# Patient Record
Sex: FEMALE | Race: WHITE | NOT HISPANIC OR LATINO | Employment: OTHER | URBAN - METROPOLITAN AREA
[De-identification: names, ages, dates, MRNs, and addresses within clinical notes are randomized per-mention and may not be internally consistent; named-entity substitution may affect disease eponyms.]

---

## 2016-10-18 LAB
CHOLESTEROL, TOTAL: 299 (ref 125–200)
CREATININE RANDOM URINE: 59 MG/DL (ref 20–320)
HDLC SERPL-MCNC: 37 MG/DL
LDLC SERPL CALC-MCNC: 224 MG/DL
MICROALBUM.,U,RANDOM: 2.6
MICROALBUMIN/CREATININE RATIO: 44
TRIGL SERPL-MCNC: 192 MG/DL

## 2017-02-09 ENCOUNTER — OFFICE VISIT (OUTPATIENT)
Dept: FAMILY MEDICINE | Facility: CLINIC | Age: 82
End: 2017-02-09
Payer: MEDICARE

## 2017-02-09 VITALS
HEIGHT: 59 IN | OXYGEN SATURATION: 98 % | TEMPERATURE: 98 F | HEART RATE: 79 BPM | DIASTOLIC BLOOD PRESSURE: 68 MMHG | SYSTOLIC BLOOD PRESSURE: 150 MMHG | WEIGHT: 113.88 LBS | BODY MASS INDEX: 22.96 KG/M2 | RESPIRATION RATE: 18 BRPM

## 2017-02-09 DIAGNOSIS — E11.40 CONTROLLED TYPE 2 DIABETES MELLITUS WITH DIABETIC NEUROPATHY, WITHOUT LONG-TERM CURRENT USE OF INSULIN: ICD-10-CM

## 2017-02-09 DIAGNOSIS — M54.9 CHRONIC BACK PAIN, UNSPECIFIED BACK LOCATION, UNSPECIFIED BACK PAIN LATERALITY: ICD-10-CM

## 2017-02-09 DIAGNOSIS — G89.29 CHRONIC BACK PAIN, UNSPECIFIED BACK LOCATION, UNSPECIFIED BACK PAIN LATERALITY: ICD-10-CM

## 2017-02-09 DIAGNOSIS — M15.9 OSTEOARTHRITIS INVOLVING MULTIPLE JOINTS ON BOTH SIDES OF BODY: ICD-10-CM

## 2017-02-09 DIAGNOSIS — I10 ESSENTIAL HYPERTENSION: ICD-10-CM

## 2017-02-09 PROBLEM — E11.9 DIABETES TYPE 2, CONTROLLED: Status: ACTIVE | Noted: 2017-02-09

## 2017-02-09 PROCEDURE — 99204 OFFICE O/P NEW MOD 45 MIN: CPT | Mod: S$GLB,,, | Performed by: FAMILY MEDICINE

## 2017-02-09 PROCEDURE — 99999 PR PBB SHADOW E&M-NEW PATIENT-LVL I: CPT | Mod: PBBFAC,,, | Performed by: FAMILY MEDICINE

## 2017-02-09 PROCEDURE — 1159F MED LIST DOCD IN RCRD: CPT | Mod: S$GLB,,, | Performed by: FAMILY MEDICINE

## 2017-02-09 PROCEDURE — 1160F RVW MEDS BY RX/DR IN RCRD: CPT | Mod: S$GLB,,, | Performed by: FAMILY MEDICINE

## 2017-02-09 PROCEDURE — 1157F ADVNC CARE PLAN IN RCRD: CPT | Mod: S$GLB,,, | Performed by: FAMILY MEDICINE

## 2017-02-09 RX ORDER — ATORVASTATIN CALCIUM 20 MG/1
40 TABLET, FILM COATED ORAL DAILY
COMMUNITY
End: 2018-01-04

## 2017-02-09 RX ORDER — GABAPENTIN 300 MG/1
300 CAPSULE ORAL 3 TIMES DAILY
COMMUNITY
End: 2018-06-26 | Stop reason: SDUPTHER

## 2017-02-09 RX ORDER — ASPIRIN 81 MG/1
81 TABLET ORAL DAILY
COMMUNITY

## 2017-02-09 RX ORDER — TRAMADOL HYDROCHLORIDE 50 MG/1
50 TABLET ORAL EVERY 12 HOURS PRN
Qty: 60 TABLET | Refills: 0 | Status: SHIPPED | OUTPATIENT
Start: 2017-02-09 | End: 2017-03-09 | Stop reason: SDUPTHER

## 2017-02-09 RX ORDER — SERTRALINE HYDROCHLORIDE 100 MG/1
100 TABLET, FILM COATED ORAL DAILY
COMMUNITY
End: 2017-05-24 | Stop reason: SDUPTHER

## 2017-02-09 NOTE — PROGRESS NOTES
Chief Complaint   Patient presents with    Establish Care       HPI  Kaycee Almanza is a 82 y.o. female with multiple medical diagnoses as listed in the medical history and problem list that presents for establishment of care . She has diabetes type 2 and hypertension along with chronic pain from arthritis. She was previously seeing Dr. Cordero but changed her insurance. Her blood sugars range form 130-140s. She has been taking tramadol for pain once daily with tylenol but this does not last long enough. She would like to take it twice a day but is concerned she will run out of pills.   The pain in her back is worsening and radiates down her legs. Her daughter describes her as being very active but is now reduced to sitting in her chair most days due to pain. She is not having numbness or paresthesia.     PAST MEDICAL HISTORY:  Past Medical History   Diagnosis Date    Arthritis     Cataracts, both eyes     Depression     Diabetes type 2, controlled      sees Dr. Elena    Hyperlipidemia     Hypertension        PAST SURGICAL HISTORY:  Past Surgical History   Procedure Laterality Date    Cholecystectomy      Cataract surgery  2006    Shoulder surgery      Knee surgery      Hand surgery      Hysterectomy         SOCIAL HISTORY:  Social History     Social History    Marital status:      Spouse name: N/A    Number of children: N/A    Years of education: N/A     Occupational History    Not on file.     Social History Main Topics    Smoking status: Not on file    Smokeless tobacco: Not on file    Alcohol use Not on file    Drug use: Not on file    Sexual activity: Not on file     Other Topics Concern    Not on file     Social History Narrative    No narrative on file       FAMILY HISTORY:  No family history on file.    ALLERGIES AND MEDICATIONS: updated and reviewed.  Review of patient's allergies indicates:  No Known Allergies  Current Outpatient Prescriptions   Medication Sig Dispense  Refill    tramadol (ULTRAM) 50 mg tablet Take 1 tablet (50 mg total) by mouth every 12 (twelve) hours as needed for Pain. 60 tablet 0     No current facility-administered medications for this visit.        ROS  Review of Systems   Constitutional: Negative for chills, diaphoresis, fatigue, fever and unexpected weight change.   HENT: Negative for rhinorrhea, sinus pressure, sore throat and tinnitus.    Eyes: Negative for photophobia and visual disturbance.   Respiratory: Negative for cough, shortness of breath and wheezing.    Cardiovascular: Negative for chest pain and palpitations.   Gastrointestinal: Negative for abdominal pain, blood in stool, constipation, diarrhea, nausea and vomiting.   Genitourinary: Negative for dysuria, flank pain, frequency and vaginal discharge.   Musculoskeletal: Positive for arthralgias and back pain. Negative for joint swelling.   Skin: Negative for rash.   Neurological: Negative for speech difficulty, weakness, light-headedness and headaches.   Psychiatric/Behavioral: Negative for behavioral problems and dysphoric mood.       Physical Exam  There were no vitals filed for this visit. There is no height or weight on file to calculate BMI.            Physical Exam   Constitutional: She is oriented to person, place, and time. She appears well-developed and well-nourished. No distress.   HENT:   Head: Normocephalic and atraumatic.   Eyes: EOM are normal.   Neck: Neck supple.   Cardiovascular: Normal rate and regular rhythm.  Exam reveals no gallop and no friction rub.    No murmur heard.  Pulmonary/Chest: Effort normal and breath sounds normal. No respiratory distress. She has no wheezes. She has no rales.   Neurological: She is alert and oriented to person, place, and time.   Skin: Skin is warm and dry. No rash noted.   Psychiatric: She has a normal mood and affect. Her behavior is normal.   Nursing note and vitals reviewed.      Health Maintenance     Patient has no pending health  maintenance at this time            ASSESSMENT     1. Osteoarthritis involving multiple joints on both sides of body    2. Essential hypertension    3. Controlled type 2 diabetes mellitus with diabetic neuropathy, without long-term current use of insulin    4. Chronic back pain, unspecified back location, unspecified back pain laterality        PLAN:     Osteoarthritis involving multiple joints on both sides of body  -increase to BID dosing, discussed side effects of narcotic medications such as tolerance, drowsiness, or constipation  -     tramadol (ULTRAM) 50 mg tablet; Take 1 tablet (50 mg total) by mouth every 12 (twelve) hours as needed for Pain.  Dispense: 60 tablet; Refill: 0    Essential hypertension  -follow up in one month if recheck is elevated    Controlled type 2 diabetes mellitus with diabetic neuropathy, without long-term current use of insulin  -get records, may need to update A1C at next visit    Chronic back pain, unspecified back location, unspecified back pain laterality  -This is a chronic medical condition that is stable under the current regimen. Continue to monitor and we will make medication adjustments as needed.           Iona Jose MD  02/09/2017 12:55 PM        Return in about 1 month (around 3/9/2017) for Follow up.

## 2017-03-09 ENCOUNTER — OFFICE VISIT (OUTPATIENT)
Dept: FAMILY MEDICINE | Facility: CLINIC | Age: 82
End: 2017-03-09
Payer: MEDICARE

## 2017-03-09 VITALS
DIASTOLIC BLOOD PRESSURE: 86 MMHG | SYSTOLIC BLOOD PRESSURE: 170 MMHG | OXYGEN SATURATION: 97 % | RESPIRATION RATE: 18 BRPM | HEIGHT: 59 IN | TEMPERATURE: 98 F | WEIGHT: 112.88 LBS | HEART RATE: 71 BPM | BODY MASS INDEX: 22.76 KG/M2

## 2017-03-09 DIAGNOSIS — M15.9 OSTEOARTHRITIS INVOLVING MULTIPLE JOINTS ON BOTH SIDES OF BODY: ICD-10-CM

## 2017-03-09 DIAGNOSIS — G89.29 CHRONIC BACK PAIN, UNSPECIFIED BACK LOCATION, UNSPECIFIED BACK PAIN LATERALITY: ICD-10-CM

## 2017-03-09 DIAGNOSIS — E11.9 TYPE II OR UNSPECIFIED TYPE DIABETES MELLITUS WITHOUT MENTION OF COMPLICATION, NOT STATED AS UNCONTROLLED: Primary | ICD-10-CM

## 2017-03-09 DIAGNOSIS — I10 ESSENTIAL HYPERTENSION: ICD-10-CM

## 2017-03-09 DIAGNOSIS — M54.9 CHRONIC BACK PAIN, UNSPECIFIED BACK LOCATION, UNSPECIFIED BACK PAIN LATERALITY: ICD-10-CM

## 2017-03-09 PROCEDURE — 1157F ADVNC CARE PLAN IN RCRD: CPT | Mod: S$GLB,,, | Performed by: FAMILY MEDICINE

## 2017-03-09 PROCEDURE — 3079F DIAST BP 80-89 MM HG: CPT | Mod: S$GLB,,, | Performed by: FAMILY MEDICINE

## 2017-03-09 PROCEDURE — 1160F RVW MEDS BY RX/DR IN RCRD: CPT | Mod: S$GLB,,, | Performed by: FAMILY MEDICINE

## 2017-03-09 PROCEDURE — 99214 OFFICE O/P EST MOD 30 MIN: CPT | Mod: S$GLB,,, | Performed by: FAMILY MEDICINE

## 2017-03-09 PROCEDURE — 99999 PR PBB SHADOW E&M-EST. PATIENT-LVL IV: CPT | Mod: PBBFAC,,, | Performed by: FAMILY MEDICINE

## 2017-03-09 PROCEDURE — 3077F SYST BP >= 140 MM HG: CPT | Mod: S$GLB,,, | Performed by: FAMILY MEDICINE

## 2017-03-09 PROCEDURE — 1125F AMNT PAIN NOTED PAIN PRSNT: CPT | Mod: S$GLB,,, | Performed by: FAMILY MEDICINE

## 2017-03-09 PROCEDURE — 1159F MED LIST DOCD IN RCRD: CPT | Mod: S$GLB,,, | Performed by: FAMILY MEDICINE

## 2017-03-09 RX ORDER — AMLODIPINE BESYLATE 5 MG/1
5 TABLET ORAL DAILY
COMMUNITY
End: 2017-05-24 | Stop reason: SDUPTHER

## 2017-03-09 RX ORDER — LISINOPRIL 40 MG/1
40 TABLET ORAL DAILY
Qty: 90 TABLET | Refills: 3 | Status: SHIPPED | OUTPATIENT
Start: 2017-03-09 | End: 2018-02-01 | Stop reason: SDUPTHER

## 2017-03-09 RX ORDER — TRAMADOL HYDROCHLORIDE 50 MG/1
50 TABLET ORAL EVERY 12 HOURS PRN
Qty: 60 TABLET | Refills: 3 | Status: SHIPPED | OUTPATIENT
Start: 2017-03-09 | End: 2017-08-24

## 2017-03-09 RX ORDER — LISINOPRIL 20 MG/1
20 TABLET ORAL DAILY
COMMUNITY
End: 2017-03-09

## 2017-03-09 NOTE — MR AVS SNAPSHOT
[facility]  [address]  [place] [address]  Phone: [phone]  Fax: [phone]                  Kaycee Almanza   3/9/2017 7:20 AM   Office Visit    Description:  Female : 10/13/1934   Provider:  Iona Jose MD   Department:  [facility] - Family Medicine           Reason for Visit     Hypertension     Follow-up           Diagnoses this Visit        Comments    Type II or unspecified type diabetes mellitus without mention of complication, not stated as uncontrolled    -  Primary     Osteoarthritis involving multiple joints on both sides of body         Essential hypertension         Chronic back pain, unspecified back location, unspecified back pain laterality                To Do List           Goals (5 Years of Data)     None      Follow-Up and Disposition     Return in about 1 month (around 2017) for Follow up.       These Medications        Disp Refills Start End    lisinopril (PRINIVIL,ZESTRIL) 40 MG tablet 90 tablet 3 3/9/2017 3/9/2018    Take 1 tablet (40 mg total) by mouth once daily. - Oral    Pharmacy: [facility] - [address] #: [phone]         [facility] On Call     [facility] On Call Nurse Care Line -  Assistance  Registered nurses in the [facility] On Call Center provide clinical advisement, health education, appointment booking, and other advisory services.  Call for this free service at [phone].             Medications           Message regarding Medications     Verify the changes and/or additions to your medication regime listed below are the same as discussed with your clinician today.  If any of these changes or additions are incorrect, please notify your healthcare provider.        START taking these NEW medications        Refills    lisinopril (PRINIVIL,ZESTRIL) 40 MG tablet 3    Sig: Take 1 tablet (40 mg total) by mouth once daily.    Class: Normal    Route: Oral           Verify that the below  "list of medications is an accurate representation of the medications you are currently taking.  If none reported, the list may be blank. If incorrect, please contact your healthcare provider. Carry this list with you in case of emergency.           Current Medications     amlodipine (NORVASC) 5 MG tablet Take 5 mg by mouth once daily.    aspirin (ECOTRIN) 81 MG EC tablet Take 81 mg by mouth once daily.    atorvastatin (LIPITOR) 20 MG tablet Take 20 mg by mouth once daily.    gabapentin (NEURONTIN) 300 MG capsule Take 300 mg by mouth 3 (three) times daily.    linagliptin (TRADJENTA) 5 mg Tab tablet Take 5 mg by mouth once daily.    sertraline (ZOLOFT) 100 MG tablet Take 100 mg by mouth once daily.    tramadol (ULTRAM) 50 mg tablet Take 1 tablet (50 mg total) by mouth every 12 (twelve) hours as needed for Pain.    lisinopril (PRINIVIL,ZESTRIL) 40 MG tablet Take 1 tablet (40 mg total) by mouth once daily.           Clinical Reference Information           Your Vitals Were     BP Pulse Temp Resp Height Weight    170/86 (BP Location: Left arm, Patient Position: Sitting, BP Method: Manual) 71 97.9 °F (36.6 °C) (Oral) 18 4' 11" (1.499 m) 51.2 kg (112 lb 14 oz)    SpO2 BMI             97% 22.8 kg/m2         Blood Pressure          Most Recent Value    BP  (!)  170/86      Allergies as of 3/9/2017     No Known Allergies      Immunizations Administered on Date of Encounter - 3/9/2017     None      Orders Placed During Today's Visit      Normal Orders This Visit    Ambulatory consult to Endocrinology     Ambulatory consult to Orthopedics       [facility] Sign-Up     Activating your [facility] account is as easy as 1-2-3!     1) Visit [email or web address], select Sign Up Now, enter this activation code and your date of birth, then select Next.  [email or web address]  Expires: 4/23/2017  7:49 AM      2) Create a username and password to use when you visit [facility] in the future and select a security question in case you lose your password " and select Next.    3) Enter your e-mail address and click Sign Up!    Additional Information  If you have questions, please e-mail [email or web address] or call [phone] to talk to our [facility] staff. Remember, [facility] is NOT to be used for urgent needs. For medical emergencies, dial 911.         Language Assistance Services     ATTENTION: Language assistance services are available, free of charge. Please call [phone].      ATENCIÓN: Si habla farrukhibeth, tiene a baker disposición servicios gratuitos de asistencia lingüística. Llame al [phone].     [facility] Ý: N?u b?n nói Ti?ng Vi?t, có các d?ch v? h? tr? ngôn ng? mi?n phí dành cho b?n. G?i s? [phone].         [facility] complies with applicable Federal civil rights laws and does not discriminate on the basis of race, color, national origin, age, disability, or sex.

## 2017-03-09 NOTE — PROGRESS NOTES
Chief Complaint   Patient presents with    Hypertension    Follow-up     Referral       HPI  Kaycee Almanza is a 82 y.o. female with multiple medical diagnoses as listed in the medical history and problem list that presents for follow-up for hypertension, diabetes and arthritis.    She needs a refill of her medication for pain. She needs a referral for Dr. Cesar, her orthopedic to make an appt.    She also needs a referral for Dr. Morales as she sees him tomorrow for diabetes care. Her sugars run around 117-120.    Her blood pressure has not been controlled. She takes norvasc 5mg and lisinopril 20mg. She has been systolic 140-150 and diastolic, 70-88.    PAST MEDICAL HISTORY:  Past Medical History:   Diagnosis Date    Anxiety     Arthritis     Cataracts, both eyes     Dementia     Depression     Diabetes type 2, controlled     sees Dr. Elena    GERD (gastroesophageal reflux disease)     Hyperlipidemia     Hypertension     Osteoporosis     Thyroid disease     Ulcer     Ulcer        PAST SURGICAL HISTORY:  Past Surgical History:   Procedure Laterality Date    cataract surgery  2006    CHOLECYSTECTOMY      EYE SURGERY      HAND SURGERY      HYSTERECTOMY      KNEE SURGERY      SHOULDER SURGERY         SOCIAL HISTORY:  Social History     Social History    Marital status:      Spouse name: N/A    Number of children: N/A    Years of education: N/A     Occupational History    Not on file.     Social History Main Topics    Smoking status: Never Smoker    Smokeless tobacco: Never Used    Alcohol use No    Drug use: No    Sexual activity: No     Other Topics Concern    Not on file     Social History Narrative       FAMILY HISTORY:  Family History   Problem Relation Age of Onset    Stroke Mother     Arthritis Father     Early death Sister     Birth defects Brother     No Known Problems Sister     Birth defects Brother     Birth defects Brother     Birth defects Brother      Birth defects Brother        ALLERGIES AND MEDICATIONS: updated and reviewed.  Review of patient's allergies indicates:  No Known Allergies  Current Outpatient Prescriptions   Medication Sig Dispense Refill    amlodipine (NORVASC) 5 MG tablet Take 5 mg by mouth once daily.      aspirin (ECOTRIN) 81 MG EC tablet Take 81 mg by mouth once daily.      atorvastatin (LIPITOR) 20 MG tablet Take 20 mg by mouth once daily.      gabapentin (NEURONTIN) 300 MG capsule Take 300 mg by mouth 3 (three) times daily.      linagliptin (TRADJENTA) 5 mg Tab tablet Take 5 mg by mouth once daily.      sertraline (ZOLOFT) 100 MG tablet Take 100 mg by mouth once daily.      tramadol (ULTRAM) 50 mg tablet Take 1 tablet (50 mg total) by mouth every 12 (twelve) hours as needed for Pain. 60 tablet 3    lisinopril (PRINIVIL,ZESTRIL) 40 MG tablet Take 1 tablet (40 mg total) by mouth once daily. 90 tablet 3     No current facility-administered medications for this visit.        ROS  Review of Systems   Constitutional: Negative for chills, diaphoresis, fatigue, fever and unexpected weight change.   HENT: Negative for rhinorrhea, sinus pressure, sore throat and tinnitus.    Eyes: Negative for photophobia and visual disturbance.   Respiratory: Negative for cough, shortness of breath and wheezing.    Cardiovascular: Negative for chest pain and palpitations.   Gastrointestinal: Negative for abdominal pain, blood in stool, constipation, diarrhea, nausea and vomiting.   Genitourinary: Negative for dysuria, flank pain, frequency and vaginal discharge.   Musculoskeletal: Positive for arthralgias. Negative for joint swelling.   Skin: Negative for rash.   Neurological: Negative for speech difficulty, weakness, light-headedness and headaches.   Psychiatric/Behavioral: Negative for behavioral problems and dysphoric mood.       Physical Exam  Vitals:    03/09/17 0722   BP: (!) 170/86   BP Location: Left arm   Patient Position: Sitting   BP Method:  "Manual   Pulse: 71   Resp: 18   Temp: 97.9 °F (36.6 °C)   TempSrc: Oral   SpO2: 97%   Weight: 51.2 kg (112 lb 14 oz)   Height: 4' 11" (1.499 m)    Body mass index is 22.8 kg/(m^2).  Weight: 51.2 kg (112 lb 14 oz)   Height: 4' 11" (149.9 cm)     Physical Exam   Constitutional: She is oriented to person, place, and time. She appears well-developed and well-nourished.   HENT:   Head: Normocephalic and atraumatic.   Eyes: EOM are normal.   Neurological: She is alert and oriented to person, place, and time.   Skin: Skin is warm and dry. No rash noted. No erythema.   Psychiatric: She has a normal mood and affect. Her behavior is normal.   Nursing note and vitals reviewed.      Health Maintenance       Date Due Completion Date    Lipid Panel 10/13/1934 ---    Hemoglobin A1c 10/13/1934 ---    Foot Exam 10/13/1944 ---    Eye Exam 10/13/1944 ---    DEXA SCAN 10/13/1974 ---    Zoster Vaccine 10/13/1994 ---    Pneumococcal (65+) (1 of 2 - PCV13) 10/13/1999 ---    Influenza Vaccine 8/1/2016 ---    TETANUS VACCINE 3/9/2027 3/9/2017 (Declined)    Override on 3/9/2017: Declined            ASSESSMENT     1. Type II or unspecified type diabetes mellitus without mention of complication, not stated as uncontrolled    2. Osteoarthritis involving multiple joints on both sides of body    3. Essential hypertension    4. Chronic back pain, unspecified back location, unspecified back pain laterality        PLAN:     Type II or unspecified type diabetes mellitus without mention of complication, not stated as uncontrolled  -     Ambulatory consult to Endocrinology    Osteoarthritis involving multiple joints on both sides of body  -     Ambulatory consult to Orthopedics  -     tramadol (ULTRAM) 50 mg tablet; Take 1 tablet (50 mg total) by mouth every 12 (twelve) hours as needed for Pain.  Dispense: 60 tablet; Refill: 3    Essential hypertension  -increase medication to 40 mg  -     lisinopril (PRINIVIL,ZESTRIL) 40 MG tablet; Take 1 tablet (40 " mg total) by mouth once daily.  Dispense: 90 tablet; Refill: 3    Chronic back pain, unspecified back location, unspecified back pain laterality  -ultram refilled, has follow up with orthopedics        Continue home monitoring      Iona Jose MD  03/09/2017 7:54 AM        Return in about 1 month (around 4/9/2017) for Follow up.

## 2017-03-10 DIAGNOSIS — E11.9 TYPE 2 DIABETES MELLITUS WITHOUT COMPLICATION: ICD-10-CM

## 2017-04-10 ENCOUNTER — OFFICE VISIT (OUTPATIENT)
Dept: FAMILY MEDICINE | Facility: CLINIC | Age: 82
End: 2017-04-10
Payer: MEDICARE

## 2017-04-10 ENCOUNTER — TELEPHONE (OUTPATIENT)
Dept: ADMINISTRATIVE | Facility: HOSPITAL | Age: 82
End: 2017-04-10

## 2017-04-10 VITALS
BODY MASS INDEX: 22.6 KG/M2 | SYSTOLIC BLOOD PRESSURE: 138 MMHG | OXYGEN SATURATION: 99 % | HEIGHT: 59 IN | RESPIRATION RATE: 18 BRPM | WEIGHT: 112.13 LBS | DIASTOLIC BLOOD PRESSURE: 60 MMHG | TEMPERATURE: 98 F | HEART RATE: 71 BPM

## 2017-04-10 DIAGNOSIS — E11.9 TYPE 2 DIABETES MELLITUS WITHOUT COMPLICATION, WITHOUT LONG-TERM CURRENT USE OF INSULIN: Primary | ICD-10-CM

## 2017-04-10 DIAGNOSIS — I10 ESSENTIAL HYPERTENSION: ICD-10-CM

## 2017-04-10 PROCEDURE — 1160F RVW MEDS BY RX/DR IN RCRD: CPT | Mod: S$GLB,,, | Performed by: FAMILY MEDICINE

## 2017-04-10 PROCEDURE — 99214 OFFICE O/P EST MOD 30 MIN: CPT | Mod: S$GLB,,, | Performed by: FAMILY MEDICINE

## 2017-04-10 PROCEDURE — 3078F DIAST BP <80 MM HG: CPT | Mod: S$GLB,,, | Performed by: FAMILY MEDICINE

## 2017-04-10 PROCEDURE — 99999 PR PBB SHADOW E&M-EST. PATIENT-LVL III: CPT | Mod: PBBFAC,,, | Performed by: FAMILY MEDICINE

## 2017-04-10 PROCEDURE — 1159F MED LIST DOCD IN RCRD: CPT | Mod: S$GLB,,, | Performed by: FAMILY MEDICINE

## 2017-04-10 PROCEDURE — 1157F ADVNC CARE PLAN IN RCRD: CPT | Mod: S$GLB,,, | Performed by: FAMILY MEDICINE

## 2017-04-10 PROCEDURE — 3075F SYST BP GE 130 - 139MM HG: CPT | Mod: S$GLB,,, | Performed by: FAMILY MEDICINE

## 2017-04-10 PROCEDURE — 1125F AMNT PAIN NOTED PAIN PRSNT: CPT | Mod: S$GLB,,, | Performed by: FAMILY MEDICINE

## 2017-04-10 NOTE — TELEPHONE ENCOUNTER
A request for was sent on today to Dr. Luther's office for a copy of the patient's most recent office notes and blood work. I spoke w/ Sariah, she will fax over the requested information to our office on today.    A request was sent on today to [facility] for a copy of the patient's bone scan.

## 2017-04-10 NOTE — MR AVS SNAPSHOT
[facility]  [address]  [place] [address]  Phone: [phone]  Fax: [phone]                  Kaycee Almanza   4/10/2017 7:20 AM   Office Visit    Description:  Female : 10/13/1934   Provider:  Iona Jose MD   Department:  [facility] Medicine           Reason for Visit     Hypertension     Follow-up           Diagnoses this Visit        Comments    Type 2 diabetes mellitus without complication, without long-term current use of insulin    -  Primary     Essential hypertension                To Do List           Goals (5 Years of Data)     None      Follow-Up and Disposition     Return in about 6 weeks (around 2017) for Follow up.      [facility] On Call     [facility] On [facility] Nurse Care Line -  Assistance  Unless otherwise directed by your provider, please contact [facility], our nurse care line that is available for  assistance.     Registered nurses in the [facility] On Call Center provide: appointment scheduling, clinical advisement, health education, and other advisory services.  Call: [phone] (toll free)               Medications           Message regarding Medications     Verify the changes and/or additions to your medication regime listed below are the same as discussed with your clinician today.  If any of these changes or additions are incorrect, please notify your healthcare provider.             Verify that the below list of medications is an accurate representation of the medications you are currently taking.  If none reported, the list may be blank. If incorrect, please contact your healthcare provider. Carry this list with you in case of emergency.           Current Medications     amlodipine (NORVASC) 5 MG tablet Take 5 mg by mouth once daily.    aspirin (ECOTRIN) 81 MG EC tablet Take 81 mg by mouth once daily.    atorvastatin (LIPITOR) 20 MG tablet Take 20 mg by mouth once daily.    gabapentin (NEURONTIN) 300 MG capsule Take 300 mg by  "mouth 3 (three) times daily.    linagliptin (TRADJENTA) 5 mg Tab tablet Take 5 mg by mouth once daily.    lisinopril (PRINIVIL,ZESTRIL) 40 MG tablet Take 1 tablet (40 mg total) by mouth once daily.    sertraline (ZOLOFT) 100 MG tablet Take 100 mg by mouth once daily.    tramadol (ULTRAM) 50 mg tablet Take 1 tablet (50 mg total) by mouth every 12 (twelve) hours as needed for Pain.           Clinical Reference Information           Your Vitals Were     BP Pulse Temp Resp Height Weight    138/60 71 97.9 °F (36.6 °C) (Oral) 18 4' 11" (1.499 m) 50.8 kg (112 lb 1.7 oz)    SpO2 BMI             99% 22.64 kg/m2         Blood Pressure          Most Recent Value    BP  138/60      Allergies as of 4/10/2017     No Known Allergies      Immunizations Administered on Date of Encounter - 4/10/2017     None      Orders Placed During Today's Visit      Normal Orders This Visit    Ambulatory referral to Optometry       [facility] Sign-Up     Activating your [facility] account is as easy as 1-2-3!     1) Visit [email or web address], select Sign Up Now, enter this activation code and your date of birth, then select Next.  [email or web address]  Expires: 4/23/2017  8:49 AM      2) Create a username and password to use when you visit [facility] in the future and select a security question in case you lose your password and select Next.    3) Enter your e-mail address and click Sign Up!    Additional Information  If you have questions, please e-mail [email or web address] or call [phone] to talk to our [facility] staff. Remember, [facility] is NOT to be used for urgent needs. For medical emergencies, dial 911.         Language Assistance Services     ATTENTION: Language assistance services are available, free of charge. Please call [phone].      ATENCIÓN: Si habla español, tiene a baker disposición servicios gratuitos de asistencia lingüística. Llame al [phone].     CHÚ Ý: N?u b?n nói Ti?ng Vi?t, có các d?ch v? h? tr? ngôn ng? mi?n phí bryce " ynes garcia?nZack TOSCANO?i s? [phone].         [facility] complies with applicable Federal civil rights laws and does not discriminate on the basis of race, color, national origin, age, disability, or sex.

## 2017-04-10 NOTE — PROGRESS NOTES
Chief Complaint   Patient presents with    Hypertension    Follow-up       HPI  Kaycee Almanza is a 82 y.o. female with multiple medical diagnoses as listed in the medical history and problem list that presents for follow-up for hypertension and diabetes. She has been doing well, she is checking her sugars and they run in the 100s. Her pressures have been improving with systolic average in 140s but some are in the 120s and one was 96/66. Diastolic running in the 60s-80s. She has been checking her pressure before she takes her medicine when she checks her sugar.     She has seen Dr. Morales and was told she has prediabetes. She feels fine.    PAST MEDICAL HISTORY:  Past Medical History:   Diagnosis Date    Anxiety     Arthritis     Cataracts, both eyes     Dementia     Depression     Diabetes type 2, controlled     sees Dr. Elena    GERD (gastroesophageal reflux disease)     Hyperlipidemia     Hypertension     Osteoporosis     Thyroid disease     Ulcer     Ulcer        PAST SURGICAL HISTORY:  Past Surgical History:   Procedure Laterality Date    cataract surgery  2006    CHOLECYSTECTOMY      EYE SURGERY      HAND SURGERY      HYSTERECTOMY      KNEE SURGERY      SHOULDER SURGERY         SOCIAL HISTORY:  Social History     Social History    Marital status:      Spouse name: N/A    Number of children: N/A    Years of education: N/A     Occupational History    Not on file.     Social History Main Topics    Smoking status: Never Smoker    Smokeless tobacco: Never Used    Alcohol use No    Drug use: No    Sexual activity: No     Other Topics Concern    Not on file     Social History Narrative       FAMILY HISTORY:  Family History   Problem Relation Age of Onset    Stroke Mother     Arthritis Father     Early death Sister     Birth defects Brother     No Known Problems Sister     Birth defects Brother     Birth defects Brother     Birth defects Brother     Birth defects  Brother        ALLERGIES AND MEDICATIONS: updated and reviewed.  Review of patient's allergies indicates:  No Known Allergies  Current Outpatient Prescriptions   Medication Sig Dispense Refill    amlodipine (NORVASC) 5 MG tablet Take 5 mg by mouth once daily.      aspirin (ECOTRIN) 81 MG EC tablet Take 81 mg by mouth once daily.      atorvastatin (LIPITOR) 20 MG tablet Take 20 mg by mouth once daily.      gabapentin (NEURONTIN) 300 MG capsule Take 300 mg by mouth 3 (three) times daily.      linagliptin (TRADJENTA) 5 mg Tab tablet Take 5 mg by mouth once daily.      lisinopril (PRINIVIL,ZESTRIL) 40 MG tablet Take 1 tablet (40 mg total) by mouth once daily. 90 tablet 3    sertraline (ZOLOFT) 100 MG tablet Take 100 mg by mouth once daily.      tramadol (ULTRAM) 50 mg tablet Take 1 tablet (50 mg total) by mouth every 12 (twelve) hours as needed for Pain. 60 tablet 3     No current facility-administered medications for this visit.        ROS  Review of Systems   Constitutional: Negative for chills, diaphoresis, fatigue, fever and unexpected weight change.   HENT: Negative for rhinorrhea, sinus pressure, sore throat and tinnitus.    Eyes: Negative for photophobia and visual disturbance.   Respiratory: Negative for cough, shortness of breath and wheezing.    Cardiovascular: Negative for chest pain and palpitations.   Gastrointestinal: Negative for abdominal pain, blood in stool, constipation, diarrhea, nausea and vomiting.   Genitourinary: Negative for dysuria, flank pain, frequency and vaginal discharge.   Musculoskeletal: Negative for arthralgias and joint swelling.   Skin: Negative for rash.   Neurological: Negative for speech difficulty, weakness, light-headedness and headaches.   Psychiatric/Behavioral: Negative for behavioral problems and dysphoric mood.       Physical Exam  Vitals:    04/10/17 0713   BP: 138/60   Pulse: 71   Resp: 18   Temp: 97.9 °F (36.6 °C)   TempSrc: Oral   SpO2: 99%   Weight: 50.8 kg  "(112 lb 1.7 oz)   Height: 4' 11" (1.499 m)    Body mass index is 22.64 kg/(m^2).  Weight: 50.8 kg (112 lb 1.7 oz)   Height: 4' 11" (149.9 cm)     Physical Exam   Constitutional: She is oriented to person, place, and time. She appears well-developed and well-nourished.   Eyes: EOM are normal.   Neurological: She is alert and oriented to person, place, and time.   Skin: Skin is warm and dry. No rash noted. No erythema.   Psychiatric: She has a normal mood and affect. Her behavior is normal.   Nursing note and vitals reviewed.      Health Maintenance       Date Due Completion Date    Lipid Panel 10/13/1934 ---    DEXA SCAN 10/13/1974 ---    Pneumococcal (65+) (1 of 2 - PCV13) 10/13/1999 ---    Influenza Vaccine 9/29/2017 (Originally 8/1/2016) ---    TETANUS VACCINE 3/9/2027 3/9/2017 (Declined)    Override on 3/9/2017: Declined            ASSESSMENT     1. Type 2 diabetes mellitus without complication, without long-term current use of insulin    2. Essential hypertension        PLAN:     Type 2 diabetes mellitus without complication, without long-term current use of insulin  -     Ambulatory referral to Optometry    Essential hypertension  -continue current management, BP improved, check Bp one to two hours after taking medication    Will get records from Dr. Andrew Jose MD  04/10/2017 7:35 AM        Return in about 6 weeks (around 5/22/2017) for Follow up.              "

## 2017-04-10 NOTE — TELEPHONE ENCOUNTER
----- Message from Iona Jose MD sent at 4/10/2017  7:41 AM CDT -----  Regarding: records  Record release for records from Dr. Morales along with bone scan done at [facility]    Thanks    Iona Jose MD

## 2017-04-11 ENCOUNTER — TELEPHONE (OUTPATIENT)
Dept: FAMILY MEDICINE | Facility: CLINIC | Age: 82
End: 2017-04-11

## 2017-04-11 NOTE — TELEPHONE ENCOUNTER
Patient has an appointment 4/19/17 8:30am with Dr. Crow at the [facility], patient was notified.

## 2017-04-19 ENCOUNTER — OFFICE VISIT (OUTPATIENT)
Dept: OPTOMETRY | Facility: CLINIC | Age: 82
End: 2017-04-19
Payer: MEDICARE

## 2017-04-19 DIAGNOSIS — H52.03 HYPEROPIA WITH ASTIGMATISM AND PRESBYOPIA, BILATERAL: ICD-10-CM

## 2017-04-19 DIAGNOSIS — H26.492 PCO (POSTERIOR CAPSULAR OPACIFICATION), LEFT: ICD-10-CM

## 2017-04-19 DIAGNOSIS — H52.203 HYPEROPIA WITH ASTIGMATISM AND PRESBYOPIA, BILATERAL: ICD-10-CM

## 2017-04-19 DIAGNOSIS — H52.4 HYPEROPIA WITH ASTIGMATISM AND PRESBYOPIA, BILATERAL: ICD-10-CM

## 2017-04-19 DIAGNOSIS — E11.9 DIABETES MELLITUS TYPE 2 WITHOUT RETINOPATHY: Primary | ICD-10-CM

## 2017-04-19 DIAGNOSIS — Z13.5 GLAUCOMA SCREENING: ICD-10-CM

## 2017-04-19 PROCEDURE — 99999 PR PBB SHADOW E&M-EST. PATIENT-LVL II: CPT | Mod: PBBFAC,,, | Performed by: OPTOMETRIST

## 2017-04-19 PROCEDURE — 92015 DETERMINE REFRACTIVE STATE: CPT | Mod: S$GLB,,, | Performed by: OPTOMETRIST

## 2017-04-19 PROCEDURE — 92004 COMPRE OPH EXAM NEW PT 1/>: CPT | Mod: S$GLB,,, | Performed by: OPTOMETRIST

## 2017-04-19 NOTE — PROGRESS NOTES
"HPI     Pt here for diabetic eye exam and states her last eye exam was about 2   years ago. Pt c/o occasional foggy "veil" over vision OS. Pt states that   if she blinks or rubs eye a little that the veil will go away.  [place] OU 2006, YAG OS only  No results found for: HGBA1C ~5's         Last edited by Koby Crow, OD on 4/19/2017  9:04 AM.         Assessment /Plan     For exam results, see Encounter Report.    Diabetes mellitus type 2 without retinopathy  -No retinopathy noted today.  Continued control with primary care physician and annual comprehensive eye exam.    PCO (posterior capsular opacification), left  -     Ambulatory Referral to Ophthalmology  -YAG consult Dr. Sawyer    Glaucoma screening  -Monitor with annual eye exam and IOP check    Hyperopia with astigmatism and presbyopia, bilateral  Eyeglass Final Rx     Eyeglass Final Rx      Sphere Cylinder Axis Add   Right +0.75 +1.25 005 +2.50   Left +0.50 +1.00 155 +2.50       Type:  Bifocal    Expiration Date:  4/20/2018                  RTC 1 yr                 "

## 2017-04-19 NOTE — MR AVS SNAPSHOT
[facility]  [address]  [place] [address]  Phone: [phone]  Fax: [phone]                  Kaycee Almanza   2017 8:30 AM   Office Visit    Description:  Female : 10/13/1934   Provider:  Koby Crow OD   Department:  [facility]           Reason for Visit     Diabetic Eye Exam           Diagnoses this Visit        Comments    Diabetes mellitus type 2 without retinopathy    -  Primary     PCO (posterior capsular opacification), left         Glaucoma screening         Hyperopia with astigmatism and presbyopia, bilateral                To Do List           Future Appointments        Provider Department Dept Phone    2017 7:00 AM Iona Jose MD [facility] [phone]      Goals (5 Years of Data)     None      Follow-Up and Disposition     Return in about 1 year (around 2018).      [facility] On [facility]     [facility] On Call Nurse Care Line -  Assistance  Unless otherwise directed by your provider, please contact [facility], our nurse care line that is available for  assistance.     Registered nurses in the [facility] provide: appointment scheduling, clinical advisement, health education, and other advisory services.  Call: [phone] (toll free)               Medications           Message regarding Medications     Verify the changes and/or additions to your medication regime listed below are the same as discussed with your clinician today.  If any of these changes or additions are incorrect, please notify your healthcare provider.             Verify that the below list of medications is an accurate representation of the medications you are currently taking.  If none reported, the list may be blank. If incorrect, please contact your healthcare provider. Carry this list with you in case of emergency.           Current Medications     amlodipine (NORVASC) 5 MG tablet Take 5 mg by mouth once daily.    aspirin (ECOTRIN) 81  MG EC tablet Take 81 mg by mouth once daily.    atorvastatin (LIPITOR) 20 MG tablet Take 20 mg by mouth once daily.    gabapentin (NEURONTIN) 300 MG capsule Take 300 mg by mouth 3 (three) times daily.    linagliptin (TRADJENTA) 5 mg Tab tablet Take 5 mg by mouth once daily.    lisinopril (PRINIVIL,ZESTRIL) 40 MG tablet Take 1 tablet (40 mg total) by mouth once daily.    sertraline (ZOLOFT) 100 MG tablet Take 100 mg by mouth once daily.    tramadol (ULTRAM) 50 mg tablet Take 1 tablet (50 mg total) by mouth every 12 (twelve) hours as needed for Pain.           Clinical Reference Information           Allergies as of 4/19/2017     No Known Allergies      Immunizations Administered on Date of Encounter - 4/19/2017     None      Orders Placed During Today's Visit      Normal Orders This Visit    Ambulatory Referral to Ophthalmology       [facility] Sign-Up     Activating your [facility] account is as easy as 1-2-3!     1) Visit [email or web address], select Sign Up Now, enter this activation code and your date of birth, then select Next.  [email or web address]  Expires: 4/23/2017  8:49 AM      2) Create a username and password to use when you visit [facility] in the future and select a security question in case you lose your password and select Next.    3) Enter your e-mail address and click Sign Up!    Additional Information  If you have questions, please e-mail [email or web address] or call [phone] to talk to our [facility] staff. Remember, [facility] is NOT to be used for urgent needs. For medical emergencies, dial 911.         Language Assistance Services     ATTENTION: Language assistance services are available, free of charge. Please call [phone].      ATENCIÓN: Si habla sis, tiene a baker disposición servicios gratuitos de asistencia lingüística. Llame al [phone].     CHÚ Ý: N?u b?n nói Ti?ng Vi?t, có các d?ch v? h? tr? ngôn ng? mi?n phí dành cho b?n. G?i s? [phone].         [facility] complies  with applicable Federal civil rights laws and does not discriminate on the basis of race, color, national origin, age, disability, or sex.

## 2017-04-19 NOTE — LETTER
April 19, 2017      Iona Jose MD  [address] [facility] [address]  [place] [address]           [facility]  [address] [facility]  [place] [address]  Phone: [phone]  Fax: [phone]          Patient: Kaycee Almanza   MR Number: [id number]   YOB: 1934   Date of Visit: 4/19/2017       Dear Dr. Iona Jose:    Thank you for referring Kaycee Almanza to me for evaluation. Attached you will find relevant portions of my assessment and plan of care.    If you have questions, please do not hesitate to call me. I look forward to following Kaycee Almanza along with you.    Sincerely,    Koby Crow, OD    Enclosure  CC:  No Recipients    If you would like to receive this communication electronically, please contact [email or web address] or [phone] to request more information on [facility] access.    For providers and/or their staff who would like to refer a patient to [facility], please contact us through our one-stop-shop provider referral line, [facility], at [phone].    If you feel you have received this communication in error or would no longer like to receive these types of communications, please e-mail [email or web address]

## 2017-04-20 NOTE — TELEPHONE ENCOUNTER
Late entry from 04/10/17. The patient's records from Dr. Luther's office was received. Health maintenance was updated on that day.

## 2017-04-25 NOTE — TELEPHONE ENCOUNTER
The patient's bone density was received in our office on yesterday. Health Maintenance was updated on today.

## 2017-05-11 ENCOUNTER — PROCEDURE VISIT (OUTPATIENT)
Dept: OPHTHALMOLOGY | Facility: CLINIC | Age: 82
End: 2017-05-11
Payer: MEDICARE

## 2017-05-11 VITALS — SYSTOLIC BLOOD PRESSURE: 153 MMHG | DIASTOLIC BLOOD PRESSURE: 74 MMHG | HEART RATE: 82 BPM

## 2017-05-11 DIAGNOSIS — H26.492 PCO (POSTERIOR CAPSULAR OPACIFICATION), LEFT: Primary | ICD-10-CM

## 2017-05-11 DIAGNOSIS — Z96.1 PSEUDOPHAKIA: ICD-10-CM

## 2017-05-11 DIAGNOSIS — I10 ESSENTIAL HYPERTENSION: ICD-10-CM

## 2017-05-11 DIAGNOSIS — E11.9 DM TYPE 2 WITHOUT RETINOPATHY: ICD-10-CM

## 2017-05-11 PROCEDURE — 66821 AFTER CATARACT LASER SURGERY: CPT | Mod: LT,S$GLB,, | Performed by: OPHTHALMOLOGY

## 2017-05-11 PROCEDURE — 92014 COMPRE OPH EXAM EST PT 1/>: CPT | Mod: 57,S$GLB,, | Performed by: OPHTHALMOLOGY

## 2017-05-11 NOTE — MR AVS SNAPSHOT
[facility]  [address]  [place] [address]  Phone: [phone]  Fax: [phone]                  Kaycee Almanza   2017 3:30 PM   Procedure visit    Description:  Female : 10/13/1934   Provider:  Bonifacio Sawyer MD   Department:  [facility]           Reason for Visit     Laser Treatment           Diagnoses this Visit        Comments    PCO (posterior capsular opacification), left    -  Primary     DM type 2 without retinopathy         Essential hypertension         Pseudophakia                To Do List           Future Appointments        Provider Department Dept Phone    2017 7:00 AM Iona Jose MD [facility] [phone]    2017 10:30 AM Bonifacio Sawyer MD [facility] [phone]      Goals (5 Years of Data)     None      Follow-Up and Disposition     Return in about 2 weeks (around 2017) for Post-op Left eye.      [facility]     [facility] On Call Nurse Care Line -  Assistance  Unless otherwise directed by your provider, please contact [facility], our nurse care line that is available for  assistance.     Registered nurses in the [facility] provide: appointment scheduling, clinical advisement, health education, and other advisory services.  Call: [phone] (toll free)               Medications           Message regarding Medications     Verify the changes and/or additions to your medication regime listed below are the same as discussed with your clinician today.  If any of these changes or additions are incorrect, please notify your healthcare provider.             Verify that the below list of medications is an accurate representation of the medications you are currently taking.  If none reported, the list may be blank. If incorrect, please contact your healthcare provider. Carry this list with you in case of emergency.           Current Medications     amlodipine  (NORVASC) 5 MG tablet Take 5 mg by mouth once daily.    aspirin (ECOTRIN) 81 MG EC tablet Take 81 mg by mouth once daily.    atorvastatin (LIPITOR) 20 MG tablet Take 20 mg by mouth once daily.    gabapentin (NEURONTIN) 300 MG capsule Take 300 mg by mouth 3 (three) times daily.    linagliptin (TRADJENTA) 5 mg Tab tablet Take 5 mg by mouth once daily.    lisinopril (PRINIVIL,ZESTRIL) 40 MG tablet Take 1 tablet (40 mg total) by mouth once daily.    sertraline (ZOLOFT) 100 MG tablet Take 100 mg by mouth once daily.    tramadol (ULTRAM) 50 mg tablet Take 1 tablet (50 mg total) by mouth every 12 (twelve) hours as needed for Pain.           Clinical Reference Information           Your Vitals Were     BP Pulse                153/74 (BP Location: Right arm, Patient Position: Sitting, BP Method: Automatic) 82          Blood Pressure          Most Recent Value    BP  (!)  153/74      Allergies as of 5/11/2017     No Known Allergies      Immunizations Administered on Date of Encounter - 5/11/2017     None      [facility] Sign-Up     Activating your [facility] account is as easy as 1-2-3!     1) Visit [email or web address], select Sign Up Now, enter this activation code and your date of birth, then select Next.  [email or web address]  Expires: 6/25/2017  7:12 PM      2) Create a username and password to use when you visit [facility] in the future and select a security question in case you lose your password and select Next.    3) Enter your e-mail address and click Sign Up!    Additional Information  If you have questions, please e-mail [email or web address] or call [phone] to talk to our [facility] staff. Remember, [facility] is NOT to be used for urgent needs. For medical emergencies, dial 911.         Language Assistance Services     ATTENTION: Language assistance services are available, free of charge. Please call [phone].      ATENCIÓN: Si habla español, tiene a baker disposición servicios gratuitos de asistencia lingüística.  Kimberly green [phone].     MARLENY Ý: N?u b?n nói Ti?ng Vi?t, có các d?ch v? h? tr? ngôn ng? mi?n phí dành cho b?n. G?i s? [phone].         [facility] complies with applicable Federal civil rights laws and does not discriminate on the basis of race, color, national origin, age, disability, or sex.

## 2017-05-12 NOTE — PROGRESS NOTES
Subjective:       Patient ID: Kaycee Almanza is a 82 y.o. female.    Chief Complaint: Laser Treatment (Yag Laser OS )    HPI  Review of Systems    Objective:      Physical Exam    Assessment:       1. PCO (posterior capsular opacification), left    2. DM type 2 without retinopathy    3. Essential hypertension    4. Pseudophakia        Plan:       Visually significant  PCO OS- Pt. Wants Laser.  DM-No NPDR.  HTN-No retinopathy.          YAG CAP OS today. TE=  35.0 mj, Avg. 1.0 mj, 35 pulses.  PF taper OS.  Control DM & HTN.  RTC 2 wks.

## 2017-05-24 ENCOUNTER — OFFICE VISIT (OUTPATIENT)
Dept: FAMILY MEDICINE | Facility: CLINIC | Age: 82
End: 2017-05-24
Payer: MEDICARE

## 2017-05-24 VITALS
HEIGHT: 59 IN | OXYGEN SATURATION: 97 % | SYSTOLIC BLOOD PRESSURE: 132 MMHG | RESPIRATION RATE: 18 BRPM | TEMPERATURE: 98 F | BODY MASS INDEX: 22.53 KG/M2 | WEIGHT: 111.75 LBS | HEART RATE: 77 BPM | DIASTOLIC BLOOD PRESSURE: 56 MMHG

## 2017-05-24 DIAGNOSIS — M15.9 OSTEOARTHRITIS INVOLVING MULTIPLE JOINTS ON BOTH SIDES OF BODY: ICD-10-CM

## 2017-05-24 DIAGNOSIS — I10 ESSENTIAL HYPERTENSION: Primary | ICD-10-CM

## 2017-05-24 DIAGNOSIS — E11.9 TYPE 2 DIABETES MELLITUS WITHOUT COMPLICATION, WITHOUT LONG-TERM CURRENT USE OF INSULIN: ICD-10-CM

## 2017-05-24 PROCEDURE — 99999 PR PBB SHADOW E&M-EST. PATIENT-LVL III: CPT | Mod: PBBFAC,,, | Performed by: FAMILY MEDICINE

## 2017-05-24 PROCEDURE — 1159F MED LIST DOCD IN RCRD: CPT | Mod: S$GLB,,, | Performed by: FAMILY MEDICINE

## 2017-05-24 PROCEDURE — 1157F ADVNC CARE PLAN IN RCRD: CPT | Mod: S$GLB,,, | Performed by: FAMILY MEDICINE

## 2017-05-24 PROCEDURE — 3075F SYST BP GE 130 - 139MM HG: CPT | Mod: S$GLB,,, | Performed by: FAMILY MEDICINE

## 2017-05-24 PROCEDURE — 3078F DIAST BP <80 MM HG: CPT | Mod: S$GLB,,, | Performed by: FAMILY MEDICINE

## 2017-05-24 PROCEDURE — 99214 OFFICE O/P EST MOD 30 MIN: CPT | Mod: S$GLB,,, | Performed by: FAMILY MEDICINE

## 2017-05-24 PROCEDURE — 1160F RVW MEDS BY RX/DR IN RCRD: CPT | Mod: S$GLB,,, | Performed by: FAMILY MEDICINE

## 2017-05-24 PROCEDURE — 1125F AMNT PAIN NOTED PAIN PRSNT: CPT | Mod: S$GLB,,, | Performed by: FAMILY MEDICINE

## 2017-05-24 RX ORDER — SERTRALINE HYDROCHLORIDE 100 MG/1
100 TABLET, FILM COATED ORAL DAILY
Qty: 90 TABLET | Refills: 1 | Status: SHIPPED | OUTPATIENT
Start: 2017-05-24 | End: 2017-10-04 | Stop reason: SDUPTHER

## 2017-05-24 RX ORDER — AMLODIPINE BESYLATE 5 MG/1
5 TABLET ORAL DAILY
Qty: 90 TABLET | Refills: 1 | Status: SHIPPED | OUTPATIENT
Start: 2017-05-24 | End: 2017-12-04 | Stop reason: SDUPTHER

## 2017-05-24 NOTE — PROGRESS NOTES
Chief Complaint   Patient presents with    Hypertension    Follow-up    Medication Refill     HTN/PAIN/ANXIETY       HPI  Kaycee Almanza is a 82 y.o. female with multiple medical diagnoses as listed in the medical history and problem list that presents for follow-up for hypertension and diabetes.    Her blood pressure has been improved per her log, with blood pressure systolic 109-136 and diastolic 70-80. She has some that are elevated but these are when she takes it before her medication. She has arthritis in her hands and a ring finger that gets stuck in the mornings and improves with use.    PAST MEDICAL HISTORY:  Past Medical History:   Diagnosis Date    Anxiety     Arthritis     Dementia     Depression     Diabetes type 2, controlled     sees Dr. Elena    GERD (gastroesophageal reflux disease)     Hyperlipidemia     Hypertension     Left posterior capsular opacification 5/11/2017    Osteoporosis     Thyroid disease     Ulcer     Ulcer        PAST SURGICAL HISTORY:  Past Surgical History:   Procedure Laterality Date    CATARACT EXTRACTION W/  INTRAOCULAR LENS IMPLANT Bilateral 2006    done at [facility]    cataract surgery  2006    CHOLECYSTECTOMY      EYE SURGERY      HAND SURGERY      HYSTERECTOMY      KNEE SURGERY      SHOULDER SURGERY         SOCIAL HISTORY:  Social History     Social History    Marital status:      Spouse name: N/A    Number of children: N/A    Years of education: N/A     Occupational History    Not on file.     Social History Main Topics    Smoking status: Never Smoker    Smokeless tobacco: Never Used    Alcohol use No    Drug use: No    Sexual activity: No     Other Topics Concern    Not on file     Social History Narrative    No narrative on file       FAMILY HISTORY:  Family History   Problem Relation Age of Onset    Stroke Mother     Diabetes Mother     Arthritis Father     Early death Sister     Birth defects Brother     No Known  Problems Sister     Birth defects Brother     Birth defects Brother     Birth defects Brother     Birth defects Brother     Amblyopia Neg Hx     Blindness Neg Hx     Cataracts Neg Hx     Glaucoma Neg Hx     Macular degeneration Neg Hx     Retinal detachment Neg Hx     Strabismus Neg Hx        ALLERGIES AND MEDICATIONS: updated and reviewed.  Review of patient's allergies indicates:  No Known Allergies  Current Outpatient Prescriptions   Medication Sig Dispense Refill    amlodipine (NORVASC) 5 MG tablet Take 1 tablet (5 mg total) by mouth once daily. 90 tablet 1    aspirin (ECOTRIN) 81 MG EC tablet Take 81 mg by mouth once daily.      atorvastatin (LIPITOR) 20 MG tablet Take 20 mg by mouth once daily.      gabapentin (NEURONTIN) 300 MG capsule Take 300 mg by mouth 3 (three) times daily.      linagliptin (TRADJENTA) 5 mg Tab tablet Take 1 tablet (5 mg total) by mouth once daily. 90 tablet 1    lisinopril (PRINIVIL,ZESTRIL) 40 MG tablet Take 1 tablet (40 mg total) by mouth once daily. 90 tablet 3    sertraline (ZOLOFT) 100 MG tablet Take 1 tablet (100 mg total) by mouth once daily. 90 tablet 1    tramadol (ULTRAM) 50 mg tablet Take 1 tablet (50 mg total) by mouth every 12 (twelve) hours as needed for Pain. 60 tablet 3    ZOSTAVAX, PF, 19,400 unit/0.65 mL injection        No current facility-administered medications for this visit.        ROS  Review of Systems   Constitutional: Negative for chills, diaphoresis, fatigue, fever and unexpected weight change.   HENT: Negative for rhinorrhea, sinus pressure, sore throat and tinnitus.    Eyes: Negative for photophobia and visual disturbance.   Respiratory: Negative for cough, shortness of breath and wheezing.    Cardiovascular: Negative for chest pain and palpitations.   Gastrointestinal: Negative for abdominal pain, blood in stool, constipation, diarrhea, nausea and vomiting.   Genitourinary: Negative for dysuria, flank pain, frequency and vaginal  "discharge.   Musculoskeletal: Positive for arthralgias. Negative for joint swelling.   Skin: Negative for rash.   Neurological: Negative for speech difficulty, weakness, light-headedness and headaches.   Psychiatric/Behavioral: Negative for behavioral problems and dysphoric mood.       Physical Exam  Vitals:    05/24/17 0708   BP: (!) 132/56   Pulse: 77   Resp: 18   Temp: 98 °F (36.7 °C)   TempSrc: Oral   SpO2: 97%   Weight: 50.7 kg (111 lb 12.4 oz)   Height: 4' 11" (1.499 m)    Body mass index is 22.58 kg/m².  Weight: 50.7 kg (111 lb 12.4 oz)   Height: 4' 11" (149.9 cm)     Physical Exam   Constitutional: She is oriented to person, place, and time. She appears well-developed and well-nourished.   Eyes: EOM are normal.   Musculoskeletal:   Swelling at MTP joints, with trigger present on right ring finger   Neurological: She is alert and oriented to person, place, and time.   Skin: Skin is warm and dry. No rash noted. No erythema.   Psychiatric: She has a normal mood and affect. Her behavior is normal.   Nursing note and vitals reviewed.      Health Maintenance       Date Due Completion Date    Pneumococcal (65+) (2 of 2 - PPSV23) 10/08/2016 10/8/2015    Hemoglobin A1c 04/18/2017 10/18/2016 (Done)    Override on 10/18/2016: Done (Dr. Hollis Luther/Endocrinology- hemoglobin a1c 6.2)    Influenza Vaccine 09/29/2017 (Originally 8/1/2017) 10/8/2015    Lipid Panel 10/18/2017 10/18/2016    Urine Microalbumin 10/18/2017 10/18/2016    Foot Exam 11/09/2017 11/9/2016 (Done)    Override on 11/9/2016: Done (Dr. Hollis Luther/Endocrinology-bilateral feet normal by visual exam, normal pulses,sensations intact,by monofilament,right DP's2/4,PT's 2/4,monofilament 10/10,cap refill <3 secs,left  DP's 2/4,PT's 2/4,monofilament 10/10,cap refill <3 secs)    Eye Exam 05/11/2018 5/11/2017    Override on 4/19/2017: Done    DEXA SCAN 06/22/2019 6/22/2016 (Done)    Override on 6/22/2016: Done (LAURY Barnett/Dr. Hollis NUNEZ " [facility]/Endocrinology- normal range in the lumbar spine & hips,left wrist Tscore -2.7 improved from -3.3. Patient has had to stop Prolia, but patient had 4 doses. Patient is also on calcium + vitamin d supplements)    Override on 7/22/2014: Done (Dr. Hollis Luther/Endocrinology- AP Spine-1.247 g/cm 2 w/ T score =0.3,dual femur=0.979 g/cm 2 w/ T score =0.2,left forearm= 0.586 g/cm 2 w/ T score=3.3,patient started on prolia)    TETANUS VACCINE 03/09/2027 3/9/2017 (Declined)    Override on 3/9/2017: Declined            ASSESSMENT     1. Essential hypertension    2. Type 2 diabetes mellitus without complication, without long-term current use of insulin    3. Osteoarthritis involving multiple joints on both sides of body        PLAN:     Essential hypertension  -BP stable, continue current management    Type 2 diabetes mellitus without complication, without long-term current use of insulin  -sugars in the 100s, has follow up with Dr. Luther    Osteoarthritis involving multiple joints on both sides of body  -continue tylenol for pain control, they were not able to see orthopedics as the insurance did not cover it  -she does not want to see anyone now              Iona Jose MD  05/24/2017 7:51 AM        Return in about 3 months (around 8/24/2017) for Follow up.

## 2017-05-25 ENCOUNTER — OFFICE VISIT (OUTPATIENT)
Dept: OPHTHALMOLOGY | Facility: CLINIC | Age: 82
End: 2017-05-25
Payer: MEDICARE

## 2017-05-25 DIAGNOSIS — Z98.890 POST-OPERATIVE STATE: Primary | ICD-10-CM

## 2017-05-25 DIAGNOSIS — H52.7 REFRACTIVE ERROR: ICD-10-CM

## 2017-05-25 PROCEDURE — 99999 PR PBB SHADOW E&M-EST. PATIENT-LVL II: CPT | Mod: PBBFAC,,, | Performed by: OPHTHALMOLOGY

## 2017-05-25 PROCEDURE — 99024 POSTOP FOLLOW-UP VISIT: CPT | Mod: S$GLB,,, | Performed by: OPHTHALMOLOGY

## 2017-05-25 NOTE — PROGRESS NOTES
Subjective:       Patient ID: Kaycee Almanza is a 82 y.o. female.    Chief Complaint: Post-op Evaluation (2 weeks yag po os)    HPI  Review of Systems    Objective:      Physical Exam    Assessment:       1. Post-operative state    2. Refractive error        Plan:       S/p YAG CAP OS- Doing well.  RE-Pt does not want MRx.        RTC Dr Crow in 6 mos.

## 2017-06-20 DIAGNOSIS — M15.9 OSTEOARTHRITIS INVOLVING MULTIPLE JOINTS ON BOTH SIDES OF BODY: ICD-10-CM

## 2017-06-20 RX ORDER — TRAMADOL HYDROCHLORIDE 50 MG/1
TABLET ORAL
Qty: 60 TABLET | OUTPATIENT
Start: 2017-06-20

## 2017-06-20 NOTE — TELEPHONE ENCOUNTER
Patient stated she already had a refill on the Tramadol and she picked it up today. Patient was also informed for her next refill for Tramadol an office visit is needed. Patient verbalized understanding.

## 2017-08-24 ENCOUNTER — OFFICE VISIT (OUTPATIENT)
Dept: FAMILY MEDICINE | Facility: CLINIC | Age: 82
End: 2017-08-24
Payer: MEDICARE

## 2017-08-24 VITALS
HEIGHT: 57 IN | SYSTOLIC BLOOD PRESSURE: 144 MMHG | BODY MASS INDEX: 23.9 KG/M2 | OXYGEN SATURATION: 98 % | HEART RATE: 77 BPM | WEIGHT: 110.81 LBS | DIASTOLIC BLOOD PRESSURE: 60 MMHG | TEMPERATURE: 98 F | RESPIRATION RATE: 18 BRPM

## 2017-08-24 DIAGNOSIS — M15.9 OSTEOARTHRITIS INVOLVING MULTIPLE JOINTS ON BOTH SIDES OF BODY: ICD-10-CM

## 2017-08-24 DIAGNOSIS — E11.9 TYPE 2 DIABETES MELLITUS WITHOUT COMPLICATION, WITHOUT LONG-TERM CURRENT USE OF INSULIN: Primary | ICD-10-CM

## 2017-08-24 DIAGNOSIS — I10 ESSENTIAL HYPERTENSION: ICD-10-CM

## 2017-08-24 PROCEDURE — 1159F MED LIST DOCD IN RCRD: CPT | Mod: S$GLB,,, | Performed by: FAMILY MEDICINE

## 2017-08-24 PROCEDURE — 1125F AMNT PAIN NOTED PAIN PRSNT: CPT | Mod: S$GLB,,, | Performed by: FAMILY MEDICINE

## 2017-08-24 PROCEDURE — 3077F SYST BP >= 140 MM HG: CPT | Mod: S$GLB,,, | Performed by: FAMILY MEDICINE

## 2017-08-24 PROCEDURE — 3008F BODY MASS INDEX DOCD: CPT | Mod: S$GLB,,, | Performed by: FAMILY MEDICINE

## 2017-08-24 PROCEDURE — 99214 OFFICE O/P EST MOD 30 MIN: CPT | Mod: S$GLB,,, | Performed by: FAMILY MEDICINE

## 2017-08-24 PROCEDURE — 99999 PR PBB SHADOW E&M-EST. PATIENT-LVL IV: CPT | Mod: PBBFAC,,, | Performed by: FAMILY MEDICINE

## 2017-08-24 PROCEDURE — 3078F DIAST BP <80 MM HG: CPT | Mod: S$GLB,,, | Performed by: FAMILY MEDICINE

## 2017-08-24 RX ORDER — CALCIUM CITRATE/VITAMIN D3 200MG-6.25
TABLET ORAL
COMMUNITY
Start: 2017-06-26 | End: 2018-04-16 | Stop reason: SDUPTHER

## 2017-08-24 RX ORDER — HYDROCODONE BITARTRATE AND ACETAMINOPHEN 5; 325 MG/1; MG/1
1 TABLET ORAL EVERY 6 HOURS PRN
Qty: 45 TABLET | Refills: 0 | Status: SHIPPED | OUTPATIENT
Start: 2017-08-24 | End: 2017-10-04 | Stop reason: SDUPTHER

## 2017-08-24 NOTE — MEDICAL/APP STUDENT
Subjective:       Patient ID: Kaycee Almanza is a 82 y.o. female.    Chief Complaint: Diabetes; Hypertension; and Follow-up    Pt is an 81 yo female with PMH HTN, DMII, arthritis here today for an annual visit.  Pt left BP log at home but describes home BP measurements between approx 120s - 140s.  Pt checks fasting blood glucose am between 100 and 110.  Chief complaint today is worsening arthritis pain in her hands which has been insufficiently managed with tramadol.  Pt has no other complaints at this time.        Review of Systems   Constitutional: Negative.    HENT: Negative.    Respiratory: Negative.    Cardiovascular: Negative.    Gastrointestinal: Negative.    Genitourinary: Negative.    Musculoskeletal: Negative.    Skin: Negative.    Allergic/Immunologic: Negative.    Neurological: Negative.    Hematological: Negative.    Psychiatric/Behavioral: Negative.        Objective:      Physical Exam   Constitutional: She is oriented to person, place, and time. She appears well-developed and well-nourished.   Neck: Normal range of motion.   Cardiovascular: Normal rate, regular rhythm, normal heart sounds and intact distal pulses.    No murmur heard.  Pulmonary/Chest: Effort normal and breath sounds normal. No respiratory distress. She has no wheezes.   Abdominal: Soft. Bowel sounds are normal. She exhibits no distension. There is no tenderness. There is no guarding.   Neurological: She is alert and oriented to person, place, and time.   Skin: Skin is warm and dry.   Psychiatric: She has a normal mood and affect. Her behavior is normal. Judgment and thought content normal.       Assessment:   1.  Arthritis joint pain in the hands  2.  Controlled DMII  3.  HTN  4.  Annual visit    Plan:   1.  Arthritis  - uncontrolled with tramadol  - consider methotrexate    2.  Controlled DMII  - Pt sees endocrine next month (Dr. Luther)    3.  HTN  - continue current regimen  - ask pt to bring HTN log next visit    4.  Annual  visit  - labs outdated, consider CBC, CMP, lipid panel

## 2017-08-24 NOTE — PROGRESS NOTES
Chief Complaint   Patient presents with    Diabetes    Hypertension    Follow-up       HPI  Kaycee Almanza is a 82 y.o. female with multiple medical diagnoses as listed in the medical history and problem list that presents for follow-up for diabetes and hypertension follow up.    Her sugars run in the 100s, she checks her pressure but it runs anywhere from 120s-150s diastolic but she does not check it often. She did not take her medication today. She has been having increased arthritis pain and the tramadol lasts about 7 hours. She takes it twice a day,but it only decreases her pain from a 7 to a 5. Her pain is worse when she does housework.    PAST MEDICAL HISTORY:  Past Medical History:   Diagnosis Date    Anxiety     Arthritis     Dementia     Depression     Diabetes type 2, controlled     sees Dr. Elena    GERD (gastroesophageal reflux disease)     Hyperlipidemia     Hypertension     Left posterior capsular opacification 5/11/2017    Osteoporosis     Thyroid disease     Ulcer     Ulcer        PAST SURGICAL HISTORY:  Past Surgical History:   Procedure Laterality Date    CATARACT EXTRACTION W/  INTRAOCULAR LENS IMPLANT Bilateral 2006    done at [facility]    cataract surgery  2006    CHOLECYSTECTOMY      EYE SURGERY      HAND SURGERY      HYSTERECTOMY      KNEE SURGERY      SHOULDER SURGERY         SOCIAL HISTORY:  Social History     Social History    Marital status:      Spouse name: N/A    Number of children: N/A    Years of education: N/A     Occupational History    Not on file.     Social History Main Topics    Smoking status: Never Smoker    Smokeless tobacco: Never Used    Alcohol use No    Drug use: No    Sexual activity: No     Other Topics Concern    Not on file     Social History Narrative    No narrative on file       FAMILY HISTORY:  Family History   Problem Relation Age of Onset    Stroke Mother     Diabetes Mother     Arthritis Father     Early death  Sister     Birth defects Brother     No Known Problems Sister     Birth defects Brother     Birth defects Brother     Birth defects Brother     Birth defects Brother     Amblyopia Neg Hx     Blindness Neg Hx     Cataracts Neg Hx     Glaucoma Neg Hx     Macular degeneration Neg Hx     Retinal detachment Neg Hx     Strabismus Neg Hx        ALLERGIES AND MEDICATIONS: updated and reviewed.  Review of patient's allergies indicates:  No Known Allergies  Current Outpatient Prescriptions   Medication Sig Dispense Refill    amlodipine (NORVASC) 5 MG tablet Take 1 tablet (5 mg total) by mouth once daily. 90 tablet 1    aspirin (ECOTRIN) 81 MG EC tablet Take 81 mg by mouth once daily.      atorvastatin (LIPITOR) 20 MG tablet Take 20 mg by mouth once daily.      linagliptin (TRADJENTA) 5 mg Tab tablet Take 1 tablet (5 mg total) by mouth once daily. 90 tablet 1    lisinopril (PRINIVIL,ZESTRIL) 40 MG tablet Take 1 tablet (40 mg total) by mouth once daily. 90 tablet 3    sertraline (ZOLOFT) 100 MG tablet Take 1 tablet (100 mg total) by mouth once daily. 90 tablet 1    TRUE METRIX GLUCOSE TEST STRIP Strp       gabapentin (NEURONTIN) 300 MG capsule Take 300 mg by mouth 3 (three) times daily.      hydrocodone-acetaminophen 5-325mg (NORCO) 5-325 mg per tablet Take 1 tablet by mouth every 6 (six) hours as needed. 45 tablet 0    ZOSTAVAX, PF, 19,400 unit/0.65 mL injection        No current facility-administered medications for this visit.        ROS  Review of Systems   Constitutional: Negative for chills, diaphoresis, fatigue, fever and unexpected weight change.   HENT: Negative for rhinorrhea, sinus pressure, sore throat and tinnitus.    Eyes: Negative for photophobia and visual disturbance.   Respiratory: Negative for cough, shortness of breath and wheezing.    Cardiovascular: Negative for chest pain and palpitations.   Gastrointestinal: Negative for abdominal pain, blood in stool, constipation, diarrhea,  "nausea and vomiting.   Genitourinary: Negative for dysuria, flank pain, frequency and vaginal discharge.   Musculoskeletal: Positive for arthralgias and back pain. Negative for joint swelling.   Skin: Negative for rash.   Neurological: Negative for speech difficulty, weakness, light-headedness and headaches.   Psychiatric/Behavioral: Negative for behavioral problems and dysphoric mood.       Physical Exam  Vitals:    08/24/17 0705 08/24/17 0754   BP: (!) 150/60 (!) 144/60   Pulse: 77    Resp: 18    Temp: 98.4 °F (36.9 °C)    TempSrc: Oral    SpO2: 98%    Weight: 50.3 kg (110 lb 12.5 oz)    Height: 4' 9" (1.448 m)     Body mass index is 23.97 kg/m².  Weight: 50.3 kg (110 lb 12.5 oz)   Height: 4' 9" (144.8 cm)     Physical Exam   Constitutional: She is oriented to person, place, and time. She appears well-developed and well-nourished. No distress.   HENT:   Head: Normocephalic and atraumatic.   Right Ear: Tympanic membrane normal.   Left Ear: Tympanic membrane normal.   Nose: Nose normal.   Mouth/Throat: No oropharyngeal exudate.   Eyes: EOM are normal.   Neck: Neck supple.   Cardiovascular: Normal rate and regular rhythm.  Exam reveals no gallop and no friction rub.    No murmur heard.  Pulmonary/Chest: Effort normal and breath sounds normal. No respiratory distress. She has no wheezes. She has no rales.   Abdominal: Soft. Bowel sounds are normal. She exhibits no distension and no mass. There is no tenderness. There is no rebound and no guarding.   Musculoskeletal:   Both hands show some deformity from arthritis   Lymphadenopathy:     She has no cervical adenopathy.   Neurological: She is alert and oriented to person, place, and time.   Skin: Skin is warm and dry. No rash noted.   Psychiatric: She has a normal mood and affect. Her behavior is normal.   Nursing note and vitals reviewed.      Health Maintenance       Date Due Completion Date    Pneumococcal (65+) (2 of 2 - PPSV23) 10/08/2016 10/8/2015    Hemoglobin A1c " 04/18/2017 10/18/2016 (Done)    Override on 10/18/2016: Done (Dr. Hollis Luther/Endocrinology- hemoglobin a1c 6.2)    Urine Microalbumin 10/18/2017 10/18/2016    Influenza Vaccine 09/29/2017 (Originally 8/1/2017) 10/8/2015    Lipid Panel 10/18/2017 10/18/2016    Foot Exam 11/09/2017 11/9/2016 (Done)    Override on 11/9/2016: Done (Dr. Hollis Luther/Endocrinology-bilateral feet normal by visual exam, normal pulses,sensations intact,by monofilament,right DP's2/4,PT's 2/4,monofilament 10/10,cap refill <3 secs,left  DP's 2/4,PT's 2/4,monofilament 10/10,cap refill <3 secs)    Eye Exam 05/11/2018 5/11/2017    Override on 4/19/2017: Done    DEXA SCAN 06/22/2019 6/22/2016 (Done)    Override on 6/22/2016: Done (LAURY Barnett/Dr. Hollis Luther/Endocrinology- normal range in the lumbar spine & hips,left wrist Tscore -2.7 improved from -3.3. Patient has had to stop Prolia, but patient had 4 doses. Patient is also on calcium + vitamin d supplements)    Override on 7/22/2014: Done (Dr. Hollis Luther/Endocrinology- AP Spine-1.247 g/cm 2 w/ T score =0.3,dual femur=0.979 g/cm 2 w/ T score =0.2,left forearm= 0.586 g/cm 2 w/ T score=3.3,patient started on prolia)    TETANUS VACCINE 03/09/2027 3/9/2017 (Declined)    Override on 3/9/2017: Declined            ASSESSMENT     1. Type 2 diabetes mellitus without complication, without long-term current use of insulin    2. Essential hypertension    3. Osteoarthritis involving multiple joints on both sides of body        PLAN:     Type 2 diabetes mellitus without complication, without long-term current use of insulin  -she has follow up with endocrine for lab work, will get records    Essential hypertension  -BP recheck elevated, will follow up in one month  Recommend she take her medication before her visitis    Osteoarthritis involving multiple joints on both sides of body  -discussed side effects of this medication, recommend she take her medication at night as it may cause  drowsiness  -     hydrocodone-acetaminophen 5-325mg (NORCO) 5-325 mg per tablet; Take 1 tablet by mouth every 6 (six) hours as needed.  Dispense: 45 tablet; Refill: 0          Iona Jose MD  08/24/2017 8:01 AM        Return in about 2 months (around 10/24/2017) for Follow up.

## 2017-09-06 LAB
ALBUMIN/GLOBULIN RATIO: 1.4 (ref 1–2.5)
ALBUMIN: 3.9 (ref 3.6–5.1)
ALP ISOS SERPL LEV INH-CCNC: 61 U/L (ref 33–130)
ALT SERPL-CCNC: 12 U/L (ref 6–29)
AST SERPL-CCNC: 19 U/L (ref 10–35)
BILIRUBIN, TOTAL: 0.4 (ref 0.2–1.2)
BUN/CREAT SERPL: 23 (ref 6–22)
CALCIUM SERPL-MCNC: 9.5 MG/DL (ref 8.6–10.4)
CARBON DIOXIDE, CO2: 31 (ref 20–31)
CHLORIDE: 104 (ref 98–110)
CHOL/HDLC RATIO: 8.4
CHOLESTEROL, TOTAL: 336 (ref 125–200)
CREAT SERPL-MCNC: 0.9 MG/DL (ref 0.6–0.9)
EGFR IF AFRICAN AMERICAN: 65
EST. GFR  (NON AFRICAN AMERICAN): 56 MG/DL
GLOBULIN: 2.8 (ref 1.9–3.7)
GLUCOSE: 110 (ref 65–99)
HBA1C MFR BLD: 5.6 % (ref 4–5.7)
HDLC SERPL-MCNC: 40 MG/DL
LDLC SERPL CALC-MCNC: 260 MG/DL
MAGNESIUM SERPL-MCNC: 1.9 MG/DL (ref 1.5–2.5)
POTASSIUM: 4.2 (ref 3.5–5.3)
PROT SNV-MCNC: 6.7 G/L (ref 6.1–8.1)
SODIUM: 143 (ref 135–146)
T4 TOTAL: 6.4 (ref 4.5–12)
TRIGL SERPL-MCNC: 186 MG/DL
TSH SERPL DL<=0.005 MIU/L-ACNC: 1.22 UIU/ML (ref 0.4–4.5)
UREA NITROGEN (BUN): 22 (ref 7–25)

## 2017-09-08 ENCOUNTER — HOSPITAL ENCOUNTER (OUTPATIENT)
Facility: HOSPITAL | Age: 82
Discharge: HOME | End: 2017-09-09
Attending: EMERGENCY MEDICINE | Admitting: INTERNAL MEDICINE
Payer: MEDICARE

## 2017-09-08 DIAGNOSIS — I10 ESSENTIAL HYPERTENSION: ICD-10-CM

## 2017-09-08 DIAGNOSIS — K62.5 RECTAL BLEEDING: Primary | ICD-10-CM

## 2017-09-08 DIAGNOSIS — K64.4 EXTERNAL HEMORRHOID, BLEEDING: ICD-10-CM

## 2017-09-08 DIAGNOSIS — E11.9 TYPE 2 DIABETES MELLITUS WITHOUT COMPLICATION, WITHOUT LONG-TERM CURRENT USE OF INSULIN: ICD-10-CM

## 2017-09-08 PROBLEM — D64.9 ANEMIA: Status: ACTIVE | Noted: 2017-09-08

## 2017-09-08 LAB
ALBUMIN SERPL BCP-MCNC: 3.5 G/DL
ALP SERPL-CCNC: 71 U/L
ALT SERPL W/O P-5'-P-CCNC: 15 U/L
ANION GAP SERPL CALC-SCNC: 9 MMOL/L
AST SERPL-CCNC: 18 U/L
BASOPHILS # BLD AUTO: 0.03 K/UL
BASOPHILS NFR BLD: 0.6 %
BILIRUB SERPL-MCNC: 0.4 MG/DL
BUN SERPL-MCNC: 16 MG/DL
CALCIUM SERPL-MCNC: 9.6 MG/DL
CHLORIDE SERPL-SCNC: 104 MMOL/L
CO2 SERPL-SCNC: 26 MMOL/L
CREAT SERPL-MCNC: 0.9 MG/DL
DIFFERENTIAL METHOD: ABNORMAL
EOSINOPHIL # BLD AUTO: 0.2 K/UL
EOSINOPHIL NFR BLD: 4.5 %
ERYTHROCYTE [DISTWIDTH] IN BLOOD BY AUTOMATED COUNT: 13.3 %
EST. GFR  (AFRICAN AMERICAN): >60 ML/MIN/1.73 M^2
EST. GFR  (NON AFRICAN AMERICAN): 60 ML/MIN/1.73 M^2
GLUCOSE SERPL-MCNC: 124 MG/DL
HCT VFR BLD AUTO: 35.5 %
HCT VFR BLD AUTO: 35.9 %
HCT VFR BLD AUTO: 36.9 %
HGB BLD-MCNC: 12.3 G/DL
HGB BLD-MCNC: 12.4 G/DL
HGB BLD-MCNC: 12.4 G/DL
INR PPP: 1
LYMPHOCYTES # BLD AUTO: 1.6 K/UL
LYMPHOCYTES NFR BLD: 30.8 %
MCH RBC QN AUTO: 31.6 PG
MCHC RBC AUTO-ENTMCNC: 34.6 G/DL
MCV RBC AUTO: 91 FL
MONOCYTES # BLD AUTO: 0.7 K/UL
MONOCYTES NFR BLD: 12.3 %
NEUTROPHILS # BLD AUTO: 2.8 K/UL
NEUTROPHILS NFR BLD: 51.8 %
PLATELET # BLD AUTO: 196 K/UL
PMV BLD AUTO: 10.2 FL
POCT GLUCOSE: 127 MG/DL (ref 70–110)
POCT GLUCOSE: 161 MG/DL (ref 70–110)
POTASSIUM SERPL-SCNC: 4.1 MMOL/L
PROT SERPL-MCNC: 7.2 G/DL
PROTHROMBIN TIME: 10.4 SEC
RBC # BLD AUTO: 3.89 M/UL
SODIUM SERPL-SCNC: 139 MMOL/L
WBC # BLD AUTO: 5.3 K/UL

## 2017-09-08 PROCEDURE — 85610 PROTHROMBIN TIME: CPT

## 2017-09-08 PROCEDURE — G0378 HOSPITAL OBSERVATION PER HR: HCPCS

## 2017-09-08 PROCEDURE — 85025 COMPLETE CBC W/AUTO DIFF WBC: CPT

## 2017-09-08 PROCEDURE — 36415 COLL VENOUS BLD VENIPUNCTURE: CPT

## 2017-09-08 PROCEDURE — 80053 COMPREHEN METABOLIC PANEL: CPT

## 2017-09-08 PROCEDURE — 85018 HEMOGLOBIN: CPT

## 2017-09-08 PROCEDURE — 25000003 PHARM REV CODE 250: Performed by: EMERGENCY MEDICINE

## 2017-09-08 PROCEDURE — 85014 HEMATOCRIT: CPT | Mod: 91

## 2017-09-08 PROCEDURE — 99284 EMERGENCY DEPT VISIT MOD MDM: CPT

## 2017-09-08 RX ORDER — AMOXICILLIN 250 MG
1 CAPSULE ORAL 2 TIMES DAILY
Status: DISCONTINUED | OUTPATIENT
Start: 2017-09-08 | End: 2017-09-09 | Stop reason: HOSPADM

## 2017-09-08 RX ORDER — ATORVASTATIN CALCIUM 10 MG/1
20 TABLET, FILM COATED ORAL DAILY
Status: DISCONTINUED | OUTPATIENT
Start: 2017-09-08 | End: 2017-09-09 | Stop reason: HOSPADM

## 2017-09-08 RX ORDER — GABAPENTIN 300 MG/1
300 CAPSULE ORAL 3 TIMES DAILY
Status: DISCONTINUED | OUTPATIENT
Start: 2017-09-08 | End: 2017-09-09 | Stop reason: HOSPADM

## 2017-09-08 RX ORDER — INSULIN ASPART 100 [IU]/ML
0-5 INJECTION, SOLUTION INTRAVENOUS; SUBCUTANEOUS
Status: DISCONTINUED | OUTPATIENT
Start: 2017-09-08 | End: 2017-09-09 | Stop reason: HOSPADM

## 2017-09-08 RX ORDER — SERTRALINE HYDROCHLORIDE 50 MG/1
100 TABLET, FILM COATED ORAL DAILY
Status: DISCONTINUED | OUTPATIENT
Start: 2017-09-08 | End: 2017-09-09 | Stop reason: HOSPADM

## 2017-09-08 RX ORDER — LISINOPRIL 20 MG/1
40 TABLET ORAL DAILY
Status: DISCONTINUED | OUTPATIENT
Start: 2017-09-08 | End: 2017-09-09 | Stop reason: HOSPADM

## 2017-09-08 RX ORDER — ACETAMINOPHEN 325 MG/1
650 TABLET ORAL EVERY 6 HOURS PRN
Status: DISCONTINUED | OUTPATIENT
Start: 2017-09-08 | End: 2017-09-09 | Stop reason: HOSPADM

## 2017-09-08 RX ORDER — IBUPROFEN 200 MG
24 TABLET ORAL
Status: DISCONTINUED | OUTPATIENT
Start: 2017-09-08 | End: 2017-09-09 | Stop reason: HOSPADM

## 2017-09-08 RX ORDER — SODIUM CHLORIDE, SODIUM LACTATE, POTASSIUM CHLORIDE, CALCIUM CHLORIDE 600; 310; 30; 20 MG/100ML; MG/100ML; MG/100ML; MG/100ML
1000 INJECTION, SOLUTION INTRAVENOUS CONTINUOUS
Status: DISCONTINUED | OUTPATIENT
Start: 2017-09-08 | End: 2017-09-08

## 2017-09-08 RX ORDER — IBUPROFEN 200 MG
16 TABLET ORAL
Status: DISCONTINUED | OUTPATIENT
Start: 2017-09-08 | End: 2017-09-09 | Stop reason: HOSPADM

## 2017-09-08 RX ORDER — GLUCAGON 1 MG
1 KIT INJECTION
Status: DISCONTINUED | OUTPATIENT
Start: 2017-09-08 | End: 2017-09-09 | Stop reason: HOSPADM

## 2017-09-08 RX ORDER — AMLODIPINE BESYLATE 5 MG/1
5 TABLET ORAL DAILY
Status: DISCONTINUED | OUTPATIENT
Start: 2017-09-08 | End: 2017-09-09 | Stop reason: HOSPADM

## 2017-09-08 RX ORDER — ASPIRIN 81 MG/1
81 TABLET ORAL DAILY
Status: DISCONTINUED | OUTPATIENT
Start: 2017-09-08 | End: 2017-09-08

## 2017-09-08 RX ADMIN — ATORVASTATIN CALCIUM 20 MG: 10 TABLET, FILM COATED ORAL at 01:09

## 2017-09-08 RX ADMIN — GABAPENTIN 300 MG: 300 CAPSULE ORAL at 01:09

## 2017-09-08 RX ADMIN — SERTRALINE HYDROCHLORIDE 100 MG: 50 TABLET ORAL at 01:09

## 2017-09-08 RX ADMIN — AMLODIPINE BESYLATE 5 MG: 5 TABLET ORAL at 01:09

## 2017-09-08 RX ADMIN — GABAPENTIN 300 MG: 300 CAPSULE ORAL at 10:09

## 2017-09-08 RX ADMIN — LISINOPRIL 40 MG: 20 TABLET ORAL at 01:09

## 2017-09-08 NOTE — HPI
"82 year old female with Hx of peptic ulcer with bleed (~7 years ago), essential hypertension and controlled diabetes mellitus type 2 (not on insulin) who presented with 4 days of "dark stools" and one day of bright red blood per rectum. Associated symptoms include mild lower abdominal pain. Denied dysuria, lightheadedness, LOC, syncope, SOB, chest pain, n/v or diarrhea. Is on daily aspirin. Not on anticoagulants. Denied constipation or straining with BMs. In the ED, Hgb 12.3 (no other Hgb on record to compare), non toxic appearing, benign abdominal exam, AAOx3 and euvolemic, although hypertensive without associated distress. Admitted to observation unit with GI consult for further monitoring and evaluation.   "

## 2017-09-08 NOTE — NURSING
Pt arrive to the unit.  Assisted pt to bed. Tele monitoring initiated.  Admit assessment initiated.  Pt is AAOx4.  No distress noted.

## 2017-09-08 NOTE — SUBJECTIVE & OBJECTIVE
Past Medical History:   Diagnosis Date    Anxiety     Arthritis     Dementia     Depression     Diabetes type 2, controlled     sees Dr. Elena    GERD (gastroesophageal reflux disease)     Hyperlipidemia     Hypertension     Left posterior capsular opacification 5/11/2017    Osteoporosis     Thyroid disease     Ulcer     Ulcer        Past Surgical History:   Procedure Laterality Date    CATARACT EXTRACTION W/  INTRAOCULAR LENS IMPLANT Bilateral 2006    done at [facility]    cataract surgery  2006    CHOLECYSTECTOMY      EYE SURGERY      HAND SURGERY      HYSTERECTOMY      KNEE SURGERY      SHOULDER SURGERY         Review of patient's allergies indicates:  No Known Allergies    No current facility-administered medications on file prior to encounter.      Current Outpatient Prescriptions on File Prior to Encounter   Medication Sig    amlodipine (NORVASC) 5 MG tablet Take 1 tablet (5 mg total) by mouth once daily.    aspirin (ECOTRIN) 81 MG EC tablet Take 81 mg by mouth once daily.    atorvastatin (LIPITOR) 20 MG tablet Take 20 mg by mouth once daily.    gabapentin (NEURONTIN) 300 MG capsule Take 300 mg by mouth 3 (three) times daily.    hydrocodone-acetaminophen 5-325mg (NORCO) 5-325 mg per tablet Take 1 tablet by mouth every 6 (six) hours as needed.    linagliptin (TRADJENTA) 5 mg Tab tablet Take 1 tablet (5 mg total) by mouth once daily.    lisinopril (PRINIVIL,ZESTRIL) 40 MG tablet Take 1 tablet (40 mg total) by mouth once daily.    sertraline (ZOLOFT) 100 MG tablet Take 1 tablet (100 mg total) by mouth once daily.    TRUE METRIX GLUCOSE TEST STRIP Strp     ZOSTAVAX, PF, 19,400 unit/0.65 mL injection      Family History     Problem Relation (Age of Onset)    Arthritis Father    Birth defects Brother, Brother, Brother, Brother, Brother    Diabetes Mother    Early death Sister    No Known Problems Sister    Stroke Mother        Social History Main Topics    Smoking status: Never  Smoker    Smokeless tobacco: Never Used    Alcohol use No    Drug use: No    Sexual activity: No     Review of Systems   Constitutional: Negative.    HENT: Negative.    Eyes: Negative.    Respiratory: Negative.    Cardiovascular: Negative.    Gastrointestinal: Positive for abdominal pain, anal bleeding and blood in stool. Negative for constipation.   Genitourinary: Negative.    Musculoskeletal: Negative.    Skin: Negative.    Neurological: Negative.    Hematological: Negative.    Psychiatric/Behavioral: Negative.      Objective:     Vital Signs (Most Recent):  Temp: 98.6 °F (37 °C) (09/08/17 1324)  Pulse: 68 (09/08/17 1324)  Resp: 16 (09/08/17 1324)  BP: (!) 171/72 (09/08/17 1324)  SpO2: 97 % (09/08/17 1324) Vital Signs (24h Range):  Temp:  [98.6 °F (37 °C)] 98.6 °F (37 °C)  Pulse:  [68-82] 68  Resp:  [16-18] 16  SpO2:  [97 %-98 %] 97 %  BP: (171-221)/(72-85) 171/72     Weight: 48.5 kg (106 lb 14.8 oz)  Body mass index is 23.14 kg/m².    Physical Exam   Constitutional: She is oriented to person, place, and time. She appears well-developed. No distress.   HENT:   Head: Normocephalic and atraumatic.   Mouth/Throat: Oropharynx is clear and moist.   Eyes: Conjunctivae and EOM are normal.   Neck: Normal range of motion.   Cardiovascular: Normal rate, regular rhythm, normal heart sounds and intact distal pulses.    Pulmonary/Chest: Effort normal and breath sounds normal.   Abdominal: Soft. Bowel sounds are normal. She exhibits no distension. There is no tenderness. There is no guarding.   One external small hemorrhoid not actively bleeding. None else palpated on rectal exam. No pain   Musculoskeletal: Normal range of motion.   Neurological: She is alert and oriented to person, place, and time.   Skin: Skin is warm and dry. Capillary refill takes less than 2 seconds. She is not diaphoretic.   Psychiatric: She has a normal mood and affect. Her behavior is normal. Judgment and thought content normal.   Nursing note and  vitals reviewed.       Significant Labs: All pertinent labs within the past 24 hours have been reviewed.    Significant Imaging: I have reviewed all pertinent imaging results/findings within the past 24 hours.  I have reviewed and interpreted all pertinent imaging results/findings within the past 24 hours.

## 2017-09-08 NOTE — CONSULTS
Gastroenterology    CC: Rectal bleeding    HPI 82 y.o. female  With persistent dark loose stool this week admitted with one episode of bright red, mild-moderate severity, painless, recurrent x 1 (though second episode with only small amount blood) rectal bleeding this AM.  No heavy NSAID use.  No abd pain.  No N/V.  No recent sick contacts or travel.  No hematemesis.  Bleeding this AM was without stool.      Past Medical History:   Diagnosis Date    Anxiety     Arthritis     Dementia     Depression     Diabetes type 2, controlled     sees Dr. Elena    GERD (gastroesophageal reflux disease)     Hyperlipidemia     Hypertension     Left posterior capsular opacification 5/11/2017    Osteoporosis     Thyroid disease     Ulcer     Ulcer          Past Surgical History:   Procedure Laterality Date    CATARACT EXTRACTION W/  INTRAOCULAR LENS IMPLANT Bilateral 2006    [facility]    cataract surgery  2006    CHOLECYSTECTOMY      EYE SURGERY      HAND SURGERY      HYSTERECTOMY      KNEE SURGERY      SHOULDER SURGERY         Social History  Social History   Substance Use Topics    Smoking status: Never Smoker    Smokeless tobacco: Never Used    Alcohol use No   No illicits      Family History   Problem Relation Age of Onset    Stroke Mother     Diabetes Mother     Arthritis Father     Early death Sister     Birth defects Brother     No Known Problems Sister     Birth defects Brother     Birth defects Brother     Birth defects Brother     Birth defects Brother     Amblyopia Neg Hx     Blindness Neg Hx     Cataracts Neg Hx     Glaucoma Neg Hx     Macular degeneration Neg Hx     Retinal detachment Neg Hx     Strabismus Neg Hx        Review of Systems  General ROS: negative for chills, fever or weight loss  Psychological ROS: negative for hallucination, depression or suicidal ideation  Ophthalmic ROS: negative for blurry vision, photophobia or eye pain  ENT ROS: negative for  "epistaxis, sore throat or rhinorrhea  Respiratory ROS: no cough, shortness of breath, or wheezing  Cardiovascular ROS: no chest pain or dyspnea on exertion  Gastrointestinal ROS: no abdominal pain, change in bowel habits  Genito-Urinary ROS: no dysuria, trouble voiding, or hematuria  Musculoskeletal ROS: negative for gait disturbance or muscular weakness  Neurological ROS: no syncope or seizures; no ataxia  Dermatological ROS: negative for pruritis, rash and jaundice    Physical Examination  BP (!) 171/72 (BP Location: Left arm, Patient Position: Lying)   Pulse 68   Temp 98.6 °F (37 °C) (Oral)   Resp 16   Ht 4' 9" (1.448 m)   Wt 48.5 kg (106 lb 14.8 oz)   SpO2 97%   Breastfeeding? No   BMI 23.14 kg/m²   General appearance: alert, cooperative, no distress  HENT: Normocephalic, atraumatic, neck symmetrical, no nasal discharge   Eyes: conjunctivae/corneas clear, PERRL, EOM's intact  Lungs: clear to auscultation bilaterally, no dullness to percussion bilaterally  Heart: regular rate and rhythm without rub; no displacement of the PMI   Abdomen: soft, non-tender; ND, no rebound or guarding  Extremities: extremities symmetric; no clubbing, cyanosis, or edema  Integument: Skin color, texture, turgor normal; no rashes; hair distrubution normal  Neurologic: Alert and oriented X 3, MAEW  Psychiatric: no pressured speech; normal affect; no evidence of impaired cognition     Labs:  H/H normal  BUN normal    Assessment:     Suspect limited diverticular bleeding, expected to resolve spontaneously.  VSS and H/H normal.     Plan:   - Observe until tomorrow AM with serial H/H and monitoring for recurrent bleeding.  If no further bleeding and H/H stable in AM, can D/C with clinic follow up.  If bleeding recurs significantly, will perform further testing.       "

## 2017-09-08 NOTE — ED PROVIDER NOTES
"Encounter Date: 9/8/2017    SCRIBE #1 NOTE: I, Regina Velasco, am scribing for, and in the presence of,  Mateo Snell Jr., MD. I have scribed the following portions of the note - Other sections scribed: HPI and ROS.       History     Chief Complaint   Patient presents with    Rectal Bleeding     pt states rectal bleeding since monday     CC: Hematochezia and Rectal Bleeding    HPI: This 82 y.o. Female with PMHx of DM type II, arthritis, depression, HTN, HLD, anxiety, GERD, osteoporosis, thyroid disease, ulcer, and dementia presents to the ED c/o 4 day hx of hematochezia and acute onset episode of rectal bleeding that occurred shortly PTA. Pt states she had 2 to 3 episodes of hematochezia in the past 4 days, and she describes the blood in stool as "dark" colored stool. Pt describes her rectal bleeding episode as "bright" colored blood with no clots, and it was blood only, not mixed with stool. Pt adds she had one episode of lower abdominal pain that lasted for several minutes. Pt describes abdominal pain as "sharp". Pt notes she was diagnosed with bleeding ulcers approximately 7 years ago at [facility]. Pt adds she takes 81 mg Aspirin daily. Pt denies a hx of hemorrhoids . Pt also denies fever, SOB, chest pain, vomiting, and any other associated symptoms.      The history is provided by the patient and a relative. No  was used.     Review of patient's allergies indicates:  No Known Allergies  Past Medical History:   Diagnosis Date    Anxiety     Arthritis     Dementia     Depression     Diabetes type 2, controlled     sees Dr. Elena    GERD (gastroesophageal reflux disease)     Hyperlipidemia     Hypertension     Left posterior capsular opacification 5/11/2017    Osteoporosis     Thyroid disease     Ulcer     Ulcer      Past Surgical History:   Procedure Laterality Date    CATARACT EXTRACTION W/  INTRAOCULAR LENS IMPLANT Bilateral 2006    done at [facility]    cataract surgery  2006 " "   CHOLECYSTECTOMY      EYE SURGERY      HAND SURGERY      HYSTERECTOMY      KNEE SURGERY      SHOULDER SURGERY       Family History   Problem Relation Age of Onset    Stroke Mother     Diabetes Mother     Arthritis Father     Early death Sister     Birth defects Brother     No Known Problems Sister     Birth defects Brother     Birth defects Brother     Birth defects Brother     Birth defects Brother     Amblyopia Neg Hx     Blindness Neg Hx     Cataracts Neg Hx     Glaucoma Neg Hx     Macular degeneration Neg Hx     Retinal detachment Neg Hx     Strabismus Neg Hx      Social History   Substance Use Topics    Smoking status: Never Smoker    Smokeless tobacco: Never Used    Alcohol use No     Review of Systems   Constitutional: Negative for chills, diaphoresis and fever.   HENT: Negative for ear pain and sore throat.    Eyes: Negative for pain.   Respiratory: Negative for cough and shortness of breath.    Cardiovascular: Negative for chest pain.   Gastrointestinal: Positive for abdominal pain (Lower, "sharp"), anal bleeding (1x in the past 24 hours, "bright" colored blood) and blood in stool (2 to 3 episodes, "dark" colored blood). Negative for diarrhea, nausea and vomiting.   Genitourinary: Negative for dysuria.   Musculoskeletal: Negative for back pain.   Skin: Negative for rash.   Neurological: Negative for headaches.       Physical Exam     Initial Vitals [09/08/17 0838]   BP Pulse Resp Temp SpO2   (!) 221/85 82 18 98.6 °F (37 °C) 97 %      MAP       130.33         Physical Exam    Nursing note and vitals reviewed.  Constitutional: She appears well-developed and well-nourished. She is not diaphoretic. No distress.   Well-appearing female in no acute distress.   HENT:   Head: Normocephalic and atraumatic.   Right Ear: External ear normal.   Left Ear: External ear normal.   Nose: Nose normal.   Mouth/Throat: Oropharynx is clear and moist.   Eyes: Conjunctivae and EOM are normal. Pupils " are equal, round, and reactive to light. Right eye exhibits no discharge. Left eye exhibits no discharge. No scleral icterus.   Neck: Normal range of motion. Neck supple.   Cardiovascular: Normal rate, regular rhythm, normal heart sounds and intact distal pulses.   No murmur heard.  Pulmonary/Chest: Breath sounds normal. No respiratory distress. She has no wheezes. She has no rhonchi. She has no rales.   Abdominal: Soft. Bowel sounds are normal. She exhibits no distension and no mass. There is no tenderness. There is no rebound and no guarding.   Abdomen is soft, nontender in all 4 quadrants.  Normal bowel sounds.  No distention.  No hernias or masses.   Genitourinary: Rectal exam shows guaiac negative stool. Guaiac negative stool. : Acceptable.  Genitourinary Comments: Rectal exam reveals nonthrombosed external hemorrhoids.  No internal hemorrhoids updated.  No gross blood noted.  There is scant brown stool in the rectal vault which is guaiac positive.   Musculoskeletal: Normal range of motion. She exhibits no edema or tenderness.   Neurological: She is alert and oriented to person, place, and time. She has normal strength. No cranial nerve deficit or sensory deficit.   Skin: Skin is warm and dry. No rash noted. No erythema. No pallor.   Psychiatric: She has a normal mood and affect. Her behavior is normal. Judgment and thought content normal.         ED Course   Procedures  Labs Reviewed   CBC W/ AUTO DIFFERENTIAL - Abnormal; Notable for the following:        Result Value    RBC 3.89 (*)     Hematocrit 35.5 (*)     MCH 31.6 (*)     All other components within normal limits   COMPREHENSIVE METABOLIC PANEL - Abnormal; Notable for the following:     Glucose 124 (*)     All other components within normal limits   PROTIME-INR             Medical Decision Making:   ED Management:  This is the emergent evaluation of a 82-year-old female who presents to the emergency department for evaluation of rectal  bleeding.  Differential diagnosis at the time of initial evaluation included, but was not limited to: Diverticulosis, angiodysplasia, arteriovenous formation, internal hemorrhoids.  I considered but doubt brisk upper GI bleed.   Patient states that initially started as maroon and dark blood on Monday and is occurred 2 or 3 times since then.  This morning, the patient had large volume of bright red blood from the rectum without any stool associated with it.  She had one sharp pain that lasted 3 seconds this morning but is otherwise had no abdominal pain.  No vomiting.  No fever.  No chest pain or shortness of breath or dyspnea on exertion.    Patient's rectal exam reveals scant brown stool which is guaiac negative.  Her hemoglobin and hematocrit are reassuring.  She is not tachycardic, weak, dyspneic, or hypotensive.  She is not having any chest pain.  I discussed this case with Dr. Marques from gastroenterology.  Due to the patient's age and history of continuous episodes of bleeding over the past 4 days, we agreed to admit this patient to observation for serial hemoglobin and hematocrit.  Patient and family member also agree with this plan.  No further episodes of hematochezia in this emergency department.            Scribe Attestation:   Scribe #1: I performed the above scribed service and the documentation accurately describes the services I performed. I attest to the accuracy of the note.    Attending Attestation:           Physician Attestation for Scribe:  Physician Attestation Statement for Scribe #1: I, Mateo Snell Jr., MD, reviewed documentation, as scribed by Regina Velasco in my presence, and it is both accurate and complete.                 ED Course      Clinical Impression:   The encounter diagnosis was Rectal bleeding.                           Mateo Snell Jr., MD  09/08/17 1203

## 2017-09-08 NOTE — ED TRIAGE NOTES
81 yo female comes in with the cc of rectal bleeding for the past 5 days. Pt describes blood as bright red, but back on Monday it was black and green. Pt denies any abdominal pain. Pt has cenuine concern because this has happened B-4.

## 2017-09-08 NOTE — ASSESSMENT & PLAN NOTE
Controlled. No on insulin at home. Home oral hypoglycemics and start SSI with diabetic diet. Goal < 180

## 2017-09-08 NOTE — HOSPITAL COURSE
Ms Almanza presented with blood per rectum without associated acute abdomen, severe anemia or hemodynamic instability. Also without upper GI symptoms. Is on ASA at home, but otherwise no anticoagulation and no coagulopathy. Hgb remained at 12 g/dL for 24 hours and with no further bloody BMs nor blood per rectum. Patient denied chronic constipation nor straining during BMs. ASA held and senna doc initiated to ensure stool softening effect. On exam, patient was noted to have a small external hemorrhoid that was not actively bleeding nor tender. Rest of rectal exam unremarkable. GI consulted. They had also suggested the possibility of self limited, non complicated diverticular bleed. Patient was observed overnight. Remained clinically stable and with no significant events. Discharged in stable condition. Is to follow up with PCP. Is to continue stool softeners and resume ASA. Ambulatory and independent. Low sodium/diabetic diet.

## 2017-09-08 NOTE — ASSESSMENT & PLAN NOTE
Likely due to external hemorrhoid. Will hold ASA temporarily. GI consulted for co-management. Senna-doc to avoid straining and constipation. H/H every 8 hours and vitals q 4 hours.

## 2017-09-08 NOTE — H&P
"[facility] Hospital Medicine  History & Physical    Patient Name: Kaycee Almanza  MRN: [id number]  Admission Date: 9/8/2017  Attending Physician: No att. providers found   Primary Care Provider: Iona Jose MD         Patient information was obtained from patient and ER records.     Subjective:     Principal Problem:Rectal bleeding    Chief Complaint:   Chief Complaint   Patient presents with    Rectal Bleeding     pt states rectal bleeding since monday        HPI: 82 year old female with Hx of peptic ulcer with bleed (~7 years ago), essential hypertension and controlled diabetes mellitus type 2 (not on insulin) who presented with 4 days of "dark stools" and one day of bright red blood per rectum. Associated symptoms include mild lower abdominal pain. Denied dysuria, lightheadedness, LOC, syncope, SOB, chest pain, n/v or diarrhea. Is on daily aspirin. Not on anticoagulants. Denied constipation or straining with BMs. In the ED, Hgb 12.3 (no other Hgb on record to compare), non toxic appearing, benign abdominal exam, AAOx3 and euvolemic, although hypertensive without associated distress. Admitted to observation unit with GI consult for further monitoring and evaluation.     Past Medical History:   Diagnosis Date    Anxiety     Arthritis     Dementia     Depression     Diabetes type 2, controlled     sees Dr. Elena    GERD (gastroesophageal reflux disease)     Hyperlipidemia     Hypertension     Left posterior capsular opacification 5/11/2017    Osteoporosis     Thyroid disease     Ulcer     Ulcer        Past Surgical History:   Procedure Laterality Date    CATARACT EXTRACTION W/  INTRAOCULAR LENS IMPLANT Bilateral 2006    [facility] at [facility]    cataract surgery  2006    CHOLECYSTECTOMY      EYE SURGERY      HAND SURGERY      HYSTERECTOMY      KNEE SURGERY      SHOULDER SURGERY         Review of patient's allergies indicates:  No Known Allergies    No current " facility-administered medications on file prior to encounter.      Current Outpatient Prescriptions on File Prior to Encounter   Medication Sig    amlodipine (NORVASC) 5 MG tablet Take 1 tablet (5 mg total) by mouth once daily.    aspirin (ECOTRIN) 81 MG EC tablet Take 81 mg by mouth once daily.    atorvastatin (LIPITOR) 20 MG tablet Take 20 mg by mouth once daily.    gabapentin (NEURONTIN) 300 MG capsule Take 300 mg by mouth 3 (three) times daily.    hydrocodone-acetaminophen 5-325mg (NORCO) 5-325 mg per tablet Take 1 tablet by mouth every 6 (six) hours as needed.    linagliptin (TRADJENTA) 5 mg Tab tablet Take 1 tablet (5 mg total) by mouth once daily.    lisinopril (PRINIVIL,ZESTRIL) 40 MG tablet Take 1 tablet (40 mg total) by mouth once daily.    sertraline (ZOLOFT) 100 MG tablet Take 1 tablet (100 mg total) by mouth once daily.    TRUE METRIX GLUCOSE TEST STRIP Strp     ZOSTAVAX, PF, 19,400 unit/0.65 mL injection      Family History     Problem Relation (Age of Onset)    Arthritis Father    Birth defects Brother, Brother, Brother, Brother, Brother    Diabetes Mother    Early death Sister    No Known Problems Sister    Stroke Mother        Social History Main Topics    Smoking status: Never Smoker    Smokeless tobacco: Never Used    Alcohol use No    Drug use: No    Sexual activity: No     Review of Systems   Constitutional: Negative.    HENT: Negative.    Eyes: Negative.    Respiratory: Negative.    Cardiovascular: Negative.    Gastrointestinal: Positive for abdominal pain, anal bleeding and blood in stool. Negative for constipation.   Genitourinary: Negative.    Musculoskeletal: Negative.    Skin: Negative.    Neurological: Negative.    Hematological: Negative.    Psychiatric/Behavioral: Negative.      Objective:     Vital Signs (Most Recent):  Temp: 98.6 °F (37 °C) (09/08/17 1324)  Pulse: 68 (09/08/17 1324)  Resp: 16 (09/08/17 1324)  BP: (!) 171/72 (09/08/17 1324)  SpO2: 97 % (09/08/17 1324)  Vital Signs (24h Range):  Temp:  [98.6 °F (37 °C)] 98.6 °F (37 °C)  Pulse:  [68-82] 68  Resp:  [16-18] 16  SpO2:  [97 %-98 %] 97 %  BP: (171-221)/(72-85) 171/72     Weight: 48.5 kg (106 lb 14.8 oz)  Body mass index is 23.14 kg/m².    Physical Exam   Constitutional: She is oriented to person, place, and time. She appears well-developed. No distress.   HENT:   Head: Normocephalic and atraumatic.   Mouth/Throat: Oropharynx is clear and moist.   Eyes: Conjunctivae and EOM are normal.   Neck: Normal range of motion.   Cardiovascular: Normal rate, regular rhythm, normal heart sounds and intact distal pulses.    Pulmonary/Chest: Effort normal and breath sounds normal.   Abdominal: Soft. Bowel sounds are normal. She exhibits no distension. There is no tenderness. There is no guarding.   One external small hemorrhoid not actively bleeding. None else palpated on rectal exam. No pain   Musculoskeletal: Normal range of motion.   Neurological: She is alert and oriented to person, place, and time.   Skin: Skin is warm and dry. Capillary refill takes less than 2 seconds. She is not diaphoretic.   Psychiatric: She has a normal mood and affect. Her behavior is normal. Judgment and thought content normal.   Nursing note and vitals reviewed.       Significant Labs: All pertinent labs within the past 24 hours have been reviewed.    Significant Imaging: I have reviewed all pertinent imaging results/findings within the past 24 hours.  I have reviewed and interpreted all pertinent imaging results/findings within the past 24 hours.    Assessment/Plan:     * Rectal bleeding    Likely due to external hemorrhoid. Will hold ASA temporarily. GI consulted for co-management. Senna-doc to avoid straining and constipation. H/H every 8 hours and vitals q 4 hours.           External hemorrhoid, bleeding    As above          Anemia    unknown baseline. Is to currently severe. H/H every 8 hours given bleed          Type 2 diabetes mellitus without  complication, without long-term current use of insulin    Controlled. No on insulin at home. Home oral hypoglycemics and start SSI with diabetic diet. Goal < 180          Essential hypertension    Not at goal. Give home antihypertensives now and monitor. Goal < 150/90 mmHg           Osteoarthritis involving multiple joints on both sides of body    Pain management. Currently without acute issues. Ambulates indepenently            VTE Risk Mitigation         Ordered     Medium Risk of VTE  Once      09/08/17 1324     Place VEENA hose  Until discontinued      09/08/17 1324        Monitor overnight in OBS unit. Appreciate GI recs. H/H q 8 hours       Lisseth Cazares MD  Department of Hospital Medicine   [facility]

## 2017-09-09 VITALS
OXYGEN SATURATION: 97 % | RESPIRATION RATE: 18 BRPM | HEART RATE: 70 BPM | WEIGHT: 106.94 LBS | TEMPERATURE: 99 F | SYSTOLIC BLOOD PRESSURE: 140 MMHG | BODY MASS INDEX: 23.07 KG/M2 | DIASTOLIC BLOOD PRESSURE: 65 MMHG | HEIGHT: 57 IN

## 2017-09-09 LAB
BASOPHILS # BLD AUTO: 0.02 K/UL
BASOPHILS NFR BLD: 0.3 %
DIFFERENTIAL METHOD: NORMAL
EOSINOPHIL # BLD AUTO: 0.4 K/UL
EOSINOPHIL NFR BLD: 5.1 %
ERYTHROCYTE [DISTWIDTH] IN BLOOD BY AUTOMATED COUNT: 13.1 %
HCT VFR BLD AUTO: 37.6 %
HGB BLD-MCNC: 12.4 G/DL
LYMPHOCYTES # BLD AUTO: 2.7 K/UL
LYMPHOCYTES NFR BLD: 36.6 %
MCH RBC QN AUTO: 30.6 PG
MCHC RBC AUTO-ENTMCNC: 33 G/DL
MCV RBC AUTO: 93 FL
MONOCYTES # BLD AUTO: 0.9 K/UL
MONOCYTES NFR BLD: 12.6 %
NEUTROPHILS # BLD AUTO: 3.3 K/UL
NEUTROPHILS NFR BLD: 45.3 %
PLATELET # BLD AUTO: 220 K/UL
PMV BLD AUTO: 10.1 FL
POCT GLUCOSE: 148 MG/DL (ref 70–110)
RBC # BLD AUTO: 4.05 M/UL
WBC # BLD AUTO: 7.24 K/UL

## 2017-09-09 PROCEDURE — 36415 COLL VENOUS BLD VENIPUNCTURE: CPT

## 2017-09-09 PROCEDURE — 85025 COMPLETE CBC W/AUTO DIFF WBC: CPT

## 2017-09-09 PROCEDURE — G0378 HOSPITAL OBSERVATION PER HR: HCPCS

## 2017-09-09 PROCEDURE — 25000003 PHARM REV CODE 250: Performed by: EMERGENCY MEDICINE

## 2017-09-09 RX ADMIN — ATORVASTATIN CALCIUM 20 MG: 10 TABLET, FILM COATED ORAL at 09:09

## 2017-09-09 RX ADMIN — LISINOPRIL 40 MG: 20 TABLET ORAL at 09:09

## 2017-09-09 RX ADMIN — SERTRALINE HYDROCHLORIDE 100 MG: 50 TABLET ORAL at 09:09

## 2017-09-09 RX ADMIN — GABAPENTIN 300 MG: 300 CAPSULE ORAL at 06:09

## 2017-09-09 RX ADMIN — AMLODIPINE BESYLATE 5 MG: 5 TABLET ORAL at 09:09

## 2017-09-09 NOTE — PLAN OF CARE
09/09/17 1122   Discharge Assessment   Assessment Type Discharge Planning Assessment   Confirmed/corrected address and phone number on facesheet? Yes   Assessment information obtained from? Patient   Communicated expected length of stay with patient/caregiver no   Prior to hospitilization cognitive status: Alert/Oriented   Prior to hospitalization functional status: Independent   Current cognitive status: Alert/Oriented   Current Functional Status: Independent   Facility Arrived From: Home   Lives With grandchild(sally)  (Adult grandchildren; son across street; other near; family very close around)   Able to Return to Prior Arrangements yes   Is patient able to care for self after discharge? Yes   Who are your caregiver(s) and their phone number(s)? Son-Tru; spouse-Santo: [phone]; relative-Jacklyn: [phone]   Patient's perception of discharge disposition home or selfcare   Readmission Within The Last 30 Days no previous admission in last 30 days   Patient currently being followed by outpatient case management? No   Patient currently receives any other outside agency services? No   Equipment Currently Used at Home none   Do you have any problems affording any of your prescribed medications? No   Is the patient taking medications as prescribed? yes   Does the patient have transportation home? Yes   Transportation Available family or friend will provide   Does the patient receive services at the [facility]madin Clinic? No   Discharge Plan A Home with family   Discharge Plan B Other  (TBD)   Patient/Family In Agreement With Plan yes     Pharmacy: [facility] and mail order-[facility]    Independent  home with adult grandchildren; son is across street and other son is around the block; family very near and very involved    Warning signs/symptoms information reviewed with and provided to patient-blue folder

## 2017-09-09 NOTE — PROGRESS NOTES
AAOX3  Denies any needs and no distress noted.  No bloody stools noted this am.  VSS.  IV dc'd.  Waiting for son to arrive and bring pt home.

## 2017-09-09 NOTE — PLAN OF CARE
Problem: Fall Risk (Adult)  Goal: Identify Related Risk Factors and Signs and Symptoms  Related risk factors and signs and symptoms are identified upon initiation of Human Response Clinical Practice Guideline (CPG)   Outcome: Ongoing (interventions implemented as appropriate)   09/08/17 1912   Fall Risk   Related Risk Factors (Fall Risk) age-related changes;environment unfamiliar   Signs and Symptoms (Fall Risk) presence of risk factors     Goal: Absence of Falls  Patient will demonstrate the desired outcomes by discharge/transition of care.   Outcome: Ongoing (interventions implemented as appropriate)   09/08/17 1912   Fall Risk (Adult)   Absence of Falls making progress toward outcome       Problem: Patient Care Overview  Goal: Plan of Care Review  Outcome: Ongoing (interventions implemented as appropriate)   09/08/17 1912   Coping/Psychosocial   Plan Of Care Reviewed With patient;family

## 2017-09-09 NOTE — PLAN OF CARE
09/09/17 1027   Final Note   Assessment Type Final Discharge Note   Discharge Disposition Home   What phone number can be called within the next 1-3 days to see how you are doing after discharge? ([phone])   Hospital Follow Up  Appt(s) scheduled? Yes  (Patient has pcp follow-up scheduled for next week)   Discharge plans and expectations educations in teach back method with documentation complete? Yes   Right Care Referral Info   Post Acute Recommendation No Care

## 2017-09-10 NOTE — DISCHARGE SUMMARY
"[facility] Medicine  Discharge Summary      Patient Name: Kaycee Almanza  MRN: [id number]  Admission Date: 9/8/2017  Hospital Length of Stay: 0 days  Discharge Date and Time:  09/09/2017 6:31 AM  Attending Physician: No att. providers found   Discharging Provider: Lisseth Cazares MD  Primary Care Provider: Iona Jose MD      HPI:   82 year old female with Hx of peptic ulcer with bleed (~7 years ago), essential hypertension and controlled diabetes mellitus type 2 (not on insulin) who presented with 4 days of "dark stools" and one day of bright red blood per rectum. Associated symptoms include mild lower abdominal pain. Denied dysuria, lightheadedness, LOC, syncope, SOB, chest pain, n/v or diarrhea. Is on daily aspirin. Not on anticoagulants. Denied constipation or straining with BMs. In the ED, Hgb 12.3 (no other Hgb on record to compare), non toxic appearing, benign abdominal exam, AAOx3 and euvolemic, although hypertensive without associated distress. Admitted to observation unit with GI consult for further monitoring and evaluation.     * No surgery found *      Indwelling Lines/Drains at time of discharge:   Lines/Drains/Airways          No matching active lines, drains, or airways        Hospital Course:   Ms Almanza presented with blood per rectum without associated acute abdomen, severe anemia or hemodynamic instability. Also without upper GI symptoms. Is on ASA at home, but otherwise no anticoagulation and no coagulopathy. Hgb remained at 12 g/dL for 24 hours and with no further bloody BMs nor blood per rectum. BUN normal. Patient denied chronic constipation nor straining during BMs. ASA held and senna doc initiated to ensure stool softening effect. On exam, patient was noted to have a small external hemorrhoid that was not actively bleeding nor tender. Rest of rectal exam unremarkable. GI consulted. They had also suggested the possibility of self limited, non complicated " diverticular bleed. Patient was observed overnight. Remained clinically stable and with no significant events. Discharged in stable condition. Is to follow up with PCP. Is to continue stool softeners and resume ASA. Ambulatory and independent. Low sodium/diabetic diet.      Consults:   Consults         Status Ordering Provider     Inpatient consult to Gastroenterology  Once     Provider:  Gilberto Marques MD    Completed MARIO WILSON JR        Pending Diagnostic Studies:     None        Final Active Diagnoses:    Diagnosis Date Noted POA    PRINCIPAL PROBLEM:  Rectal bleeding [K62.5] 09/08/2017 Yes    External hemorrhoid, bleeding [K64.4] 09/08/2017 Yes    Anemia [D64.9] 09/08/2017 Yes    Type 2 diabetes mellitus without complication, without long-term current use of insulin [E11.9] 04/10/2017 Yes    Osteoarthritis involving multiple joints on both sides of body [M15.9] 02/09/2017 Yes    Essential hypertension [I10] 02/09/2017 Yes      Problems Resolved During this Admission:    Diagnosis Date Noted Date Resolved POA      Discharged Condition: good    Disposition: Home or Self Care    Follow Up:  Follow-up Information     Iona Jose MD.    Specialty:  Internal Medicine  Why:  Call on Monday to scheduled post hospitalization follow-up   Contact information:  [address] [facility] [address]  [phone]             Cooper Canales MD.    Specialty:  Gastroenterology  Why:  Call on Monday to scheduled post hospitalization follow-up   Contact information:  [address] [place] [address]  [id number]  [facility]  [facility] [address] [phone]                   Diet general   Order Specific Question Answer Comments   Na restriction, if any: 2gNa    Additional restrictions: Diabetic 1800      Activity as tolerated     Call MD for:  temperature >100.4     Call MD for:  persistent nausea and vomiting or diarrhea     Call MD for:  severe uncontrolled pain     Call MD for:   persistent dizziness, light-headedness, or visual disturbances     Call MD for:  increased confusion or weakness     Call MD for:  severe persistent headache     Call MD for:  difficulty breathing or increased cough       Medications:  Reconciled Home Medications:   Discharge Medication List as of 9/9/2017  9:56 AM      CONTINUE these medications which have NOT CHANGED    Details   amlodipine (NORVASC) 5 MG tablet Take 1 tablet (5 mg total) by mouth once daily., Starting Wed 5/24/2017, Normal      aspirin (ECOTRIN) 81 MG EC tablet Take 81 mg by mouth once daily., Until Discontinued, Historical Med      atorvastatin (LIPITOR) 20 MG tablet Take 20 mg by mouth once daily., Until Discontinued, Historical Med      gabapentin (NEURONTIN) 300 MG capsule Take 300 mg by mouth 3 (three) times daily., Until Discontinued, Historical Med      hydrocodone-acetaminophen 5-325mg (NORCO) 5-325 mg per tablet Take 1 tablet by mouth every 6 (six) hours as needed., Starting Thu 8/24/2017, Print      linagliptin (TRADJENTA) 5 mg Tab tablet Take 1 tablet (5 mg total) by mouth once daily., Starting Wed 5/24/2017, Normal      lisinopril (PRINIVIL,ZESTRIL) 40 MG tablet Take 1 tablet (40 mg total) by mouth once daily., Starting 3/9/2017, Until Fri 3/9/18, Normal      sertraline (ZOLOFT) 100 MG tablet Take 1 tablet (100 mg total) by mouth once daily., Starting Wed 5/24/2017, Normal      TRUE METRIX GLUCOSE TEST STRIP Strp Starting Mon 6/26/2017, Historical Med      ZOSTAVAX, PF, 19,400 unit/0.65 mL injection Starting Wed 4/12/2017, Historical Med           Time spent on the discharge of patient: > 45 minutes    HOS POC IP DISCHARGE SUMMARY    Lisseth Cazares MD  Department of Hospital Medicine  [facility]

## 2017-10-04 ENCOUNTER — OFFICE VISIT (OUTPATIENT)
Dept: FAMILY MEDICINE | Facility: CLINIC | Age: 82
End: 2017-10-04
Payer: MEDICARE

## 2017-10-04 ENCOUNTER — HOSPITAL ENCOUNTER (EMERGENCY)
Facility: OTHER | Age: 82
Discharge: HOME | End: 2017-10-04
Attending: EMERGENCY MEDICINE
Payer: MEDICARE

## 2017-10-04 ENCOUNTER — NURSE TRIAGE (OUTPATIENT)
Dept: ADMINISTRATIVE | Facility: CLINIC | Age: 82
End: 2017-10-04

## 2017-10-04 VITALS
WEIGHT: 107 LBS | HEART RATE: 78 BPM | TEMPERATURE: 99 F | HEIGHT: 57 IN | RESPIRATION RATE: 18 BRPM | WEIGHT: 107.69 LBS | OXYGEN SATURATION: 98 % | HEART RATE: 68 BPM | OXYGEN SATURATION: 97 % | HEIGHT: 57 IN | TEMPERATURE: 98 F | SYSTOLIC BLOOD PRESSURE: 187 MMHG | DIASTOLIC BLOOD PRESSURE: 66 MMHG | SYSTOLIC BLOOD PRESSURE: 180 MMHG | BODY MASS INDEX: 23.08 KG/M2 | BODY MASS INDEX: 23.23 KG/M2 | DIASTOLIC BLOOD PRESSURE: 62 MMHG

## 2017-10-04 DIAGNOSIS — M15.9 OSTEOARTHRITIS INVOLVING MULTIPLE JOINTS ON BOTH SIDES OF BODY: ICD-10-CM

## 2017-10-04 DIAGNOSIS — F41.9 ANXIETY: ICD-10-CM

## 2017-10-04 DIAGNOSIS — T78.40XA ALLERGIC REACTION TO DRUG, INITIAL ENCOUNTER: Primary | ICD-10-CM

## 2017-10-04 DIAGNOSIS — K62.5 RECTAL BLEEDING: ICD-10-CM

## 2017-10-04 DIAGNOSIS — Z23 NEED FOR PROPHYLACTIC VACCINATION WITH STREPTOCOCCUS PNEUMONIAE (PNEUMOCOCCUS) AND INFLUENZA VACCINES: ICD-10-CM

## 2017-10-04 DIAGNOSIS — Z23 NEED FOR IMMUNIZATION AGAINST INFLUENZA: ICD-10-CM

## 2017-10-04 DIAGNOSIS — E11.9 TYPE 2 DIABETES MELLITUS WITHOUT COMPLICATION, WITHOUT LONG-TERM CURRENT USE OF INSULIN: Primary | ICD-10-CM

## 2017-10-04 PROCEDURE — 63600175 PHARM REV CODE 636 W HCPCS: Performed by: EMERGENCY MEDICINE

## 2017-10-04 PROCEDURE — 25000003 PHARM REV CODE 250: Performed by: EMERGENCY MEDICINE

## 2017-10-04 PROCEDURE — 90732 PPSV23 VACC 2 YRS+ SUBQ/IM: CPT | Mod: S$GLB,,, | Performed by: FAMILY MEDICINE

## 2017-10-04 PROCEDURE — 99214 OFFICE O/P EST MOD 30 MIN: CPT | Mod: S$GLB,,, | Performed by: FAMILY MEDICINE

## 2017-10-04 PROCEDURE — G0009 ADMIN PNEUMOCOCCAL VACCINE: HCPCS | Mod: S$GLB,,, | Performed by: FAMILY MEDICINE

## 2017-10-04 PROCEDURE — 99999 PR PBB SHADOW E&M-EST. PATIENT-LVL III: CPT | Mod: PBBFAC,,, | Performed by: FAMILY MEDICINE

## 2017-10-04 PROCEDURE — 99283 EMERGENCY DEPT VISIT LOW MDM: CPT

## 2017-10-04 RX ORDER — DIPHENHYDRAMINE HCL 25 MG
25 CAPSULE ORAL
Status: COMPLETED | OUTPATIENT
Start: 2017-10-04 | End: 2017-10-04

## 2017-10-04 RX ORDER — ATORVASTATIN CALCIUM 40 MG/1
TABLET, FILM COATED ORAL
COMMUNITY
Start: 2017-09-15 | End: 2018-01-04

## 2017-10-04 RX ORDER — PREDNISONE 20 MG/1
60 TABLET ORAL
Status: COMPLETED | OUTPATIENT
Start: 2017-10-04 | End: 2017-10-04

## 2017-10-04 RX ORDER — PREDNISONE 10 MG/1
10 TABLET ORAL DAILY
Qty: 5 TABLET | Refills: 0 | Status: SHIPPED | OUTPATIENT
Start: 2017-10-04 | End: 2017-10-09

## 2017-10-04 RX ORDER — HYDROCODONE BITARTRATE AND ACETAMINOPHEN 5; 325 MG/1; MG/1
1 TABLET ORAL EVERY 6 HOURS PRN
Qty: 45 TABLET | Refills: 0 | Status: SHIPPED | OUTPATIENT
Start: 2017-10-04 | End: 2017-11-16 | Stop reason: SDUPTHER

## 2017-10-04 RX ORDER — SERTRALINE HYDROCHLORIDE 100 MG/1
100 TABLET, FILM COATED ORAL DAILY
Qty: 90 TABLET | Refills: 1 | Status: SHIPPED | OUTPATIENT
Start: 2017-10-04 | End: 2018-06-15 | Stop reason: SDUPTHER

## 2017-10-04 RX ADMIN — PREDNISONE 60 MG: 20 TABLET ORAL at 07:10

## 2017-10-04 RX ADMIN — DIPHENHYDRAMINE HYDROCHLORIDE 25 MG: 25 CAPSULE ORAL at 07:10

## 2017-10-04 NOTE — PROGRESS NOTES
Chief Complaint   Patient presents with    Hypertension     follow up        HPI  Kaycee Almanza is a 82 y.o. female with multiple medical diagnoses as listed in the medical history and problem list that presents for follow-up for hypertension, diabetes, and as follow up to her recent hospital visit for rectal bleeding. She was kept in observation and discharged after her blood counts remained stable. This occurred on 9/8. She has not followed up with GI yet as it has not happened again. She has seen endocrine recently and her blood sugar is controlled. Her statin was increased as her cholesterol is not controlled.    PAST MEDICAL HISTORY:  Past Medical History:   Diagnosis Date    Anxiety     Arthritis     Dementia     Depression     Diabetes type 2, controlled     sees Dr. Elena    GERD (gastroesophageal reflux disease)     Hyperlipidemia     Hypertension     Left posterior capsular opacification 5/11/2017    Osteoporosis     Thyroid disease     Ulcer     Ulcer        PAST SURGICAL HISTORY:  Past Surgical History:   Procedure Laterality Date    CATARACT EXTRACTION W/  INTRAOCULAR LENS IMPLANT Bilateral 2006    [facility] at [facility]    cataract surgery  2006    CHOLECYSTECTOMY      EYE SURGERY      HAND SURGERY      HYSTERECTOMY      KNEE SURGERY      SHOULDER SURGERY         SOCIAL HISTORY:  Social History     Social History    Marital status:      Spouse name: N/A    Number of children: N/A    Years of education: N/A     Occupational History    Not on file.     Social History Main Topics    Smoking status: Never Smoker    Smokeless tobacco: Never Used    Alcohol use No    Drug use: No    Sexual activity: No     Other Topics Concern    Not on file     Social History Narrative    No narrative on file       FAMILY HISTORY:  Family History   Problem Relation Age of Onset    Stroke Mother     Diabetes Mother     Arthritis Father     Early death Sister     Birth defects  Brother     No Known Problems Sister     Birth defects Brother     Birth defects Brother     Birth defects Brother     Birth defects Brother     Amblyopia Neg Hx     Blindness Neg Hx     Cataracts Neg Hx     Glaucoma Neg Hx     Macular degeneration Neg Hx     Retinal detachment Neg Hx     Strabismus Neg Hx        ALLERGIES AND MEDICATIONS: updated and reviewed.  Review of patient's allergies indicates:  No Known Allergies  Current Outpatient Prescriptions   Medication Sig Dispense Refill    amlodipine (NORVASC) 5 MG tablet Take 1 tablet (5 mg total) by mouth once daily. 90 tablet 1    aspirin (ECOTRIN) 81 MG EC tablet Take 81 mg by mouth once daily.      atorvastatin (LIPITOR) 40 MG tablet       gabapentin (NEURONTIN) 300 MG capsule Take 300 mg by mouth 3 (three) times daily.      hydrocodone-acetaminophen 5-325mg (NORCO) 5-325 mg per tablet Take 1 tablet by mouth every 6 (six) hours as needed. 45 tablet 0    linagliptin (TRADJENTA) 5 mg Tab tablet Take 1 tablet (5 mg total) by mouth once daily. 90 tablet 1    lisinopril (PRINIVIL,ZESTRIL) 40 MG tablet Take 1 tablet (40 mg total) by mouth once daily. 90 tablet 3    sertraline (ZOLOFT) 100 MG tablet Take 1 tablet (100 mg total) by mouth once daily. 90 tablet 1    TRUE METRIX GLUCOSE TEST STRIP Strp       atorvastatin (LIPITOR) 20 MG tablet Take 40 mg by mouth once daily.       ZOSTAVAX, PF, 19,400 unit/0.65 mL injection        No current facility-administered medications for this visit.        ROS  Review of Systems   Constitutional: Negative for chills, diaphoresis, fatigue, fever and unexpected weight change.   HENT: Negative for rhinorrhea, sinus pressure, sore throat and tinnitus.    Eyes: Negative for photophobia and visual disturbance.   Respiratory: Negative for cough, shortness of breath and wheezing.    Cardiovascular: Negative for chest pain and palpitations.   Gastrointestinal: Negative for abdominal pain, blood in stool,  "constipation, diarrhea, nausea and vomiting.   Genitourinary: Negative for dysuria, flank pain, frequency and vaginal discharge.   Musculoskeletal: Negative for arthralgias and joint swelling.   Skin: Negative for rash.   Neurological: Negative for speech difficulty, weakness, light-headedness and headaches.   Psychiatric/Behavioral: Negative for behavioral problems and dysphoric mood.       Physical Exam  Vitals:    10/04/17 0705 10/04/17 0757   BP: (!) 144/60 (!) 180/66   BP Location: Left arm Left arm   Patient Position: Sitting Sitting   BP Method: Medium (Manual) Medium (Manual)   Pulse: 78    Temp: 98.2 °F (36.8 °C)    TempSrc: Oral    SpO2: 97%    Weight: 48.9 kg (107 lb 11.1 oz)    Height: 4' 9" (1.448 m)     Body mass index is 23.3 kg/m².  Weight: 48.9 kg (107 lb 11.1 oz)   Height: 4' 9" (144.8 cm)     Physical Exam   Constitutional: She is oriented to person, place, and time. She appears well-developed and well-nourished.   Eyes: EOM are normal.   Neurological: She is alert and oriented to person, place, and time.   Skin: Skin is warm and dry. No rash noted. No erythema.   Psychiatric: She has a normal mood and affect. Her behavior is normal.   Nursing note and vitals reviewed.      Health Maintenance       Date Due Completion Date    Pneumococcal (65+) (2 of 2 - PPSV23) 10/08/2016 10/8/2015    Hemoglobin A1c 04/18/2017 10/18/2016 (Done)    Override on 10/18/2016: Done (Dr. Hollis Luther/Endocrinology- hemoglobin a1c 6.2)    Influenza Vaccine 08/01/2017 10/8/2015    Urine Microalbumin 10/18/2017 10/18/2016    Foot Exam 11/09/2017 11/9/2016 (Done)    Override on 11/9/2016: Done (Dr. Hollis Luther/Endocrinology-bilateral feet normal by visual exam, normal pulses,sensations intact,by monofilament,right DP's2/4,PT's 2/4,monofilament 10/10,cap refill <3 secs,left  DP's 2/4,PT's 2/4,monofilament 10/10,cap refill <3 secs)    Lipid Panel 10/18/2017 10/18/2016    Eye Exam 05/11/2018 5/11/2017    Override on " 4/19/2017: Done    DEXA SCAN 06/22/2019 6/22/2016 (Done)    Override on 6/22/2016: Done (LAURY Barnett/Dr. Hollis Luther/Endocrinology- normal range in the lumbar spine & hips,left wrist Tscore -2.7 improved from -3.3. Patient has had to stop Prolia, but patient had 4 doses. Patient is also on calcium + vitamin d supplements)    Override on 7/22/2014: Done (Dr. Hollis Luther/Endocrinology- AP Spine-1.247 g/cm 2 w/ T score =0.3,dual femur=0.979 g/cm 2 w/ T score =0.2,left forearm= 0.586 g/cm 2 w/ T score=3.3,patient started on prolia)    TETANUS VACCINE 03/09/2027 3/9/2017 (Declined)    Override on 3/9/2017: Declined            ASSESSMENT     1. Type 2 diabetes mellitus without complication, without long-term current use of insulin    2. Need for immunization against influenza    3. Need for prophylactic vaccination with Streptococcus pneumoniae (Pneumococcus) and Influenza vaccines    4. Anxiety    5. Osteoarthritis involving multiple joints on both sides of body    6. Rectal bleeding        PLAN:     Problem List Items Addressed This Visit        Psychiatric    Anxiety  -This is a chronic medical condition that is stable under the current regimen. Continue to monitor and we will make medication adjustments as needed.       Relevant Medications    sertraline (ZOLOFT) 100 MG tablet       Endocrine    Type 2 diabetes mellitus without complication, without long-term current use of insulin - Primary  -continue follow up with endocrine       GI    Rectal bleeding  -I recommend she follow up with GI just in case this occurs again       Orthopedic    Osteoarthritis involving multiple joints on both sides of body  -This is a chronic medical condition that is stable under the current regimen. Continue to monitor and we will make medication adjustments as needed.       Relevant Medications    hydrocodone-acetaminophen 5-325mg (NORCO) 5-325 mg per tablet      Other Visit Diagnoses     Need for immunization against  influenza      -as ordered       Relevant Orders    Influenza - High Dose (65+) (PF) (IM)    Need for prophylactic vaccination with Streptococcus pneumoniae (Pneumococcus) and Influenza vaccines      -as ordered       Relevant Orders    (In Office Administered) Pneumococcal Polysaccharide Vaccine (23 Valent) (SQ/IM) (Completed)            Iona Jose MD  10/04/2017 7:48 AM        Return in about 3 months (around 1/4/2018) for Follow up.

## 2017-10-04 NOTE — PROGRESS NOTES
Pneumonia-23 vaccine administered, yuliana well. Instructed to wait 15mins for observation, no reaction noted @ time of discharge.

## 2017-10-04 NOTE — TELEPHONE ENCOUNTER
"    Reason for Disposition   Sounds like a severe, unusual reaction to the triager     Recommended ED now for Kaycee.  Her daughter in law, Jacklyn, called to say her left arm from shoulder to elbow is very painful, and it is noticeably swollen, both on the top of the arm and the back of the arm, also from the shoulder to the elbow. This began following her pneumococcal vaccination this morning in clinic.  Jacklyn will bring her to the [facility] ED now for evaluation.   Message to Iona Jose pcp.  Please contact caller directly with any additional care advice.    Answer Assessment - Initial Assessment Questions  1. SYMPTOMS: "What is the main symptom?" (e.g., redness, swelling, pain)       Swelling in her left arm at the site of pneumonia shot, and the swelling goes from her shoulder to the elbow.    2. ONSET: "When was the vaccine (shot) given?" "How much later did the __________ begin?" (e.g., hours, days ago)       Today at 0800.    3. SEVERITY: "How bad is it?"       The swelling is all on the front and the back side of the arm to the elbow.    4. FEVER: "Is there a fever?" If so, ask: "What is it, how was it measured, and when did it start?"       No fever.    5. IMMUNIZATIONS GIVEN: "What shots have you recently received?"      Pneumoccocal .    6. PAST REACTIONS: "Have you reacted to immunizations before?" If so, ask: "What happened?"      No.    7. OTHER SYMPTOMS: "Do you have any other symptoms?"      Pain and swelling.    Protocols used: ST IMMUNIZATION [id number]      "

## 2017-10-05 NOTE — ED PROVIDER NOTES
Encounter Date: 10/4/2017       History     Chief Complaint   Patient presents with    Allergic Reaction     pt presents to ED with c/o localized swelling and itching to injection site of pneumonia vaccine. Denies any sob, throat irritation or tongue swelling at this time.      Patient had pneumococcal vaccine today developed ALLERGIC reaction shot site.        Allergic Reaction   The primary symptoms are  rash.     Review of patient's allergies indicates:  No Known Allergies  Past Medical History:   Diagnosis Date    Anxiety     Arthritis     Dementia     Depression     Diabetes type 2, controlled     sees Dr. Elena    GERD (gastroesophageal reflux disease)     Hyperlipidemia     Hypertension     Left posterior capsular opacification 5/11/2017    Osteoporosis     Thyroid disease     Ulcer     Ulcer      Past Surgical History:   Procedure Laterality Date    CATARACT EXTRACTION W/  INTRAOCULAR LENS IMPLANT Bilateral 2006    [facility] at [facility]    cataract surgery  2006    CHOLECYSTECTOMY      EYE SURGERY      HAND SURGERY      HYSTERECTOMY      KNEE SURGERY      SHOULDER SURGERY       Family History   Problem Relation Age of Onset    Stroke Mother     Diabetes Mother     Arthritis Father     Early death Sister     Birth defects Brother     No Known Problems Sister     Birth defects Brother     Birth defects Brother     Birth defects Brother     Birth defects Brother     Amblyopia Neg Hx     Blindness Neg Hx     Cataracts Neg Hx     Glaucoma Neg Hx     Macular degeneration Neg Hx     Retinal detachment Neg Hx     Strabismus Neg Hx      Social History   Substance Use Topics    Smoking status: Never Smoker    Smokeless tobacco: Never Used    Alcohol use No     Review of Systems   Constitutional: Negative.    HENT: Negative.    Eyes: Negative.    Respiratory: Negative.    Cardiovascular: Negative.    Gastrointestinal: Negative.    Endocrine: Negative.    Genitourinary: Negative.     Musculoskeletal: Negative.    Skin: Positive for rash.   Allergic/Immunologic: Negative.    Neurological: Negative.    Hematological: Negative.    Psychiatric/Behavioral: Negative.        Physical Exam     Initial Vitals [10/04/17 1900]   BP Pulse Resp Temp SpO2   (!) 196/60 70 16 98.9 °F (37.2 °C) 97 %      MAP       105.33         Physical Exam    Nursing note and vitals reviewed.  Constitutional: Vital signs are normal. She appears well-developed. She is active and cooperative.   HENT:   Head: Normocephalic and atraumatic.   Eyes: Conjunctivae, EOM and lids are normal. Pupils are equal, round, and reactive to light.   Neck: Trachea normal and full passive range of motion without pain. Neck supple. No thyroid mass present.   Cardiovascular: Normal rate, regular rhythm, S1 normal, S2 normal, normal heart sounds, intact distal pulses and normal pulses.   Abdominal: Soft. Normal appearance, normal aorta and bowel sounds are normal.   Musculoskeletal: Normal range of motion.   Lymphadenopathy:     She has no axillary adenopathy.   Neurological: She is alert and oriented to person, place, and time.   Skin: Skin is warm, dry and intact.        Psychiatric: She has a normal mood and affect. Her speech is normal and behavior is normal. Judgment and thought content normal. Cognition and memory are normal.         ED Course   Procedures  Labs Reviewed - No data to display                            ED Course      Clinical Impression:   The encounter diagnosis was Allergic reaction to drug, initial encounter.                           Marvel Gauthier MD  10/04/17 1918

## 2017-10-05 NOTE — TELEPHONE ENCOUNTER
Can we contact the pt to see how she is doing today, I recommend placing ice on the area for 15 min three times daily. Let us know if it is getting worse

## 2017-10-11 ENCOUNTER — CLINICAL SUPPORT (OUTPATIENT)
Dept: FAMILY MEDICINE | Facility: CLINIC | Age: 82
End: 2017-10-11
Payer: MEDICARE

## 2017-10-11 VITALS — HEART RATE: 72 BPM | DIASTOLIC BLOOD PRESSURE: 66 MMHG | SYSTOLIC BLOOD PRESSURE: 148 MMHG

## 2017-10-11 DIAGNOSIS — I10 BENIGN ESSENTIAL HTN: Primary | ICD-10-CM

## 2017-10-11 PROCEDURE — 99499 UNLISTED E&M SERVICE: CPT | Mod: S$GLB,,, | Performed by: FAMILY MEDICINE

## 2017-10-11 PROCEDURE — 99999 PR PBB SHADOW E&M-EST. PATIENT-LVL II: CPT | Mod: PBBFAC,,,

## 2017-10-11 NOTE — PROGRESS NOTES
Kaycee Almanza 82 y.o. female is here today for Blood Pressure check.   History of HTN yes.    Review of patient's allergies indicates:  No Known Allergies  Creatinine   Date Value Ref Range Status   09/08/2017 0.9 0.5 - 1.4 mg/dL Final     Sodium   Date Value Ref Range Status   09/08/2017 139 136 - 145 mmol/L Final     Potassium   Date Value Ref Range Status   09/08/2017 4.1 3.5 - 5.1 mmol/L Final   ]  Patient verifies taking blood pressure medications on a regular basis at the same time of the day.     Current Outpatient Prescriptions:     amlodipine (NORVASC) 5 MG tablet, Take 1 tablet (5 mg total) by mouth once daily., Disp: 90 tablet, Rfl: 1    aspirin (ECOTRIN) 81 MG EC tablet, Take 81 mg by mouth once daily., Disp: , Rfl:     atorvastatin (LIPITOR) 20 MG tablet, Take 40 mg by mouth once daily. , Disp: , Rfl:     atorvastatin (LIPITOR) 40 MG tablet, , Disp: , Rfl:     gabapentin (NEURONTIN) 300 MG capsule, Take 300 mg by mouth 3 (three) times daily., Disp: , Rfl:     hydrocodone-acetaminophen 5-325mg (NORCO) 5-325 mg per tablet, Take 1 tablet by mouth every 6 (six) hours as needed., Disp: 45 tablet, Rfl: 0    linagliptin (TRADJENTA) 5 mg Tab tablet, Take 1 tablet (5 mg total) by mouth once daily., Disp: 90 tablet, Rfl: 1    lisinopril (PRINIVIL,ZESTRIL) 40 MG tablet, Take 1 tablet (40 mg total) by mouth once daily., Disp: 90 tablet, Rfl: 3    sertraline (ZOLOFT) 100 MG tablet, Take 1 tablet (100 mg total) by mouth once daily., Disp: 90 tablet, Rfl: 1    TRUE METRIX GLUCOSE TEST STRIP Strp, , Disp: , Rfl:     ZOSTAVAX, PF, 19,400 unit/0.65 mL injection, , Disp: , Rfl:   Does patient have record of home blood pressure readings no. .   Last dose of blood pressure medication was taken at 6:00 this morning.  Patient is asymptomatic.   Complains of none.    Vitals:    10/11/17 0734 10/11/17 0756   BP: (!) 150/70 (!) 148/66   BP Location: Right arm Right arm   Patient Position: Sitting Sitting   BP  Method: Small (Manual) Small (Manual)   Pulse: 78 72         Dr. Jose notified.

## 2017-10-21 ENCOUNTER — TELEPHONE (OUTPATIENT)
Dept: FAMILY MEDICINE | Facility: CLINIC | Age: 82
End: 2017-10-21

## 2017-10-21 NOTE — TELEPHONE ENCOUNTER
Attempted to contact patient regarding pharmacy not receiving meds. Prescription received by the Pharmacy on 10/4/17 per note in computer. Unable to contact pharmacy today because they are closed. Left VM for patient to call the clinic at her earliest convenience.

## 2017-10-21 NOTE — TELEPHONE ENCOUNTER
----- Message from Chayo Laird sent at 10/20/2017  3:38 PM CDT -----  Contact: self  Pt states [facility] stated they never received prescription for--sertraline (ZOLOFT) 100 MG tablet--- neither [place] [address].     [facility] Mail Order.     [phone].

## 2017-10-23 ENCOUNTER — CLINICAL SUPPORT (OUTPATIENT)
Dept: FAMILY MEDICINE | Facility: CLINIC | Age: 82
End: 2017-10-23
Payer: MEDICARE

## 2017-10-23 ENCOUNTER — TELEPHONE (OUTPATIENT)
Dept: ADMINISTRATIVE | Facility: HOSPITAL | Age: 82
End: 2017-10-23

## 2017-10-23 DIAGNOSIS — Z23 NEED FOR INFLUENZA VACCINATION: Primary | ICD-10-CM

## 2017-10-23 PROCEDURE — 99499 UNLISTED E&M SERVICE: CPT | Mod: S$GLB,,, | Performed by: FAMILY MEDICINE

## 2017-10-23 PROCEDURE — G0008 ADMIN INFLUENZA VIRUS VAC: HCPCS | Mod: S$GLB,,, | Performed by: FAMILY MEDICINE

## 2017-10-23 PROCEDURE — 90662 IIV NO PRSV INCREASED AG IM: CPT | Mod: S$GLB,,, | Performed by: FAMILY MEDICINE

## 2017-11-16 DIAGNOSIS — M15.9 OSTEOARTHRITIS INVOLVING MULTIPLE JOINTS ON BOTH SIDES OF BODY: ICD-10-CM

## 2017-11-16 RX ORDER — HYDROCODONE BITARTRATE AND ACETAMINOPHEN 5; 325 MG/1; MG/1
1 TABLET ORAL EVERY 6 HOURS PRN
Qty: 45 TABLET | Refills: 0 | Status: SHIPPED | OUTPATIENT
Start: 2017-11-16 | End: 2018-01-04 | Stop reason: SDUPTHER

## 2017-11-16 RX ORDER — HYDROCODONE BITARTRATE AND ACETAMINOPHEN 5; 325 MG/1; MG/1
1 TABLET ORAL EVERY 6 HOURS PRN
Qty: 45 TABLET | Refills: 0 | Status: SHIPPED | OUTPATIENT
Start: 2017-11-16 | End: 2017-11-16 | Stop reason: SDUPTHER

## 2017-12-04 RX ORDER — AMLODIPINE BESYLATE 5 MG/1
TABLET ORAL
Qty: 90 TABLET | Refills: 1 | Status: SHIPPED | OUTPATIENT
Start: 2017-12-04 | End: 2018-02-15

## 2017-12-29 DIAGNOSIS — M15.9 OSTEOARTHRITIS INVOLVING MULTIPLE JOINTS ON BOTH SIDES OF BODY: ICD-10-CM

## 2017-12-29 RX ORDER — HYDROCODONE BITARTRATE AND ACETAMINOPHEN 5; 325 MG/1; MG/1
1 TABLET ORAL EVERY 6 HOURS PRN
Qty: 45 TABLET | Refills: 0 | OUTPATIENT
Start: 2017-12-29

## 2017-12-29 NOTE — TELEPHONE ENCOUNTER
Called and spoken to patient. Patient would like hydrocodone 5-325mg refilled. She going out of town soon.

## 2018-01-04 ENCOUNTER — LAB VISIT (OUTPATIENT)
Dept: LAB | Facility: HOSPITAL | Age: 83
End: 2018-01-04
Attending: FAMILY MEDICINE
Payer: MEDICARE

## 2018-01-04 ENCOUNTER — OFFICE VISIT (OUTPATIENT)
Dept: FAMILY MEDICINE | Facility: CLINIC | Age: 83
End: 2018-01-04
Payer: MEDICARE

## 2018-01-04 VITALS
TEMPERATURE: 98 F | RESPIRATION RATE: 18 BRPM | WEIGHT: 110.69 LBS | SYSTOLIC BLOOD PRESSURE: 182 MMHG | OXYGEN SATURATION: 98 % | HEART RATE: 68 BPM | HEIGHT: 59 IN | DIASTOLIC BLOOD PRESSURE: 62 MMHG | BODY MASS INDEX: 22.32 KG/M2

## 2018-01-04 DIAGNOSIS — I10 ESSENTIAL HYPERTENSION: ICD-10-CM

## 2018-01-04 DIAGNOSIS — E11.9 TYPE 2 DIABETES MELLITUS WITHOUT COMPLICATION, WITHOUT LONG-TERM CURRENT USE OF INSULIN: ICD-10-CM

## 2018-01-04 DIAGNOSIS — G89.29 CHRONIC BACK PAIN, UNSPECIFIED BACK LOCATION, UNSPECIFIED BACK PAIN LATERALITY: Primary | ICD-10-CM

## 2018-01-04 DIAGNOSIS — M15.9 OSTEOARTHRITIS INVOLVING MULTIPLE JOINTS ON BOTH SIDES OF BODY: ICD-10-CM

## 2018-01-04 DIAGNOSIS — M54.9 CHRONIC BACK PAIN, UNSPECIFIED BACK LOCATION, UNSPECIFIED BACK PAIN LATERALITY: Primary | ICD-10-CM

## 2018-01-04 LAB
CREAT UR-MCNC: 87 MG/DL
MICROALBUMIN UR DL<=1MG/L-MCNC: 2097 UG/ML
MICROALBUMIN/CREATININE RATIO: 2410.3 UG/MG

## 2018-01-04 PROCEDURE — 99214 OFFICE O/P EST MOD 30 MIN: CPT | Mod: S$GLB,,, | Performed by: FAMILY MEDICINE

## 2018-01-04 PROCEDURE — 99999 PR PBB SHADOW E&M-EST. PATIENT-LVL IV: CPT | Mod: PBBFAC,,, | Performed by: FAMILY MEDICINE

## 2018-01-04 PROCEDURE — 82043 UR ALBUMIN QUANTITATIVE: CPT

## 2018-01-04 RX ORDER — HYDROCODONE BITARTRATE AND ACETAMINOPHEN 5; 325 MG/1; MG/1
1 TABLET ORAL EVERY 6 HOURS PRN
Qty: 45 TABLET | Refills: 0 | Status: SHIPPED | OUTPATIENT
Start: 2018-01-04 | End: 2018-02-05 | Stop reason: SDUPTHER

## 2018-01-04 NOTE — PROGRESS NOTES
Chief Complaint   Patient presents with    Hypertension    Follow-up       HPI  Kaycee Almanza is a 83 y.o. female with multiple medical diagnoses as listed in the medical history and problem list that presents for follow-up for HTN and arthritis. She has not taken her medication today as she normally takes it at this time. She has been having some pain from arthritis but her script lasted her a month and a half as she takes it when needed. She feels the current dose is effective.    PAST MEDICAL HISTORY:  Past Medical History:   Diagnosis Date    Anxiety     Arthritis     Dementia     Depression     Diabetes type 2, controlled     sees Dr. Elena    GERD (gastroesophageal reflux disease)     Hyperlipidemia     Hypertension     Left posterior capsular opacification 5/11/2017    Osteoporosis     Thyroid disease     Ulcer     Ulcer        PAST SURGICAL HISTORY:  Past Surgical History:   Procedure Laterality Date    CATARACT EXTRACTION W/  INTRAOCULAR LENS IMPLANT Bilateral 2006    [facility] at [facility]    cataract surgery  2006    CHOLECYSTECTOMY      EYE SURGERY      HAND SURGERY      HYSTERECTOMY      KNEE SURGERY      SHOULDER SURGERY         SOCIAL HISTORY:  Social History     Social History    Marital status:      Spouse name: N/A    Number of children: N/A    Years of education: N/A     Occupational History    Not on file.     Social History Main Topics    Smoking status: Never Smoker    Smokeless tobacco: Never Used    Alcohol use No    Drug use: No    Sexual activity: No     Other Topics Concern    Not on file     Social History Narrative    No narrative on file       FAMILY HISTORY:  Family History   Problem Relation Age of Onset    Stroke Mother     Diabetes Mother     Arthritis Father     Early death Sister     Birth defects Brother     No Known Problems Sister     Birth defects Brother     Birth defects Brother     Birth defects Brother     Birth defects  Brother     Amblyopia Neg Hx     Blindness Neg Hx     Cataracts Neg Hx     Glaucoma Neg Hx     Macular degeneration Neg Hx     Retinal detachment Neg Hx     Strabismus Neg Hx        ALLERGIES AND MEDICATIONS: updated and reviewed.  Review of patient's allergies indicates:  No Known Allergies  Current Outpatient Prescriptions   Medication Sig Dispense Refill    amLODIPine (NORVASC) 5 MG tablet TAKE 1 TABLET EVERY DAY 90 tablet 1    aspirin (ECOTRIN) 81 MG EC tablet Take 81 mg by mouth once daily.      gabapentin (NEURONTIN) 300 MG capsule Take 300 mg by mouth 3 (three) times daily.      hydrocodone-acetaminophen 5-325mg (NORCO) 5-325 mg per tablet Take 1 tablet by mouth every 6 (six) hours as needed. 45 tablet 0    linagliptin (TRADJENTA) 5 mg Tab tablet Take 1 tablet (5 mg total) by mouth once daily. 90 tablet 1    lisinopril (PRINIVIL,ZESTRIL) 40 MG tablet Take 1 tablet (40 mg total) by mouth once daily. 90 tablet 3    sertraline (ZOLOFT) 100 MG tablet Take 1 tablet (100 mg total) by mouth once daily. 90 tablet 1    TRUE METRIX GLUCOSE TEST STRIP Strp       ZOSTAVAX, PF, 19,400 unit/0.65 mL injection        No current facility-administered medications for this visit.        ROS  Review of Systems   Constitutional: Negative for chills, diaphoresis, fatigue, fever and unexpected weight change.   HENT: Negative for rhinorrhea, sinus pressure, sore throat and tinnitus.    Eyes: Negative for photophobia and visual disturbance.   Respiratory: Negative for cough, shortness of breath and wheezing.    Cardiovascular: Negative for chest pain and palpitations.   Gastrointestinal: Negative for abdominal pain, blood in stool, constipation, diarrhea, nausea and vomiting.   Genitourinary: Negative for dysuria, flank pain, frequency and vaginal discharge.   Musculoskeletal: Positive for arthralgias and back pain. Negative for joint swelling.   Skin: Negative for rash.   Neurological: Negative for speech difficulty,  "weakness, light-headedness and headaches.   Psychiatric/Behavioral: Negative for behavioral problems and dysphoric mood.       Physical Exam  Vitals:    01/04/18 0705   BP: (!) 192/64   Pulse: 68   Resp: 18   Temp: 98.3 °F (36.8 °C)   TempSrc: Oral   SpO2: 98%   Weight: 50.2 kg (110 lb 10.7 oz)   Height: 4' 11" (1.499 m)    Body mass index is 22.35 kg/m².  Weight: 50.2 kg (110 lb 10.7 oz)   Height: 4' 11" (149.9 cm)     Physical Exam   Constitutional: She is oriented to person, place, and time. She appears well-developed and well-nourished.   HENT:   Head: Normocephalic and atraumatic.   Eyes: EOM are normal.   Neurological: She is alert and oriented to person, place, and time.   Skin: Skin is warm and dry. No rash noted. No erythema.   Psychiatric: She has a normal mood and affect. Her behavior is normal.   Nursing note and vitals reviewed.      Health Maintenance       Date Due Completion Date    Sign Pain Contract 10/13/1952 ---    Urine Microalbumin 10/18/2017 10/18/2016    Foot Exam 11/09/2017 11/9/2016 (Done)    Override on 11/9/2016: Done (Dr. Hollis Luther/Endocrinology-bilateral feet normal by visual exam, normal pulses,sensations intact,by monofilament,right DP's2/4,PT's 2/4,monofilament 10/10,cap refill <3 secs,left  DP's 2/4,PT's 2/4,monofilament 10/10,cap refill <3 secs)    Hemoglobin A1c 03/06/2018 9/6/2017    Override on 10/18/2016: Done (Dr. Hollis Luther/Endocrinology- hemoglobin a1c 6.2)    Eye Exam 05/11/2018 5/11/2017    Override on 4/19/2017: Done    Lipid Panel 09/06/2018 9/6/2017    DEXA SCAN 06/22/2019 6/22/2016 (Done)    Override on 6/22/2016: Done (LAURY Barnett/Dr. Hollis Luther/Endocrinology- normal range in the lumbar spine & hips,left wrist Tscore -2.7 improved from -3.3. Patient has had to stop Prolia, but patient had 4 doses. Patient is also on calcium + vitamin d supplements)    Override on 7/22/2014: Done (Dr. Hollis Luther/Endocrinology- AP Spine-1.247 g/cm 2 w/ T score " =0.3,dual femur=0.979 g/cm 2 w/ T score =0.2,left forearm= 0.586 g/cm 2 w/ T score=3.3,patient started on prolia)    TETANUS VACCINE 03/09/2027 3/9/2017 (Declined)    Override on 3/9/2017: Declined            ASSESSMENT     1. Chronic back pain, unspecified back location, unspecified back pain laterality    2. Osteoarthritis involving multiple joints on both sides of body    3. Essential hypertension    4. Type 2 diabetes mellitus without complication, without long-term current use of insulin        PLAN:     Problem List Items Addressed This Visit        Cardiac/Vascular    Essential hypertension  -will follow up in 6 wks for recheck  -suggest scheduling her appts so that she can take her medication       Endocrine    Type 2 diabetes mellitus without complication, without long-term current use of insulin  -check urine protein    Relevant Orders    Microalbumin/creatinine urine ratio       Orthopedic    Osteoarthritis involving multiple joints on both sides of body  -continue current dose    Relevant Medications    hydrocodone-acetaminophen 5-325mg (NORCO) 5-325 mg per tablet    Chronic back pain - Primary  -continue current medication            Iona Jose MD  01/04/2018 7:34 AM        Return in about 6 weeks (around 2/15/2018) for Follow up.

## 2018-01-12 ENCOUNTER — TELEPHONE (OUTPATIENT)
Dept: FAMILY MEDICINE | Facility: CLINIC | Age: 83
End: 2018-01-12

## 2018-01-12 DIAGNOSIS — R80.9 DIABETES MELLITUS DUE TO UNDERLYING CONDITION WITH MICROALBUMINURIA, WITHOUT LONG-TERM CURRENT USE OF INSULIN: Primary | ICD-10-CM

## 2018-01-12 DIAGNOSIS — E08.29 DIABETES MELLITUS DUE TO UNDERLYING CONDITION WITH MICROALBUMINURIA, WITHOUT LONG-TERM CURRENT USE OF INSULIN: Primary | ICD-10-CM

## 2018-01-12 NOTE — TELEPHONE ENCOUNTER
Her urine test shows she has a lot of protein in her urine. I have referred her to a kidney doctor for evaluation of this problem    Iona Jose MD

## 2018-02-01 DIAGNOSIS — I10 ESSENTIAL HYPERTENSION: ICD-10-CM

## 2018-02-01 RX ORDER — LISINOPRIL 40 MG/1
TABLET ORAL
Qty: 90 TABLET | Refills: 1 | Status: SHIPPED | OUTPATIENT
Start: 2018-02-01 | End: 2018-06-15 | Stop reason: SDUPTHER

## 2018-02-05 DIAGNOSIS — M15.9 OSTEOARTHRITIS INVOLVING MULTIPLE JOINTS ON BOTH SIDES OF BODY: ICD-10-CM

## 2018-02-05 NOTE — TELEPHONE ENCOUNTER
----- Message from Shaylee Coto sent at 2/5/2018  1:23 PM CST -----  Contact: Self  [phone]  Pt is requesting a refill on Hydrocodone , Pls call Lexy [phone]. Thanks......Sima

## 2018-02-06 RX ORDER — HYDROCODONE BITARTRATE AND ACETAMINOPHEN 5; 325 MG/1; MG/1
1 TABLET ORAL EVERY 6 HOURS PRN
Qty: 45 TABLET | Refills: 0 | Status: SHIPPED | OUTPATIENT
Start: 2018-02-06 | End: 2018-03-08 | Stop reason: SDUPTHER

## 2018-02-10 ENCOUNTER — HOSPITAL ENCOUNTER (EMERGENCY)
Facility: OTHER | Age: 83
Discharge: HOME | End: 2018-02-10
Attending: EMERGENCY MEDICINE
Payer: MEDICARE

## 2018-02-10 VITALS
DIASTOLIC BLOOD PRESSURE: 85 MMHG | OXYGEN SATURATION: 96 % | BODY MASS INDEX: 20.96 KG/M2 | SYSTOLIC BLOOD PRESSURE: 180 MMHG | WEIGHT: 104 LBS | HEIGHT: 59 IN | RESPIRATION RATE: 16 BRPM | HEART RATE: 82 BPM | TEMPERATURE: 98 F

## 2018-02-10 DIAGNOSIS — S60.512A ABRASION, HAND WITH INFECTION, LEFT, INITIAL ENCOUNTER: Primary | ICD-10-CM

## 2018-02-10 DIAGNOSIS — S40.022A CONTUSION OF LEFT UPPER EXTREMITY, INITIAL ENCOUNTER: ICD-10-CM
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
## 2018-02-10 DIAGNOSIS — L08.9 ABRASION, HAND WITH INFECTION, LEFT, INITIAL ENCOUNTER: Primary | ICD-10-CM

## 2018-02-10 PROBLEM — T81.30XA WOUND DISRUPTION: Status: ACTIVE | Noted: 2018-02-10

## 2018-02-10 PROCEDURE — 99283 EMERGENCY DEPT VISIT LOW MDM: CPT

## 2018-02-10 PROCEDURE — 25000003 PHARM REV CODE 250: Performed by: EMERGENCY MEDICINE

## 2018-02-10 RX ORDER — CLINDAMYCIN HYDROCHLORIDE 150 MG/1
300 CAPSULE ORAL
Status: COMPLETED | OUTPATIENT
Start: 2018-02-10 | End: 2018-02-10

## 2018-02-10 RX ORDER — NAPROXEN 375 MG/1
375 TABLET ORAL 2 TIMES DAILY WITH MEALS
Qty: 20 TABLET | Refills: 0 | Status: SHIPPED | OUTPATIENT
Start: 2018-02-10 | End: 2018-03-06

## 2018-02-10 RX ORDER — MUPIROCIN 20 MG/G
OINTMENT TOPICAL 3 TIMES DAILY
Qty: 1 TUBE | Refills: 0 | Status: SHIPPED | OUTPATIENT
Start: 2018-02-10 | End: 2018-02-20

## 2018-02-10 RX ORDER — CLINDAMYCIN HYDROCHLORIDE 150 MG/1
300 CAPSULE ORAL 4 TIMES DAILY
Qty: 80 CAPSULE | Refills: 0 | Status: SHIPPED | OUTPATIENT
Start: 2018-02-10 | End: 2018-02-15

## 2018-02-10 RX ORDER — ACETAMINOPHEN 325 MG/1
650 TABLET ORAL
Status: COMPLETED | OUTPATIENT
Start: 2018-02-10 | End: 2018-02-10

## 2018-02-10 RX ADMIN — ACETAMINOPHEN 650 MG: 325 TABLET ORAL at 10:02

## 2018-02-10 RX ADMIN — CLINDAMYCIN HYDROCHLORIDE 300 MG: 150 CAPSULE ORAL at 10:02

## 2018-02-10 RX ADMIN — BACITRACIN ZINC, NEOMYCIN SULFATE, POLYMYXIN B SULFATE 1 EACH: 3.5; 5000; 4 OINTMENT TOPICAL at 10:02

## 2018-02-10 NOTE — ED PROVIDER NOTES
Encounter Date: 2/10/2018       History     Chief Complaint   Patient presents with    Hand Injury     pt presents to ED with c/o left hand injury and left elbow pain that started thursday after tripping in [place]     Elbow Pain     Kaycee Almanza is a 83 y.o. female who presents to the Emergency Department with left wrist arm and elbow pain.  injury occurred after accidental  trip and fall [place].  Patient states she fell on Thursday cut the base of her left hand, has wrist pain and arm pain.  patient states she is right-handed.  Last tetanus 5 years ago.      The history is provided by the patient and a relative.     Review of patient's allergies indicates:  No Known Allergies  Past Medical History:   Diagnosis Date    Anxiety     Arthritis     Dementia     Depression     Diabetes type 2, controlled     sees Dr. Elena    GERD (gastroesophageal reflux disease)     Hyperlipidemia     Hypertension     Left posterior capsular opacification 5/11/2017    Osteoporosis     Thyroid disease     Ulcer     Ulcer      Past Surgical History:   Procedure Laterality Date    CATARACT EXTRACTION W/  INTRAOCULAR LENS IMPLANT Bilateral 2006    [facility] at [facility]    cataract surgery  2006    CHOLECYSTECTOMY      EYE SURGERY      HAND SURGERY      HYSTERECTOMY      KNEE SURGERY      SHOULDER SURGERY       Family History   Problem Relation Age of Onset    Stroke Mother     Diabetes Mother     Arthritis Father     Early death Sister     Birth defects Brother     No Known Problems Sister     Birth defects Brother     Birth defects Brother     Birth defects Brother     Birth defects Brother     Amblyopia Neg Hx     Blindness Neg Hx     Cataracts Neg Hx     Glaucoma Neg Hx     Macular degeneration Neg Hx     Retinal detachment Neg Hx     Strabismus Neg Hx      Social History   Substance Use Topics    Smoking status: Never Smoker    Smokeless tobacco: Never Used    Alcohol use  No     Review of Systems   Constitutional: Negative for fever.   HENT: Negative for sore throat.    Respiratory: Negative for shortness of breath.    Cardiovascular: Negative for chest pain.   Gastrointestinal: Negative for nausea.   Genitourinary: Negative for dysuria.   Musculoskeletal: Positive for arthralgias. Negative for back pain.   Skin: Positive for wound. Negative for rash.   Neurological: Negative for weakness.   Hematological: Does not bruise/bleed easily.   All other systems reviewed and are negative.      Physical Exam     Initial Vitals [02/10/18 0844]   BP Pulse Resp Temp SpO2   (!) 180/85 82 16 98.4 °F (36.9 °C) 96 %      MAP       116.67         Physical Exam    Nursing note and vitals reviewed.  Constitutional: She appears well-developed and well-nourished.   HENT:   Head: Normocephalic and atraumatic.   Right Ear: External ear normal.   Left Ear: External ear normal.   Nose: Nose normal.   Eyes: Conjunctivae and EOM are normal. Pupils are equal, round, and reactive to light. Right eye exhibits no discharge. Left eye exhibits no discharge.   Neck: Normal range of motion. Neck supple.   Cardiovascular: Normal rate, regular rhythm, normal heart sounds and intact distal pulses. Exam reveals no gallop and no friction rub.    No murmur heard.  Pulmonary/Chest: Breath sounds normal. No respiratory distress. She has no wheezes. She has no rhonchi. She has no rales. She exhibits no tenderness.   Abdominal: Soft. Bowel sounds are normal. She exhibits no distension and no mass. There is no tenderness. There is no rebound and no guarding.   Musculoskeletal: Normal range of motion. She exhibits tenderness. She exhibits no edema.        Right shoulder: Normal.        Left shoulder: Normal. She exhibits normal range of motion, no tenderness and no bony tenderness.        Right elbow: Normal.       Left elbow: Normal. She exhibits normal range of motion, no swelling, no effusion and no deformity.        Right  wrist: Normal.        Left wrist: She exhibits tenderness and bony tenderness. She exhibits no swelling.        Cervical back: Normal. She exhibits no bony tenderness and no edema.        Thoracic back: Normal. She exhibits no bony tenderness and no edema.        Lumbar back: Normal. She exhibits no bony tenderness and no edema.        Left hand: Normal. She exhibits no bony tenderness, normal two-point discrimination and normal capillary refill. Normal sensation noted. Normal strength noted.        Hands:  Avulsion injury as dipicted   Neurological: She is alert and oriented to person, place, and time. She has normal strength. No cranial nerve deficit or sensory deficit.   Skin: Skin is warm and dry. Capillary refill takes less than 2 seconds. No rash noted. No pallor.   Psychiatric: She has a normal mood and affect.         ED Course   Procedures  Labs Reviewed - No data to display     Imaging Results          X-Ray Humerus 2 View Left (Final result)  Result time 02/10/18 10:25:54    Final result by Kentrell Blake III, MD (02/10/18 10:25:54)                 Narrative:    2 views: There is chronic rotator cuff tear and there is chronic a.c. joint separation. No fracture or dislocation or bone destruction seen.      Electronically signed by: KENTRELL BLAKE MD  Date:     02/10/18  Time:    10:25                              X-Ray Forearm Left (Final result)  Result time 02/10/18 10:26:06    Final result by Kentrell Blake III, MD (02/10/18 10:26:06)                 Narrative:    The 2 views: No fracture dislocation bone destruction seen. There is mild DJD.      Electronically signed by: KENTRELL BLAKE MD  Date:     02/10/18  Time:    10:26                              X-Ray Wrist Complete Left (Final result)  Result time 02/10/18 10:00:01    Final result by Kentrell Blake III, MD (02/10/18 10:00:01)                 Narrative:    Findings: There is baseline DJD. 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 No fracture dislocation bone destruction seen.      Electronically signed by: KENTRELL BLAKE MD  Date:     02/10/18  Time:    10:00                              X-Ray Elbow Complete Left (Final result)  Result time 02/10/18 09:59:42    Final result by Kentrell Blake III, MD (02/10/18 09:59:42)                 Narrative:    3 views: No fracture dislocation bone destruction seen. There is DJD, joint effusion, and mild unilateral chronic calcific epicondylitis.      Electronically signed by: KENTRELL BLAKE MD  Date:     02/10/18  Time:    09:59                                                    ED Course        Medical decision making   Chief complaint: atient states she fell on Thursday cut the base of her left hand, has wrist pain and arm pain.   Differential diagnosis: Strain, sprain, contusion, laceration, abrasion, and fracture  Treatment in the ED Physical Exam, clindamycin, Bactrim for abrasion, and Tylenol  Patient reports feeling much better after medication.    Discussed  imaging results.    Fill and take prescriptions as directed.  Return to the ED if symptoms worsen or do not resolve.   Answered questions and discussed discharge plan.    Patient feels much better and is ready for discharge.  Follow up with PCP in 1 days.    Clinical Impression:   The primary encounter diagnosis was Abrasion, hand with infection, left, initial encounter. Diagnoses of Fall and Contusion of left upper extremity, initial encounter were also pertinent to this visit.                           Elisa Emery DO  02/10/18 1536

## 2018-02-15 ENCOUNTER — OFFICE VISIT (OUTPATIENT)
Dept: FAMILY MEDICINE | Facility: CLINIC | Age: 83
End: 2018-02-15
Payer: MEDICARE

## 2018-02-15 VITALS
SYSTOLIC BLOOD PRESSURE: 166 MMHG | HEART RATE: 80 BPM | BODY MASS INDEX: 23.54 KG/M2 | WEIGHT: 109.13 LBS | RESPIRATION RATE: 18 BRPM | TEMPERATURE: 98 F | OXYGEN SATURATION: 98 % | DIASTOLIC BLOOD PRESSURE: 60 MMHG | HEIGHT: 57 IN
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
## 2018-02-15 DIAGNOSIS — T14.8XXA ABRASION: ICD-10-CM

## 2018-02-15 DIAGNOSIS — B35.3 TINEA PEDIS OF BOTH FEET: ICD-10-CM

## 2018-02-15 DIAGNOSIS — S61.411D LACERATION OF RIGHT HAND WITHOUT FOREIGN BODY, SUBSEQUENT ENCOUNTER: ICD-10-CM

## 2018-02-15 DIAGNOSIS — E11.9 TYPE 2 DIABETES MELLITUS WITHOUT COMPLICATION, WITHOUT LONG-TERM CURRENT USE OF INSULIN: ICD-10-CM

## 2018-02-15 DIAGNOSIS — I10 ESSENTIAL HYPERTENSION: ICD-10-CM

## 2018-02-15 PROCEDURE — 99214 OFFICE O/P EST MOD 30 MIN: CPT | Mod: S$GLB,,, | Performed by: FAMILY MEDICINE

## 2018-02-15 PROCEDURE — 1159F MED LIST DOCD IN RCRD: CPT | Mod: S$GLB,,, | Performed by: FAMILY MEDICINE

## 2018-02-15 PROCEDURE — 3008F BODY MASS INDEX DOCD: CPT | Mod: S$GLB,,, | Performed by: FAMILY MEDICINE

## 2018-02-15 PROCEDURE — 99999 PR PBB SHADOW E&M-EST. PATIENT-LVL IV: CPT | Mod: PBBFAC,,, | Performed by: FAMILY MEDICINE

## 2018-02-15 PROCEDURE — 1126F AMNT PAIN NOTED NONE PRSNT: CPT | Mod: S$GLB,,, | Performed by: FAMILY MEDICINE

## 2018-02-15 RX ORDER — DOXYCYCLINE 100 MG/1
100 CAPSULE ORAL EVERY 12 HOURS
Qty: 20 CAPSULE | Refills: 0 | Status: SHIPPED | OUTPATIENT
Start: 2018-02-15 | End: 2018-02-25

## 2018-02-15 RX ORDER — AMLODIPINE BESYLATE 10 MG/1
10 TABLET ORAL DAILY
Qty: 90 TABLET | Refills: 0 | Status: SHIPPED | OUTPATIENT
Start: 2018-02-15 | End: 2018-07-09 | Stop reason: SDUPTHER

## 2018-02-15 RX ORDER — PRAVASTATIN SODIUM 10 MG/1
10 TABLET ORAL DAILY
Qty: 90 TABLET | Refills: 1 | Status: SHIPPED | OUTPATIENT
Start: 2018-02-15 | End: 2019-01-15 | Stop reason: SDUPTHER

## 2018-02-15 RX ORDER — DOXYCYCLINE 100 MG/1
100 CAPSULE ORAL EVERY 12 HOURS
Qty: 20 CAPSULE | Refills: 0 | Status: SHIPPED | OUTPATIENT
Start: 2018-02-15 | End: 2018-02-15 | Stop reason: SDUPTHER

## 2018-02-15 NOTE — PROGRESS NOTES
Chief Complaint   Patient presents with    Hypertension    Follow-up       HPI  Kaycee Almanza is a 83 y.o. female with multiple medical diagnoses as listed in the medical history and problem list that presents for follow-up for hypertension, she also had a fall 2[place] where she caught herself on outstretched hands. She went to the ER two days later and was given abx and ointment. She has not been able to take the abx because it is too many pills. She has had some drainage from the wound. She initially had bruising of her left arm and hand but this has resolved along with her pain. She is taking naproxen prn.     PAST MEDICAL HISTORY:  Past Medical History:   Diagnosis Date    Anxiety     Arthritis     Dementia     Depression     Diabetes type 2, controlled     sees Dr. Elena    GERD (gastroesophageal reflux disease)     Hyperlipidemia     Hypertension     Left posterior capsular opacification 5/11/2017    Osteoporosis     Thyroid disease     Ulcer     Ulcer        PAST SURGICAL HISTORY:  Past Surgical History:   Procedure Laterality Date    CATARACT EXTRACTION W/  INTRAOCULAR LENS IMPLANT Bilateral 2006    done [facility]    cataract surgery  2006    CHOLECYSTECTOMY      EYE SURGERY      HAND SURGERY      HYSTERECTOMY      KNEE SURGERY      SHOULDER SURGERY         SOCIAL HISTORY:  Social History     Social History    Marital status:      Spouse name: N/A    Number of children: N/A    Years of education: N/A     Occupational History    Not on file.     Social History Main Topics    Smoking status: Never Smoker    Smokeless tobacco: Never Used    Alcohol use No    Drug use: No    Sexual activity: No     Other Topics Concern    Not on file     Social History Narrative    No narrative on file       FAMILY HISTORY:  Family History   Problem Relation Age of Onset    Stroke Mother     Diabetes Mother     Arthritis Father     Early death Sister     Birth  defects Brother     No Known Problems Sister     Birth defects Brother     Birth defects Brother     Birth defects Brother     Birth defects Brother     Amblyopia Neg Hx     Blindness Neg Hx     Cataracts Neg Hx     Glaucoma Neg Hx     Macular degeneration Neg Hx     Retinal detachment Neg Hx     Strabismus Neg Hx        ALLERGIES AND MEDICATIONS: updated and reviewed.  Review of patient's allergies indicates:  No Known Allergies  Current Outpatient Prescriptions   Medication Sig Dispense Refill    aspirin (ECOTRIN) 81 MG EC tablet Take 81 mg by mouth once daily.      gabapentin (NEURONTIN) 300 MG capsule Take 300 mg by mouth 3 (three) times daily.      hydrocodone-acetaminophen 5-325mg (NORCO) 5-325 mg per tablet Take 1 tablet by mouth every 6 (six) hours as needed. 45 tablet 0    linagliptin (TRADJENTA) 5 mg Tab tablet Take 1 tablet (5 mg total) by mouth once daily. 90 tablet 1    lisinopril (PRINIVIL,ZESTRIL) 40 MG tablet TAKE 1 TABLET EVERY DAY 90 tablet 1    mupirocin (BACTROBAN) 2 % ointment Apply topically 3 (three) times daily. 1 Tube 0    naproxen (NAPROSYN) 375 MG tablet Take 1 tablet (375 mg total) by mouth 2 (two) times daily with meals. 20 tablet 0    sertraline (ZOLOFT) 100 MG tablet Take 1 tablet (100 mg total) by mouth once daily. 90 tablet 1    TRUE METRIX GLUCOSE TEST STRIP Strp       amLODIPine (NORVASC) 10 MG tablet Take 1 tablet (10 mg total) by mouth once daily. 90 tablet 0    doxycycline (VIBRAMYCIN) 100 MG Cap Take 1 capsule (100 mg total) by mouth every 12 (twelve) hours. 20 capsule 0    pravastatin (PRAVACHOL) 10 MG tablet Take 1 tablet (10 mg total) by mouth once daily. 90 tablet 1    ZOSTAVAX, PF, 19,400 unit/0.65 mL injection        No current facility-administered medications for this visit.        ROS  Review of Systems   Constitutional: Negative for chills, diaphoresis, fatigue, fever and unexpected weight change.   HENT: Negative for rhinorrhea, sinus  "pressure, sore throat and tinnitus.    Eyes: Negative for photophobia and visual disturbance.   Respiratory: Negative for cough, shortness of breath and wheezing.    Cardiovascular: Negative for chest pain and palpitations.   Gastrointestinal: Negative for abdominal pain, blood in stool, constipation, diarrhea, nausea and vomiting.   Genitourinary: Negative for dysuria, flank pain, frequency and vaginal discharge.   Musculoskeletal: Positive for arthralgias. Negative for joint swelling.   Skin: Negative for rash.   Neurological: Negative for speech difficulty, weakness, light-headedness and headaches.   Psychiatric/Behavioral: Negative for behavioral problems and dysphoric mood.       Physical Exam  Vitals:    02/15/18 0750 02/15/18 0832   BP: (!) 176/58 (!) 166/60   Pulse: 80    Resp: 18    Temp: 98.4 °F (36.9 °C)    TempSrc: Oral    SpO2: 98%    Weight: 49.5 kg (109 lb 2 oz)    Height: 4' 9" (1.448 m)     Body mass index is 23.61 kg/m².  Weight: 49.5 kg (109 lb 2 oz)   Height: 4' 9" (144.8 cm)     Physical Exam   Constitutional: She is oriented to person, place, and time. She appears well-developed and well-nourished.   HENT:   Head: Normocephalic and atraumatic.   Eyes: EOM are normal.   Musculoskeletal:   Left arm with minimal bruising, return to baseline ROM    Left hand wound healing with some serous drainage   Neurological: She is alert and oriented to person, place, and time.   Protective Sensation (w/ 10 gram monofilament):  Right: Intact  Left: Intact    Visual Inspection:  Normal -  Bilateral; with tinea pedis present along sock distribution    Pedal Pulses:   Right: Present  Left: Present    Posterior tibialis:   Right:Present  Left: Present     Skin: Skin is warm and dry. No rash noted. No erythema.   Psychiatric: She has a normal mood and affect. Her behavior is normal.   Nursing note and vitals reviewed.      Health Maintenance       Date Due Completion Date    Sign Pain Contract 10/13/1952 ---    " Foot Exam 11/09/2017 11/9/2016 (Done)    Override on 11/9/2016: Done (Dr. Hollis Luther/Endocrinology-bilateral feet normal by visual exam, normal pulses,sensations intact,by monofilament,right DP's2/4,PT's 2/4,monofilament 10/10,cap refill <3 secs,left  DP's 2/4,PT's 2/4,monofilament 10/10,cap refill <3 secs)    Hemoglobin A1c 03/06/2018 9/6/2017    Override on 10/18/2016: Done (Dr. Hollis Luther/Endocrinology- hemoglobin a1c 6.2)    Eye Exam 05/11/2018 5/11/2017    Override on 4/19/2017: Done    Lipid Panel 09/06/2018 9/6/2017    Urine Microalbumin 01/04/2019 1/4/2018    DEXA SCAN 06/22/2019 6/22/2016 (Done)    Override on 6/22/2016: Done (LAURY Barnett/Dr. Hollis Luther/Endocrinology- normal range in the lumbar spine & hips,left wrist Tscore -2.7 improved from -3.3. Patient has had to stop Prolia, but patient had 4 doses. Patient is also on calcium + vitamin d supplements)    Override on 7/22/2014: Done (Dr. Hollis Luther/Endocrinology- AP Spine-1.247 g/cm 2 w/ T score =0.3,dual femur=0.979 g/cm 2 w/ T score =0.2,left forearm= 0.586 g/cm 2 w/ T score=3.3,patient started on prolia)    TETANUS VACCINE 03/09/2027 3/9/2017 (Declined)    Override on 3/9/2017: Declined            ASSESSMENT     1. Fall, subsequent encounter    2. Abrasion    3. Laceration of right hand without foreign body, subsequent encounter    4. Type 2 diabetes mellitus without complication, without long-term current use of insulin    5. Essential hypertension    6. Tinea pedis of both feet        PLAN:     Problem List Items Addressed This Visit        Derm    Tinea pedis of both feet  -begin athletes foot cream over the counter to both feet       Cardiac/Vascular    Essential hypertension  -increase norvasc to 10mg, may add fluid pill if renal function can tolerate it    Relevant Medications    amLODIPine (NORVASC) 10 MG tablet       Endocrine    Type 2 diabetes mellitus without complication, without long-term current use of  insulin  -begin taking statin every other day  Lab Results   Component Value Date    LDLCALC 260 (H) 09/06/2017         Relevant Medications    pravastatin (PRAVACHOL) 10 MG tablet      Other Visit Diagnoses     Fall, subsequent encounter    -  Primary  -healing wound, pain improving  -reviewed ER x rays, positive for DJD, ligamentous injury of wrist, and old ligament tear of shoulder    Abrasion      -will change to doxy as she has not been able to be compliant with clinda    Relevant Medications    doxycycline (VIBRAMYCIN) 100 MG Cap    Laceration of right hand without foreign body, subsequent encounter      -will change to doxy as she has not been able to be compliant with clinda    Relevant Medications    doxycycline (VIBRAMYCIN) 100 MG Cap            Iona Jose MD  02/15/2018 8:28 AM        Follow-up in about 6 weeks (around 3/29/2018) for Follow up.

## 2018-03-06 ENCOUNTER — LAB VISIT (OUTPATIENT)
Dept: LAB | Facility: HOSPITAL | Age: 83
End: 2018-03-06
Attending: INTERNAL MEDICINE
Payer: MEDICARE

## 2018-03-06 ENCOUNTER — OFFICE VISIT (OUTPATIENT)
Dept: NEPHROLOGY | Facility: CLINIC | Age: 83
End: 2018-03-06
Payer: MEDICARE

## 2018-03-06 VITALS
BODY MASS INDEX: 22.79 KG/M2 | OXYGEN SATURATION: 95 % | SYSTOLIC BLOOD PRESSURE: 160 MMHG | WEIGHT: 105.63 LBS | DIASTOLIC BLOOD PRESSURE: 60 MMHG | HEART RATE: 88 BPM | HEIGHT: 57 IN

## 2018-03-06 DIAGNOSIS — R80.1 PERSISTENT PROTEINURIA: ICD-10-CM

## 2018-03-06 DIAGNOSIS — R80.1 PERSISTENT PROTEINURIA: Primary | ICD-10-CM

## 2018-03-06 DIAGNOSIS — R80.9 CONTROLLED TYPE 2 DIABETES MELLITUS WITH MICROALBUMINURIA, WITHOUT LONG-TERM CURRENT USE OF INSULIN: ICD-10-CM

## 2018-03-06 DIAGNOSIS — E11.29 CONTROLLED TYPE 2 DIABETES MELLITUS WITH MICROALBUMINURIA, WITHOUT LONG-TERM CURRENT USE OF INSULIN: ICD-10-CM

## 2018-03-06 LAB
BACTERIA #/AREA URNS AUTO: ABNORMAL /HPF
BILIRUB UR QL STRIP: NEGATIVE
CHOL/HDLC RATIO: 6
CHOLESTEROL, TOTAL: 301
CLARITY UR REFRACT.AUTO: ABNORMAL
COLOR UR AUTO: YELLOW
CREAT UR-MCNC: 77 MG/DL
GLUCOSE UR QL STRIP: NEGATIVE
HBA1C MFR BLD: 5.6 % (ref 4–5.6)
HDLC SERPL-MCNC: 50 MG/DL
HGB UR QL STRIP: NEGATIVE
HYALINE CASTS UR QL AUTO: 3 /LPF
KETONES UR QL STRIP: NEGATIVE
LDLC SERPL CALC-MCNC: 216 MG/DL
LEUKOCYTE ESTERASE UR QL STRIP: NEGATIVE
MICROSCOPIC COMMENT: ABNORMAL
NITRITE UR QL STRIP: NEGATIVE
NONHDLC SERPL-MCNC: 251 MG/DL
PH UR STRIP: 6 [PH] (ref 5–8)
PROT UR QL STRIP: ABNORMAL
PROT UR-MCNC: 312 MG/DL
PROT/CREAT RATIO, UR: 4.05
RBC #/AREA URNS AUTO: 0 /HPF (ref 0–4)
SP GR UR STRIP: 1.01 (ref 1–1.03)
SQUAMOUS #/AREA URNS AUTO: 1 /HPF
TRIGL SERPL-MCNC: 177 MG/DL
URN SPEC COLLECT METH UR: ABNORMAL
UROBILINOGEN UR STRIP-ACNC: NEGATIVE EU/DL
WBC #/AREA URNS AUTO: 1 /HPF (ref 0–5)

## 2018-03-06 PROCEDURE — 99204 OFFICE O/P NEW MOD 45 MIN: CPT | Mod: S$GLB,,, | Performed by: INTERNAL MEDICINE

## 2018-03-06 PROCEDURE — 3077F SYST BP >= 140 MM HG: CPT | Mod: CPTII,S$GLB,, | Performed by: INTERNAL MEDICINE

## 2018-03-06 PROCEDURE — 82570 ASSAY OF URINE CREATININE: CPT

## 2018-03-06 PROCEDURE — 3078F DIAST BP <80 MM HG: CPT | Mod: CPTII,S$GLB,, | Performed by: INTERNAL MEDICINE

## 2018-03-06 PROCEDURE — 99999 PR PBB SHADOW E&M-EST. PATIENT-LVL II: CPT | Mod: PBBFAC,,, | Performed by: INTERNAL MEDICINE

## 2018-03-06 PROCEDURE — 81001 URINALYSIS AUTO W/SCOPE: CPT

## 2018-03-06 RX ORDER — ALPRAZOLAM 0.25 MG/1
TABLET ORAL
COMMUNITY
Start: 2018-01-24 | End: 2019-12-18

## 2018-03-06 NOTE — LETTER
March 9, 2018      Iona Jose MD  [address] [facility]  [place] [address]           [facility]  [address] [facility] [place]  [place] [address]  Phone: [phone]          Patient: Kaycee Almanza   MR Number: [id number]   YOB: 1934   Date of Visit: 3/6/2018       Dear Dr. Iona Jose:    Thank you for referring Kaycee Almanza to me for evaluation. Attached you will find relevant portions of my assessment and plan of care.    If you have questions, please do not hesitate to call me. I look forward to following Kaycee Almanza along with you.    Sincerely,    Miranda Rosenbaum  CC:  No Recipients    If you would like to receive this communication electronically, please contact [email or web address] or [phone] to request more information on [employer] access.    For providers and/or their staff who would like to refer a patient to [facility], please contact us through our one-stop-shop provider referral line, Aleksandr Fink, at [phone].    If you feel you have received this communication in error or would no longer like to receive these types of communications, please e-mail [email or web address]

## 2018-03-08 DIAGNOSIS — M15.9 OSTEOARTHRITIS INVOLVING MULTIPLE JOINTS ON BOTH SIDES OF BODY: ICD-10-CM

## 2018-03-08 RX ORDER — HYDROCODONE BITARTRATE AND ACETAMINOPHEN 5; 325 MG/1; MG/1
1 TABLET ORAL EVERY 6 HOURS PRN
Qty: 45 TABLET | Refills: 0 | Status: SHIPPED | OUTPATIENT
Start: 2018-03-08 | End: 2018-04-06 | Stop reason: SDUPTHER

## 2018-03-08 NOTE — TELEPHONE ENCOUNTER
Next OV 4/16/18. Was seen in February.      ----- Message from Bradley Nix sent at 3/8/2018  3:17 PM CST -----  Contact: Kaycee [phone]  REFILL: hydrocodone-acetaminophen 5-325mg (NORCO) 5-325 mg per tablet    PHARMACY: [facility] [address]

## 2018-03-15 ENCOUNTER — CLINICAL SUPPORT (OUTPATIENT)
Dept: FAMILY MEDICINE | Facility: CLINIC | Age: 83
End: 2018-03-15
Payer: MEDICARE

## 2018-03-15 VITALS — DIASTOLIC BLOOD PRESSURE: 60 MMHG | SYSTOLIC BLOOD PRESSURE: 130 MMHG | HEART RATE: 78 BPM

## 2018-03-15 DIAGNOSIS — I10 BENIGN ESSENTIAL HTN: Primary | ICD-10-CM

## 2018-03-15 PROCEDURE — 99999 PR PBB SHADOW E&M-EST. PATIENT-LVL I: CPT | Mod: PBBFAC,,,

## 2018-03-15 PROCEDURE — 99499 UNLISTED E&M SERVICE: CPT | Mod: S$GLB,,, | Performed by: FAMILY MEDICINE

## 2018-03-15 NOTE — PROGRESS NOTES
Kaycee Almanza 83 y.o. female is here today for Blood Pressure check.   History of HTN yes.    Review of patient's allergies indicates:  No Known Allergies  Creatinine   Date Value Ref Range Status   09/08/2017 0.9 0.5 - 1.4 mg/dL Final   09/06/2017 0.9 (H) 0.6 - 0.9 mg/dL Final     Sodium   Date Value Ref Range Status   09/08/2017 139 136 - 145 mmol/L Final   09/06/2017 143 135 - 146 Final     Potassium   Date Value Ref Range Status   09/08/2017 4.1 3.5 - 5.1 mmol/L Final   09/06/2017 4.2 3.5 - 5.3 Final   ]  Patient verifies taking blood pressure medications on a regular basis at the same time of the day.     Current Outpatient Prescriptions:     ALPRAZolam (XANAX) 0.25 MG tablet, , Disp: , Rfl:     amLODIPine (NORVASC) 10 MG tablet, Take 1 tablet (10 mg total) by mouth once daily., Disp: 90 tablet, Rfl: 0    aspirin (ECOTRIN) 81 MG EC tablet, Take 81 mg by mouth once daily., Disp: , Rfl:     gabapentin (NEURONTIN) 300 MG capsule, Take 300 mg by mouth 3 (three) times daily., Disp: , Rfl:     hydrocodone-acetaminophen 5-325mg (NORCO) 5-325 mg per tablet, Take 1 tablet by mouth every 6 (six) hours as needed., Disp: 45 tablet, Rfl: 0    linagliptin (TRADJENTA) 5 mg Tab tablet, Take 1 tablet (5 mg total) by mouth once daily., Disp: 90 tablet, Rfl: 1    lisinopril (PRINIVIL,ZESTRIL) 40 MG tablet, TAKE 1 TABLET EVERY DAY, Disp: 90 tablet, Rfl: 1    pravastatin (PRAVACHOL) 10 MG tablet, Take 1 tablet (10 mg total) by mouth once daily., Disp: 90 tablet, Rfl: 1    sertraline (ZOLOFT) 100 MG tablet, Take 1 tablet (100 mg total) by mouth once daily., Disp: 90 tablet, Rfl: 1    TRUE METRIX GLUCOSE TEST STRIP Strp, , Disp: , Rfl:     ZOSTAVAX, PF, 19,400 unit/0.65 mL injection, , Disp: , Rfl:   Does patient have record of home blood pressure readings yes. Readings have been averaging 199/90-94/58.   Last dose of blood pressure medication was taken at 6:00 this morning.  Patient is asymptomatic.   Complains of  none.    Vitals:    03/15/18 0803   BP: 130/60   BP Location: Right arm   Patient Position: Sitting   BP Method: Small (Manual)   Pulse: 78         Dr. Jose notified.

## 2018-04-06 DIAGNOSIS — M15.9 OSTEOARTHRITIS INVOLVING MULTIPLE JOINTS ON BOTH SIDES OF BODY: ICD-10-CM

## 2018-04-06 RX ORDER — HYDROCODONE BITARTRATE AND ACETAMINOPHEN 5; 325 MG/1; MG/1
1 TABLET ORAL EVERY 6 HOURS PRN
Qty: 45 TABLET | Refills: 0 | Status: SHIPPED | OUTPATIENT
Start: 2018-04-06 | End: 2018-05-03 | Stop reason: SDUPTHER

## 2018-04-06 NOTE — TELEPHONE ENCOUNTER
----- Message from Narciso Christopher sent at 4/6/2018  1:58 PM CDT -----  Contact: [phone]/PT  Calling TO get pain med refill at [facility]

## 2018-04-16 ENCOUNTER — OFFICE VISIT (OUTPATIENT)
Dept: FAMILY MEDICINE | Facility: CLINIC | Age: 83
End: 2018-04-16
Payer: MEDICARE

## 2018-04-16 ENCOUNTER — TELEPHONE (OUTPATIENT)
Dept: FAMILY MEDICINE | Facility: CLINIC | Age: 83
End: 2018-04-16

## 2018-04-16 VITALS
DIASTOLIC BLOOD PRESSURE: 60 MMHG | HEIGHT: 57 IN | HEART RATE: 86 BPM | WEIGHT: 108 LBS | SYSTOLIC BLOOD PRESSURE: 164 MMHG | OXYGEN SATURATION: 97 % | TEMPERATURE: 98 F | BODY MASS INDEX: 23.3 KG/M2

## 2018-04-16 DIAGNOSIS — I10 ESSENTIAL HYPERTENSION: Primary | ICD-10-CM

## 2018-04-16 DIAGNOSIS — E11.9 TYPE 2 DIABETES MELLITUS WITHOUT COMPLICATION, WITHOUT LONG-TERM CURRENT USE OF INSULIN: ICD-10-CM

## 2018-04-16 PROCEDURE — 99214 OFFICE O/P EST MOD 30 MIN: CPT | Mod: S$GLB,,, | Performed by: NURSE PRACTITIONER

## 2018-04-16 PROCEDURE — 3077F SYST BP >= 140 MM HG: CPT | Mod: CPTII,S$GLB,, | Performed by: NURSE PRACTITIONER

## 2018-04-16 PROCEDURE — 99999 PR PBB SHADOW E&M-EST. PATIENT-LVL IV: CPT | Mod: PBBFAC,,, | Performed by: NURSE PRACTITIONER

## 2018-04-16 PROCEDURE — 3078F DIAST BP <80 MM HG: CPT | Mod: CPTII,S$GLB,, | Performed by: NURSE PRACTITIONER

## 2018-04-16 RX ORDER — HYDROCHLOROTHIAZIDE 12.5 MG/1
12.5 TABLET ORAL DAILY
Qty: 90 TABLET | Refills: 0 | Status: SHIPPED | OUTPATIENT
Start: 2018-04-16 | End: 2019-05-08 | Stop reason: SDUPTHER

## 2018-04-16 RX ORDER — CALCIUM CITRATE/VITAMIN D3 200MG-6.25
TABLET ORAL
Qty: 200 EACH | Refills: 0 | Status: SHIPPED | OUTPATIENT
Start: 2018-04-16 | End: 2019-09-18 | Stop reason: SDUPTHER

## 2018-04-16 RX ORDER — ATORVASTATIN CALCIUM 20 MG/1
10 TABLET, FILM COATED ORAL DAILY
COMMUNITY
Start: 2018-03-19 | End: 2019-01-15

## 2018-04-16 NOTE — TELEPHONE ENCOUNTER
Called patient to notify Dr. Jose is out today need to rescheule appointment. Left message to call our office.

## 2018-04-16 NOTE — PROGRESS NOTES
Subjective:       Patient ID: Kaycee Almanza is a 83 y.o. female.    Chief Complaint: Hypertension (follow up )    84 yo female presents for a follow-up on Diabetes, HTN, and Hyperlipidemia. Her HgbA1c was 5.6. She denies numbness and tingling in her feet. She denies wounds on her feet. Her last LDL was 260. Her blood pressure in clinic today is 164/60. She reports blood pressure of 180's/90's this morning before taking her medication. She denies shortness of breath, chest pain, headache, and dizziness.        Past Medical History:   Diagnosis Date    Anxiety     Arthritis     Dementia     Depression     Diabetes type 2, controlled     sees Dr. Elena    GERD (gastroesophageal reflux disease)     Hyperlipidemia     Hypertension     Left posterior capsular opacification 5/11/2017    Osteoporosis     Thyroid disease     Ulcer     Ulcer        Social History     Social History    Marital status:      Spouse name: N/A    Number of children: N/A    Years of education: N/A     Occupational History    Not on file.     Social History Main Topics    Smoking status: Never Smoker    Smokeless tobacco: Never Used    Alcohol use No    Drug use: No    Sexual activity: No     Other Topics Concern    Not on file     Social History Narrative    No narrative on file       Past Surgical History:   Procedure Laterality Date    CATARACT EXTRACTION W/  INTRAOCULAR LENS IMPLANT Bilateral 2006    [facility] at [facility]    cataract surgery  2006    CHOLECYSTECTOMY      EYE SURGERY      HAND SURGERY      HYSTERECTOMY      KNEE SURGERY      SHOULDER SURGERY         Review of Systems   Constitutional: Negative for fatigue and unexpected weight change.   Eyes: Negative for photophobia, redness and visual disturbance.   Respiratory: Negative for chest tightness.    Cardiovascular: Negative for leg swelling.   Gastrointestinal: Negative for abdominal pain, nausea and vomiting.   Skin: Negative for pallor.  "  Neurological: Negative for dizziness, tremors, seizures, syncope, weakness and numbness.   Hematological: Does not bruise/bleed easily.   All other systems reviewed and are negative.      Objective:   BP (!) 164/60 (BP Location: Left arm, Patient Position: Sitting, BP Method: Medium (Manual))   Pulse 86   Temp 98.3 °F (36.8 °C) (Oral)   Ht 4' 9" (1.448 m)   Wt 49 kg (108 lb 0.4 oz)   SpO2 97%   BMI 23.38 kg/m²      Physical Exam   Constitutional: She is oriented to person, place, and time. She appears well-developed and well-nourished.   HENT:   Head: Normocephalic and atraumatic.   Right Ear: Hearing, tympanic membrane, external ear and ear canal normal.   Left Ear: Hearing, tympanic membrane, external ear and ear canal normal.   Nose: No epistaxis.   Eyes: Conjunctivae, EOM and lids are normal. Pupils are equal, round, and reactive to light.   Neck: Normal carotid pulses and no JVD present. Carotid bruit is not present.   Cardiovascular: Normal rate, regular rhythm and normal heart sounds.    Pulmonary/Chest: Effort normal and breath sounds normal. No respiratory distress. She has no decreased breath sounds.   Neurological: She is alert and oriented to person, place, and time.   Skin: Skin is warm, dry and intact. She is not diaphoretic. No pallor.   Psychiatric: She has a normal mood and affect. Her speech is normal and behavior is normal.       Assessment:       1. Essential hypertension    2. Type 2 diabetes mellitus without complication, without long-term current use of insulin        Plan:       Kaycee was seen today for hypertension.    Diagnoses and all orders for this visit:    Essential hypertension    Type 2 diabetes mellitus without complication, without long-term current use of insulin    Other orders  -     TRUE METRIX GLUCOSE TEST STRIP Strp; Check blood glucose 2 times daily before meals  -     hydroCHLOROthiazide (HYDRODIURIL) 12.5 MG Tab; Take 1 tablet (12.5 mg total) by mouth once " daily.      Problem List Items Addressed This Visit     Type 2 diabetes mellitus without complication, without long-term current use of insulin    Current Assessment & Plan     Stable and controlled. Continue current treatment plan as previously prescribed with your PCP.   The patient is asked to make an attempt to improve diet and exercise patterns to aid in medical management of this problem.  Followed by PCP           Essential hypertension - Primary    Current Assessment & Plan     This problem is currently not controlled. Please follow up with your PCP as planned to discuss adjustments to your treatment plan.  The patient is asked to make an attempt to improve diet and exercise patterns to aid in medical management of this problem.  She will switch from salt to Mrs. Dash. Will add HCTZ 12.5 mg to her regimen. She is to follow up in 2 weeks for a nurse visit and bring in BP machine.             Follow-up in about 2 weeks (around 4/30/2018).

## 2018-04-30 ENCOUNTER — CLINICAL SUPPORT (OUTPATIENT)
Dept: FAMILY MEDICINE | Facility: CLINIC | Age: 83
End: 2018-04-30
Payer: MEDICARE

## 2018-04-30 VITALS — SYSTOLIC BLOOD PRESSURE: 130 MMHG | HEART RATE: 66 BPM | DIASTOLIC BLOOD PRESSURE: 68 MMHG

## 2018-04-30 DIAGNOSIS — I10 BENIGN ESSENTIAL HTN: Primary | ICD-10-CM

## 2018-04-30 PROCEDURE — 99999 PR PBB SHADOW E&M-EST. PATIENT-LVL II: CPT | Mod: PBBFAC,,,

## 2018-04-30 PROCEDURE — 99499 UNLISTED E&M SERVICE: CPT | Mod: S$GLB,,, | Performed by: FAMILY MEDICINE

## 2018-04-30 NOTE — PROGRESS NOTES
Kaycee Almanza 83 y.o. female is here today for Blood Pressure check.   History of HTN yes.    Review of patient's allergies indicates:  No Known Allergies  Creatinine   Date Value Ref Range Status   09/08/2017 0.9 0.5 - 1.4 mg/dL Final   09/06/2017 0.9 (H) 0.6 - 0.9 mg/dL Final     Sodium   Date Value Ref Range Status   09/08/2017 139 136 - 145 mmol/L Final   09/06/2017 143 135 - 146 Final     Potassium   Date Value Ref Range Status   09/08/2017 4.1 3.5 - 5.1 mmol/L Final   09/06/2017 4.2 3.5 - 5.3 Final   ]  Patient verifies taking blood pressure medications on a regular basis at the same time of the day.     Current Outpatient Prescriptions:     ALPRAZolam (XANAX) 0.25 MG tablet, , Disp: , Rfl:     amLODIPine (NORVASC) 10 MG tablet, Take 1 tablet (10 mg total) by mouth once daily., Disp: 90 tablet, Rfl: 0    aspirin (ECOTRIN) 81 MG EC tablet, Take 81 mg by mouth once daily., Disp: , Rfl:     atorvastatin (LIPITOR) 20 MG tablet, , Disp: , Rfl:     gabapentin (NEURONTIN) 300 MG capsule, Take 300 mg by mouth 3 (three) times daily., Disp: , Rfl:     hydroCHLOROthiazide (HYDRODIURIL) 12.5 MG Tab, Take 1 tablet (12.5 mg total) by mouth once daily., Disp: 90 tablet, Rfl: 0    hydrocodone-acetaminophen 5-325mg (NORCO) 5-325 mg per tablet, Take 1 tablet by mouth every 6 (six) hours as needed., Disp: 45 tablet, Rfl: 0    linagliptin (TRADJENTA) 5 mg Tab tablet, Take 1 tablet (5 mg total) by mouth once daily., Disp: 90 tablet, Rfl: 1    lisinopril (PRINIVIL,ZESTRIL) 40 MG tablet, TAKE 1 TABLET EVERY DAY, Disp: 90 tablet, Rfl: 1    pravastatin (PRAVACHOL) 10 MG tablet, Take 1 tablet (10 mg total) by mouth once daily., Disp: 90 tablet, Rfl: 1    sertraline (ZOLOFT) 100 MG tablet, Take 1 tablet (100 mg total) by mouth once daily., Disp: 90 tablet, Rfl: 1    TRUE METRIX GLUCOSE TEST STRIP Strp, Check blood glucose 2 times daily before meals, Disp: 200 each, Rfl: 0    ZOSTAVAX, PF, 19,400 unit/0.65 mL injection,  , Disp: , Rfl:   Does patient have record of home blood pressure readings yes. Readings have been averaging  189//89.   Last dose of blood pressure medication was taken at 6:00 this morning..  Patient is asymptomatic.   Complains of pain lower back to back of bilateral legs 10 on pain scale.blood pressure with patient's machine 156/67.    Vitals:    04/30/18 0753 04/30/18 0812   BP: (!) 140/68 130/68   BP Location: Right arm Right arm   Patient Position: Sitting Sitting   BP Method: Small (Manual) Small (Manual)   Pulse: 88 66         Dr. Jose notified.

## 2018-05-03 DIAGNOSIS — M15.9 OSTEOARTHRITIS INVOLVING MULTIPLE JOINTS ON BOTH SIDES OF BODY: ICD-10-CM

## 2018-05-03 RX ORDER — HYDROCODONE BITARTRATE AND ACETAMINOPHEN 5; 325 MG/1; MG/1
1 TABLET ORAL EVERY 6 HOURS PRN
Qty: 45 TABLET | Refills: 0 | Status: SHIPPED | OUTPATIENT
Start: 2018-05-03 | End: 2018-05-15

## 2018-05-03 NOTE — TELEPHONE ENCOUNTER
----- Message from Maria Victoria Ronquillo sent at 5/3/2018  2:01 PM CDT -----  Contact: Daughter-Jacklyn  Called to f/u on request for hydrocodone-acetaminophen 5-325mg (NORCO) 5-325 mg per [phone]

## 2018-05-15 ENCOUNTER — OFFICE VISIT (OUTPATIENT)
Dept: FAMILY MEDICINE | Facility: CLINIC | Age: 83
End: 2018-05-15
Payer: MEDICARE

## 2018-05-15 VITALS
DIASTOLIC BLOOD PRESSURE: 60 MMHG | HEIGHT: 56 IN | HEART RATE: 77 BPM | OXYGEN SATURATION: 97 % | BODY MASS INDEX: 24.52 KG/M2 | SYSTOLIC BLOOD PRESSURE: 146 MMHG | RESPIRATION RATE: 18 BRPM | WEIGHT: 109 LBS | TEMPERATURE: 98 F

## 2018-05-15 DIAGNOSIS — E11.9 TYPE 2 DIABETES MELLITUS WITHOUT COMPLICATION, WITHOUT LONG-TERM CURRENT USE OF INSULIN: ICD-10-CM

## 2018-05-15 DIAGNOSIS — I10 ESSENTIAL HYPERTENSION: ICD-10-CM

## 2018-05-15 DIAGNOSIS — M54.9 CHRONIC BACK PAIN, UNSPECIFIED BACK LOCATION, UNSPECIFIED BACK PAIN LATERALITY: Primary | ICD-10-CM

## 2018-05-15 DIAGNOSIS — M15.9 OSTEOARTHRITIS INVOLVING MULTIPLE JOINTS ON BOTH SIDES OF BODY: ICD-10-CM

## 2018-05-15 DIAGNOSIS — G89.29 CHRONIC BACK PAIN, UNSPECIFIED BACK LOCATION, UNSPECIFIED BACK PAIN LATERALITY: Primary | ICD-10-CM

## 2018-05-15 PROCEDURE — 3077F SYST BP >= 140 MM HG: CPT | Mod: CPTII,S$GLB,, | Performed by: FAMILY MEDICINE

## 2018-05-15 PROCEDURE — 99999 PR PBB SHADOW E&M-EST. PATIENT-LVL V: CPT | Mod: PBBFAC,,, | Performed by: FAMILY MEDICINE

## 2018-05-15 PROCEDURE — 3078F DIAST BP <80 MM HG: CPT | Mod: CPTII,S$GLB,, | Performed by: FAMILY MEDICINE

## 2018-05-15 PROCEDURE — 99214 OFFICE O/P EST MOD 30 MIN: CPT | Mod: S$GLB,,, | Performed by: FAMILY MEDICINE

## 2018-05-15 RX ORDER — HYDROCODONE BITARTRATE AND ACETAMINOPHEN 7.5; 325 MG/1; MG/1
1 TABLET ORAL EVERY 6 HOURS PRN
Qty: 60 TABLET | Refills: 0 | Status: SHIPPED | OUTPATIENT
Start: 2018-05-15 | End: 2018-06-21 | Stop reason: SDUPTHER

## 2018-05-15 NOTE — PROGRESS NOTES
Chief Complaint   Patient presents with    Sciatica       HPI  Kaycee Almanza is a 83 y.o. female with multiple medical diagnoses as listed in the medical history and problem list that presents for follow-up for chronic low back pain in her right side that radiates to her lower leg. It is difficult for her to walk and has been so for the last four weeks. She has been taking norco for this but it takes her pain from a 9 to a four. Pain returns after 12 hours. She has pain with getting up from a seated position. She is no longer getting out of the house due to her pain. She has taken epidural injections in the past and is considering doing this again.     She has been taking hctz without any problems and is not having any urinary frequency. Per her BP log her systolic pressures are running in the 140s-160s, with diastolic in the 60s-80s.    PAST MEDICAL HISTORY:  Past Medical History:   Diagnosis Date    Anxiety     Arthritis     Dementia     Depression     Diabetes type 2, controlled     sees Dr. Elena    GERD (gastroesophageal reflux disease)     Hyperlipidemia     Hypertension     Left posterior capsular opacification 5/11/2017    Osteoporosis     Thyroid disease     Ulcer     Ulcer        PAST SURGICAL HISTORY:  Past Surgical History:   Procedure Laterality Date    CATARACT EXTRACTION W/  INTRAOCULAR LENS IMPLANT Bilateral 2006    done at [facility]    cataract surgery  2006    CHOLECYSTECTOMY      EYE SURGERY      HAND SURGERY      HYSTERECTOMY      KNEE SURGERY      SHOULDER SURGERY         SOCIAL HISTORY:  Social History     Social History    Marital status:      Spouse name: N/A    Number of children: N/A    Years of education: N/A     Occupational History    Not on file.     Social History Main Topics    Smoking status: Never Smoker    Smokeless tobacco: Never Used    Alcohol use No    Drug use: No    Sexual activity: No     Other Topics Concern    Not on file      Social History Narrative    No narrative on file       FAMILY HISTORY:  Family History   Problem Relation Age of Onset    Stroke Mother     Diabetes Mother     Arthritis Father     Early death Sister     Birth defects Brother     No Known Problems Sister     Birth defects Brother     Birth defects Brother     Birth defects Brother     Birth defects Brother     Amblyopia Neg Hx     Blindness Neg Hx     Cataracts Neg Hx     Glaucoma Neg Hx     Macular degeneration Neg Hx     Retinal detachment Neg Hx     Strabismus Neg Hx        ALLERGIES AND MEDICATIONS: updated and reviewed.  Review of patient's allergies indicates:  No Known Allergies  Current Outpatient Prescriptions   Medication Sig Dispense Refill    ALPRAZolam (XANAX) 0.25 MG tablet       amLODIPine (NORVASC) 10 MG tablet Take 1 tablet (10 mg total) by mouth once daily. 90 tablet 0    aspirin (ECOTRIN) 81 MG EC tablet Take 81 mg by mouth once daily.      atorvastatin (LIPITOR) 20 MG tablet Take 10 mg by mouth once daily.       gabapentin (NEURONTIN) 300 MG capsule Take 300 mg by mouth 3 (three) times daily.      hydroCHLOROthiazide (HYDRODIURIL) 12.5 MG Tab Take 1 tablet (12.5 mg total) by mouth once daily. 90 tablet 0    linagliptin (TRADJENTA) 5 mg Tab tablet Take 1 tablet (5 mg total) by mouth once daily. 90 tablet 1    lisinopril (PRINIVIL,ZESTRIL) 40 MG tablet TAKE 1 TABLET EVERY DAY 90 tablet 1    pravastatin (PRAVACHOL) 10 MG tablet Take 1 tablet (10 mg total) by mouth once daily. 90 tablet 1    sertraline (ZOLOFT) 100 MG tablet Take 1 tablet (100 mg total) by mouth once daily. 90 tablet 1    TRUE METRIX GLUCOSE TEST STRIP Strp Check blood glucose 2 times daily before meals 200 each 0    hydrocodone-acetaminophen 7.5-325mg (NORCO) 7.5-325 mg per tablet Take 1 tablet by mouth every 6 (six) hours as needed for Pain. 60 tablet 0    ZOSTAVAX, PF, 19,400 unit/0.65 mL injection        No current facility-administered  "medications for this visit.        ROS  Review of Systems   Constitutional: Negative for chills, diaphoresis, fatigue, fever and unexpected weight change.   HENT: Negative for rhinorrhea, sinus pressure, sore throat and tinnitus.    Eyes: Negative for photophobia and visual disturbance.   Respiratory: Negative for cough, shortness of breath and wheezing.    Cardiovascular: Negative for chest pain and palpitations.   Gastrointestinal: Negative for abdominal pain, blood in stool, constipation, diarrhea, nausea and vomiting.   Genitourinary: Negative for dysuria, flank pain, frequency and vaginal discharge.   Musculoskeletal: Positive for arthralgias and back pain. Negative for joint swelling.   Skin: Negative for rash.   Neurological: Negative for speech difficulty, weakness, light-headedness and headaches.   Psychiatric/Behavioral: Negative for behavioral problems and dysphoric mood.       Physical Exam  Vitals:    05/15/18 1147 05/15/18 1204   BP: (!) 166/60 (!) 146/60   Pulse: 77    Resp: 18    Temp: 97.9 °F (36.6 °C)    TempSrc: Oral    SpO2: 97%    Weight: 49.4 kg (109 lb)    Height: 4' 8" (1.422 m)     Body mass index is 24.44 kg/m².  Weight: 49.4 kg (109 lb)   Height: 4' 8" (142.2 cm)     Physical Exam   Constitutional: She is oriented to person, place, and time. She appears well-developed and well-nourished.   Eyes: EOM are normal.   Neurological: She is alert and oriented to person, place, and time.   Skin: Skin is warm and dry. No rash noted. No erythema.   Psychiatric: She has a normal mood and affect. Her behavior is normal.   Nursing note and vitals reviewed.      Health Maintenance       Date Due Completion Date    Sign Pain Contract 10/13/1952 ---    Hemoglobin A1c 03/06/2018 9/6/2017    Override on 10/18/2016: Done (Dr. Hollis Luther/Endocrinology- hemoglobin a1c 6.2)    Eye Exam 05/11/2018 5/11/2017    Override on 4/19/2017: Done    Influenza Vaccine 08/01/2018 10/23/2017    Lipid Panel 09/06/2018 " 9/6/2017    Foot Exam 02/15/2019 2/15/2018    Override on 2/15/2018: Done    Override on 11/9/2016: Done (Dr. Hollis Luther/Endocrinology-bilateral feet normal by visual exam, normal pulses,sensations intact,by monofilament,right DP's2/4,PT's 2/4,monofilament 10/10,cap refill <3 secs,left  DP's 2/4,PT's 2/4,monofilament 10/10,cap refill <3 secs)    Urine Microalbumin 03/06/2019 3/6/2018    DEXA SCAN 06/22/2019 6/22/2016 (Done)    Override on 6/22/2016: Done (NP Akilah Barnett/Dr. Hollis Luther/Endocrinology- normal range in the lumbar spine & hips,left wrist Tscore -2.7 improved from -3.3. Patient has had to stop Prolia, but patient had 4 doses. Patient is also on calcium + vitamin d supplements)    Override on 7/22/2014: Done (Dr. Hollis Luther/Endocrinology- AP Spine-1.247 g/cm 2 w/ T score =0.3,dual femur=0.979 g/cm 2 w/ T score =0.2,left forearm= 0.586 g/cm 2 w/ T score=3.3,patient started on prolia)    TETANUS VACCINE 03/09/2027 3/9/2017 (Declined)    Override on 3/9/2017: Declined            ASSESSMENT     1. Chronic back pain, unspecified back location, unspecified back pain laterality    2. Osteoarthritis involving multiple joints on both sides of body    3. Essential hypertension    4. Type 2 diabetes mellitus without complication, without long-term current use of insulin        PLAN:     Problem List Items Addressed This Visit        Cardiac/Vascular    Essential hypertension  -continue hctz, pain improvement should improve blood pressure also, will continue to monitor renal function       Endocrine    Type 2 diabetes mellitus without complication, without long-term current use of insulin  -get records for last A1c from Dr. Luther       Orthopedic    Osteoarthritis involving multiple joints on both sides of body  -will refer for pain management    Relevant Orders    Ambulatory consult to Pain Clinic    Chronic back pain - Primary  -continue to supplement with tylenol arthritis, and use for prn use     Relevant Medications    hydrocodone-acetaminophen 7.5-325mg (NORCO) 7.5-325 mg per tablet    Other Relevant Orders    Ambulatory consult to Pain Clinic    Ambulatory referral to Optometry            Iona Jose MD  05/15/2018 12:24 PM        Follow-up in about 1 month (around 6/15/2018) for Follow up.

## 2018-05-16 ENCOUNTER — TELEPHONE (OUTPATIENT)
Dept: OPTOMETRY | Facility: CLINIC | Age: 83
End: 2018-05-16

## 2018-05-29 ENCOUNTER — HOSPITAL ENCOUNTER (OUTPATIENT)
Dept: RADIOLOGY | Facility: HOSPITAL | Age: 83
Discharge: HOME | End: 2018-05-29
Attending: PAIN MEDICINE
Payer: MEDICARE

## 2018-05-29 ENCOUNTER — OFFICE VISIT (OUTPATIENT)
Dept: PAIN MEDICINE | Facility: CLINIC | Age: 83
End: 2018-05-29
Payer: MEDICARE

## 2018-05-29 VITALS
RESPIRATION RATE: 18 BRPM | DIASTOLIC BLOOD PRESSURE: 80 MMHG | HEART RATE: 85 BPM | HEIGHT: 56 IN | WEIGHT: 109.38 LBS | SYSTOLIC BLOOD PRESSURE: 133 MMHG | BODY MASS INDEX: 24.6 KG/M2 | OXYGEN SATURATION: 96 %

## 2018-05-29 DIAGNOSIS — M51.36 DDD (DEGENERATIVE DISC DISEASE), LUMBAR: ICD-10-CM

## 2018-05-29 DIAGNOSIS — M47.816 LUMBAR SPONDYLOSIS: ICD-10-CM

## 2018-05-29 DIAGNOSIS — M51.36 DDD (DEGENERATIVE DISC DISEASE), LUMBAR: Primary | ICD-10-CM

## 2018-05-29 DIAGNOSIS — M48.062 SPINAL STENOSIS OF LUMBAR REGION WITH NEUROGENIC CLAUDICATION: ICD-10-CM

## 2018-05-29 PROCEDURE — 99204 OFFICE O/P NEW MOD 45 MIN: CPT | Mod: S$GLB,,, | Performed by: PAIN MEDICINE

## 2018-05-29 PROCEDURE — 99999 PR PBB SHADOW E&M-EST. PATIENT-LVL III: CPT | Mod: PBBFAC,,, | Performed by: PAIN MEDICINE

## 2018-05-29 PROCEDURE — 3079F DIAST BP 80-89 MM HG: CPT | Mod: CPTII,S$GLB,, | Performed by: PAIN MEDICINE

## 2018-05-29 PROCEDURE — 72148 MRI LUMBAR SPINE W/O DYE: CPT | Mod: TC

## 2018-05-29 PROCEDURE — 3075F SYST BP GE 130 - 139MM HG: CPT | Mod: CPTII,S$GLB,, | Performed by: PAIN MEDICINE

## 2018-05-29 PROCEDURE — 72148 MRI LUMBAR SPINE W/O DYE: CPT | Mod: 26,,, | Performed by: RADIOLOGY

## 2018-05-29 NOTE — PROGRESS NOTES
Subjective:     Patient ID: Kaycee Almanza is a 83 y.o. female    Chief Complaint: Back Pain (patient experiences chronic back pain- osteoarthritis involving multiple joints- patient experiences numbnes in bilateral legs & hand w/ tingling- treatment w/ medication)      Referred by: Iona Jose MD      HPI:    Initial Encounter (5/29/18):  Kaycee Almanza is a 83 y.o. female who presents today with chronic low back pain. This pain has been present for many years without inciting event or injury. In the past 6 months she has noticed a significant increase in her pain and it began radiating to the bilateral posterior thigh. It does not cross the knees. She does report some associated numbness. She denies any focal weakness or b/b dysfunction. The pain is constant but significantly worsens when she walks for 15 minutes or more. The pain will significantly and immediately improve upon sitting. .   This pain is described in detail below.    Physical Therapy: No    Non-pharmacologic Treatment: rest helps         · TENS? No    Pain Medications:         · Currently taking: nothing    · Has tried in the past:  Norco, Gabapentin,     · Has not tried: NSAIDs, Tylenol, Muscle relaxants, TCAs, SNRIs, topical creams    Blood thinners: ASA 81mg daily    Interventional Therapies:   Previous lumbar ESIs over 20 years ago    Relevant Surgeries: None    Affecting sleep? Yes    Affecting daily activities? yes    Depressive symptoms? yes          · SI/HI? No    Work status: Unemployed    Pain Scores:    Best:       10/10  Worst:     10/10  Usually:   10/10  Today:    10/10    Review of Systems   Constitutional: Negative for activity change, appetite change, chills, fatigue, fever and unexpected weight change.   HENT: Negative for hearing loss.    Eyes: Negative for visual disturbance.   Respiratory: Negative for chest tightness and shortness of breath.    Cardiovascular: Negative for chest pain.   Gastrointestinal: Negative for  abdominal pain, constipation, diarrhea, nausea and vomiting.   Genitourinary: Negative for difficulty urinating.   Musculoskeletal: Positive for arthralgias, back pain, gait problem and myalgias. Negative for neck pain.   Skin: Negative for rash.   Neurological: Negative for dizziness, weakness, light-headedness, numbness and headaches.   Psychiatric/Behavioral: Positive for sleep disturbance. Negative for hallucinations and suicidal ideas. The patient is not nervous/anxious.        Past Medical History:   Diagnosis Date    Anxiety     Arthritis     Dementia     Depression     Diabetes type 2, controlled     sees Dr. Elena    GERD (gastroesophageal reflux disease)     Hyperlipidemia     Hypertension     Left posterior capsular opacification 5/11/2017    Osteoporosis     Thyroid disease     Ulcer     Ulcer        Past Surgical History:   Procedure Laterality Date    CATARACT EXTRACTION W/  INTRAOCULAR LENS IMPLANT Bilateral 2006    [facility]    cataract surgery  2006    CHOLECYSTECTOMY      EYE SURGERY      HAND SURGERY      HYSTERECTOMY      KNEE SURGERY      SHOULDER SURGERY         Social History     Social History    Marital status:      Spouse name: N/A    Number of children: N/A    Years of education: N/A     Occupational History    Not on file.     Social History Main Topics    Smoking status: Never Smoker    Smokeless tobacco: Never Used    Alcohol use No    Drug use: No    Sexual activity: No     Other Topics Concern    Not on file     Social History Narrative    No narrative on file       Review of patient's allergies indicates:  No Known Allergies    Current Outpatient Prescriptions on File Prior to Visit   Medication Sig Dispense Refill    ALPRAZolam (XANAX) 0.25 MG tablet       aspirin (ECOTRIN) 81 MG EC tablet Take 81 mg by mouth once daily.      atorvastatin (LIPITOR) 20 MG tablet Take 10 mg by mouth once daily.       gabapentin (NEURONTIN) 300 MG  "capsule Take 300 mg by mouth 3 (three) times daily.      hydroCHLOROthiazide (HYDRODIURIL) 12.5 MG Tab Take 1 tablet (12.5 mg total) by mouth once daily. 90 tablet 0    hydrocodone-acetaminophen 7.5-325mg (NORCO) 7.5-325 mg per tablet Take 1 tablet by mouth every 6 (six) hours as needed for Pain. 60 tablet 0    linagliptin (TRADJENTA) 5 mg Tab tablet Take 1 tablet (5 mg total) by mouth once daily. 90 tablet 1    lisinopril (PRINIVIL,ZESTRIL) 40 MG tablet TAKE 1 TABLET EVERY DAY 90 tablet 1    pravastatin (PRAVACHOL) 10 MG tablet Take 1 tablet (10 mg total) by mouth once daily. 90 tablet 1    sertraline (ZOLOFT) 100 MG tablet Take 1 tablet (100 mg total) by mouth once daily. 90 tablet 1    TRUE METRIX GLUCOSE TEST STRIP Strp Check blood glucose 2 times daily before meals 200 each 0    ZOSTAVAX, PF, 19,400 unit/0.65 mL injection       amLODIPine (NORVASC) 10 MG tablet Take 1 tablet (10 mg total) by mouth once daily. 90 tablet 0     No current facility-administered medications on file prior to visit.        Objective:      /80   Pulse 85   Resp 18   Ht 4' 8" (1.422 m)   Wt 49.6 kg (109 lb 6.4 oz)   SpO2 96%   BMI 24.53 kg/m²     Exam:  GEN:  Well developed, well nourished.  No acute distress. Normal pain behavior.  HEENT:  No trauma.  Mucous membranes moist.  Nares patent bilaterally.  PSYCH: Normal affect. Thought content appropriate.  CHEST:  Breathing symmetric.  No audible wheezing.  ABD: Soft, non-distended.  SKIN:  Warm, pink, dry.  No rash on exposed areas.    EXT:  No cyanosis, clubbing, or edema.  No color change or changes in nail or hair growth.  NEURO/MUSCULOSKELETAL:  Fully alert, oriented, and appropriate. Speech normal zaire. No cranial nerve deficits.   Gait: Antalgic.  No trendelenburg sign bilaterally.   Motor Strength: 5/5 motor strength throughout lower extremities.   Sensory: No sensory deficit in the lower extremities.   Reflexes:  1+ and symmetric throughout.  Downgoing " Babinski's bilaterally.  No clonus or spasticity.  L-Spine:  Limited ROM with pain on flexion = extension. Negative facet loading bilaterally.  Negative SLR bilaterally.    SI Joint/Hip: unable to perform BENOIT bilaterally.  Unable to perform FADIR bilaterally.  No TTP over lumbar paraspinals, bilateral SI joints, hips, piriformis muscles, or GTB.          Imaging:  No pertinent imaging available for review at this time.    Assessment:       Encounter Diagnoses   Name Primary?    DDD (degenerative disc disease), lumbar Yes    Lumbar spondylosis     Spinal stenosis of lumbar region with neurogenic claudication          Plan:       Kaycee was seen today for back pain.    Diagnoses and all orders for this visit:    DDD (degenerative disc disease), lumbar  -     MRI Lumbar Spine Without Contrast; Future    Lumbar spondylosis  -     MRI Lumbar Spine Without Contrast; Future    Spinal stenosis of lumbar region with neurogenic claudication  -     MRI Lumbar Spine Without Contrast; Future        Kaycee Almanza is a 83 y.o. female with chronic low back pain that has been worsening over the past 6 months. Now with symptoms radiating to the bilateral lower extremities. Her pain sounds most consistent with neurogenic claudication, though likely has some component of facet mediated pain as well.    1. MRI L-spine to evaluate for spinal stenosis and facet arthropathy  2. RTC after MRI. At that time we will discuss MRI results and consider HERBERTH and PT.

## 2018-05-29 NOTE — LETTER
May 29, 2018      Iona Jose MD  [address]  [place] [address]           [facility]  [address]  [place] [address]  Phone: [phone]  Fax: [phone]          Patient: Kaycee Almanza   MR Number: [id number]   YOB: 1934   Date of Visit: 5/29/2018       Dear Dr. Iona Jose:    Thank you for referring Kaycee Almanza to me for evaluation. Attached you will find relevant portions of my assessment and plan of care.    If you have questions, please do not hesitate to call me. I look forward to following Kaycee Almanza along with you.    Sincerely,    Marvel Castro Jr., MD    Enclosure  CC:  No Recipients    If you would like to receive this communication electronically, please contact [email or web address] or [phone] to request more information on [facility] access.    For providers and/or their staff who would like to refer a patient to [facility], please contact us through our one-stop-shop provider referral line, [facility], at [phone].    If you feel you have received this communication in error or would no longer like to receive these types of communications, please e-mail [email or web address]

## 2018-06-01 ENCOUNTER — TELEPHONE (OUTPATIENT)
Dept: PAIN MEDICINE | Facility: CLINIC | Age: 83
End: 2018-06-01

## 2018-06-01 NOTE — TELEPHONE ENCOUNTER
Reminded patient of Pain Management appointment scheduled for Monday at 8.15a with Dr. Castro- verbal confirmation received.  Location information also provided.

## 2018-06-04 ENCOUNTER — OFFICE VISIT (OUTPATIENT)
Dept: PAIN MEDICINE | Facility: CLINIC | Age: 83
End: 2018-06-04
Payer: MEDICARE

## 2018-06-04 VITALS
DIASTOLIC BLOOD PRESSURE: 64 MMHG | HEART RATE: 79 BPM | HEIGHT: 56 IN | SYSTOLIC BLOOD PRESSURE: 137 MMHG | WEIGHT: 109.5 LBS | OXYGEN SATURATION: 96 % | RESPIRATION RATE: 18 BRPM | BODY MASS INDEX: 24.63 KG/M2

## 2018-06-04 DIAGNOSIS — M54.16 LUMBAR RADICULOPATHY: ICD-10-CM

## 2018-06-04 DIAGNOSIS — M48.062 SPINAL STENOSIS OF LUMBAR REGION WITH NEUROGENIC CLAUDICATION: ICD-10-CM

## 2018-06-04 DIAGNOSIS — M47.816 LUMBAR SPONDYLOSIS: ICD-10-CM

## 2018-06-04 DIAGNOSIS — M51.36 DDD (DEGENERATIVE DISC DISEASE), LUMBAR: Primary | ICD-10-CM

## 2018-06-04 PROCEDURE — 3075F SYST BP GE 130 - 139MM HG: CPT | Mod: CPTII,S$GLB,, | Performed by: PAIN MEDICINE

## 2018-06-04 PROCEDURE — 99999 PR PBB SHADOW E&M-EST. PATIENT-LVL III: CPT | Mod: PBBFAC,,, | Performed by: PAIN MEDICINE

## 2018-06-04 PROCEDURE — 3078F DIAST BP <80 MM HG: CPT | Mod: CPTII,S$GLB,, | Performed by: PAIN MEDICINE

## 2018-06-04 PROCEDURE — 99213 OFFICE O/P EST LOW 20 MIN: CPT | Mod: S$GLB,,, | Performed by: PAIN MEDICINE

## 2018-06-04 NOTE — PROGRESS NOTES
Subjective:     Patient ID: Kaycee Alamnza is a 83 y.o. female    Chief Complaint: Follow-up (MRI f/u)      Referred by: No ref. provider found      HPI:    Interval History (6/4/18):  She returns today for follow up and MRI review.  She reports that her pain is unchanged since last visit. She denies weakness and b/b dysfunction.       Initial Encounter (5/29/18):  Kaycee Almanza is a 83 y.o. female who presents today with chronic low back pain. This pain has been present for many years without inciting event or injury. In the past 6 months she has noticed a significant increase in her pain and it began radiating to the bilateral posterior thigh. It does not cross the knees. She does report some associated numbness. She denies any focal weakness or b/b dysfunction. The pain is constant but significantly worsens when she walks for 15 minutes or more. The pain will significantly and immediately improve upon sitting. .   This pain is described in detail below.    Physical Therapy: No    Non-pharmacologic Treatment: rest helps         · TENS? No    Pain Medications:         · Currently taking: nothing    · Has tried in the past:  Norco, Gabapentin,     · Has not tried: NSAIDs, Tylenol, Muscle relaxants, TCAs, SNRIs, topical creams    Blood thinners: ASA 81mg daily    Interventional Therapies:   Previous lumbar ESIs over 20 years ago    Relevant Surgeries: None    Affecting sleep? Yes    Affecting daily activities? yes    Depressive symptoms? yes          · SI/HI? No    Work status: Unemployed    Pain Scores:    Best:       10/10  Worst:     10/10  Usually:   10/10  Today:    9/10    Review of Systems   Constitutional: Negative for activity change, appetite change, chills, fatigue, fever and unexpected weight change.   HENT: Negative for hearing loss.    Eyes: Negative for visual disturbance.   Respiratory: Negative for chest tightness and shortness of breath.    Cardiovascular: Negative for chest pain.    Gastrointestinal: Negative for abdominal pain, constipation, diarrhea, nausea and vomiting.   Genitourinary: Negative for difficulty urinating.   Musculoskeletal: Positive for arthralgias, back pain, gait problem and myalgias. Negative for neck pain.   Skin: Negative for rash.   Neurological: Negative for dizziness, weakness, light-headedness, numbness and headaches.   Psychiatric/Behavioral: Positive for sleep disturbance. Negative for hallucinations and suicidal ideas. The patient is not nervous/anxious.        Past Medical History:   Diagnosis Date    Anxiety     Arthritis     Dementia     Depression     Diabetes type 2, controlled     sees Dr. Elena    GERD (gastroesophageal reflux disease)     Hyperlipidemia     Hypertension     Left posterior capsular opacification 5/11/2017    Osteoporosis     Thyroid disease     Ulcer     Ulcer        Past Surgical History:   Procedure Laterality Date    CATARACT EXTRACTION W/  INTRAOCULAR LENS IMPLANT Bilateral 2006    [facility]    cataract surgery  2006    CHOLECYSTECTOMY      EYE SURGERY      HAND SURGERY      HYSTERECTOMY      KNEE SURGERY      SHOULDER SURGERY         Social History     Social History    Marital status:      Spouse name: N/A    Number of children: N/A    Years of education: N/A     Occupational History    Not on file.     Social History Main Topics    Smoking status: Never Smoker    Smokeless tobacco: Never Used    Alcohol use No    Drug use: No    Sexual activity: No     Other Topics Concern    Not on file     Social History Narrative    No narrative on file       Review of patient's allergies indicates:  No Known Allergies    Current Outpatient Prescriptions on File Prior to Visit   Medication Sig Dispense Refill    ALPRAZolam (XANAX) 0.25 MG tablet       aspirin (ECOTRIN) 81 MG EC tablet Take 81 mg by mouth once daily.      atorvastatin (LIPITOR) 20 MG tablet Take 10 mg by mouth once daily.        "gabapentin (NEURONTIN) 300 MG capsule Take 300 mg by mouth 3 (three) times daily.      hydroCHLOROthiazide (HYDRODIURIL) 12.5 MG Tab Take 1 tablet (12.5 mg total) by mouth once daily. 90 tablet 0    hydrocodone-acetaminophen 7.5-325mg (NORCO) 7.5-325 mg per tablet Take 1 tablet by mouth every 6 (six) hours as needed for Pain. 60 tablet 0    linagliptin (TRADJENTA) 5 mg Tab tablet Take 1 tablet (5 mg total) by mouth once daily. 90 tablet 1    lisinopril (PRINIVIL,ZESTRIL) 40 MG tablet TAKE 1 TABLET EVERY DAY 90 tablet 1    pravastatin (PRAVACHOL) 10 MG tablet Take 1 tablet (10 mg total) by mouth once daily. 90 tablet 1    sertraline (ZOLOFT) 100 MG tablet Take 1 tablet (100 mg total) by mouth once daily. 90 tablet 1    TRUE METRIX GLUCOSE TEST STRIP Strp Check blood glucose 2 times daily before meals 200 each 0    ZOSTAVAX, PF, 19,400 unit/0.65 mL injection       amLODIPine (NORVASC) 10 MG tablet Take 1 tablet (10 mg total) by mouth once daily. 90 tablet 0     No current facility-administered medications on file prior to visit.        Objective:      /64   Pulse 79   Resp 18   Ht 4' 8" (1.422 m)   Wt 49.7 kg (109 lb 8 oz)   SpO2 96%   BMI 24.55 kg/m²     Exam:  GEN:  Well developed, well nourished.  No acute distress.   HEENT:  No trauma.  Mucous membranes moist.  Nares patent bilaterally.  PSYCH: Normal affect. Thought content appropriate.  CHEST:  Breathing symmetric.  No audible wheezing.  ABD: Soft, non-distended.  SKIN:  Warm, pink, dry.  No rash on exposed areas.    EXT:  No cyanosis, clubbing, or edema.  No color change or changes in nail or hair growth.  NEURO/MUSCULOSKELETAL:  Fully alert, oriented, and appropriate. Speech normal zaire. No cranial nerve deficits.   Gait: normal.  No focal motor deficits.             Imaging:  Narrative     EXAMINATION:  MRI LUMBAR SPINE WITHOUT CONTRAST    CLINICAL HISTORY:  neurogenic claudication; Other intervertebral disc degeneration, lumbar " region    TECHNIQUE:  Multiplanar, multisequence MR images were acquired from the thoracolumbar junction to the sacrum without the administration of contrast.    COMPARISON:  None.    FINDINGS:  Normal marrow signal is seen throughout with the exception of endplate changes adjacent to L3-4.    L1/2: There is a circumferential disc protrusion resulting in mild narrowing of the anterior aspect of the canal and mild narrowing of bilateral neural foramina.    L2/3: There is grade 1 retrolisthesis of L2 with respect L3 as well as a circumferential disc protrusion resulting in moderate narrowing of bilateral neural foramina and moderate narrowing of the central canal.    L3/4: There is grade 1 retrolisthesis of L3 with respect L4 with a circumferential disc protrusion resulting in moderate severe narrowing of bilateral neural foramina and moderate narrowing of the central canal.    L4/5: There is a circumferential disc protrusion resulting in severe narrowing of bilateral neural foramina and severe central canal narrowing.    L5/S1: There is grade 1 anterolisthesis of L5 with respect S1 with uncovering of the disc and severe bilateral neural foraminal narrowing.    The adjacent soft tissues are unremarkable.   Impression       There are multilevel degenerative changes of the spine.      Electronically signed by: Nathaly Salter MD  Date: 05/30/2018  Time: 08:53         Assessment:       Encounter Diagnoses   Name Primary?    DDD (degenerative disc disease), lumbar Yes    Lumbar spondylosis     Spinal stenosis of lumbar region with neurogenic claudication          Plan:       Kaycee was seen today for follow-up.    Diagnoses and all orders for this visit:    DDD (degenerative disc disease), lumbar    Lumbar spondylosis    Spinal stenosis of lumbar region with neurogenic claudication        Kaycee Almanza is a 83 y.o. female with chronic low back pain that has been worsening over the past 6 months. Now with symptoms  radiating to the bilateral lower extremities. Her pain sounds most consistent with neurogenic claudication, though likely has some component of facet mediated pain as well.    1. Pertinent imaging studies reviewed by me. Imaging results were discussed with patient.  2. Schedule for L3-4 ILESI.  3. RTC 2 weeks after procedure.

## 2018-06-07 ENCOUNTER — TELEPHONE (OUTPATIENT)
Dept: PAIN MEDICINE | Facility: CLINIC | Age: 83
End: 2018-06-07

## 2018-06-07 NOTE — TELEPHONE ENCOUNTER
Patient's daughter states she is going to drop the Anticoagulant Clearance Form to us later today from Dr. Saldana.     ----- Message from Zoë Lopez sent at 6/7/2018 12:24 PM CDT -----  Contact: Jacklyn/Daughter/[phone]  Jacklyn returned the staff's call. Thank you.

## 2018-06-13 ENCOUNTER — HOSPITAL ENCOUNTER (OUTPATIENT)
Facility: HOSPITAL | Age: 83
Discharge: HOME | End: 2018-06-13
Attending: PAIN MEDICINE | Admitting: PAIN MEDICINE
Payer: MEDICARE

## 2018-06-13 ENCOUNTER — HOSPITAL ENCOUNTER (OUTPATIENT)
Dept: RADIOLOGY | Facility: HOSPITAL | Age: 83
Discharge: HOME | End: 2018-06-13
Attending: PAIN MEDICINE | Admitting: PAIN MEDICINE
Payer: MEDICARE

## 2018-06-13 ENCOUNTER — SURGERY (OUTPATIENT)
Age: 83
End: 2018-06-13

## 2018-06-13 VITALS
RESPIRATION RATE: 16 BRPM | TEMPERATURE: 98 F | OXYGEN SATURATION: 97 % | HEART RATE: 70 BPM | DIASTOLIC BLOOD PRESSURE: 66 MMHG | SYSTOLIC BLOOD PRESSURE: 150 MMHG

## 2018-06-13 DIAGNOSIS — M48.061 LUMBAR SPINAL STENOSIS: ICD-10-CM

## 2018-06-13 DIAGNOSIS — M48.062 SPINAL STENOSIS OF LUMBAR REGION WITH NEUROGENIC CLAUDICATION: Primary | ICD-10-CM

## 2018-06-13 DIAGNOSIS — M54.16 LUMBAR RADICULOPATHY: ICD-10-CM

## 2018-06-13 PROCEDURE — 76000 FLUOROSCOPY <1 HR PHYS/QHP: CPT | Mod: TC

## 2018-06-13 PROCEDURE — 63600175 PHARM REV CODE 636 W HCPCS: Performed by: PAIN MEDICINE

## 2018-06-13 PROCEDURE — 62322 NJX INTERLAMINAR LMBR/SAC: CPT | Performed by: PAIN MEDICINE

## 2018-06-13 PROCEDURE — 62323 NJX INTERLAMINAR LMBR/SAC: CPT | Mod: ,,, | Performed by: PAIN MEDICINE

## 2018-06-13 PROCEDURE — 25500020 PHARM REV CODE 255: Performed by: PAIN MEDICINE

## 2018-06-13 PROCEDURE — 62323 NJX INTERLAMINAR LMBR/SAC: CPT | Performed by: PAIN MEDICINE

## 2018-06-13 PROCEDURE — 25000003 PHARM REV CODE 250: Performed by: PAIN MEDICINE

## 2018-06-13 RX ORDER — LIDOCAINE HYDROCHLORIDE 20 MG/ML
10 INJECTION, SOLUTION INFILTRATION; PERINEURAL ONCE
Status: COMPLETED | OUTPATIENT
Start: 2018-06-13 | End: 2018-06-13

## 2018-06-13 RX ORDER — LIDOCAINE HYDROCHLORIDE 10 MG/ML
INJECTION, SOLUTION EPIDURAL; INFILTRATION; INTRACAUDAL; PERINEURAL
Status: DISCONTINUED
Start: 2018-06-13 | End: 2018-06-13 | Stop reason: HOSPADM

## 2018-06-13 RX ORDER — LIDOCAINE HYDROCHLORIDE 10 MG/ML
1 INJECTION, SOLUTION EPIDURAL; INFILTRATION; INTRACAUDAL; PERINEURAL ONCE
Status: COMPLETED | OUTPATIENT
Start: 2018-06-13 | End: 2018-06-13

## 2018-06-13 RX ORDER — DEXAMETHASONE SODIUM PHOSPHATE 10 MG/ML
10 INJECTION INTRAMUSCULAR; INTRAVENOUS ONCE
Status: COMPLETED | OUTPATIENT
Start: 2018-06-13 | End: 2018-06-13

## 2018-06-13 RX ORDER — LIDOCAINE HYDROCHLORIDE 20 MG/ML
INJECTION, SOLUTION INFILTRATION; PERINEURAL
Status: DISCONTINUED
Start: 2018-06-13 | End: 2018-06-13 | Stop reason: HOSPADM

## 2018-06-13 RX ORDER — ALPRAZOLAM 0.5 MG/1
1 TABLET, ORALLY DISINTEGRATING ORAL ONCE AS NEEDED
Status: COMPLETED | OUTPATIENT
Start: 2018-06-13 | End: 2018-06-13

## 2018-06-13 RX ORDER — DEXAMETHASONE SODIUM PHOSPHATE 10 MG/ML
INJECTION INTRAMUSCULAR; INTRAVENOUS
Status: DISCONTINUED
Start: 2018-06-13 | End: 2018-06-13 | Stop reason: HOSPADM

## 2018-06-13 RX ADMIN — SODIUM BICARBONATE 1 ML: 0.2 INJECTION, SOLUTION INTRAVENOUS at 11:06

## 2018-06-13 RX ADMIN — LIDOCAINE HYDROCHLORIDE 9 ML: 20 INJECTION, SOLUTION INFILTRATION; PERINEURAL at 11:06

## 2018-06-13 RX ADMIN — ALPRAZOLAM 1 MG: 0.5 TABLET, ORALLY DISINTEGRATING ORAL at 11:06

## 2018-06-13 RX ADMIN — DEXAMETHASONE SODIUM PHOSPHATE 10 MG: 10 INJECTION, SOLUTION INTRAMUSCULAR; INTRAVENOUS at 11:06

## 2018-06-13 RX ADMIN — LIDOCAINE HYDROCHLORIDE 4 ML: 10 INJECTION, SOLUTION EPIDURAL; INFILTRATION; INTRACAUDAL; PERINEURAL at 11:06

## 2018-06-13 RX ADMIN — IOHEXOL 5 ML: 300 INJECTION, SOLUTION INTRAVENOUS at 11:06

## 2018-06-13 NOTE — DISCHARGE INSTRUCTIONS
Home Care Instructions Pain Management:    1. DIET:   You may resume your normal diet today.   2. BATHING:   You may shower with luke warm water.  3. DRESSING:   You may remove your bandage today.   4. ACTIVITY LEVEL:   You may resume your normal activities 24 hrs after your procedure.  5. MEDICATIONS:   You may resume your normal medications today.   6. SPECIAL INSTRUCTIONS:   No heat to the injection site for 24 hrs including, bath or shower, heating pad, moist heat, or hot tubs.    Use ice pack to injection site for any pain or discomfort.  Apply ice packs for 20 minute intervals as needed.   If you have received any sedatives by mouth today you may not drive for 12 hours.    If you have received any sedation through your IV, you may not drive for 24 hrs.     PLEASE CALL YOUR DOCTOR IF:  1. Redness or swelling around the injection site.  2. Fever of 101 degrees  3. Drainage (pus) from the injection site.  4. For any continuous bleeding (some dried blood over the incision is normal.)    FOR EMERGENCIES:   If any unusual problems or difficulties occur during clinic hours, call [phone] or 494.   Fall Prevention  Millions of people fall every year and injure themselves. You may have had anesthesia or sedation which may increase your risk of falling. You may have health issues that put you at an increased risk of falling.     Here are ways to reduce your risk of falling.  ·   · Make your home safe by keeping walkways clear of objects you may trip over.  · Use non-slip pads under rugs. Do not use area rugs or small throw rugs.  · Use non-slip mats in bathtubs and showers.  · Install handrails and lights on staircases.  · Do not walk in poorly lit areas.  · Do not stand on chairs or wobbly ladders.  · Use caution when reaching overhead or looking upward. This position can cause a loss of balance.  · Be sure your shoes fit properly, have non-slip bottoms and are in good condition.   · Wear shoes both inside and  out. Avoid going barefoot or wearing slippers.  · Be cautious when going up and down stairs, curbs, and when walking on uneven sidewalks.  · If your balance is poor, consider using a cane or walker.  · If your fall was related to alcohol use, stop or limit alcohol intake.   · If your fall was related to use of sleeping medicines, talk to your doctor about this. You may need to reduce your dosage at bedtime if you awaken during the night to go to the bathroom.    · To reduce the need for nighttime bathroom trips:  ¨ Avoid drinking fluids for several hours before going to bed  ¨ Empty your bladder before going to bed  ¨ Men can keep a urinal at the bedside  · Stay as active as you can. Balance, flexibility, strength, and endurance all come from exercise. They all play a role in preventing falls. Ask your healthcare provider which types of activity are right for you.  · Get your vision checked on a regular basis.  · If you have pets, know where they are before you stand up or walk so you don't trip over them.  · Use night lights.

## 2018-06-13 NOTE — OP NOTE
Lumbar Interlaminar Epidural Steroid Injection under Fluoroscopic Guidance.  Time-out taken to identify patient and procedure side prior to starting the procedure.   I attest that I have reviewed the patient's home medications prior to the procedure and no contraindication have been identified. I  re-evaluated the patient after the patient was positioned for the procedure in the procedure room immediately before the procedural time-out. The vital signs are current and represent the current state of the patient which has not significantly changed since the preprocedure assessment.                                                               Date of Service: 06/13/2018    PCP: Iona Jose MD    Referring Physician:    Procedure:  L3-4 Interlaminar epidural steroid injection under fluoroscopic guidance.    Reasons for procedure: Lumbar radiculopathy [M54.16]  DDD (degenerative disc disease), lumbar [M51.36]     Physician: Marvel Castro MD  ASSISTANTS: none    Medications injected: Preservative-free dexamethasone 10mg with 4mL of sterile Xylocaine-MPF 1% and 1ml of sterile preservative-free normal saline.    Local anesthetic injected:    Xylocaine 1% 9ml with Sodium Bicarbonate 1ml.   Sedation Medications: None    Estimated blood loss:  none.    Complications:  none.    Technique:  With the patient laying in a prone position, the area was prepped and draped in the usual sterile fashion using ChloraPrep and a fenestrated drape.  Local anesthetic was given using a 27-gauge needle by raising a wheal and going down to the hub of the needle.  A 3.5 inch 20-gauge Touhy needle was introduced under fluoroscopic guidance.  It met the lamina of the posterior element. The needle was then hinged above the lamina.  Loss of resistance technique was employed while advancing the needle.  Once in the desired position, contrast dye Omnipaque was injected to confirm placement and there was no vascular runoff.  Digital subtraction  was employed to confirm that there was no vascular runoff.  The medication was then injected slowly.  The patient tolerated the procedure well.      Pain before the procedure: 6/10    Pain after the procedure: 0/10    The patient was monitored after the procedure.   They were given post-procedure and discharge instructions to follow at home.  The patient was discharged in a stable condition.

## 2018-06-13 NOTE — H&P (VIEW-ONLY)
Subjective:     Patient ID: Kaycee Almanza is a 83 y.o. female    Chief Complaint: Follow-up (MRI f/u)      Referred by: No ref. provider found      HPI:    Interval History (6/4/18):  She returns today for follow up and MRI review.  She reports that her pain is unchanged since last visit. She denies weakness and b/b dysfunction.       Initial Encounter (5/29/18):  Kaycee Almanza is a 83 y.o. female who presents today with chronic low back pain. This pain has been present for many years without inciting event or injury. In the past 6 months she has noticed a significant increase in her pain and it began radiating to the bilateral posterior thigh. It does not cross the knees. She does report some associated numbness. She denies any focal weakness or b/b dysfunction. The pain is constant but significantly worsens when she walks for 15 minutes or more. The pain will significantly and immediately improve upon sitting. .   This pain is described in detail below.    Physical Therapy: No    Non-pharmacologic Treatment: rest helps         · TENS? No    Pain Medications:         · Currently taking: nothing    · Has tried in the past:  Norco, Gabapentin,     · Has not tried: NSAIDs, Tylenol, Muscle relaxants, TCAs, SNRIs, topical creams    Blood thinners: ASA 81mg daily    Interventional Therapies:   Previous lumbar ESIs over 20 years ago    Relevant Surgeries: None    Affecting sleep? Yes    Affecting daily activities? yes    Depressive symptoms? yes          · SI/HI? No    Work status: Unemployed    Pain Scores:    Best:       10/10  Worst:     10/10  Usually:   10/10  Today:    9/10    Review of Systems   Constitutional: Negative for activity change, appetite change, chills, fatigue, fever and unexpected weight change.   HENT: Negative for hearing loss.    Eyes: Negative for visual disturbance.   Respiratory: Negative for chest tightness and shortness of breath.    Cardiovascular: Negative for chest pain.    Gastrointestinal: Negative for abdominal pain, constipation, diarrhea, nausea and vomiting.   Genitourinary: Negative for difficulty urinating.   Musculoskeletal: Positive for arthralgias, back pain, gait problem and myalgias. Negative for neck pain.   Skin: Negative for rash.   Neurological: Negative for dizziness, weakness, light-headedness, numbness and headaches.   Psychiatric/Behavioral: Positive for sleep disturbance. Negative for hallucinations and suicidal ideas. The patient is not nervous/anxious.        Past Medical History:   Diagnosis Date    Anxiety     Arthritis     Dementia     Depression     Diabetes type 2, controlled     sees Dr. Elena    GERD (gastroesophageal reflux disease)     Hyperlipidemia     Hypertension     Left posterior capsular opacification 5/11/2017    Osteoporosis     Thyroid disease     Ulcer     Ulcer        Past Surgical History:   Procedure Laterality Date    CATARACT EXTRACTION W/  INTRAOCULAR LENS IMPLANT Bilateral 2006    [facility]    cataract surgery  2006    CHOLECYSTECTOMY      EYE SURGERY      HAND SURGERY      HYSTERECTOMY      KNEE SURGERY      SHOULDER SURGERY         Social History     Social History    Marital status:      Spouse name: N/A    Number of children: N/A    Years of education: N/A     Occupational History    Not on file.     Social History Main Topics    Smoking status: Never Smoker    Smokeless tobacco: Never Used    Alcohol use No    Drug use: No    Sexual activity: No     Other Topics Concern    Not on file     Social History Narrative    No narrative on file       Review of patient's allergies indicates:  No Known Allergies    Current Outpatient Prescriptions on File Prior to Visit   Medication Sig Dispense Refill    ALPRAZolam (XANAX) 0.25 MG tablet       aspirin (ECOTRIN) 81 MG EC tablet Take 81 mg by mouth once daily.      atorvastatin (LIPITOR) 20 MG tablet Take 10 mg by mouth once daily.        "gabapentin (NEURONTIN) 300 MG capsule Take 300 mg by mouth 3 (three) times daily.      hydroCHLOROthiazide (HYDRODIURIL) 12.5 MG Tab Take 1 tablet (12.5 mg total) by mouth once daily. 90 tablet 0    hydrocodone-acetaminophen 7.5-325mg (NORCO) 7.5-325 mg per tablet Take 1 tablet by mouth every 6 (six) hours as needed for Pain. 60 tablet 0    linagliptin (TRADJENTA) 5 mg Tab tablet Take 1 tablet (5 mg total) by mouth once daily. 90 tablet 1    lisinopril (PRINIVIL,ZESTRIL) 40 MG tablet TAKE 1 TABLET EVERY DAY 90 tablet 1    pravastatin (PRAVACHOL) 10 MG tablet Take 1 tablet (10 mg total) by mouth once daily. 90 tablet 1    sertraline (ZOLOFT) 100 MG tablet Take 1 tablet (100 mg total) by mouth once daily. 90 tablet 1    TRUE METRIX GLUCOSE TEST STRIP Strp Check blood glucose 2 times daily before meals 200 each 0    ZOSTAVAX, PF, 19,400 unit/0.65 mL injection       amLODIPine (NORVASC) 10 MG tablet Take 1 tablet (10 mg total) by mouth once daily. 90 tablet 0     No current facility-administered medications on file prior to visit.        Objective:      /64   Pulse 79   Resp 18   Ht 4' 8" (1.422 m)   Wt 49.7 kg (109 lb 8 oz)   SpO2 96%   BMI 24.55 kg/m²     Exam:  GEN:  Well developed, well nourished.  No acute distress.   HEENT:  No trauma.  Mucous membranes moist.  Nares patent bilaterally.  PSYCH: Normal affect. Thought content appropriate.  CHEST:  Breathing symmetric.  No audible wheezing.  ABD: Soft, non-distended.  SKIN:  Warm, pink, dry.  No rash on exposed areas.    EXT:  No cyanosis, clubbing, or edema.  No color change or changes in nail or hair growth.  NEURO/MUSCULOSKELETAL:  Fully alert, oriented, and appropriate. Speech normal zaire. No cranial nerve deficits.   Gait: normal.  No focal motor deficits.             Imaging:  Narrative     EXAMINATION:  MRI LUMBAR SPINE WITHOUT CONTRAST    CLINICAL HISTORY:  neurogenic claudication; Other intervertebral disc degeneration, lumbar " region    TECHNIQUE:  Multiplanar, multisequence MR images were acquired from the thoracolumbar junction to the sacrum without the administration of contrast.    COMPARISON:  None.    FINDINGS:  Normal marrow signal is seen throughout with the exception of endplate changes adjacent to L3-4.    L1/2: There is a circumferential disc protrusion resulting in mild narrowing of the anterior aspect of the canal and mild narrowing of bilateral neural foramina.    L2/3: There is grade 1 retrolisthesis of L2 with respect L3 as well as a circumferential disc protrusion resulting in moderate narrowing of bilateral neural foramina and moderate narrowing of the central canal.    L3/4: There is grade 1 retrolisthesis of L3 with respect L4 with a circumferential disc protrusion resulting in moderate severe narrowing of bilateral neural foramina and moderate narrowing of the central canal.    L4/5: There is a circumferential disc protrusion resulting in severe narrowing of bilateral neural foramina and severe central canal narrowing.    L5/S1: There is grade 1 anterolisthesis of L5 with respect S1 with uncovering of the disc and severe bilateral neural foraminal narrowing.    The adjacent soft tissues are unremarkable.   Impression       There are multilevel degenerative changes of the spine.      Electronically signed by: Nathaly Salter MD  Date: 05/30/2018  Time: 08:53         Assessment:       Encounter Diagnoses   Name Primary?    DDD (degenerative disc disease), lumbar Yes    Lumbar spondylosis     Spinal stenosis of lumbar region with neurogenic claudication          Plan:       Kaycee was seen today for follow-up.    Diagnoses and all orders for this visit:    DDD (degenerative disc disease), lumbar    Lumbar spondylosis    Spinal stenosis of lumbar region with neurogenic claudication        Kaycee Almanza is a 83 y.o. female with chronic low back pain that has been worsening over the past 6 months. Now with symptoms  radiating to the bilateral lower extremities. Her pain sounds most consistent with neurogenic claudication, though likely has some component of facet mediated pain as well.    1. Pertinent imaging studies reviewed by me. Imaging results were discussed with patient.  2. Schedule for L3-4 ILESI.  3. RTC 2 weeks after procedure.

## 2018-06-13 NOTE — INTERVAL H&P NOTE
The patient has been examined and the H&P has been reviewed:    I concur with the findings and no changes have occurred since H&P was written.    Anesthesia/Surgery risks, benefits and alternative options discussed and understood by patient/family.          Active Hospital Problems    Diagnosis  POA    Lumbar spinal stenosis [M48.061]  Yes      Resolved Hospital Problems    Diagnosis Date Resolved POA   No resolved problems to display.

## 2018-06-15 ENCOUNTER — OFFICE VISIT (OUTPATIENT)
Dept: FAMILY MEDICINE | Facility: CLINIC | Age: 83
End: 2018-06-15
Payer: MEDICARE

## 2018-06-15 VITALS
HEIGHT: 56 IN | HEART RATE: 72 BPM | OXYGEN SATURATION: 98 % | BODY MASS INDEX: 24.43 KG/M2 | SYSTOLIC BLOOD PRESSURE: 128 MMHG | WEIGHT: 108.63 LBS | DIASTOLIC BLOOD PRESSURE: 64 MMHG | TEMPERATURE: 98 F

## 2018-06-15 DIAGNOSIS — E11.9 TYPE 2 DIABETES MELLITUS WITHOUT COMPLICATION, WITHOUT LONG-TERM CURRENT USE OF INSULIN: ICD-10-CM

## 2018-06-15 DIAGNOSIS — F41.9 ANXIETY: ICD-10-CM

## 2018-06-15 DIAGNOSIS — I10 ESSENTIAL HYPERTENSION: ICD-10-CM

## 2018-06-15 DIAGNOSIS — M15.9 OSTEOARTHRITIS INVOLVING MULTIPLE JOINTS ON BOTH SIDES OF BODY: Primary | ICD-10-CM

## 2018-06-15 PROCEDURE — 99999 PR PBB SHADOW E&M-EST. PATIENT-LVL III: CPT | Mod: PBBFAC,,, | Performed by: FAMILY MEDICINE

## 2018-06-15 PROCEDURE — 3074F SYST BP LT 130 MM HG: CPT | Mod: CPTII,S$GLB,, | Performed by: FAMILY MEDICINE

## 2018-06-15 PROCEDURE — 3078F DIAST BP <80 MM HG: CPT | Mod: CPTII,S$GLB,, | Performed by: FAMILY MEDICINE

## 2018-06-15 PROCEDURE — 99214 OFFICE O/P EST MOD 30 MIN: CPT | Mod: S$GLB,,, | Performed by: FAMILY MEDICINE

## 2018-06-15 RX ORDER — SERTRALINE HYDROCHLORIDE 100 MG/1
100 TABLET, FILM COATED ORAL DAILY
Qty: 90 TABLET | Refills: 3 | Status: SHIPPED | OUTPATIENT
Start: 2018-06-15 | End: 2019-03-18 | Stop reason: SDUPTHER

## 2018-06-15 RX ORDER — LISINOPRIL 40 MG/1
40 TABLET ORAL DAILY
Qty: 90 TABLET | Refills: 3 | Status: SHIPPED | OUTPATIENT
Start: 2018-06-15 | End: 2019-03-18

## 2018-06-15 NOTE — PROGRESS NOTES
Chief Complaint   Patient presents with    Back Pain    Medication Refill       HPI  Kaycee Almanza is a 83 y.o. female with multiple medical diagnoses as listed in the medical history and problem list that presents for follow-up for chronic back pain and refill of medications. She has had an epidural shot in her spine and her soreness has improved. She is not taking the norco often as it did not help for her back pain but she does take it for arthritis in her knees.     PAST MEDICAL HISTORY:  Past Medical History:   Diagnosis Date    Anxiety     Arthritis     Dementia     Depression     Diabetes type 2, controlled     sees Dr. Elena    GERD (gastroesophageal reflux disease)     Hyperlipidemia     Hypertension     Left posterior capsular opacification 5/11/2017    Osteoporosis     Thyroid disease     Ulcer     Ulcer        PAST SURGICAL HISTORY:  Past Surgical History:   Procedure Laterality Date    CATARACT EXTRACTION W/  INTRAOCULAR LENS IMPLANT Bilateral 2006    [facility]    cataract surgery  2006    CHOLECYSTECTOMY      EYE SURGERY      HAND SURGERY      HYSTERECTOMY      KNEE SURGERY      SHOULDER SURGERY         SOCIAL HISTORY:  Social History     Social History    Marital status:      Spouse name: N/A    Number of children: N/A    Years of education: N/A     Occupational History    Not on file.     Social History Main Topics    Smoking status: Never Smoker    Smokeless tobacco: Never Used    Alcohol use No    Drug use: No    Sexual activity: No     Other Topics Concern    Not on file     Social History Narrative    No narrative on file       FAMILY HISTORY:  Family History   Problem Relation Age of Onset    Stroke Mother     Diabetes Mother     Arthritis Father     Early death Sister     Birth defects Brother     No Known Problems Sister     Birth defects Brother     Birth defects Brother     Birth defects Brother     Birth defects Brother      Amblyopia Neg Hx     Blindness Neg Hx     Cataracts Neg Hx     Glaucoma Neg Hx     Macular degeneration Neg Hx     Retinal detachment Neg Hx     Strabismus Neg Hx        ALLERGIES AND MEDICATIONS: updated and reviewed.  Review of patient's allergies indicates:  No Known Allergies  Current Outpatient Prescriptions   Medication Sig Dispense Refill    ALPRAZolam (XANAX) 0.25 MG tablet       amLODIPine (NORVASC) 10 MG tablet Take 1 tablet (10 mg total) by mouth once daily. 90 tablet 0    aspirin (ECOTRIN) 81 MG EC tablet Take 81 mg by mouth once daily.      atorvastatin (LIPITOR) 20 MG tablet Take 10 mg by mouth once daily.       hydroCHLOROthiazide (HYDRODIURIL) 12.5 MG Tab Take 1 tablet (12.5 mg total) by mouth once daily. 90 tablet 0    hydrocodone-acetaminophen 7.5-325mg (NORCO) 7.5-325 mg per tablet Take 1 tablet by mouth every 6 (six) hours as needed for Pain. 60 tablet 0    linagliptin (TRADJENTA) 5 mg Tab tablet Take 1 tablet (5 mg total) by mouth once daily. 90 tablet 3    lisinopril (PRINIVIL,ZESTRIL) 40 MG tablet Take 1 tablet (40 mg total) by mouth once daily. 90 tablet 3    pravastatin (PRAVACHOL) 10 MG tablet Take 1 tablet (10 mg total) by mouth once daily. 90 tablet 1    sertraline (ZOLOFT) 100 MG tablet Take 1 tablet (100 mg total) by mouth once daily. 90 tablet 3    gabapentin (NEURONTIN) 300 MG capsule Take 300 mg by mouth 3 (three) times daily.      TRUE METRIX GLUCOSE TEST STRIP Strp Check blood glucose 2 times daily before meals 200 each 0     No current facility-administered medications for this visit.        ROS  Review of Systems   Constitutional: Negative for chills, diaphoresis, fatigue, fever and unexpected weight change.   HENT: Negative for rhinorrhea, sinus pressure, sore throat and tinnitus.    Eyes: Negative for photophobia and visual disturbance.   Respiratory: Negative for cough, shortness of breath and wheezing.    Cardiovascular: Negative for chest pain and  "palpitations.   Gastrointestinal: Negative for abdominal pain, blood in stool, constipation, diarrhea, nausea and vomiting.   Genitourinary: Negative for dysuria, flank pain, frequency and vaginal discharge.   Musculoskeletal: Positive for arthralgias and back pain. Negative for joint swelling.   Skin: Negative for rash.   Neurological: Negative for speech difficulty, weakness, light-headedness and headaches.   Psychiatric/Behavioral: Negative for behavioral problems and dysphoric mood.       Physical Exam  Vitals:    06/15/18 0745   BP: 128/64   Pulse: 72   Temp: 98 °F (36.7 °C)   SpO2: 98%   Weight: 49.3 kg (108 lb 9.6 oz)   Height: 4' 8" (1.422 m)    Body mass index is 24.35 kg/m².  Weight: 49.3 kg (108 lb 9.6 oz)   Height: 4' 8" (142.2 cm)     Physical Exam   Constitutional: She is oriented to person, place, and time. She appears well-developed and well-nourished.   HENT:   Head: Normocephalic and atraumatic.   Eyes: EOM are normal.   Neurological: She is alert and oriented to person, place, and time.   Skin: Skin is warm and dry. No rash noted. No erythema.   Psychiatric: She has a normal mood and affect. Her behavior is normal.   Nursing note and vitals reviewed.      Health Maintenance       Date Due Completion Date    Hemoglobin A1c 03/06/2018 9/6/2017    Override on 10/18/2016: Done (Dr. Hollis Luther/Endocrinology- hemoglobin a1c 6.2)    Influenza Vaccine 08/01/2018 10/23/2017    Lipid Panel 09/06/2018 9/6/2017    Foot Exam 02/15/2019 2/15/2018    Override on 2/15/2018: Done    Override on 11/9/2016: Done (Dr. Hollis Luther/Endocrinology-bilateral feet normal by visual exam, normal pulses,sensations intact,by monofilament,right DP's2/4,PT's 2/4,monofilament 10/10,cap refill <3 secs,left  DP's 2/4,PT's 2/4,monofilament 10/10,cap refill <3 secs)    Urine Microalbumin 03/06/2019 3/6/2018    Eye Exam 05/16/2019 5/16/2018 (Declined)    Override on 5/16/2018: Declined    Override on 4/19/2017: Done    DEXA " SCAN 06/22/2019 6/22/2016 (Done)    Override on 6/22/2016: Done (NP Akilah Barnett/Dr. Hollis Luther/Endocrinology- normal range in the lumbar spine & hips,left wrist Tscore -2.7 improved from -3.3. Patient has had to stop Prolia, but patient had 4 doses. Patient is also on calcium + vitamin d supplements)    Override on 7/22/2014: Done (Dr. Hollis Luther/Endocrinology- AP Spine-1.247 g/cm 2 w/ T score =0.3,dual femur=0.979 g/cm 2 w/ T score =0.2,left forearm= 0.586 g/cm 2 w/ T score=3.3,patient started on prolia)    TETANUS VACCINE 03/09/2027 3/9/2017 (Declined)    Override on 3/9/2017: Declined            ASSESSMENT     1. Osteoarthritis involving multiple joints on both sides of body    2. Anxiety    3. Essential hypertension    4. Type 2 diabetes mellitus without complication, without long-term current use of insulin        PLAN:     Problem List Items Addressed This Visit        Psychiatric    Anxiety  This is a chronic medical condition that is stable under the current regimen. Continue to monitor and we will make medication adjustments as needed.       Relevant Medications    sertraline (ZOLOFT) 100 MG tablet       Cardiac/Vascular    Essential hypertension  -stable on ACE    Relevant Medications    lisinopril (PRINIVIL,ZESTRIL) 40 MG tablet       Endocrine    Type 2 diabetes mellitus without complication, without long-term current use of insulin  -she would like eye exam at this time, record request for A1C    Relevant Medications    linagliptin (TRADJENTA) 5 mg Tab tablet    Other Relevant Orders    Ambulatory referral to Optometry       Orthopedic    Osteoarthritis involving multiple joints on both sides of body - Primary  -continue follow up with pain management, may continue norco for pain as needed, does not need refills at this time            Iona Jose MD  06/15/2018 8:14 AM        Follow-up in about 3 months (around 9/15/2018) for Follow up.

## 2018-06-19 NOTE — PLAN OF CARE
Not compliant with CPAP 7 cm.   Percent of Days with Usage >= 4 Hours 33.3%  [place] [facility]  Average AHI 30.9    3/17/2015 PSG AHI 12.2   Complained of high pressures  6/19/2016 CPAP re-titration CPAP 7 cm optimal treatment     Changed to auto CPAP 4-10  Cm to determine if improved AHI   Follow up in 6 weeks for dwld.    Problem: Gastrointestinal Bleeding (Adult)  Intervention: Monitor/Manage Gastrointestinal Bleed Characteristics/Effects   09/08/17 2200 09/09/17 0419   Safety Interventions   Medication Review/Management --  medications reviewed   Monitor/Manage Gastrointestinal Bleed Characteristics/Effects   GI Signs/Symptoms diarrhea --      Intervention: Support/Optimize Psychosocial Response to Illness   09/09/17 0419   Coping/Psychosocial Interventions   Supportive Measures active listening utilized;positive reinforcement provided;verbalization of feelings encouraged;self-care encouraged     Intervention: Provide Comfort Measures Related to Nausea/Vomiting   09/09/17 0419   Monitor/Manage Hypovolemia   Nausea/Vomiting Interventions slow deep breathing encouraged     Intervention: Provide Frequent Perineal Care   09/08/17 2200   Skin Interventions   Skin Protection tubing/devices free from skin contact     Intervention: Monitor/Manage Fluid Electrolyte Balance   09/09/17 0419   Nutrition Interventions   Fluid/Electrolyte Management fluids provided       Goal: Signs and Symptoms of Listed Potential Problems Will be Absent, Minimized or Managed (Gastrointestinal Bleeding)  Signs and symptoms of listed potential problems will be absent, minimized or managed by discharge/transition of care (reference Gastrointestinal Bleeding (Adult) CPG).   Outcome: Ongoing (interventions implemented as appropriate)   09/09/17 0419   Gastrointestinal Bleeding   Problems Assessed (GI Bleeding) all   Problems Present (GI Bleeding) none   Results for BRANDT MEHTA (MRN [id number]) as of 9/9/2017 04:20   Ref. Range 9/8/2017 23:07   Hemoglobin Latest Ref Range: 12.0 - 16.0 g/dL 12.4   Hematocrit Latest Ref Range: 37.0 - 48.5 % 35.9 (L)

## 2018-06-21 DIAGNOSIS — G89.29 CHRONIC BACK PAIN, UNSPECIFIED BACK LOCATION, UNSPECIFIED BACK PAIN LATERALITY: ICD-10-CM

## 2018-06-21 DIAGNOSIS — M54.9 CHRONIC BACK PAIN, UNSPECIFIED BACK LOCATION, UNSPECIFIED BACK PAIN LATERALITY: ICD-10-CM

## 2018-06-21 NOTE — TELEPHONE ENCOUNTER
----- Message from Zoë Lopez sent at 6/21/2018  3:18 PM CDT -----  Contact: Self/[phone]  Refill:  hydrocodone-acetaminophen 7.5-325mg (NORCO) 7.5-325 mg per tablet    .  [facility] - [place] - [address]  [address]  [place] [address]  Phone: [phone] Fax: [phone]    Thank you.

## 2018-06-22 ENCOUNTER — TELEPHONE (OUTPATIENT)
Dept: ADMINISTRATIVE | Facility: HOSPITAL | Age: 83
End: 2018-06-22

## 2018-06-22 RX ORDER — HYDROCODONE BITARTRATE AND ACETAMINOPHEN 7.5; 325 MG/1; MG/1
1 TABLET ORAL EVERY 6 HOURS PRN
Qty: 60 TABLET | Refills: 0 | Status: SHIPPED | OUTPATIENT
Start: 2018-06-22 | End: 2018-07-23 | Stop reason: SDUPTHER

## 2018-06-23 ENCOUNTER — HOSPITAL ENCOUNTER (EMERGENCY)
Facility: HOSPITAL | Age: 83
Discharge: HOME | End: 2018-06-23
Attending: EMERGENCY MEDICINE
Payer: MEDICARE

## 2018-06-23 VITALS
DIASTOLIC BLOOD PRESSURE: 77 MMHG | HEIGHT: 56 IN | RESPIRATION RATE: 15 BRPM | TEMPERATURE: 98 F | SYSTOLIC BLOOD PRESSURE: 185 MMHG | BODY MASS INDEX: 24.3 KG/M2 | OXYGEN SATURATION: 98 % | HEART RATE: 80 BPM | WEIGHT: 108 LBS

## 2018-06-23 DIAGNOSIS — M54.16 LUMBAR BACK PAIN WITH RADICULOPATHY AFFECTING LEFT LOWER EXTREMITY: Primary | ICD-10-CM

## 2018-06-23 LAB
GLUCOSE SERPL-MCNC: 110 MG/DL (ref 70–110)
POCT GLUCOSE: 110 MG/DL (ref 70–110)

## 2018-06-23 PROCEDURE — 96372 THER/PROPH/DIAG INJ SC/IM: CPT

## 2018-06-23 PROCEDURE — 25000003 PHARM REV CODE 250: Performed by: EMERGENCY MEDICINE

## 2018-06-23 PROCEDURE — 99283 EMERGENCY DEPT VISIT LOW MDM: CPT | Mod: 25

## 2018-06-23 PROCEDURE — 63600175 PHARM REV CODE 636 W HCPCS: Performed by: EMERGENCY MEDICINE

## 2018-06-23 PROCEDURE — 82962 GLUCOSE BLOOD TEST: CPT

## 2018-06-23 RX ORDER — ONDANSETRON 4 MG/1
4 TABLET, ORALLY DISINTEGRATING ORAL
Status: COMPLETED | OUTPATIENT
Start: 2018-06-23 | End: 2018-06-23

## 2018-06-23 RX ORDER — BETAMETHASONE SODIUM PHOSPHATE AND BETAMETHASONE ACETATE 3; 3 MG/ML; MG/ML
8 INJECTION, SUSPENSION INTRA-ARTICULAR; INTRALESIONAL; INTRAMUSCULAR; SOFT TISSUE ONCE
Status: COMPLETED | OUTPATIENT
Start: 2018-06-23 | End: 2018-06-23

## 2018-06-23 RX ORDER — MORPHINE SULFATE 4 MG/ML
4 INJECTION, SOLUTION INTRAMUSCULAR; INTRAVENOUS
Status: COMPLETED | OUTPATIENT
Start: 2018-06-23 | End: 2018-06-23

## 2018-06-23 RX ADMIN — MORPHINE SULFATE 4 MG: 4 INJECTION INTRAVENOUS at 08:06

## 2018-06-23 RX ADMIN — ONDANSETRON 4 MG: 4 TABLET, ORALLY DISINTEGRATING ORAL at 08:06

## 2018-06-23 RX ADMIN — BETAMETHASONE SODIUM PHOSPHATE AND BETAMETHASONE ACETATE 7.98 MG: 3; 3 INJECTION, SUSPENSION INTRA-ARTICULAR; INTRALESIONAL; INTRAMUSCULAR at 07:06

## 2018-06-24 NOTE — ED PROVIDER NOTES
Encounter Date: 6/23/2018    SCRIBE #1 NOTE: I, Asa Jhonson, am scribing for, and in the presence of,  Abraham Preston MD. I have scribed the following portions of the note - Other sections scribed: HPI, ROS.       History     Chief Complaint   Patient presents with    Back Pain     Left lower back pain that radiates down left leg. Pt reports she recently had an epidural w/ no relief of pain.      CC: Back Pain    HPI: 84 y/o female with medical hx of anxiety, arthritis, dementia, depression, DM type 2, GERD, hyperlipidemia, HTN, L posterior capsular opacification, thyroid disease, osteoporosis, ulcer, and stent placement presents to the ED c/o x2 month hx of constant, severe (9/10) lumbar back pain. Pt reports that the pain radiates down her L leg. Pt had a MRI on 5/29/18 that showed DDD of her lumbar back, receiving an epidural steroid injection on 6/13/18 with little relief. Pt reports she takes hydrocodone for her pain but is out and it does not effectivly relieve her symptoms. Pt denies trauma. Pt denies fever, chills, N/V/D, abdominal pain, ear pain, chest pain, cough, dysuria, or any other associated symptoms. No modifying factors or tx PTA.       The history is provided by the patient and a relative. No  was used.     Review of patient's allergies indicates:  No Known Allergies  Past Medical History:   Diagnosis Date    Anxiety     Arthritis     Coronary artery disease     Dementia     Depression     Diabetes type 2, controlled     sees Dr. Elena    GERD (gastroesophageal reflux disease)     Hyperlipidemia     Hypertension     Left posterior capsular opacification 5/11/2017    Osteoporosis     Thyroid disease     Ulcer     Ulcer      Past Surgical History:   Procedure Laterality Date    CATARACT EXTRACTION W/  INTRAOCULAR LENS IMPLANT Bilateral 2006    [facility]    cataract surgery  2006    CHOLECYSTECTOMY      EYE SURGERY      HAND SURGERY      HYSTERECTOMY       KNEE SURGERY      SHOULDER SURGERY       Family History   Problem Relation Age of Onset    Stroke Mother     Diabetes Mother     Arthritis Father     Early death Sister     Birth defects Brother     No Known Problems Sister     Birth defects Brother     Birth defects Brother     Birth defects Brother     Birth defects Brother     Amblyopia Neg Hx     Blindness Neg Hx     Cataracts Neg Hx     Glaucoma Neg Hx     Macular degeneration Neg Hx     Retinal detachment Neg Hx     Strabismus Neg Hx      Social History   Substance Use Topics    Smoking status: Never Smoker    Smokeless tobacco: Never Used    Alcohol use No     Review of Systems   Constitutional: Negative for chills and fever.   HENT: Negative for congestion, ear pain, rhinorrhea and sore throat.    Eyes: Negative for pain and redness.   Respiratory: Negative for cough, chest tightness and shortness of breath.    Cardiovascular: Negative for chest pain.   Gastrointestinal: Negative for abdominal pain, diarrhea, nausea and vomiting.   Genitourinary: Negative for dysuria, flank pain, frequency, hematuria and urgency.   Musculoskeletal: Positive for back pain and myalgias. Negative for neck pain.   Skin: Negative for rash.   Neurological: Negative for weakness, light-headedness, numbness and headaches.   All other systems reviewed and are negative.      Physical Exam     Initial Vitals [06/23/18 1916]   BP Pulse Resp Temp SpO2   (!) 193/84 84 16 98.2 °F (36.8 °C) 97 %      MAP       --         Physical Exam  The patient was examined specifically for the following:   General:No significant distress, Good color, Warm and dry. Head and neck:Scalp atraumatic, Neck supple. Neurological:Appropriate conversation, Gross motor deficits. Eyes:Conjugate gaze, Clear corneas. ENT: No epistaxis. Cardiac: Regular rate and rhythm, Grossly normal heart tones. Pulmonary: Wheezing, Rales. Gastrointestinal: Abdominal tenderness, Abdominal distention.  Musculoskeletal: Extremity deformity, Apparent pain with range of motion of the joints. Skin: Rash.   The findings on examination were normal except for the following:  The patient has mild vague diffuse tenderness of the superior gluteal regions bilaterally.  The patient sits stands and walks with minimal splinting.  The lungs are clear.  The heart tones are normal.  The abdomen is soft.  ED Course   Procedures  Labs Reviewed   POCT GLUCOSE          Imaging Results    None       Medical decision making:  Given the above this patient has degenerative disc disease in her lumbar back.  She is a diabetic.  She has been treated with steroid shots in the past.  We discussed steroid injection.  She understands the risks related to her diabetes.  I will treat her with IM Decadron and have her follow up with Dr. Sandra on Monday she relates her hydrocodone really does not help.                 Scribe Attestation:   Scribe #1: I performed the above scribed service and the documentation accurately describes the services I performed. I attest to the accuracy of the note.    Attending Attestation:           Physician Attestation for Scribe:  Physician Attestation Statement for Scribe #1: I, Abraham Preston MD, reviewed documentation, as scribed by Asa Johnson in my presence, and it is both accurate and complete.                    Clinical Impression:   The encounter diagnosis was Lumbar back pain with radiculopathy affecting left lower extremity.                             Abraham Preston MD  07/02/18 [id number]

## 2018-06-24 NOTE — ED TRIAGE NOTES
Pt states that she had an epidural done a week from Wednesday for pain in her back. States that she is now having pain in her left hip that radiates down to her left leg. States that she has intermittent numbness noted to leg. Pt denies any recent falls or other trauma to leg

## 2018-06-24 NOTE — DISCHARGE INSTRUCTIONS
Please use a heating pad.  Please return immediately if you get worse or if new problems develop.  Please follow-up with your the pain medicine and rehabilitation doctor on Monday.  Call for appointment.

## 2018-06-26 ENCOUNTER — OFFICE VISIT (OUTPATIENT)
Dept: PAIN MEDICINE | Facility: CLINIC | Age: 83
End: 2018-06-26
Payer: MEDICARE

## 2018-06-26 VITALS
HEART RATE: 71 BPM | WEIGHT: 107.38 LBS | DIASTOLIC BLOOD PRESSURE: 71 MMHG | OXYGEN SATURATION: 96 % | HEIGHT: 56 IN | BODY MASS INDEX: 24.16 KG/M2 | RESPIRATION RATE: 18 BRPM | SYSTOLIC BLOOD PRESSURE: 163 MMHG

## 2018-06-26 DIAGNOSIS — M47.816 LUMBAR SPONDYLOSIS: ICD-10-CM

## 2018-06-26 DIAGNOSIS — M51.36 DDD (DEGENERATIVE DISC DISEASE), LUMBAR: Primary | ICD-10-CM

## 2018-06-26 DIAGNOSIS — M54.16 LUMBAR RADICULOPATHY: ICD-10-CM

## 2018-06-26 DIAGNOSIS — M48.062 SPINAL STENOSIS OF LUMBAR REGION WITH NEUROGENIC CLAUDICATION: ICD-10-CM

## 2018-06-26 PROCEDURE — 99999 PR PBB SHADOW E&M-EST. PATIENT-LVL IV: CPT | Mod: PBBFAC,,, | Performed by: PAIN MEDICINE

## 2018-06-26 PROCEDURE — 3077F SYST BP >= 140 MM HG: CPT | Mod: CPTII,S$GLB,, | Performed by: PAIN MEDICINE

## 2018-06-26 PROCEDURE — 3078F DIAST BP <80 MM HG: CPT | Mod: CPTII,S$GLB,, | Performed by: PAIN MEDICINE

## 2018-06-26 PROCEDURE — 99214 OFFICE O/P EST MOD 30 MIN: CPT | Mod: S$GLB,,, | Performed by: PAIN MEDICINE

## 2018-06-26 RX ORDER — GABAPENTIN 300 MG/1
600 CAPSULE ORAL 3 TIMES DAILY
Qty: 180 CAPSULE | Refills: 5 | Status: SHIPPED | OUTPATIENT
Start: 2018-06-26 | End: 2019-03-18

## 2018-06-26 NOTE — PROGRESS NOTES
Subjective:     Patient ID: Kaycee Almanza is a 83 y.o. female    Chief Complaint: Follow-up (S/P Lumbar HERBERTH L3-L4 06.13.18)      Referred by: No ref. provider found      HPI:    Interval History (6/25/18):  She returns today for follow up.  She reports that L3-4 ILESI has not been helpful for the low back and lower extremity pain. She states that about one week following the injection she began to experience left sided posterior hip pain that radiates to the ankle. She reports intermittent numbness and tingling associated with this pain. She denies any new focal weakness or b/b dysfunction. The pain is severe. It is not alleviated with Norco. She was seen in the ED and given injections for pain that only provided temporary relief. She denies any injuries or falls since the ILESI.       Interval History (6/4/18):  She returns today for follow up and MRI review.  She reports that her pain is unchanged since last visit. She denies weakness and b/b dysfunction.       Initial Encounter (5/29/18):  Kaycee Almanza is a 83 y.o. female who presents today with chronic low back pain. This pain has been present for many years without inciting event or injury. In the past 6 months she has noticed a significant increase in her pain and it began radiating to the bilateral posterior thigh. It does not cross the knees. She does report some associated numbness. She denies any focal weakness or b/b dysfunction. The pain is constant but significantly worsens when she walks for 15 minutes or more. The pain will significantly and immediately improve upon sitting. .   This pain is described in detail below.    Physical Therapy: No    Non-pharmacologic Treatment: rest helps         · TENS? No    Pain Medications:         · Currently taking: Norco    · Has tried in the past:  Gabapentin,     · Has not tried: NSAIDs, Tylenol, Muscle relaxants, TCAs, SNRIs, topical creams    Blood thinners: ASA 81mg daily    Interventional Therapies:    Previous lumbar ESIs over 20 years ago  6/13/18 - L3-4 ILESI - no relief    Relevant Surgeries: None    Affecting sleep? Yes    Affecting daily activities? yes    Depressive symptoms? yes          · SI/HI? No    Work status: Unemployed    Pain Scores:    Best:       10/10  Worst:     10/10  Usually:   10/10  Today:    10/10    Review of Systems   Constitutional: Negative for activity change, appetite change, chills, fatigue, fever and unexpected weight change.   HENT: Negative for hearing loss.    Eyes: Negative for visual disturbance.   Respiratory: Negative for chest tightness and shortness of breath.    Cardiovascular: Negative for chest pain.   Gastrointestinal: Negative for abdominal pain, constipation, diarrhea, nausea and vomiting.   Genitourinary: Negative for difficulty urinating.   Musculoskeletal: Positive for arthralgias, back pain, gait problem and myalgias. Negative for neck pain.   Skin: Negative for rash.   Neurological: Positive for numbness. Negative for dizziness, weakness, light-headedness and headaches.   Psychiatric/Behavioral: Positive for sleep disturbance. Negative for hallucinations and suicidal ideas. The patient is not nervous/anxious.        Past Medical History:   Diagnosis Date    Anxiety     Arthritis     Coronary artery disease     Dementia     Depression     Diabetes type 2, controlled     sees Dr. Elena    GERD (gastroesophageal reflux disease)     Hyperlipidemia     Hypertension     Left posterior capsular opacification 5/11/2017    Osteoporosis     Thyroid disease     Ulcer     Ulcer        Past Surgical History:   Procedure Laterality Date    CATARACT EXTRACTION W/  INTRAOCULAR LENS IMPLANT Bilateral 2006    [facility] at [facility]    cataract surgery  2006    CHOLECYSTECTOMY      EYE SURGERY      HAND SURGERY      HYSTERECTOMY      KNEE SURGERY      SHOULDER SURGERY         Social History     Social History    Marital status:      Spouse name: N/A  "   Number of children: N/A    Years of education: N/A     Occupational History    Not on file.     Social History Main Topics    Smoking status: Never Smoker    Smokeless tobacco: Never Used    Alcohol use No    Drug use: No    Sexual activity: No     Other Topics Concern    Not on file     Social History Narrative    No narrative on file       Review of patient's allergies indicates:  No Known Allergies    Current Outpatient Prescriptions on File Prior to Visit   Medication Sig Dispense Refill    ALPRAZolam (XANAX) 0.25 MG tablet       aspirin (ECOTRIN) 81 MG EC tablet Take 81 mg by mouth once daily.      atorvastatin (LIPITOR) 20 MG tablet Take 10 mg by mouth once daily.       hydroCHLOROthiazide (HYDRODIURIL) 12.5 MG Tab Take 1 tablet (12.5 mg total) by mouth once daily. 90 tablet 0    HYDROcodone-acetaminophen (NORCO) 7.5-325 mg per tablet Take 1 tablet by mouth every 6 (six) hours as needed for Pain. 60 tablet 0    linagliptin (TRADJENTA) 5 mg Tab tablet Take 1 tablet (5 mg total) by mouth once daily. 90 tablet 3    lisinopril (PRINIVIL,ZESTRIL) 40 MG tablet Take 1 tablet (40 mg total) by mouth once daily. 90 tablet 3    pravastatin (PRAVACHOL) 10 MG tablet Take 1 tablet (10 mg total) by mouth once daily. 90 tablet 1    sertraline (ZOLOFT) 100 MG tablet Take 1 tablet (100 mg total) by mouth once daily. 90 tablet 3    TRUE METRIX GLUCOSE TEST STRIP Strp Check blood glucose 2 times daily before meals 200 each 0    [DISCONTINUED] gabapentin (NEURONTIN) 300 MG capsule Take 300 mg by mouth 3 (three) times daily.      amLODIPine (NORVASC) 10 MG tablet Take 1 tablet (10 mg total) by mouth once daily. 90 tablet 0     No current facility-administered medications on file prior to visit.        Objective:      BP (!) 163/71   Pulse 71   Resp 18   Ht 4' 8" (1.422 m)   Wt 48.7 kg (107 lb 6.4 oz)   SpO2 96%   BMI 24.08 kg/m²     Exam:  GEN:  Well developed, well nourished.  No acute distress. " Normal pain behavior.  HEENT:  No trauma.  Mucous membranes moist.  Nares patent bilaterally.  PSYCH: Normal affect. Thought content appropriate.  CHEST:  Breathing symmetric.  No audible wheezing.  ABD: Soft, non-distended.  SKIN:  Warm, pink, dry.  No rash on exposed areas.    EXT:  No cyanosis, clubbing, or edema.  No color change or changes in nail or hair growth.  NEURO/MUSCULOSKELETAL:  Fully alert, oriented, and appropriate. Speech normal zaire. No cranial nerve deficits.   Gait: Antalgic.  Uses Rollator for stability. No trendelenburg sign bilaterally.   Motor Strength: 5/5 motor strength throughout lower extremities.   Sensory: No sensory deficit in the lower extremities.   L-Spine:   Positive SLR on the left.    No TTP over lumbar paraspinals, bilateral SI joints, hips, or GTB.      TTP over left posterolateral hip near the insertion of the piriformis muscle        Imaging:  Narrative     EXAMINATION:  MRI LUMBAR SPINE WITHOUT CONTRAST    CLINICAL HISTORY:  neurogenic claudication; Other intervertebral disc degeneration, lumbar region    TECHNIQUE:  Multiplanar, multisequence MR images were acquired from the thoracolumbar junction to the sacrum without the administration of contrast.    COMPARISON:  None.    FINDINGS:  Normal marrow signal is seen throughout with the exception of endplate changes adjacent to L3-4.    L1/2: There is a circumferential disc protrusion resulting in mild narrowing of the anterior aspect of the canal and mild narrowing of bilateral neural foramina.    L2/3: There is grade 1 retrolisthesis of L2 with respect L3 as well as a circumferential disc protrusion resulting in moderate narrowing of bilateral neural foramina and moderate narrowing of the central canal.    L3/4: There is grade 1 retrolisthesis of L3 with respect L4 with a circumferential disc protrusion resulting in moderate severe narrowing of bilateral neural foramina and moderate narrowing of the central  canal.    L4/5: There is a circumferential disc protrusion resulting in severe narrowing of bilateral neural foramina and severe central canal narrowing.    L5/S1: There is grade 1 anterolisthesis of L5 with respect S1 with uncovering of the disc and severe bilateral neural foraminal narrowing.    The adjacent soft tissues are unremarkable.   Impression       There are multilevel degenerative changes of the spine.      Electronically signed by: Nathaly Salter MD  Date: 05/30/2018  Time: 08:53         Assessment:       Encounter Diagnoses   Name Primary?    DDD (degenerative disc disease), lumbar Yes    Lumbar spondylosis     Spinal stenosis of lumbar region with neurogenic claudication     Lumbar radiculopathy          Plan:       Kaycee was seen today for follow-up.    Diagnoses and all orders for this visit:    DDD (degenerative disc disease), lumbar  -     gabapentin (NEURONTIN) 300 MG capsule; Take 2 capsules (600 mg total) by mouth 3 (three) times daily.  -     Ambulatory Referral to Physical/Occupational Therapy    Lumbar spondylosis  -     gabapentin (NEURONTIN) 300 MG capsule; Take 2 capsules (600 mg total) by mouth 3 (three) times daily.  -     Ambulatory Referral to Physical/Occupational Therapy    Spinal stenosis of lumbar region with neurogenic claudication  -     gabapentin (NEURONTIN) 300 MG capsule; Take 2 capsules (600 mg total) by mouth 3 (three) times daily.  -     Ambulatory Referral to Physical/Occupational Therapy    Lumbar radiculopathy  -     gabapentin (NEURONTIN) 300 MG capsule; Take 2 capsules (600 mg total) by mouth 3 (three) times daily.  -     Ambulatory Referral to Physical/Occupational Therapy        Kaycee Almanza is a 83 y.o. female with chronic low back pain that has been worsening over the past 6 months. Now with symptoms radiating to the bilateral lower extremities. Her pain sounds most consistent with neurogenic claudication, though likely has some component of facet  mediated pain as well. No relief with ILESI. Now with new onset left posterior hip and lower extremity pain. Exact etiology unclear but likely wither to new left radiculopathy vs left piriformis syndrome. May be related to recent ILESI, but does not have exact temporal correlation.    1. Refer to PT for ROM, strengthening, stretching and HEP.  2. Advised patient to take 1.5 pills of Norco 7.5-325mg prior to therapy session to allow for better participation in therapy.  3. Start Gabapentin 300mg Qhs. Gradually increase to 600mg TID as tolerated.   4. RTC in 4 weeks or sooner if needed. At that time, we will discuss efficacy of PT and Gabapentin. May consider repeat MRI to r/o new disc herniation/nerve root impingement.

## 2018-06-28 ENCOUNTER — CLINICAL SUPPORT (OUTPATIENT)
Dept: REHABILITATION | Facility: HOSPITAL | Age: 83
End: 2018-06-28
Attending: PAIN MEDICINE
Payer: MEDICARE

## 2018-06-28 DIAGNOSIS — M54.50 CHRONIC MIDLINE LOW BACK PAIN WITHOUT SCIATICA: ICD-10-CM

## 2018-06-28 DIAGNOSIS — M53.86 DECREASED RANGE OF MOTION OF LUMBAR SPINE: ICD-10-CM

## 2018-06-28 DIAGNOSIS — M62.81 WEAKNESS OF TRUNK MUSCULATURE: ICD-10-CM

## 2018-06-28 DIAGNOSIS — G89.29 CHRONIC MIDLINE LOW BACK PAIN WITHOUT SCIATICA: ICD-10-CM

## 2018-06-28 PROCEDURE — G8978 MOBILITY CURRENT STATUS: HCPCS | Mod: CL,PN

## 2018-06-28 PROCEDURE — 97161 PT EVAL LOW COMPLEX 20 MIN: CPT | Mod: PN

## 2018-06-28 PROCEDURE — 97110 THERAPEUTIC EXERCISES: CPT | Mod: PN

## 2018-06-28 PROCEDURE — G8979 MOBILITY GOAL STATUS: HCPCS | Mod: CK,PN

## 2018-06-28 NOTE — PLAN OF CARE
Physical Therapy Evaluation    Name: Kaycee Almanza  Clinic Number: [id number]      Diagnosis:   Encounter Diagnoses   Name Primary?    Chronic midline low back pain without sciatica     Weakness of trunk musculature     Decreased range of motion of lumbar spine      Physician: Marvel Castro Jr*  Treatment Orders: PT Eval and Treat      Past Medical History:   Diagnosis Date    Anxiety     Arthritis     Coronary artery disease     Dementia     Depression     Diabetes type 2, controlled     sees Dr. Elena    GERD (gastroesophageal reflux disease)     Hyperlipidemia     Hypertension     Left posterior capsular opacification 5/11/2017    Osteoporosis     Thyroid disease     Ulcer     Ulcer      Current Outpatient Prescriptions   Medication Sig    ALPRAZolam (XANAX) 0.25 MG tablet     amLODIPine (NORVASC) 10 MG tablet Take 1 tablet (10 mg total) by mouth once daily.    aspirin (ECOTRIN) 81 MG EC tablet Take 81 mg by mouth once daily.    atorvastatin (LIPITOR) 20 MG tablet Take 10 mg by mouth once daily.     gabapentin (NEURONTIN) 300 MG capsule Take 2 capsules (600 mg total) by mouth 3 (three) times daily.    hydroCHLOROthiazide (HYDRODIURIL) 12.5 MG Tab Take 1 tablet (12.5 mg total) by mouth once daily.    HYDROcodone-acetaminophen (NORCO) 7.5-325 mg per tablet Take 1 tablet by mouth every 6 (six) hours as needed for Pain.    linagliptin (TRADJENTA) 5 mg Tab tablet Take 1 tablet (5 mg total) by mouth once daily.    lisinopril (PRINIVIL,ZESTRIL) 40 MG tablet Take 1 tablet (40 mg total) by mouth once daily.    pravastatin (PRAVACHOL) 10 MG tablet Take 1 tablet (10 mg total) by mouth once daily.    sertraline (ZOLOFT) 100 MG tablet Take 1 tablet (100 mg total) by mouth once daily.    TRUE METRIX GLUCOSE TEST STRIP Strp Check blood glucose 2 times daily before meals     No current facility-administered medications for this visit.      Review of patient's allergies indicates:  No Known  Allergies      Evaluation Date: 6/282/018  Visit # authorized:   Authorization period:   To Vendor Referred By By Location/POS By Department   none Marvel Castro Jr., MD [facility] INTERVENTIONAL PAIN MEDICINE   Priority Start Date Expiration Date Referral Entered By   Routine 06/28/2018 12/31/2018 Marvel Castro Jr., MD   Visits Requested Visits Authorized Visits Completed Visits Scheduled   1 20 0 1         Time Start:7:06 am  Time End: 8:00 am  Total min: 54 min      Subjective   Kaycee is a 83 y.o. female that presents to [facility] outpatient clinic secondary to Chronic lower back pain. Onset of back pain over 5 years. Pt's lower back pain has increased past 6 months. Pt denies any bowel and bladder movement. Pt states lower back pain is left lower back and radiates down to her posterior thigh and down to legs. Pt describe pain as sharp pain. Aggravating factors:  Lying down back, walking, standing up, bending forward. Easing factors. Sit down and pain medications. Pt states she has had injection done in her back to decrease pain, but she states did not work.     Precautions: Osteoporosis, carpal tunnel, elbow and B shoulder surgeries. Stent to heart   Patient c/o: continuous/intermittent symptoms  Radicular symptoms: radiates left leg  Onset:: insidious/sudden/gradual   Pain Scale: Kaycee rates pain on a scale of 0-10 to be 10 at worst; 10 currently; 4 at best .  Aggravating factors: see above   Pain with coughing/sneezing, B&B, sleep disturbance: Yes   Relieving factors: See above   Previous treatment:  Yes   Past surgical history: carpal tunnel, elbow and B shoulder surgeries. Stent to heart   Functional deficits: ADL's and IADL's   Prior level of function: Independent   Occupation: Retired , work duties include:   Environment: [employer] [place]  No cultural or spiritual barriers identified to treatment or learning.  Patient's goals: Pt's goals are Decrease lower back pain       Objective      Observation:     Posture Alignment: forward head;slouched posture;trunk deviated right    Sensation: intact to light touch    DTR:: intact to light touch    GAIT DEVIATIONS: Kaycee displays decreased step length;unsteady gait;antalgic gait;decreased weight shift    Lumbar Range of Motion:    %   Flexion 25*     Extension 5*     Left Side Bending 6*   Right Side Bending 7*   Left rotation   Diminished *   Right Rotation   Diminished*    *= pain    Passive hip ROM in degrees:     Left Right   IR 15 20   ER 11 15     Lower Extremity Strength    Right LE  Left LE    Hip flexion: 4-/5 Hip flexion: 3+/5   Knee extension: 4-/5 Knee extension: 3+/5   Knee flexion: 4-/5 Knee flexion: 3+/5   Hip IR: 4-/5 Hip IR: 3+/5   Hip ER: 4-/5 Hip ER: 3+/5   Hip extension:  4-/5 Hip extension: 3+/5   Hip abduction: 4-/5 Hip abduction: 3+/5   Hip adduction: 4-/5 Hip adduction 3+/5   Ankle dorsiflexion: 4-/5 Ankle dorsiflexion: 3+/5   Ankle plantarflexion: 4-/5 Ankle plantarflexion: 3+/5     Special Tests:  -BENOIT: positive (hip add tightness)  -Repeated Flexion: positive  -Repeated Ext:positive  -Bridge Test: positive  -Heel walking: unable to perform  -Toe walking: Unable to perform    Neuro Dynamic Testing:    Sciatic nerve:      SLR: negative   Neural Tension:     Slump test: positive    Palpation: Painful left L2-L3-L4-L5    Flexibility:   Ely's test: R = 60 degrees ; L = 40 degrees   Popliteal Angle: R = 135 degrees ; L = 130 degrees      CMS Impairment/Limitation/Restriction for FOTO Lumbar Spine Survey  Status Limitation G-Code CMS Severity Modifier  Intake 24% 76% Current Status CL - At least 60 percent but less than 80 percent  Predicted 42% 58% Goal Status+ CK - At least 40 percent but less than 60 percent  D/C Status CL **only report if this is a one time visit  +Based on FOTO predicted change score    PT Evaluation Completed? Yes  Discussed Plan of Care with patient: Yes    TREATMENT:  Kaycee received therapeutic  exercises to develop strength and endurance, flexibility for 10 minutes including:    Lower trunk rotation  Pelvic tilt   Transversus abdominal activation     Kaycee  received the following manual therapy techniques x 0 min: Joint mobilizations and soft tissue mobilization were applied to the N/A to improve/decrease N/A     Pt received cold pack x 0 min to N/A following treatment.    HEP provided:   Instructed pt. Regarding: Proper technique with all exercises. Pt demo good understanding of the education provided. Kaycee demonstrated good return demonstration of activities.      Assessment     This is a 83 y.o. female referred to outpatient physical therapy and presents with a medical diagnosis of Chronic lower back pain without sciatica and demonstrates limitations as described in the problem list. Pt will benefit from physcial therapy services in order to maximize pain free functional independence. Pt presented to therapy with chronic lower back pain. Pain increases during transfers, walking, and lying down positions. Pt demonstrated posture deviated to right side to avoid pressure on left lower extremity.  Pt presented decreased lumbar range of motion in all planes. Pt has decrease B lower extremity muscle strength. Pt has diminished hamstring and quadriceps flexibly contributing with lower back pain. Pt also has extreme restricted B hip ROM. Pt will benefit from skilled PT services to decrease functional limitations. The following goals were discussed with the patient and patient is in agreement with them as to be addressed in the treatment plan. Pt was given a HEP consisting of strengthening . Pt verbally understood the instructions as they were given and demonstrated proper form and technique during therapy. Pt was advised to perform these exercises free of pain, and to stop performing them if pain occurs.     History  Co-morbidities and personal factors that may impact the plan of care Examination  Body  Structures and Functions, activity limitations and participation restrictions that may impact the plan of care    Clinical Presentation   Co-morbidities:   carpal tunnel, elbow and B shoulder surgeries. Stent to heart         Personal Factors:   no deficits Body Regions:   back    Body Systems:    ROM  strength  gait  transfers  transitions            Participation Restrictions:   Community participations      Activity limitations:   Learning and applying knowledge  no deficits    General Tasks and Commands  no deficits    Communication  no deficits    Mobility  lifting and carrying objects  walking    Self care  no deficits    Domestic Life  shopping  cooking  doing house work (cleaning house, washing dishes, laundry)    Interactions/Relationships  no deficits    Life Areas  no deficits    Community and Social Life  community life         stable and uncomplicated                      Complexity: low  FOTO see above           Prognosis: Good-    Anticipated barriers to physical therapy: None    Medical necessity is demonstrated by the following IMPAIRMENTS/PROBLEM LIST:   1) Increase in pain level limiting function   2) Decrease muscle strength   3) Decrease ROM   4) Decrease flexibility   5) Lack of HEP                 GOALS: Short Term Goals:  2-3 weeks  1. Report decreased in pain at worse less than  <   / =  5  /10  to increase tolerance for functional activities  2. Pt to increase B popliteal angle by greater than > or = 5 degrees in order to improve flexibility and posture.   3. Increased B lower extremity  MMT 1/2  to increase tolerance for ADL and work activities.  4. Pt to increase B Ely's Test by greater than  > or = 5 degrees in order to improve flexibility and posture.   5. Pt to tolerate HEP to improve ROM and independence with ADL's  6. Pt to improve range of motion by 10% to allow for improved functional mobility to allow for improvement in IADLs.     Long Term Goals: 6-8 weeks  1. Report decreased  in pain at worse less than  <   / =  0 or 1 /10  to increase tolerance for functional mobility.   2 .Pt to increase B popliteal angle by greater than > or = 10degrees in order to improve flexibility and posture.   3. Increased B lower extremity  MMT 1 grade to increase tolerance for ADL and work activities.  4. Pt will report at 58% or less limitation on FOTO lumbar spine  to demonstrate decrease in disability and improvement in back pain.  5. Pt to be Independent with HEP to improve ROM and independence with ADL's  6. Pt to increase B Ely's Test by greater than > or = 10 degrees in order to improve flexibility and posture.   7. Pt to demonstrate negative Bridge Test in order to show improved core strength for lumbar stabilization.   8. Pt to improve range of motion by 50% to allow for improved functional mobility to allow for improvement in IADLs.       Plan     Pt will be treated by physical therapy 1-2 times a week for 6-8 weeks for Pt Education, HEP, therapeutic exercises, neuromuscular re-education, manual therapy, joint mobilizations, taping techniques, functional dry needling, modalities prn to achieve established goals. Kaycee may at times be seen by a PTA as part of the Rehab Team. Cont PT for 6-8  weeks.     Certification date:6/28/2018 to 9/28/2018    I certify the need for these services furnished under this plan of treatment and while under my care.______________________________ Physician/Referring Practitioner  Date of Signature    Malik Rob PT, DPT  Date:6/28/2018

## 2018-07-09 ENCOUNTER — OFFICE VISIT (OUTPATIENT)
Dept: FAMILY MEDICINE | Facility: CLINIC | Age: 83
End: 2018-07-09
Payer: MEDICARE

## 2018-07-09 ENCOUNTER — HOSPITAL ENCOUNTER (OUTPATIENT)
Dept: RADIOLOGY | Facility: HOSPITAL | Age: 83
Discharge: HOME | End: 2018-07-09
Attending: NURSE PRACTITIONER
Payer: MEDICARE

## 2018-07-09 VITALS
TEMPERATURE: 99 F | HEIGHT: 56 IN | DIASTOLIC BLOOD PRESSURE: 70 MMHG | BODY MASS INDEX: 24.84 KG/M2 | WEIGHT: 110.44 LBS | OXYGEN SATURATION: 98 % | HEART RATE: 70 BPM | SYSTOLIC BLOOD PRESSURE: 150 MMHG

## 2018-07-09 DIAGNOSIS — M79.662 PAIN AND SWELLING OF LEFT LOWER LEG: ICD-10-CM

## 2018-07-09 DIAGNOSIS — I10 ESSENTIAL HYPERTENSION: ICD-10-CM

## 2018-07-09 DIAGNOSIS — M79.605 LEFT LEG PAIN: ICD-10-CM

## 2018-07-09 DIAGNOSIS — M15.9 OSTEOARTHRITIS INVOLVING MULTIPLE JOINTS ON BOTH SIDES OF BODY: ICD-10-CM

## 2018-07-09 DIAGNOSIS — G89.29 CHRONIC MIDLINE LOW BACK PAIN WITHOUT SCIATICA: ICD-10-CM

## 2018-07-09 DIAGNOSIS — M54.50 CHRONIC MIDLINE LOW BACK PAIN WITHOUT SCIATICA: ICD-10-CM

## 2018-07-09 DIAGNOSIS — M48.061 SPINAL STENOSIS OF LUMBAR REGION WITHOUT NEUROGENIC CLAUDICATION: ICD-10-CM

## 2018-07-09 DIAGNOSIS — M79.89 PAIN AND SWELLING OF LEFT LOWER LEG: ICD-10-CM

## 2018-07-09 DIAGNOSIS — M53.86 DECREASED RANGE OF MOTION OF LUMBAR SPINE: Primary | ICD-10-CM

## 2018-07-09 PROCEDURE — 3078F DIAST BP <80 MM HG: CPT | Mod: CPTII,S$GLB,, | Performed by: NURSE PRACTITIONER

## 2018-07-09 PROCEDURE — 3077F SYST BP >= 140 MM HG: CPT | Mod: CPTII,S$GLB,, | Performed by: NURSE PRACTITIONER

## 2018-07-09 PROCEDURE — 93971 EXTREMITY STUDY: CPT | Mod: 26,,, | Performed by: RADIOLOGY

## 2018-07-09 PROCEDURE — 93971 EXTREMITY STUDY: CPT | Mod: TC

## 2018-07-09 PROCEDURE — 99214 OFFICE O/P EST MOD 30 MIN: CPT | Mod: S$GLB,,, | Performed by: NURSE PRACTITIONER

## 2018-07-09 PROCEDURE — 99999 PR PBB SHADOW E&M-EST. PATIENT-LVL V: CPT | Mod: PBBFAC,,, | Performed by: NURSE PRACTITIONER

## 2018-07-09 RX ORDER — AMLODIPINE BESYLATE 10 MG/1
10 TABLET ORAL DAILY
Qty: 90 TABLET | Refills: 0 | Status: SHIPPED | OUTPATIENT
Start: 2018-07-09 | End: 2018-09-12 | Stop reason: SDUPTHER

## 2018-07-09 NOTE — PROGRESS NOTES
Subjective:       Patient ID: Kaycee Almanza is a 83 y.o. female.    Chief Complaint: Leg Pain and Back Pain    Leg Pain    The incident occurred more than 1 week ago (3 weeks ). The incident occurred at home. There was no injury mechanism. The pain is present in the left leg, left hip and right hip. The quality of the pain is described as aching. The pain is at a severity of 6/10. The pain is moderate. The pain has been constant since onset. Pertinent negatives include no inability to bear weight, loss of motion, loss of sensation, muscle weakness, numbness or tingling. She reports no foreign bodies present. The symptoms are aggravated by palpation, weight bearing and movement. Treatments tried: norco. The treatment provided no relief.     Review of Systems   Constitutional: Negative for chills, diaphoresis, fatigue and fever.   Respiratory: Negative for cough, chest tightness, shortness of breath and wheezing.    Cardiovascular: Positive for leg swelling. Negative for palpitations.   Gastrointestinal: Negative for abdominal pain, constipation, diarrhea, nausea and vomiting.   Musculoskeletal: Positive for arthralgias, back pain, gait problem, joint swelling and myalgias. Negative for neck pain.   Skin: Negative for color change and rash.   Neurological: Negative for tingling, weakness, light-headedness, numbness and headaches.       Objective:      Physical Exam   Constitutional: She is oriented to person, place, and time. Vital signs are normal. She appears well-developed and well-nourished.   Cardiovascular: Normal rate, regular rhythm and normal heart sounds.    Pulmonary/Chest: Effort normal and breath sounds normal.   Musculoskeletal:        Right hip: She exhibits decreased range of motion, decreased strength and tenderness. She exhibits no bony tenderness, no swelling, no crepitus, no deformity and no laceration.        Left hip: She exhibits decreased range of motion, decreased strength, tenderness and  bony tenderness. She exhibits no swelling, no crepitus and no deformity.        Lumbar back: She exhibits decreased range of motion, tenderness, bony tenderness and pain. She exhibits no swelling, no edema, no deformity, no laceration, no spasm and normal pulse.        Left lower leg: She exhibits tenderness and swelling. She exhibits no bony tenderness, no edema, no deformity and no laceration.        Legs:  Neurological: She is alert and oriented to person, place, and time.   Skin: Skin is warm, dry and intact.   Psychiatric: She has a normal mood and affect.       Assessment:       1. Decreased range of motion of lumbar spine    2. Chronic midline low back pain without sciatica    3. Spinal stenosis of lumbar region without neurogenic claudication    4. Osteoarthritis involving multiple joints on both sides of body    5. Left leg pain    6. Pain and swelling of left lower leg    7. Essential hypertension        Plan:       Kaycee was seen today for leg pain and back pain.    Diagnoses and all orders for this visit:    Decreased range of motion of lumbar spine  -     Ambulatory consult to Orthopedics    Chronic midline low back pain without sciatica  -     Ambulatory consult to Orthopedics    Spinal stenosis of lumbar region without neurogenic claudication  -     Ambulatory consult to Orthopedics    Osteoarthritis involving multiple joints on both sides of body  -     Ambulatory consult to Orthopedics  Home care  · When a joint is more sore than usual, rest it for a day or two.  · Heat can help relieve stiffness. Take a hot bath or apply a heating pad for up to 30 minutes at a time. If symptoms are worse in the morning, using heat just after awakening can help relax the muscle and soothe the joints.   · Ice helps relieve pain and swelling. It is often used after activity. Use a cold pack wrapped in a thin cloth on the joint for 10 to 15 minutes at a time.   · Alternating hot and cold can also help relieve pain.  Try this for 20 minutes at a time, several times per day.  · Exercise helps prevent the muscles and ligaments around the joint from becoming weak. It also helps maintain function in the joint.  Be as active as you can. Talk to your healthcare provider about what activity program is best for you.  · Excess weight puts a lot of extra strain on weight-bearing joints of the lower back, hips, knees, feet and ankles. If you are overweight, talk to your healthcare provider about a safe and effective weight loss program.  · Use anti-inflammatory medicines as prescribed for pain. This includes acetaminophen or NSAIDs such as ibuprofen or naproxen. If needed, topical or injected medicines may be recommended. Talk to your healthcare provider if these options are not enough to manage your pain.  · Talk with your healthcare provider about devices that might help improve your function and reduce pain.  Follow-up care  Follow up with your healthcare provider as advised by our staff.  When to seek medical advice  Call your healthcare provider right away if any of these occur:  · Redness or swelling of a painful joint  · Discharge or pus from a painful joint  · Fever of 100.4ºF (38ºC) or higher, or as directed by your healthcare provider  · Worsening joint pain  · Decreased ability to move the joint or bear weight on the joint  Date Last Reviewed: 3/1/2017  © 5091-6711 [employer]. [address] [place] [address], [place] [address]. All rights reserved. This information is not intended as a substitute for professional medical care. Always follow your healthcare professional's instructions.        Left leg pain  -     US Lower Extremity Veins Left; Future    Pain and swelling of left lower leg  -     US Lower Extremity Veins Left; Future    Essential hypertension  -     amLODIPine (NORVASC) 10 MG tablet; Take 1 tablet (10 mg total) by mouth once daily.

## 2018-07-09 NOTE — PATIENT INSTRUCTIONS
Osteoarthritis  Osteoarthritis (also called degenerative joint disease) happens when the cartilage in a joint becomes damaged and worn. This may be due to age, wear and tear, overuse of the joint, or other problems. Osteoarthritis can affect any joint. But it is most common in hands, knees, spine, hips, and feet. Symptoms include joint stiffness, pain, and swelling.  Home care  · When a joint is more sore than usual, rest it for a day or two.  · Heat can help relieve stiffness. Take a hot bath or apply a heating pad for up to 30 minutes at a time. If symptoms are worse in the morning, using heat just after awakening can help relax the muscle and soothe the joints.   · Ice helps relieve pain and swelling. It is often used after activity. Use a cold pack wrapped in a thin cloth on the joint for 10 to 15 minutes at a time.   · Alternating hot and cold can also help relieve pain. Try this for 20 minutes at a time, several times per day.  · Exercise helps prevent the muscles and ligaments around the joint from becoming weak. It also helps maintain function in the joint.  Be as active as you can. Talk to your healthcare provider about what activity program is best for you.  · Excess weight puts a lot of extra strain on weight-bearing joints of the lower back, hips, knees, feet and ankles. If you are overweight, talk to your healthcare provider about a safe and effective weight loss program.  · Use anti-inflammatory medicines as prescribed for pain. This includes acetaminophen or NSAIDs such as ibuprofen or naproxen. If needed, topical or injected medicines may be recommended. Talk to your healthcare provider if these options are not enough to manage your pain.  · Talk with your healthcare provider about devices that might help improve your function and reduce pain.  Follow-up care  Follow up with your healthcare provider as advised by our staff.  When to seek medical advice  Call your healthcare provider right away if  any of these occur:  · Redness or swelling of a painful joint  · Discharge or pus from a painful joint  · Fever of 100.4ºF (38ºC) or higher, or as directed by your healthcare provider  · Worsening joint pain  · Decreased ability to move the joint or bear weight on the joint  Date Last Reviewed: 3/1/2017  © 3746-8637 [employer]. [address] [place] [address]. All rights reserved. This information is not intended as a substitute for professional medical care. Always follow your healthcare professional's instructions.

## 2018-07-12 ENCOUNTER — DOCUMENTATION ONLY (OUTPATIENT)
Dept: REHABILITATION | Facility: HOSPITAL | Age: 83
End: 2018-07-12

## 2018-07-12 ENCOUNTER — CLINICAL SUPPORT (OUTPATIENT)
Dept: REHABILITATION | Facility: HOSPITAL | Age: 83
End: 2018-07-12
Attending: PAIN MEDICINE
Payer: MEDICARE

## 2018-07-12 DIAGNOSIS — M62.81 WEAKNESS OF TRUNK MUSCULATURE: ICD-10-CM

## 2018-07-12 DIAGNOSIS — M53.86 DECREASED RANGE OF MOTION OF LUMBAR SPINE: ICD-10-CM

## 2018-07-12 DIAGNOSIS — M54.50 CHRONIC MIDLINE LOW BACK PAIN WITHOUT SCIATICA: ICD-10-CM

## 2018-07-12 DIAGNOSIS — G89.29 CHRONIC MIDLINE LOW BACK PAIN WITHOUT SCIATICA: ICD-10-CM

## 2018-07-12 NOTE — PROGRESS NOTES
Patient's therapy appointment was cancelled today secondary to patient arriving to session complaining of diarrhea. Patient requested to use the restroom 4x during her session. No charges were dropped today.     Therapist: Lilia Rodriguez, PTA  07/12/2018

## 2018-07-17 ENCOUNTER — CLINICAL SUPPORT (OUTPATIENT)
Dept: REHABILITATION | Facility: HOSPITAL | Age: 83
End: 2018-07-17
Attending: PAIN MEDICINE
Payer: MEDICARE

## 2018-07-17 DIAGNOSIS — M54.50 CHRONIC MIDLINE LOW BACK PAIN WITHOUT SCIATICA: ICD-10-CM

## 2018-07-17 DIAGNOSIS — M62.81 WEAKNESS OF TRUNK MUSCULATURE: ICD-10-CM

## 2018-07-17 DIAGNOSIS — M53.86 DECREASED RANGE OF MOTION OF LUMBAR SPINE: ICD-10-CM

## 2018-07-17 DIAGNOSIS — G89.29 CHRONIC MIDLINE LOW BACK PAIN WITHOUT SCIATICA: ICD-10-CM

## 2018-07-17 PROCEDURE — 97110 THERAPEUTIC EXERCISES: CPT | Mod: PN

## 2018-07-17 NOTE — PROGRESS NOTES
Physical Therapy Daily Note     Name: Kaycee Almanza  Clinic Number: [id number]  Diagnosis:   Encounter Diagnoses   Name Primary?    Chronic midline low back pain without sciatica     Weakness of trunk musculature     Decreased range of motion of lumbar spine      Physician: Marvel Castro Jr*  Precautions: Osteoporosis, carpal tunnel, elbow and B shoulder surgeries. Stent to heart   Visit #: 2 of 20  JARAMILLO 12/31/2018  PTA Visit #: 0  Time In: 7:00 am  Time Out: 8:03 am  Total time 63 min  (1:1 with PT for 53 min)    Subjective     Pt reports: that she does not have lower back pain right now, but she does have legs pain. Left side is greater than right. Pt states she has been c/o with HEP.   Pain Scale: Kaycee rates pain on a scale of 0-10 to be 0 currently.    Objective     Kaycee received individual therapeutic exercises to develop strength, endurance, ROM, flexibility, posture and core stabilization for 53 minutes including:    Nustep 6 min  Lower trunk rotation 1x2'  Pelvic tilt 1x2'  Transversus abdominal activation 1x2'   Hamstring stretch 3x30''   SLR 3x10   SAQ 3x10   SKTC   HL hip abd YTB 2x10 hold 3 sec  HL hip add ball squeeze 3x10 hold 3 sec   Clamshells 2x10   Sit to stand 2x10       Kaycee received the following manual therapy techniques: Soft tissue Mobilization were applied to the: lower back  for 2 minutes including:  Biofreeze applied at left lower back     Pt received 10 min ice pack. Location lower back.    Written Home Exercises Provided:   Pt demo good understanding of the education provided. Kaycee demonstrated good return demonstration of activities.     Education provided re:  Kaycee verbalized good understanding of education provided.   No spiritual or educational barriers to learning provided    Assessment     Patient tolerated PT session well. Pt demonstrated provocation in leg pain today, but not lower back. Pt demonstrated  poor transversus abdominal activations. Heavy v/c's required during pelvic tilt and Transversus abdominal activation. Pt demonstrated poor seated posture. Heavy v/c's required to perform all exercises with proper muscle activation. Biofreeze was applied at left lower back today to manage pain. Cont skilled PT services to decrease functional limitations.     This is a 83 y.o. female referred to outpatient physical therapy and presents with a medical diagnosis of  Chronic lower back pain without sciatica  and demonstrates limitations as described in the problem list. Pt prognosis is Good minus. Pt will continue to benefit from skilled outpatient physical therapy to address the deficits listed in the problem list, provide pt/family education and to maximize pt's level of independence in the home and community environment.     Goals as follows:                  GOALS: Short Term Goals:  2-3 weeks  1. Report decreased in pain at worse less than  <   / =  5  /10  to increase tolerance for functional activities  2. Pt to increase B popliteal angle by greater than > or = 5 degrees in order to improve flexibility and posture.   3. Increased B lower extremity  MMT 1/2  to increase tolerance for ADL and work activities.  4. Pt to increase B Ely's Test by greater than  > or = 5 degrees in order to improve flexibility and posture.   5. Pt to tolerate HEP to improve ROM and independence with ADL's  6. Pt to improve range of motion by 10% to allow for improved functional mobility to allow for improvement in IADLs.      Long Term Goals: 6-8 weeks  1. Report decreased in pain at worse less than  <   / =  0 or 1 /10  to increase tolerance for functional mobility.   2 .Pt to increase B popliteal angle by greater than > or = 10degrees in order to improve flexibility and posture.   3. Increased B lower extremity  MMT 1 grade to increase tolerance for ADL and work activities.  4. Pt will report at 58% or less limitation on FOTO lumbar  spine  to demonstrate decrease in disability and improvement in back pain.  5. Pt to be Independent with HEP to improve ROM and independence with ADL's  6. Pt to increase B Ely's Test by greater than > or = 10 degrees in order to improve flexibility and posture.   7. Pt to demonstrate negative Bridge Test in order to show improved core strength for lumbar stabilization.   8. Pt to improve range of motion by 50% to allow for improved functional mobility to allow for improvement in IADLs     Plan     Continue with established Plan of Care towards PT goals.  Certification date:6/28/2018 to 9/28/2018  Therapist: Malik Gresham, PT  7/17/2018

## 2018-07-19 ENCOUNTER — CLINICAL SUPPORT (OUTPATIENT)
Dept: REHABILITATION | Facility: HOSPITAL | Age: 83
End: 2018-07-19
Attending: PAIN MEDICINE
Payer: MEDICARE

## 2018-07-19 ENCOUNTER — TELEPHONE (OUTPATIENT)
Dept: ADMINISTRATIVE | Facility: HOSPITAL | Age: 83
End: 2018-07-19

## 2018-07-19 DIAGNOSIS — M53.86 DECREASED RANGE OF MOTION OF LUMBAR SPINE: ICD-10-CM

## 2018-07-19 DIAGNOSIS — M62.81 WEAKNESS OF TRUNK MUSCULATURE: ICD-10-CM

## 2018-07-19 DIAGNOSIS — G89.29 CHRONIC MIDLINE LOW BACK PAIN WITHOUT SCIATICA: ICD-10-CM

## 2018-07-19 DIAGNOSIS — M54.50 CHRONIC MIDLINE LOW BACK PAIN WITHOUT SCIATICA: ICD-10-CM

## 2018-07-19 PROCEDURE — 97110 THERAPEUTIC EXERCISES: CPT | Mod: PN

## 2018-07-19 NOTE — PROGRESS NOTES
Physical Therapy Daily Note     Name: Kaycee Almanza  Clinic Number: [id number]  Diagnosis:   Encounter Diagnoses   Name Primary?    Chronic midline low back pain without sciatica     Weakness of trunk musculature     Decreased range of motion of lumbar spine      Physician: Marvel Castro Jr*  Precautions: Osteoporosis, carpal tunnel, elbow and B shoulder surgeries. Stent to heart   Visit #: 3 of 20  JARAMILLO 12/31/2018  PTA Visit #: 0  Time In: 7:00 am  Time Out: 8:00 am  Total time 60 min  (1:1 with PT for 60 min)    Subjective     Pt reports: that she does not have lower back pain right now, but she does have legs pain. Left side is greater than right. Pt states she has been c/o with HEP.   Pain Scale: Kaycee rates pain on a scale of 0-10 to be 0 currently.    Objective     Kaycee received individual therapeutic exercises to develop strength, endurance, ROM, flexibility, posture and core stabilization for 53 minutes including:    Nustep 6 min  Lower trunk rotation 1x2'  Pelvic tilt 1x2'  Transversus abdominal activation 1x2'   Hamstring stretch 3x30''   SLR 3x10   SAQ 3x10   SKTC   HL hip abd YTB 2x10 hold 3 sec  HL hip add ball squeeze 3x10 hold 3 sec   Clamshells 2x10   Sit to stand 2x10   Prone hip IR and ER ea 1x2'      Kaycee received the following manual therapy techniques: Soft tissue Mobilization were applied to the: lower back and gluteus for 5 minutes including:  Biofreeze applied at left lower back   Green roll theraband     Pt received 10 min ice pack. Location lower back. NP    Written Home Exercises Provided:   Pt demo good understanding of the education provided. Kaycee demonstrated good return demonstration of activities.     Education provided re:  Kaycee verbalized good understanding of education provided.   No spiritual or educational barriers to learning provided    Assessment     Patient tolerated PT session well. Pt did not  presented with any provocation in lower back pain. Pt demonstrated B hip ER/IR stiffness. V/c's were required for proper transversus abdominus activation. Manual therapy was performed gluteus and lower back to decrease pain and stiffness. Cont skilled PT services to decrease functional limitations.     This is a 83 y.o. female referred to outpatient physical therapy and presents with a medical diagnosis of  Chronic lower back pain without sciatica  and demonstrates limitations as described in the problem list. Pt prognosis is Good minus. Pt will continue to benefit from skilled outpatient physical therapy to address the deficits listed in the problem list, provide pt/family education and to maximize pt's level of independence in the home and community environment.     Goals as follows:                  GOALS: Short Term Goals:  2-3 weeks  1. Report decreased in pain at worse less than  <   / =  5  /10  to increase tolerance for functional activities  2. Pt to increase B popliteal angle by greater than > or = 5 degrees in order to improve flexibility and posture.   3. Increased B lower extremity  MMT 1/2  to increase tolerance for ADL and work activities.  4. Pt to increase B Ely's Test by greater than  > or = 5 degrees in order to improve flexibility and posture.   5. Pt to tolerate HEP to improve ROM and independence with ADL's  6. Pt to improve range of motion by 10% to allow for improved functional mobility to allow for improvement in IADLs.      Long Term Goals: 6-8 weeks  1. Report decreased in pain at worse less than  <   / =  0 or 1 /10  to increase tolerance for functional mobility.   2 .Pt to increase B popliteal angle by greater than > or = 10degrees in order to improve flexibility and posture.   3. Increased B lower extremity  MMT 1 grade to increase tolerance for ADL and work activities.  4. Pt will report at 58% or less limitation on FOTO lumbar spine  to demonstrate decrease in disability and  improvement in back pain.  5. Pt to be Independent with HEP to improve ROM and independence with ADL's  6. Pt to increase B Ely's Test by greater than > or = 10 degrees in order to improve flexibility and posture.   7. Pt to demonstrate negative Bridge Test in order to show improved core strength for lumbar stabilization.   8. Pt to improve range of motion by 50% to allow for improved functional mobility to allow for improvement in IADLs     Plan     Continue with established Plan of Care towards PT goals.  Certification date:6/28/2018 to 9/28/2018  Therapist: Malik Gresham, PT  7/19/2018

## 2018-07-20 ENCOUNTER — TELEPHONE (OUTPATIENT)
Dept: PAIN MEDICINE | Facility: CLINIC | Age: 83
End: 2018-07-20

## 2018-07-20 ENCOUNTER — TELEPHONE (OUTPATIENT)
Dept: ADMINISTRATIVE | Facility: HOSPITAL | Age: 83
End: 2018-07-20

## 2018-07-20 NOTE — TELEPHONE ENCOUNTER
Called and left message with [facility] medical records and requested DEX Scan from 3/8/2018.  Left phone number [phone] and fax number [phone] on message.    Called Dr Gentile office and per Ms Castellanos patient's insurance has changed and they no longer accept it.  Per Jasmin she had to of had the eye exam with someone else.  Patient does not pull up in their system so must had been before 2016.

## 2018-07-23 ENCOUNTER — OFFICE VISIT (OUTPATIENT)
Dept: PAIN MEDICINE | Facility: CLINIC | Age: 83
End: 2018-07-23
Payer: MEDICARE

## 2018-07-23 VITALS
WEIGHT: 114.5 LBS | SYSTOLIC BLOOD PRESSURE: 185 MMHG | BODY MASS INDEX: 25.76 KG/M2 | RESPIRATION RATE: 18 BRPM | OXYGEN SATURATION: 98 % | HEIGHT: 56 IN | HEART RATE: 75 BPM | DIASTOLIC BLOOD PRESSURE: 74 MMHG

## 2018-07-23 DIAGNOSIS — M54.9 CHRONIC BACK PAIN, UNSPECIFIED BACK LOCATION, UNSPECIFIED BACK PAIN LATERALITY: ICD-10-CM

## 2018-07-23 DIAGNOSIS — G89.29 CHRONIC BACK PAIN, UNSPECIFIED BACK LOCATION, UNSPECIFIED BACK PAIN LATERALITY: ICD-10-CM

## 2018-07-23 DIAGNOSIS — M47.816 LUMBAR SPONDYLOSIS: ICD-10-CM

## 2018-07-23 DIAGNOSIS — M48.062 SPINAL STENOSIS OF LUMBAR REGION WITH NEUROGENIC CLAUDICATION: ICD-10-CM

## 2018-07-23 DIAGNOSIS — M51.36 DDD (DEGENERATIVE DISC DISEASE), LUMBAR: Primary | ICD-10-CM

## 2018-07-23 PROCEDURE — 99999 PR PBB SHADOW E&M-EST. PATIENT-LVL III: CPT | Mod: PBBFAC,,, | Performed by: PAIN MEDICINE

## 2018-07-23 PROCEDURE — 3077F SYST BP >= 140 MM HG: CPT | Mod: CPTII,S$GLB,, | Performed by: PAIN MEDICINE

## 2018-07-23 PROCEDURE — 99214 OFFICE O/P EST MOD 30 MIN: CPT | Mod: S$GLB,,, | Performed by: PAIN MEDICINE

## 2018-07-23 PROCEDURE — 3078F DIAST BP <80 MM HG: CPT | Mod: CPTII,S$GLB,, | Performed by: PAIN MEDICINE

## 2018-07-23 RX ORDER — HYDROCODONE BITARTRATE AND ACETAMINOPHEN 7.5; 325 MG/1; MG/1
1 TABLET ORAL EVERY 8 HOURS PRN
Qty: 90 TABLET | Refills: 0 | Status: SHIPPED | OUTPATIENT
Start: 2018-07-23 | End: 2018-08-22

## 2018-07-23 NOTE — PROGRESS NOTES
Subjective:     Patient ID: Kaycee Almanza is a 83 y.o. female    Chief Complaint: Follow-up (4 week f/u - PT referral- leg stiffness)      Referred by: No ref. provider found      HPI:    Interval History (7/23/18):  She returns today for follow up.  She reports that PT has not been helpful for the low back pain She states that Gabapentin 300mg BID has been somewhat helpful. She also notes some relief with Norco 7.5-325mg BID PRN. She did not increase Gabapentin further because her neighbors told her it was dangerous. She denies any side effects from gabapentin.       Interval History (6/25/18):  She returns today for follow up.  She reports that L3-4 ILESI has not been helpful for the low back and lower extremity pain. She states that about one week following the injection she began to experience left sided posterior hip pain that radiates to the ankle. She reports intermittent numbness and tingling associated with this pain. She denies any new focal weakness or b/b dysfunction. The pain is severe. It is not alleviated with Norco. She was seen in the ED and given injections for pain that only provided temporary relief. She denies any injuries or falls since the ILESI.       Interval History (6/4/18):  She returns today for follow up and MRI review.  She reports that her pain is unchanged since last visit. She denies weakness and b/b dysfunction.       Initial Encounter (5/29/18):  Kaycee Almanza is a 83 y.o. female who presents today with chronic low back pain. This pain has been present for many years without inciting event or injury. In the past 6 months she has noticed a significant increase in her pain and it began radiating to the bilateral posterior thigh. It does not cross the knees. She does report some associated numbness. She denies any focal weakness or b/b dysfunction. The pain is constant but significantly worsens when she walks for 15 minutes or more. The pain will significantly and immediately  improve upon sitting. .   This pain is described in detail below.    Physical Therapy: No    Non-pharmacologic Treatment: rest helps         · TENS? No    Pain Medications:         · Currently taking: Norco, Gabapentin,     · Has tried in the past:      · Has not tried: NSAIDs, Tylenol, Muscle relaxants, TCAs, SNRIs, topical creams    Blood thinners: ASA 81mg daily    Interventional Therapies:   Previous lumbar ESIs over 20 years ago  6/13/18 - L3-4 ILESI - no relief    Relevant Surgeries: None    Affecting sleep? Yes    Affecting daily activities? yes    Depressive symptoms? yes          · SI/HI? No    Work status: Unemployed    Pain Scores:    Best:       5/10  Worst:     10/10  Usually:   5/10  Today:    6/10    Review of Systems   Constitutional: Negative for activity change, appetite change, chills, fatigue, fever and unexpected weight change.   HENT: Negative for hearing loss.    Eyes: Negative for visual disturbance.   Respiratory: Negative for chest tightness and shortness of breath.    Cardiovascular: Negative for chest pain.   Gastrointestinal: Negative for abdominal pain, constipation, diarrhea, nausea and vomiting.   Genitourinary: Negative for difficulty urinating.   Musculoskeletal: Positive for arthralgias, back pain, gait problem and myalgias. Negative for neck pain.   Skin: Negative for rash.   Neurological: Positive for numbness. Negative for dizziness, weakness, light-headedness and headaches.   Psychiatric/Behavioral: Positive for sleep disturbance. Negative for hallucinations and suicidal ideas. The patient is not nervous/anxious.        Past Medical History:   Diagnosis Date    Anxiety     Arthritis     Coronary artery disease     Dementia     Depression     Diabetes type 2, controlled     sees Dr. Elena    GERD (gastroesophageal reflux disease)     Hyperlipidemia     Hypertension     Left posterior capsular opacification 5/11/2017    Osteoporosis     Thyroid disease     Ulcer      Ulcer        Past Surgical History:   Procedure Laterality Date    CATARACT EXTRACTION W/  INTRAOCULAR LENS IMPLANT Bilateral 2006    [facility] at [facility]    cataract surgery  2006    CHOLECYSTECTOMY      EYE SURGERY      HAND SURGERY      HYSTERECTOMY      KNEE SURGERY      SHOULDER SURGERY         Social History     Social History    Marital status:      Spouse name: N/A    Number of children: N/A    Years of education: N/A     Occupational History    Not on file.     Social History Main Topics    Smoking status: Never Smoker    Smokeless tobacco: Never Used    Alcohol use No    Drug use: No    Sexual activity: No     Other Topics Concern    Not on file     Social History Narrative    No narrative on file       Review of patient's allergies indicates:  No Known Allergies    Current Outpatient Prescriptions on File Prior to Visit   Medication Sig Dispense Refill    ALPRAZolam (XANAX) 0.25 MG tablet       amLODIPine (NORVASC) 10 MG tablet Take 1 tablet (10 mg total) by mouth once daily. 90 tablet 0    aspirin (ECOTRIN) 81 MG EC tablet Take 81 mg by mouth once daily.      atorvastatin (LIPITOR) 20 MG tablet Take 10 mg by mouth once daily.       gabapentin (NEURONTIN) 300 MG capsule Take 2 capsules (600 mg total) by mouth 3 (three) times daily. 180 capsule 5    hydroCHLOROthiazide (HYDRODIURIL) 12.5 MG Tab Take 1 tablet (12.5 mg total) by mouth once daily. 90 tablet 0    linagliptin (TRADJENTA) 5 mg Tab tablet Take 1 tablet (5 mg total) by mouth once daily. 90 tablet 3    lisinopril (PRINIVIL,ZESTRIL) 40 MG tablet Take 1 tablet (40 mg total) by mouth once daily. 90 tablet 3    pravastatin (PRAVACHOL) 10 MG tablet Take 1 tablet (10 mg total) by mouth once daily. 90 tablet 1    sertraline (ZOLOFT) 100 MG tablet Take 1 tablet (100 mg total) by mouth once daily. 90 tablet 3    TRUE METRIX GLUCOSE TEST STRIP Strp Check blood glucose 2 times daily before meals 200 each 0     "[DISCONTINUED] HYDROcodone-acetaminophen (NORCO) 7.5-325 mg per tablet Take 1 tablet by mouth every 6 (six) hours as needed for Pain. 60 tablet 0     No current facility-administered medications on file prior to visit.        Objective:      BP (!) 185/74   Pulse 75   Resp 18   Ht 4' 8" (1.422 m)   Wt 51.9 kg (114 lb 8 oz)   SpO2 98%   BMI 25.67 kg/m²     Exam:  GEN:  Well developed, well nourished.  No acute distress.   HEENT:  No trauma.  Mucous membranes moist.  Nares patent bilaterally.  PSYCH: Normal affect. Thought content appropriate.  CHEST:  Breathing symmetric.  No audible wheezing.  ABD: Soft, non-distended.  SKIN:  Warm, pink, dry.  No rash on exposed areas.    EXT:  No cyanosis, clubbing, or edema.  No color change or changes in nail or hair growth.  NEURO/MUSCULOSKELETAL:  Fully alert, oriented, and appropriate. Speech normal zaire. No cranial nerve deficits.   Gait: Antalgic. Uses Rollator for ambulation.  No focal motor deficits.       Imaging:  Narrative     EXAMINATION:  MRI LUMBAR SPINE WITHOUT CONTRAST    CLINICAL HISTORY:  neurogenic claudication; Other intervertebral disc degeneration, lumbar region    TECHNIQUE:  Multiplanar, multisequence MR images were acquired from the thoracolumbar junction to the sacrum without the administration of contrast.    COMPARISON:  None.    FINDINGS:  Normal marrow signal is seen throughout with the exception of endplate changes adjacent to L3-4.    L1/2: There is a circumferential disc protrusion resulting in mild narrowing of the anterior aspect of the canal and mild narrowing of bilateral neural foramina.    L2/3: There is grade 1 retrolisthesis of L2 with respect L3 as well as a circumferential disc protrusion resulting in moderate narrowing of bilateral neural foramina and moderate narrowing of the central canal.    L3/4: There is grade 1 retrolisthesis of L3 with respect L4 with a circumferential disc protrusion resulting in moderate severe " narrowing of bilateral neural foramina and moderate narrowing of the central canal.    L4/5: There is a circumferential disc protrusion resulting in severe narrowing of bilateral neural foramina and severe central canal narrowing.    L5/S1: There is grade 1 anterolisthesis of L5 with respect S1 with uncovering of the disc and severe bilateral neural foraminal narrowing.    The adjacent soft tissues are unremarkable.   Impression       There are multilevel degenerative changes of the spine.      Electronically signed by: Nathaly Salter MD  Date: 05/30/2018  Time: 08:53         Assessment:       Encounter Diagnoses   Name Primary?    Chronic back pain, unspecified back location, unspecified back pain laterality     DDD (degenerative disc disease), lumbar Yes    Lumbar spondylosis     Spinal stenosis of lumbar region with neurogenic claudication          Plan:       Kaycee was seen today for follow-up.    Diagnoses and all orders for this visit:    DDD (degenerative disc disease), lumbar    Chronic back pain, unspecified back location, unspecified back pain laterality  -     HYDROcodone-acetaminophen (NORCO) 7.5-325 mg per tablet; Take 1 tablet by mouth every 8 (eight) hours as needed for Pain.    Lumbar spondylosis    Spinal stenosis of lumbar region with neurogenic claudication        Kaycee Almanza is a 83 y.o. female with chronic low back pain that has been worsening over the past 6 months. Now with symptoms radiating to the bilateral lower extremities. Her pain sounds most consistent with neurogenic claudication, though likely has some component of facet mediated pain as well. No relief with ILESI. Now with new onset left posterior hip and lower extremity pain. Exact etiology unclear but likely wither to new left radiculopathy vs left piriformis syndrome. May be related to recent ILESI, but does not have exact temporal correlation.    1. Norco 7.5-325mg q8h PRN. 90 pills no refills.  2. LA  reviewed  and no inconsistencies were noted.  3. We discussed that Gabapentin is a safe medication. I encouraged her to cotninue to titrate up to 600mg TID as tolerated.  4. RTC in 4 weeks. At that time, we will dicsuss efficacy of Gabapentin and consider increasing dose further if neede.d

## 2018-07-24 ENCOUNTER — CLINICAL SUPPORT (OUTPATIENT)
Dept: REHABILITATION | Facility: HOSPITAL | Age: 83
End: 2018-07-24
Attending: PAIN MEDICINE
Payer: MEDICARE

## 2018-07-24 DIAGNOSIS — M53.86 DECREASED RANGE OF MOTION OF LUMBAR SPINE: ICD-10-CM

## 2018-07-24 DIAGNOSIS — G89.29 CHRONIC MIDLINE LOW BACK PAIN WITHOUT SCIATICA: ICD-10-CM

## 2018-07-24 DIAGNOSIS — M54.50 CHRONIC MIDLINE LOW BACK PAIN WITHOUT SCIATICA: ICD-10-CM

## 2018-07-24 DIAGNOSIS — M62.81 WEAKNESS OF TRUNK MUSCULATURE: ICD-10-CM

## 2018-07-24 PROCEDURE — 97110 THERAPEUTIC EXERCISES: CPT | Mod: PN

## 2018-07-24 PROCEDURE — 97140 MANUAL THERAPY 1/> REGIONS: CPT | Mod: PN

## 2018-07-24 NOTE — PROGRESS NOTES
Physical Therapy Daily Note     Name: Kaycee Almanza  Clinic Number: [id number]  Diagnosis:   Encounter Diagnoses   Name Primary?    Chronic midline low back pain without sciatica     Weakness of trunk musculature     Decreased range of motion of lumbar spine      Physician: Marvel aCstro Jr*  Precautions: Osteoporosis, carpal tunnel, elbow and B shoulder surgeries. Stent to heart   Visit #: 4 of 20  JARAMILLO 12/31/2018  PTA Visit #: 0  Time In: 7:00 am  Time Out: 8:00 am  Total time 60 min  (1:1 with PT for 55 min)    Subjective     Pt reports: that she continues to have both posterior thigh pain and she has no pain in the lower back.    Pain Scale: Kaycee rates pain on a scale of 0-10 to be 0 currently.    Objective     Kaycee received individual therapeutic exercises to develop strength, endurance, ROM, flexibility, posture and core stabilization for 45 minutes including:    Nustep 6 min  Lower trunk rotation with yogaball 1x2'  Pelvic tilt 1x2'  Transversus abdominal activation 1x2'   Hamstring stretch 3x30''   SLR 3x10 1#  SAQ 3x10  1#6  SKTC with yogaball  HL hip add ball squeeze 3x10 hold 3 sec   Clamshells 2x10  YTB  Sit to stand 2x10   Prone hip IR and ER ea 1x2'  Standing hip abd      NP:  HL hip abd YTB 2x10 hold 3 sec    Kaycee received the following manual therapy techniques: Soft tissue Mobilization were applied to the: lower back and B  gluteus and B posterior thigh for 10 minutes including:  Biofreeze applied at left lower back   Green roll theraband     Pt received 00 min ice pack. Location lower back. NP    Written Home Exercises Provided:   Pt demo good understanding of the education provided. Kaycee demonstrated good return demonstration of activities.     Education provided re:  Kaycee verbalized good understanding of education provided.   No spiritual or educational barriers to learning provided    Assessment     Patient tolerated  PT session well. Pt demonstrated with B hamstring tightness and stiffness in B hip ER/R. Pt cont to require v/c's for transversus abdominus activation. Pt tolerated manual therapy with decrease in B thigh muscles today. Cont skilled PT services to decrease functional limitations.     This is a 83 y.o. female referred to outpatient physical therapy and presents with a medical diagnosis of  Chronic lower back pain without sciatica  and demonstrates limitations as described in the problem list. Pt prognosis is Good minus. Pt will continue to benefit from skilled outpatient physical therapy to address the deficits listed in the problem list, provide pt/family education and to maximize pt's level of independence in the home and community environment.     Goals as follows:                  GOALS: Short Term Goals:  2-3 weeks  1. Report decreased in pain at worse less than  <   / =  5  /10  to increase tolerance for functional activities  2. Pt to increase B popliteal angle by greater than > or = 5 degrees in order to improve flexibility and posture.   3. Increased B lower extremity  MMT 1/2  to increase tolerance for ADL and work activities.  4. Pt to increase B Ely's Test by greater than  > or = 5 degrees in order to improve flexibility and posture.   5. Pt to tolerate HEP to improve ROM and independence with ADL's  6. Pt to improve range of motion by 10% to allow for improved functional mobility to allow for improvement in IADLs.      Long Term Goals: 6-8 weeks  1. Report decreased in pain at worse less than  <   / =  0 or 1 /10  to increase tolerance for functional mobility.   2 .Pt to increase B popliteal angle by greater than > or = 10degrees in order to improve flexibility and posture.   3. Increased B lower extremity  MMT 1 grade to increase tolerance for ADL and work activities.  4. Pt will report at 58% or less limitation on FOTO lumbar spine  to demonstrate decrease in disability and improvement in back  pain.  5. Pt to be Independent with HEP to improve ROM and independence with ADL's  6. Pt to increase B Ely's Test by greater than > or = 10 degrees in order to improve flexibility and posture.   7. Pt to demonstrate negative Bridge Test in order to show improved core strength for lumbar stabilization.   8. Pt to improve range of motion by 50% to allow for improved functional mobility to allow for improvement in IADLs     Plan     Continue with established Plan of Care towards PT goals.  Certification date:6/28/2018 to 9/28/2018  Therapist: Malik Gresham, PT  7/24/2018

## 2018-07-26 ENCOUNTER — CLINICAL SUPPORT (OUTPATIENT)
Dept: REHABILITATION | Facility: HOSPITAL | Age: 83
End: 2018-07-26
Attending: PAIN MEDICINE
Payer: MEDICARE

## 2018-07-26 DIAGNOSIS — G89.29 CHRONIC MIDLINE LOW BACK PAIN WITHOUT SCIATICA: ICD-10-CM

## 2018-07-26 DIAGNOSIS — M62.81 WEAKNESS OF TRUNK MUSCULATURE: ICD-10-CM

## 2018-07-26 DIAGNOSIS — M53.86 DECREASED RANGE OF MOTION OF LUMBAR SPINE: ICD-10-CM

## 2018-07-26 DIAGNOSIS — M54.50 CHRONIC MIDLINE LOW BACK PAIN WITHOUT SCIATICA: ICD-10-CM

## 2018-07-26 PROCEDURE — 97110 THERAPEUTIC EXERCISES: CPT | Mod: PN

## 2018-07-26 NOTE — PROGRESS NOTES
Physical Therapy Daily Note     Name: Kaycee Almanza  Clinic Number: [id number]  Diagnosis:   Encounter Diagnoses   Name Primary?    Chronic midline low back pain without sciatica     Weakness of trunk musculature     Decreased range of motion of lumbar spine      Physician: Marvel Castro Jr*  Precautions: Osteoporosis, carpal tunnel, elbow and B shoulder surgeries. Stent to heart   Visit #: 5 of 20  JARAMILLO 12/31/2018  PTA Visit #: 0  Time In: 7:01 am  Time Out: 8:01 am  Total time 60 min  (1:1 with PT for 30 min)    Subjective     Pt reports: that she has lower back pain about 6/10 and posterior leg pain about 6/10 today. Pt states she noticed improvement in PT session. Pt states she want to try exercises her own.  Pt reports transportation is also an issue for her. Pt reports to leave her file open so she can come back to therapy as she needs.   Pain Scale: Kaycee rates pain on a scale of 0-10 to be 0 currently.    Objective   CMS Impairment/Limitation/Restriction for FOTO Lumbar Spine Survey  Status Limitation G-Code CMS Severity Modifier  Intake 24% 76%  Predicted 42% 58% Goal Status+ CK - At least 40 percent but less than 60 percent  7/26/2018 60% 40% Current Status CK - At least 40 percent but less than 60 percent  D/C Status CK **only report if this is discharge survey  +Based on FOTO predicted change score    Kaycee received individual therapeutic exercises to develop strength, endurance, ROM, flexibility, posture and core stabilization for 55 minutes including:    Nustep 6 min  Lower trunk rotation with yogaball 1x2'  Pelvic tilt 1x2'  Transversus abdominal activation 1x2'   Hamstring stretch 3x30''   SLR 3x10 1#  SAQ 3x10  1#6  SKTC with yogaball  HL hip add ball squeeze 3x10 hold 3 sec   Clamshells 2x10  YTB  Sit to stand 2x10   Prone hip IR and ER ea 1x2'  Standing hip abd      NP:  HL hip abd YTB 2x10 hold 3 sec    Kaycee received the  following manual therapy techniques: Soft tissue Mobilization were applied to the: lower back and B  gluteus and B posterior thigh for 00 minutes including:  Biofreeze applied at left lower back   Green roll theraband     Pt received 00 min ice pack. Location lower back. NP    Written Home Exercises Provided:   Pt demo good understanding of the education provided. Kaycee demonstrated good return demonstration of activities.     Education provided re:  Kaycee verbalized good understanding of education provided.   No spiritual or educational barriers to learning provided    Assessment     Patient tolerated PT session well.  Pt cont with lower back pain and posterior leg pain but pain has subsided. Pt demonstrated B hamstring and hip stiffness. Heavy v/c's required for transversus abdominus  Activation and engagement. HEP was given today. Plan to keep chart open and discharge after certification date is .    This is a 83 y.o. female referred to outpatient physical therapy and presents with a medical diagnosis of  Chronic lower back pain without sciatica  and demonstrates limitations as described in the problem list. Pt prognosis is Good minus. Pt will continue to benefit from skilled outpatient physical therapy to address the deficits listed in the problem list, provide pt/family education and to maximize pt's level of independence in the home and community environment.     Goals as follows:                  GOALS: Short Term Goals:  2-3 weeks  1. Report decreased in pain at worse less than  <   / =  5  /10  to increase tolerance for functional activities  2. Pt to increase B popliteal angle by greater than > or = 5 degrees in order to improve flexibility and posture.   3. Increased B lower extremity  MMT 1/2  to increase tolerance for ADL and work activities.  4. Pt to increase B Ely's Test by greater than  > or = 5 degrees in order to improve flexibility and posture.   5. Pt to tolerate HEP to improve ROM  and independence with ADL's  6. Pt to improve range of motion by 10% to allow for improved functional mobility to allow for improvement in IADLs.      Long Term Goals: 6-8 weeks  1. Report decreased in pain at worse less than  <   / =  0 or 1 /10  to increase tolerance for functional mobility.   2 .Pt to increase B popliteal angle by greater than > or = 10degrees in order to improve flexibility and posture.   3. Increased B lower extremity  MMT 1 grade to increase tolerance for ADL and work activities.  4. Pt will report at 58% or less limitation on FOTO lumbar spine  to demonstrate decrease in disability and improvement in back pain.  5. Pt to be Independent with HEP to improve ROM and independence with ADL's  6. Pt to increase B Ely's Test by greater than > or = 10 degrees in order to improve flexibility and posture.   7. Pt to demonstrate negative Bridge Test in order to show improved core strength for lumbar stabilization.   8. Pt to improve range of motion by 50% to allow for improved functional mobility to allow for improvement in IADLs     Plan   Plan to keep her chat open until certification date .     Certification date:2018 to 2018  Therapist: Malik Gresham, PT  2018

## 2018-08-17 ENCOUNTER — TELEPHONE (OUTPATIENT)
Dept: PAIN MEDICINE | Facility: CLINIC | Age: 83
End: 2018-08-17

## 2018-08-17 NOTE — TELEPHONE ENCOUNTER
Reminded patient's daughter of Pain Management appointment scheduled for Monday at 0815 with Dr. Castro- verbal confirmation received.  Location information also provided.

## 2018-08-20 ENCOUNTER — OFFICE VISIT (OUTPATIENT)
Dept: PAIN MEDICINE | Facility: CLINIC | Age: 83
End: 2018-08-20
Payer: MEDICARE

## 2018-08-20 VITALS
RESPIRATION RATE: 18 BRPM | SYSTOLIC BLOOD PRESSURE: 174 MMHG | BODY MASS INDEX: 25.26 KG/M2 | HEART RATE: 73 BPM | OXYGEN SATURATION: 98 % | DIASTOLIC BLOOD PRESSURE: 74 MMHG | WEIGHT: 112.31 LBS | HEIGHT: 56 IN

## 2018-08-20 DIAGNOSIS — M48.062 SPINAL STENOSIS OF LUMBAR REGION WITH NEUROGENIC CLAUDICATION: ICD-10-CM

## 2018-08-20 DIAGNOSIS — M47.816 LUMBAR SPONDYLOSIS: ICD-10-CM

## 2018-08-20 DIAGNOSIS — M51.36 DDD (DEGENERATIVE DISC DISEASE), LUMBAR: Primary | ICD-10-CM

## 2018-08-20 PROCEDURE — 99999 PR PBB SHADOW E&M-EST. PATIENT-LVL III: CPT | Mod: PBBFAC,,, | Performed by: PAIN MEDICINE

## 2018-08-20 PROCEDURE — 99214 OFFICE O/P EST MOD 30 MIN: CPT | Mod: S$GLB,,, | Performed by: PAIN MEDICINE

## 2018-08-20 PROCEDURE — 3077F SYST BP >= 140 MM HG: CPT | Mod: CPTII,S$GLB,, | Performed by: PAIN MEDICINE

## 2018-08-20 PROCEDURE — 3078F DIAST BP <80 MM HG: CPT | Mod: CPTII,S$GLB,, | Performed by: PAIN MEDICINE

## 2018-08-20 NOTE — PROGRESS NOTES
Subjective:     Patient ID: Kaycee Almanza is a 83 y.o. female    Chief Complaint: Follow-up (pain resumed in lower back and below the buttocks)      Referred by: No ref. provider found      HPI:    Interval History (8/20/18):  She returns today for follow up.  She reports that Norco 7.5-325mg q8h PRN has been helpful for the low back and lower extremity pain. She reports taking 1.5 pills in the AM and sometimes one pill in the evening. This adequately controls her pain. She admits that she is concerned about potential adverse effects from this medication/dose. She denies any sedation or other adverse effects related to her current dosing.      Interval History (7/23/18):  She returns today for follow up.  She reports that PT has not been helpful for the low back pain She states that Gabapentin 300mg BID has been somewhat helpful. She also notes some relief with Norco 7.5-325mg BID PRN. She did not increase Gabapentin further because her neighbors told her it was dangerous. She denies any side effects from gabapentin.       Interval History (6/25/18):  She returns today for follow up.  She reports that L3-4 ILESI has not been helpful for the low back and lower extremity pain. She states that about one week following the injection she began to experience left sided posterior hip pain that radiates to the ankle. She reports intermittent numbness and tingling associated with this pain. She denies any new focal weakness or b/b dysfunction. The pain is severe. It is not alleviated with Norco. She was seen in the ED and given injections for pain that only provided temporary relief. She denies any injuries or falls since the ILESI.       Interval History (6/4/18):  She returns today for follow up and MRI review.  She reports that her pain is unchanged since last visit. She denies weakness and b/b dysfunction.       Initial Encounter (5/29/18):  Kaycee Almanza is a 83 y.o. female who presents today with chronic low  back pain. This pain has been present for many years without inciting event or injury. In the past 6 months she has noticed a significant increase in her pain and it began radiating to the bilateral posterior thigh. It does not cross the knees. She does report some associated numbness. She denies any focal weakness or b/b dysfunction. The pain is constant but significantly worsens when she walks for 15 minutes or more. The pain will significantly and immediately improve upon sitting. .   This pain is described in detail below.    Physical Therapy: No    Non-pharmacologic Treatment: rest helps         · TENS? No    Pain Medications:         · Currently taking: Norco, Gabapentin,     · Has tried in the past:      · Has not tried: NSAIDs, Tylenol, Muscle relaxants, TCAs, SNRIs, topical creams    Blood thinners: ASA 81mg daily    Interventional Therapies:   Previous lumbar ESIs over 20 years ago  6/13/18 - L3-4 ILESI - no relief    Relevant Surgeries: None    Affecting sleep? Yes    Affecting daily activities? yes    Depressive symptoms? yes          · SI/HI? No    Work status: Unemployed    Pain Scores:    Best:       5/10  Worst:     10/10  Usually:   5/10  Today:    8/10    Review of Systems   Constitutional: Negative for activity change, appetite change, chills, fatigue, fever and unexpected weight change.   HENT: Negative for hearing loss.    Eyes: Negative for visual disturbance.   Respiratory: Negative for chest tightness and shortness of breath.    Cardiovascular: Negative for chest pain.   Gastrointestinal: Negative for abdominal pain, constipation, diarrhea, nausea and vomiting.   Genitourinary: Negative for difficulty urinating.   Musculoskeletal: Positive for arthralgias, back pain, gait problem and myalgias. Negative for neck pain.   Skin: Negative for rash.   Neurological: Positive for numbness. Negative for dizziness, weakness, light-headedness and headaches.   Psychiatric/Behavioral: Positive for sleep  disturbance. Negative for hallucinations and suicidal ideas. The patient is not nervous/anxious.        Past Medical History:   Diagnosis Date    Anxiety     Arthritis     Coronary artery disease     Dementia     Depression     Diabetes type 2, controlled     sees Dr. Elena    GERD (gastroesophageal reflux disease)     Hyperlipidemia     Hypertension     Left posterior capsular opacification 5/11/2017    Osteoporosis     Thyroid disease     Ulcer     Ulcer        Past Surgical History:   Procedure Laterality Date    CATARACT EXTRACTION W/  INTRAOCULAR LENS IMPLANT Bilateral 2006    [facility]    cataract surgery  2006    CHOLECYSTECTOMY      EYE SURGERY      HAND SURGERY      HYSTERECTOMY      KNEE SURGERY      SHOULDER SURGERY         Social History     Socioeconomic History    Marital status:      Spouse name: Not on file    Number of children: Not on file    Years of education: Not on file    Highest education level: Not on file   Social Needs    Financial resource strain: Not on file    Food insecurity - worry: Not on file    Food insecurity - inability: Not on file    Transportation needs - medical: Not on file    Transportation needs - non-medical: Not on file   Occupational History    Not on file   Tobacco Use    Smoking status: Never Smoker    Smokeless tobacco: Never Used   Substance and Sexual Activity    Alcohol use: No    Drug use: No    Sexual activity: No   Other Topics Concern    Not on file   Social History Narrative    Not on file       Review of patient's allergies indicates:  No Known Allergies    Current Outpatient Medications on File Prior to Visit   Medication Sig Dispense Refill    ALPRAZolam (XANAX) 0.25 MG tablet       amLODIPine (NORVASC) 10 MG tablet Take 1 tablet (10 mg total) by mouth once daily. 90 tablet 0    aspirin (ECOTRIN) 81 MG EC tablet Take 81 mg by mouth once daily.      atorvastatin (LIPITOR) 20 MG tablet Take 10 mg by  "mouth once daily.       gabapentin (NEURONTIN) 300 MG capsule Take 2 capsules (600 mg total) by mouth 3 (three) times daily. 180 capsule 5    hydroCHLOROthiazide (HYDRODIURIL) 12.5 MG Tab Take 1 tablet (12.5 mg total) by mouth once daily. 90 tablet 0    HYDROcodone-acetaminophen (NORCO) 7.5-325 mg per tablet Take 1 tablet by mouth every 8 (eight) hours as needed for Pain. 90 tablet 0    linagliptin (TRADJENTA) 5 mg Tab tablet Take 1 tablet (5 mg total) by mouth once daily. 90 tablet 3    lisinopril (PRINIVIL,ZESTRIL) 40 MG tablet Take 1 tablet (40 mg total) by mouth once daily. 90 tablet 3    pravastatin (PRAVACHOL) 10 MG tablet Take 1 tablet (10 mg total) by mouth once daily. 90 tablet 1    sertraline (ZOLOFT) 100 MG tablet Take 1 tablet (100 mg total) by mouth once daily. 90 tablet 3    TRUE METRIX GLUCOSE TEST STRIP Strp Check blood glucose 2 times daily before meals 200 each 0     No current facility-administered medications on file prior to visit.        Objective:      BP (!) 174/74   Pulse 73   Resp 18   Ht 4' 8" (1.422 m)   Wt 50.9 kg (112 lb 4.8 oz)   SpO2 98%   BMI 25.18 kg/m²     Exam:  GEN:  Well developed, well nourished.  No acute distress.   HEENT:  No trauma.  Mucous membranes moist.  Nares patent bilaterally.  PSYCH: Normal affect. Thought content appropriate.  CHEST:  Breathing symmetric.  No audible wheezing.  ABD: Soft, non-distended.  SKIN:  Warm, pink, dry.  No rash on exposed areas.    EXT:  No cyanosis, clubbing, or edema.  No color change or changes in nail or hair growth.  NEURO/MUSCULOSKELETAL:  Fully alert, oriented, and appropriate. Speech normal zaire. No cranial nerve deficits.   Gait: Antalgic. Uses Rollator for ambulation.  No focal motor deficits.       Imaging:  Narrative     EXAMINATION:  MRI LUMBAR SPINE WITHOUT CONTRAST    CLINICAL HISTORY:  neurogenic claudication; Other intervertebral disc degeneration, lumbar region    TECHNIQUE:  Multiplanar, multisequence MR " images were acquired from the thoracolumbar junction to the sacrum without the administration of contrast.    COMPARISON:  None.    FINDINGS:  Normal marrow signal is seen throughout with the exception of endplate changes adjacent to L3-4.    L1/2: There is a circumferential disc protrusion resulting in mild narrowing of the anterior aspect of the canal and mild narrowing of bilateral neural foramina.    L2/3: There is grade 1 retrolisthesis of L2 with respect L3 as well as a circumferential disc protrusion resulting in moderate narrowing of bilateral neural foramina and moderate narrowing of the central canal.    L3/4: There is grade 1 retrolisthesis of L3 with respect L4 with a circumferential disc protrusion resulting in moderate severe narrowing of bilateral neural foramina and moderate narrowing of the central canal.    L4/5: There is a circumferential disc protrusion resulting in severe narrowing of bilateral neural foramina and severe central canal narrowing.    L5/S1: There is grade 1 anterolisthesis of L5 with respect S1 with uncovering of the disc and severe bilateral neural foraminal narrowing.    The adjacent soft tissues are unremarkable.   Impression       There are multilevel degenerative changes of the spine.      Electronically signed by: Nathaly Salter MD  Date: 05/30/2018  Time: 08:53         Assessment:       Encounter Diagnoses   Name Primary?    DDD (degenerative disc disease), lumbar Yes    Lumbar spondylosis     Spinal stenosis of lumbar region with neurogenic claudication          Plan:       Kaycee was seen today for follow-up.    Diagnoses and all orders for this visit:    DDD (degenerative disc disease), lumbar    Lumbar spondylosis    Spinal stenosis of lumbar region with neurogenic claudication        Kaycee Almanza is a 83 y.o. female with chronic low back pain that has been worsening over the past 6 months. Now with symptoms radiating to the bilateral lower extremities. Her  pain sounds most consistent with neurogenic claudication, though likely has some component of facet mediated pain as well.     1. I counseled patient about the safety and risks of taking Norco 7.5-325mg 1.5 pills BID. There is a very low risks of any adverse effects from this medication/dose. She notes good pain control with this regimen. It is appropriate to take this medication as the benefit greatly outweighs the risks. It is ok for her to get refills of this medication from her PCP in the future.   2. LA  reviewed and no inconsistencies were noted.  3. RTC PRN.

## 2018-09-12 ENCOUNTER — OFFICE VISIT (OUTPATIENT)
Dept: FAMILY MEDICINE | Facility: CLINIC | Age: 83
End: 2018-09-12
Payer: MEDICARE

## 2018-09-12 VITALS
HEIGHT: 58 IN | SYSTOLIC BLOOD PRESSURE: 180 MMHG | HEART RATE: 73 BPM | WEIGHT: 113 LBS | BODY MASS INDEX: 23.72 KG/M2 | TEMPERATURE: 98 F | DIASTOLIC BLOOD PRESSURE: 60 MMHG | OXYGEN SATURATION: 97 % | RESPIRATION RATE: 18 BRPM

## 2018-09-12 DIAGNOSIS — I10 ESSENTIAL HYPERTENSION: ICD-10-CM

## 2018-09-12 DIAGNOSIS — M54.50 CHRONIC MIDLINE LOW BACK PAIN WITHOUT SCIATICA: ICD-10-CM

## 2018-09-12 DIAGNOSIS — G89.29 CHRONIC MIDLINE LOW BACK PAIN WITHOUT SCIATICA: ICD-10-CM

## 2018-09-12 DIAGNOSIS — G89.29 CHRONIC BACK PAIN, UNSPECIFIED BACK LOCATION, UNSPECIFIED BACK PAIN LATERALITY: Primary | ICD-10-CM

## 2018-09-12 DIAGNOSIS — E11.9 TYPE 2 DIABETES MELLITUS WITHOUT COMPLICATION, WITHOUT LONG-TERM CURRENT USE OF INSULIN: ICD-10-CM

## 2018-09-12 DIAGNOSIS — M54.9 CHRONIC BACK PAIN, UNSPECIFIED BACK LOCATION, UNSPECIFIED BACK PAIN LATERALITY: Primary | ICD-10-CM

## 2018-09-12 PROCEDURE — 99214 OFFICE O/P EST MOD 30 MIN: CPT | Mod: PBBFAC,PO | Performed by: FAMILY MEDICINE

## 2018-09-12 PROCEDURE — 99999 PR PBB SHADOW E&M-EST. PATIENT-LVL IV: CPT | Mod: PBBFAC,,, | Performed by: FAMILY MEDICINE

## 2018-09-12 PROCEDURE — 3078F DIAST BP <80 MM HG: CPT | Mod: CPTII,,, | Performed by: FAMILY MEDICINE

## 2018-09-12 PROCEDURE — 99214 OFFICE O/P EST MOD 30 MIN: CPT | Mod: S$PBB,,, | Performed by: FAMILY MEDICINE

## 2018-09-12 PROCEDURE — 3077F SYST BP >= 140 MM HG: CPT | Mod: CPTII,,, | Performed by: FAMILY MEDICINE

## 2018-09-12 PROCEDURE — 1101F PT FALLS ASSESS-DOCD LE1/YR: CPT | Mod: CPTII,,, | Performed by: FAMILY MEDICINE

## 2018-09-12 RX ORDER — HYDROCODONE BITARTRATE AND ACETAMINOPHEN 10; 325 MG/1; MG/1
1 TABLET ORAL EVERY 6 HOURS PRN
Qty: 120 TABLET | Refills: 0 | Status: SHIPPED | OUTPATIENT
Start: 2018-11-12 | End: 2020-06-12 | Stop reason: SDUPTHER

## 2018-09-12 RX ORDER — HYDROCODONE BITARTRATE AND ACETAMINOPHEN 10; 325 MG/1; MG/1
1 TABLET ORAL EVERY 6 HOURS PRN
Qty: 120 TABLET | Refills: 0 | Status: SHIPPED | OUTPATIENT
Start: 2018-10-13 | End: 2018-12-13 | Stop reason: SDUPTHER

## 2018-09-12 RX ORDER — AMLODIPINE BESYLATE 10 MG/1
10 TABLET ORAL DAILY
Qty: 90 TABLET | Refills: 1 | Status: SHIPPED | OUTPATIENT
Start: 2018-09-12 | End: 2018-12-14

## 2018-09-12 RX ORDER — HYDROCODONE BITARTRATE AND ACETAMINOPHEN 10; 325 MG/1; MG/1
1 TABLET ORAL EVERY 6 HOURS PRN
Qty: 120 TABLET | Refills: 0 | Status: SHIPPED | OUTPATIENT
Start: 2018-09-12 | End: 2019-03-18 | Stop reason: SDUPTHER

## 2018-09-12 NOTE — PROGRESS NOTES
Chief Complaint   Patient presents with    Diabetes    Hypertension    Follow-up    Pain     neck/shoulders/back left side       HPI  Kaycee Almanza is a 83 y.o. female with multiple medical diagnoses as listed in the medical history and problem list that presents for follow-up for hypertension, diabetes and for chronic pain.    HTN- has been made worse by her pain. She has had consistent pain since her epidural injection which was followed by PT that did not help her pain.    Diabetes- has follow up with Dr. Morales and has blood work to be done then    Chronic neck and back pain along with shoulder pain- she has been turned over to me for pain management, per pain management notes she has had control on norco but she reports 10 out of 10 pain and per her daughter she has been struggling to take the pain medication every 6 hours but she reports it is not helping    PAST MEDICAL HISTORY:  Past Medical History:   Diagnosis Date    Anxiety     Arthritis     Coronary artery disease     Dementia     Depression     Diabetes type 2, controlled     sees Dr. Elena    GERD (gastroesophageal reflux disease)     Hyperlipidemia     Hypertension     Left posterior capsular opacification 5/11/2017    Osteoporosis     Thyroid disease     Ulcer     Ulcer        PAST SURGICAL HISTORY:  Past Surgical History:   Procedure Laterality Date    CATARACT EXTRACTION W/  INTRAOCULAR LENS IMPLANT Bilateral 2006    done at [facility]    cataract surgery  2006    CHOLECYSTECTOMY      EYE SURGERY      HAND SURGERY      HYSTERECTOMY      Injection,steroid,epidural N/A 6/13/2018    Performed by Marvel Castro Jr[facility] at [facility] ENDO    KNEE SURGERY      SHOULDER SURGERY         SOCIAL HISTORY:  Social History     Socioeconomic History    Marital status:      Spouse name: Not on file    Number of children: Not on file    Years of education: Not on file    Highest education level: Not on file   Social Needs     Financial resource strain: Not on file    Food insecurity - worry: Not on file    Food insecurity - inability: Not on file    Transportation needs - medical: Not on file    Transportation needs - non-medical: Not on file   Occupational History    Not on file   Tobacco Use    Smoking status: Never Smoker    Smokeless tobacco: Never Used   Substance and Sexual Activity    Alcohol use: No    Drug use: No    Sexual activity: No   Other Topics Concern    Not on file   Social History Narrative    Not on file       FAMILY HISTORY:  Family History   Problem Relation Age of Onset    Stroke Mother     Diabetes Mother     Arthritis Father     Early death Sister     Birth defects Brother     No Known Problems Sister     Birth defects Brother     Birth defects Brother     Birth defects Brother     Birth defects Brother     Amblyopia Neg Hx     Blindness Neg Hx     Cataracts Neg Hx     Glaucoma Neg Hx     Macular degeneration Neg Hx     Retinal detachment Neg Hx     Strabismus Neg Hx        ALLERGIES AND MEDICATIONS: updated and reviewed.  Review of patient's allergies indicates:  No Known Allergies  Current Outpatient Medications   Medication Sig Dispense Refill    ALPRAZolam (XANAX) 0.25 MG tablet       amLODIPine (NORVASC) 10 MG tablet Take 1 tablet (10 mg total) by mouth once daily. 90 tablet 0    aspirin (ECOTRIN) 81 MG EC tablet Take 81 mg by mouth once daily.      atorvastatin (LIPITOR) 20 MG tablet Take 10 mg by mouth once daily.       gabapentin (NEURONTIN) 300 MG capsule Take 2 capsules (600 mg total) by mouth 3 (three) times daily. 180 capsule 5    hydroCHLOROthiazide (HYDRODIURIL) 12.5 MG Tab Take 1 tablet (12.5 mg total) by mouth once daily. 90 tablet 0    linagliptin (TRADJENTA) 5 mg Tab tablet Take 1 tablet (5 mg total) by mouth once daily. 90 tablet 3    lisinopril (PRINIVIL,ZESTRIL) 40 MG tablet Take 1 tablet (40 mg total) by mouth once daily. 90 tablet 3    pravastatin  "(PRAVACHOL) 10 MG tablet Take 1 tablet (10 mg total) by mouth once daily. 90 tablet 1    sertraline (ZOLOFT) 100 MG tablet Take 1 tablet (100 mg total) by mouth once daily. 90 tablet 3    TRUE METRIX GLUCOSE TEST STRIP Strp Check blood glucose 2 times daily before meals 200 each 0    HYDROcodone-acetaminophen (NORCO)  mg per tablet Take 1 tablet by mouth every 6 (six) hours as needed. 120 tablet 0    [START ON 10/13/2018] HYDROcodone-acetaminophen (NORCO)  mg per tablet Take 1 tablet by mouth every 6 (six) hours as needed. 120 tablet 0    [START ON 11/12/2018] HYDROcodone-acetaminophen (NORCO)  mg per tablet Take 1 tablet by mouth every 6 (six) hours as needed. 120 tablet 0     No current facility-administered medications for this visit.        ROS  Review of Systems   Constitutional: Negative for chills, diaphoresis, fatigue, fever and unexpected weight change.   HENT: Negative for rhinorrhea, sinus pressure, sore throat and tinnitus.    Eyes: Negative for photophobia and visual disturbance.   Respiratory: Negative for cough, shortness of breath and wheezing.    Cardiovascular: Negative for chest pain and palpitations.   Gastrointestinal: Negative for abdominal pain, blood in stool, constipation, diarrhea, nausea and vomiting.   Genitourinary: Negative for dysuria, flank pain, frequency and vaginal discharge.   Musculoskeletal: Positive for arthralgias and back pain. Negative for joint swelling.   Skin: Negative for rash.   Neurological: Negative for speech difficulty, weakness, light-headedness and headaches.   Psychiatric/Behavioral: Negative for behavioral problems and dysphoric mood.       Physical Exam  Vitals:    09/12/18 0816 09/12/18 0824   BP: (!) 186/62 (!) 180/60   Pulse: 73    Resp: 18    Temp: 98.1 °F (36.7 °C)    SpO2: 97%    Weight: 51.3 kg (113 lb)    Height: 4' 10" (1.473 m)     Body mass index is 23.62 kg/m².  Weight: 51.3 kg (113 lb)   Height: 4' 10" (147.3 cm)     Physical " Exam   Constitutional: She is oriented to person, place, and time. She appears well-developed and well-nourished.   HENT:   Head: Normocephalic and atraumatic.   Eyes: EOM are normal.   Neurological: She is alert and oriented to person, place, and time.   Skin: Skin is warm and dry. No rash noted. No erythema.   Psychiatric: She has a normal mood and affect. Her behavior is normal.   Nursing note and vitals reviewed.      Health Maintenance       Date Due Completion Date    Influenza Vaccine 08/01/2018 10/23/2017    Hemoglobin A1c 09/06/2018 3/6/2018    Override on 10/18/2016: Done (Dr. Hollis Luther/Endocrinology- hemoglobin a1c 6.2)    Lipid Panel 03/06/2019 3/6/2018    Urine Microalbumin 03/06/2019 3/6/2018    Eye Exam 05/16/2019 5/16/2018 (Declined)    Override on 5/16/2018: Declined    Override on 4/19/2017: Done    Foot Exam 07/19/2019 7/19/2018    Override on 2/15/2018: Done    Override on 11/9/2016: Done (Dr. Hollis Luther/Endocrinology-bilateral feet normal by visual exam, normal pulses,sensations intact,by monofilament,right DP's2/4,PT's 2/4,monofilament 10/10,cap refill <3 secs,left  DP's 2/4,PT's 2/4,monofilament 10/10,cap refill <3 secs)    DEXA SCAN 03/08/2021 3/8/2018    Override on 6/22/2016: Done (LAURY Barnett/Dr. Hollis Luther/Endocrinology- normal range in the lumbar spine & hips,left wrist Tscore -2.7 improved from -3.3. Patient has had to stop Prolia, but patient had 4 doses. Patient is also on calcium + vitamin d supplements)    Override on 7/22/2014: Done (Dr. Hollis Luther/Endocrinology- AP Spine-1.247 g/cm 2 w/ T score =0.3,dual femur=0.979 g/cm 2 w/ T score =0.2,left forearm= 0.586 g/cm 2 w/ T score=3.3,patient started on prolia)    TETANUS VACCINE 03/09/2027 3/9/2017 (Declined)    Override on 3/9/2017: Declined            ASSESSMENT     1. Chronic back pain, unspecified back location, unspecified back pain laterality    2. Chronic midline low back pain without sciatica    3.  Essential hypertension    4. Type 2 diabetes mellitus without complication, without long-term current use of insulin        PLAN:     Problem List Items Addressed This Visit        Cardiac/Vascular    Essential hypertension  -likely elevated by pain, control will be difficult as this has worsened       Endocrine    Type 2 diabetes mellitus without complication, without long-term current use of insulin  -has follow up with Dr. Morales       Orthopedic    Chronic back pain - Primary  -increase Norco to QID dosing if needed at [place] [facility]  -consider second pain management consult if no relief is occurred    Relevant Medications    HYDROcodone-acetaminophen (NORCO)  mg per tablet    HYDROcodone-acetaminophen (NORCO)  mg per tablet (Start on 10/13/2018)    HYDROcodone-acetaminophen (NORCO)  mg per tablet (Start on 11/12/2018)    Chronic midline low back pain without sciatica  -reviewed notes and she is not a surgical candidate due to her comorbidities    Relevant Medications    HYDROcodone-acetaminophen (NORCO)  mg per tablet    HYDROcodone-acetaminophen (NORCO)  mg per tablet (Start on 10/13/2018)    HYDROcodone-acetaminophen (NORCO)  mg per tablet (Start on 11/12/2018)            Iona Jose MD  09/12/2018 9:09 AM        Follow-up in about 3 months (around 12/12/2018) for Follow up.

## 2018-09-26 ENCOUNTER — CLINICAL SUPPORT (OUTPATIENT)
Dept: FAMILY MEDICINE | Facility: CLINIC | Age: 83
End: 2018-09-26
Payer: MEDICARE

## 2018-09-26 VITALS — OXYGEN SATURATION: 98 % | SYSTOLIC BLOOD PRESSURE: 150 MMHG | DIASTOLIC BLOOD PRESSURE: 80 MMHG | HEART RATE: 70 BPM

## 2018-09-26 DIAGNOSIS — I10 ESSENTIAL HYPERTENSION: Primary | ICD-10-CM

## 2018-09-26 PROCEDURE — 99999 PR PBB SHADOW E&M-EST. PATIENT-LVL II: CPT | Mod: PBBFAC,,,

## 2018-09-26 PROCEDURE — 99212 OFFICE O/P EST SF 10 MIN: CPT | Mod: PBBFAC,PO

## 2018-09-26 PROCEDURE — 99499 UNLISTED E&M SERVICE: CPT | Mod: S$PBB,,, | Performed by: FAMILY MEDICINE

## 2018-09-26 NOTE — PROGRESS NOTES
Kaycee Almanza 83 y.o. female is here today for Blood Pressure check.   History of HTN yes.    Review of patient's allergies indicates:  No Known Allergies  Creatinine   Date Value Ref Range Status   09/08/2017 0.9 0.5 - 1.4 mg/dL Final   09/06/2017 0.9 (H) 0.6 - 0.9 mg/dL Final     Sodium   Date Value Ref Range Status   09/08/2017 139 136 - 145 mmol/L Final   09/06/2017 143 135 - 146 Final     Potassium   Date Value Ref Range Status   09/08/2017 4.1 3.5 - 5.1 mmol/L Final   09/06/2017 4.2 3.5 - 5.3 Final   ]  Patient verifies taking blood pressure medications on a regular basis at the same time of the day.     Current Outpatient Medications:     ALPRAZolam (XANAX) 0.25 MG tablet, , Disp: , Rfl:     amLODIPine (NORVASC) 10 MG tablet, Take 1 tablet (10 mg total) by mouth once daily., Disp: 90 tablet, Rfl: 1    aspirin (ECOTRIN) 81 MG EC tablet, Take 81 mg by mouth once daily., Disp: , Rfl:     atorvastatin (LIPITOR) 20 MG tablet, Take 10 mg by mouth once daily. , Disp: , Rfl:     gabapentin (NEURONTIN) 300 MG capsule, Take 2 capsules (600 mg total) by mouth 3 (three) times daily., Disp: 180 capsule, Rfl: 5    hydroCHLOROthiazide (HYDRODIURIL) 12.5 MG Tab, Take 1 tablet (12.5 mg total) by mouth once daily., Disp: 90 tablet, Rfl: 0    HYDROcodone-acetaminophen (NORCO)  mg per tablet, Take 1 tablet by mouth every 6 (six) hours as needed., Disp: 120 tablet, Rfl: 0    [START ON 10/13/2018] HYDROcodone-acetaminophen (NORCO)  mg per tablet, Take 1 tablet by mouth every 6 (six) hours as needed., Disp: 120 tablet, Rfl: 0    [START ON 11/12/2018] HYDROcodone-acetaminophen (NORCO)  mg per tablet, Take 1 tablet by mouth every 6 (six) hours as needed., Disp: 120 tablet, Rfl: 0    linagliptin (TRADJENTA) 5 mg Tab tablet, Take 1 tablet (5 mg total) by mouth once daily., Disp: 90 tablet, Rfl: 3    lisinopril (PRINIVIL,ZESTRIL) 40 MG tablet, Take 1 tablet (40 mg total) by mouth once daily., Disp: 90  tablet, Rfl: 3    pravastatin (PRAVACHOL) 10 MG tablet, Take 1 tablet (10 mg total) by mouth once daily., Disp: 90 tablet, Rfl: 1    sertraline (ZOLOFT) 100 MG tablet, Take 1 tablet (100 mg total) by mouth once daily., Disp: 90 tablet, Rfl: 3    TRUE METRIX GLUCOSE TEST STRIP Strp, Check blood glucose 2 times daily before meals, Disp: 200 each, Rfl: 0  Does patient have record of home blood pressure readings no..   Last dose of blood pressure medication was taken at 6:00 this am.  Patient is asymptomatic.   Complains of none.    Vitals:    09/26/18 0841 09/26/18 0854   BP: (!) 150/80 (!) 150/80   BP Location: Left arm Left arm   Patient Position: Sitting Sitting   BP Method: Medium (Manual) Medium (Manual)   Pulse: 68 70   SpO2: 97% 98%         Dr. Jose informed of nurse visit.

## 2018-10-04 ENCOUNTER — TELEPHONE (OUTPATIENT)
Dept: NEPHROLOGY | Facility: CLINIC | Age: 83
End: 2018-10-04

## 2018-10-04 NOTE — TELEPHONE ENCOUNTER
----- Message from Rosi Ferreira sent at 10/4/2018  9:14 AM CDT -----  Contact: pt's daughter  Jacklyn  Ph [phone]  -  Daughter called     Pt was advised by [facility] Dr Edwards to see her nephrologist    About the back pain      Pt is also due back for f/u    Dr Edwards faxing lab work      Can she have 10/24   Early in morning   8  Or so     Please call Jacklyn with appt     -   On wait list   Thanks

## 2018-10-22 ENCOUNTER — TELEPHONE (OUTPATIENT)
Dept: NEPHROLOGY | Facility: CLINIC | Age: 83
End: 2018-10-22

## 2018-10-22 NOTE — TELEPHONE ENCOUNTER
----- Message from Pam Hill sent at 10/22/2018 10:28 AM CDT -----  Contact: Daughter  .Same Day Appointment Request    Was an appointment with another provider offered?     Reason for FST appt.: abnormal labs & back pain.  Communication Preference:   Additional Information: Was told by Dr Luther /encronologist needs to be seen sooner. Will be faxing over her labs.       Spoke to pt daughter and gave her appt for 10/24/2018

## 2018-10-24 ENCOUNTER — OFFICE VISIT (OUTPATIENT)
Dept: NEPHROLOGY | Facility: CLINIC | Age: 83
End: 2018-10-24
Payer: MEDICARE

## 2018-10-24 VITALS
BODY MASS INDEX: 22.87 KG/M2 | HEIGHT: 58 IN | DIASTOLIC BLOOD PRESSURE: 60 MMHG | OXYGEN SATURATION: 95 % | WEIGHT: 108.94 LBS | HEART RATE: 82 BPM | SYSTOLIC BLOOD PRESSURE: 150 MMHG

## 2018-10-24 DIAGNOSIS — E11.29 CONTROLLED TYPE 2 DIABETES MELLITUS WITH MICROALBUMINURIA, WITHOUT LONG-TERM CURRENT USE OF INSULIN: ICD-10-CM

## 2018-10-24 DIAGNOSIS — R80.1 PERSISTENT PROTEINURIA: Primary | ICD-10-CM

## 2018-10-24 DIAGNOSIS — R80.9 CONTROLLED TYPE 2 DIABETES MELLITUS WITH MICROALBUMINURIA, WITHOUT LONG-TERM CURRENT USE OF INSULIN: ICD-10-CM

## 2018-10-24 PROCEDURE — 3078F DIAST BP <80 MM HG: CPT | Mod: CPTII,,, | Performed by: INTERNAL MEDICINE

## 2018-10-24 PROCEDURE — 1101F PT FALLS ASSESS-DOCD LE1/YR: CPT | Mod: CPTII,,, | Performed by: INTERNAL MEDICINE

## 2018-10-24 PROCEDURE — 99214 OFFICE O/P EST MOD 30 MIN: CPT | Mod: S$PBB,,, | Performed by: INTERNAL MEDICINE

## 2018-10-24 PROCEDURE — 99999 PR PBB SHADOW E&M-EST. PATIENT-LVL II: CPT | Mod: PBBFAC,,, | Performed by: INTERNAL MEDICINE

## 2018-10-24 PROCEDURE — 99212 OFFICE O/P EST SF 10 MIN: CPT | Mod: PBBFAC,PO | Performed by: INTERNAL MEDICINE

## 2018-10-24 PROCEDURE — 3077F SYST BP >= 140 MM HG: CPT | Mod: CPTII,,, | Performed by: INTERNAL MEDICINE

## 2018-10-24 RX ORDER — POTASSIUM CHLORIDE 750 MG/1
TABLET, EXTENDED RELEASE ORAL
Refills: 0 | COMMUNITY
Start: 2018-10-03

## 2018-10-24 NOTE — PROGRESS NOTES
Subjective:       Patient ID: Kaycee Almanza is a 84 y.o. White female who presents for follow up of Proteinuria    HPI    Ms. Almanza is an 84 year old woman with medical history of diabetes, hypertension presenting for follow up of proteinuria.  Patient reports blood sugars have been better controlled, blood pressure at home usually well-controlled.  Patient denies any NSAID use, she reports adequate fluid intake, she reports better dietary discretion with salt.  She otherwise denies any fever, chest pain, shortness of breath, abdominal pain, diarrhea, dysuria/hematuria.      Review of Systems   Constitutional: Negative for appetite change, fatigue and fever.   Respiratory: Negative for chest tightness and shortness of breath.    Cardiovascular: Negative for chest pain and leg swelling.   Gastrointestinal: Negative for abdominal pain, constipation, diarrhea, nausea and vomiting.   Genitourinary: Negative for difficulty urinating, dysuria, flank pain, frequency, hematuria and urgency.   Musculoskeletal: Negative for arthralgias, joint swelling and myalgias.   Skin: Negative for rash and wound.   Neurological: Negative for dizziness, weakness and light-headedness.   All other systems reviewed and are negative.      Objective:      Physical Exam   Constitutional: She appears well-developed and well-nourished.   Cardiovascular: Normal rate, regular rhythm and normal heart sounds. Exam reveals no gallop and no friction rub.   No murmur heard.  Pulmonary/Chest: Effort normal and breath sounds normal. No respiratory distress. She has no wheezes. She has no rales.   Abdominal: Soft. Bowel sounds are normal. There is no tenderness.   Musculoskeletal: She exhibits no edema.   Neurological: She is alert.   Skin: Skin is warm and dry. No rash noted. No erythema.   Psychiatric: She has a normal mood and affect.       Assessment:       1. Persistent proteinuria    2. Controlled type 2 diabetes mellitus with  microalbuminuria, without long-term current use of insulin        Plan:     Ms. Almanza is an 84 year old woman with medical history of diabetes, hypertension presenting for follow up of proteinuria.  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  Recent urine studies in January 2018 showed microalbuminuria of ~2.4gm/day, possibly due to worse glycemic control, will obtain urine protein/creatinine ratio to trend, stressed importance of blood pressure/glycemic control to prevent any further progression of kidney disease, patient voiced understanding.  Further recommendations pending results of above.   - Hypokalemia: patient on HCTZ, will discuss potassium intake and consider holding HCTZ    Return to clinic in 4-6 months with renal/heme panel, iron/TIBC/ferritin, urinalysis/culture, urine protein/creatinine ratio prior to next visit

## 2018-11-07 ENCOUNTER — OFFICE VISIT (OUTPATIENT)
Dept: OPTOMETRY | Facility: CLINIC | Age: 83
End: 2018-11-07
Payer: COMMERCIAL

## 2018-11-07 DIAGNOSIS — H52.7 REFRACTIVE ERROR: ICD-10-CM

## 2018-11-07 DIAGNOSIS — Z96.1 PSEUDOPHAKIA: ICD-10-CM

## 2018-11-07 DIAGNOSIS — Z01.00 DIABETIC EYE EXAM: Primary | ICD-10-CM

## 2018-11-07 DIAGNOSIS — E11.9 DIABETIC EYE EXAM: Primary | ICD-10-CM

## 2018-11-07 PROCEDURE — 92014 COMPRE OPH EXAM EST PT 1/>: CPT | Mod: S$GLB,,, | Performed by: OPTOMETRIST

## 2018-11-07 PROCEDURE — 92015 DETERMINE REFRACTIVE STATE: CPT | Mod: S$GLB,,, | Performed by: OPTOMETRIST

## 2018-11-07 PROCEDURE — 99999 PR PBB SHADOW E&M-EST. PATIENT-LVL II: CPT | Mod: PBBFAC,,, | Performed by: OPTOMETRIST

## 2018-11-07 NOTE — PROGRESS NOTES
Subjective:       Patient ID: Kaycee Almanza is a 84 y.o. female      Chief Complaint   Patient presents with    Diabetic Eye Exam     History of Present Illness  Dls: 4/19/17 Dr. Crow     83 y/o female presents today for diabetic eye exam.   Pt c/o blurry vision at distance and near ou .   Pt wears bifocal glasses.     LBS 95 this am     No tearing  + itching  No burning  No pain  + ha's  No floaters  No flashes     Eye meds  otc gtts ou prn     Hemoglobin A1C       Date                     Value               Ref Range           Status            03/06/2018               5.6                 4.0 - 5.6 %         Final             09/06/2017               5.6                 4.0 - 5.7 %         Final         ----------       Assessment/Plan:     1. Diabetic eye exam  [facility] vision.    No diabetic retinopathy. Discussed with pt the effects of diabetes on vision, importance of good blood sugar control, compliance with meds, and follow up care with PCP. Return in 1 year for dilated eye exam, sooner PRN.    2. Pseudophakia  S/p Yag OU. Clear.    3. Refractive error  Educated patient on refractive error and discussed lens options. Dispensed updated spectacle Rx. Educated about adaptation period to new specs.    Eyeglass Final Rx     Eyeglass Final Rx       Sphere Cylinder Axis Add    Right +0.50 +2.00 180 +2.75    Left +0.25 +1.00 160 +2.75    Expiration Date:  11/8/2019                  Follow-up in about 1 year (around 11/7/2019) for Diabetic Eye Exam. '

## 2018-11-29 ENCOUNTER — PATIENT OUTREACH (OUTPATIENT)
Dept: ADMINISTRATIVE | Facility: HOSPITAL | Age: 83
End: 2018-11-29

## 2018-11-29 DIAGNOSIS — E11.9 DM TYPE 2 WITHOUT RETINOPATHY: ICD-10-CM

## 2018-11-29 DIAGNOSIS — E11.9 TYPE 2 DIABETES MELLITUS WITHOUT COMPLICATION, WITHOUT LONG-TERM CURRENT USE OF INSULIN: Primary | ICD-10-CM

## 2018-12-03 ENCOUNTER — LAB VISIT (OUTPATIENT)
Dept: LAB | Facility: HOSPITAL | Age: 83
End: 2018-12-03
Attending: FAMILY MEDICINE
Payer: MEDICARE

## 2018-12-03 DIAGNOSIS — E11.9 DM TYPE 2 WITHOUT RETINOPATHY: ICD-10-CM

## 2018-12-03 DIAGNOSIS — E11.9 TYPE 2 DIABETES MELLITUS WITHOUT COMPLICATION, WITHOUT LONG-TERM CURRENT USE OF INSULIN: ICD-10-CM

## 2018-12-03 LAB
ESTIMATED AVG GLUCOSE: 117 MG/DL
HBA1C MFR BLD HPLC: 5.7 %

## 2018-12-03 PROCEDURE — 36415 COLL VENOUS BLD VENIPUNCTURE: CPT | Mod: PO

## 2018-12-03 PROCEDURE — 83036 HEMOGLOBIN GLYCOSYLATED A1C: CPT

## 2018-12-13 ENCOUNTER — OFFICE VISIT (OUTPATIENT)
Dept: FAMILY MEDICINE | Facility: CLINIC | Age: 83
End: 2018-12-13
Payer: MEDICARE

## 2018-12-13 ENCOUNTER — LAB VISIT (OUTPATIENT)
Dept: LAB | Facility: HOSPITAL | Age: 83
End: 2018-12-13
Attending: FAMILY MEDICINE
Payer: MEDICARE

## 2018-12-13 VITALS
SYSTOLIC BLOOD PRESSURE: 179 MMHG | RESPIRATION RATE: 18 BRPM | OXYGEN SATURATION: 97 % | WEIGHT: 101.94 LBS | HEART RATE: 70 BPM | HEIGHT: 59 IN | TEMPERATURE: 98 F | BODY MASS INDEX: 20.55 KG/M2 | DIASTOLIC BLOOD PRESSURE: 60 MMHG

## 2018-12-13 DIAGNOSIS — M15.9 OSTEOARTHRITIS INVOLVING MULTIPLE JOINTS ON BOTH SIDES OF BODY: ICD-10-CM

## 2018-12-13 DIAGNOSIS — M54.50 CHRONIC MIDLINE LOW BACK PAIN WITHOUT SCIATICA: Primary | ICD-10-CM

## 2018-12-13 DIAGNOSIS — I10 ESSENTIAL HYPERTENSION: ICD-10-CM

## 2018-12-13 DIAGNOSIS — G89.29 CHRONIC BACK PAIN, UNSPECIFIED BACK LOCATION, UNSPECIFIED BACK PAIN LATERALITY: ICD-10-CM

## 2018-12-13 DIAGNOSIS — M53.86 DECREASED RANGE OF MOTION OF LUMBAR SPINE: ICD-10-CM

## 2018-12-13 DIAGNOSIS — G89.29 CHRONIC MIDLINE LOW BACK PAIN WITHOUT SCIATICA: Primary | ICD-10-CM

## 2018-12-13 DIAGNOSIS — M54.9 CHRONIC BACK PAIN, UNSPECIFIED BACK LOCATION, UNSPECIFIED BACK PAIN LATERALITY: ICD-10-CM

## 2018-12-13 LAB
ALBUMIN SERPL BCP-MCNC: 3.3 G/DL
ALP SERPL-CCNC: 59 U/L
ALT SERPL W/O P-5'-P-CCNC: 15 U/L
ANION GAP SERPL CALC-SCNC: 6 MMOL/L
AST SERPL-CCNC: 19 U/L
BILIRUB SERPL-MCNC: 0.2 MG/DL
BUN SERPL-MCNC: 22 MG/DL
CALCIUM SERPL-MCNC: 9.9 MG/DL
CHLORIDE SERPL-SCNC: 103 MMOL/L
CO2 SERPL-SCNC: 31 MMOL/L
CREAT SERPL-MCNC: 1 MG/DL
EST. GFR  (AFRICAN AMERICAN): 59.8 ML/MIN/1.73 M^2
EST. GFR  (NON AFRICAN AMERICAN): 51.9 ML/MIN/1.73 M^2
GLUCOSE SERPL-MCNC: 106 MG/DL
POTASSIUM SERPL-SCNC: 5.1 MMOL/L
PROT SERPL-MCNC: 6.9 G/DL
SODIUM SERPL-SCNC: 140 MMOL/L

## 2018-12-13 PROCEDURE — 99999 PR PBB SHADOW E&M-EST. PATIENT-LVL III: CPT | Mod: PBBFAC,,, | Performed by: FAMILY MEDICINE

## 2018-12-13 PROCEDURE — 36415 COLL VENOUS BLD VENIPUNCTURE: CPT | Mod: PO

## 2018-12-13 PROCEDURE — 80053 COMPREHEN METABOLIC PANEL: CPT

## 2018-12-13 PROCEDURE — 3077F SYST BP >= 140 MM HG: CPT | Mod: CPTII,S$GLB,, | Performed by: FAMILY MEDICINE

## 2018-12-13 PROCEDURE — 99214 OFFICE O/P EST MOD 30 MIN: CPT | Mod: S$GLB,,, | Performed by: FAMILY MEDICINE

## 2018-12-13 PROCEDURE — 3078F DIAST BP <80 MM HG: CPT | Mod: CPTII,S$GLB,, | Performed by: FAMILY MEDICINE

## 2018-12-13 PROCEDURE — 1101F PT FALLS ASSESS-DOCD LE1/YR: CPT | Mod: CPTII,S$GLB,, | Performed by: FAMILY MEDICINE

## 2018-12-13 RX ORDER — HYDROCODONE BITARTRATE AND ACETAMINOPHEN 10; 325 MG/1; MG/1
1 TABLET ORAL EVERY 6 HOURS PRN
Qty: 120 TABLET | Refills: 0 | Status: SHIPPED | OUTPATIENT
Start: 2019-01-13 | End: 2019-02-11

## 2018-12-13 RX ORDER — HYDROCODONE BITARTRATE AND ACETAMINOPHEN 10; 325 MG/1; MG/1
1 TABLET ORAL EVERY 6 HOURS PRN
Qty: 120 TABLET | Refills: 0 | Status: SHIPPED | OUTPATIENT
Start: 2019-02-13 | End: 2020-08-17 | Stop reason: SDUPTHER

## 2018-12-13 RX ORDER — HYDROCODONE BITARTRATE AND ACETAMINOPHEN 10; 325 MG/1; MG/1
1 TABLET ORAL EVERY 6 HOURS PRN
Qty: 120 TABLET | Refills: 0 | Status: SHIPPED | OUTPATIENT
Start: 2018-12-13 | End: 2019-01-11

## 2018-12-13 NOTE — PROGRESS NOTES
Chief Complaint   Patient presents with    Hypertension    Low-back Pain    Leg Pain       HPI  Kaycee Almanza is a 84 y.o. female with multiple medical diagnoses as listed in the medical history and problem list that presents for follow-up for hypertension, low back pain and pain in her knees.     She is hard to read regarding her pain control. She states her pain is ten but not when she takes her medicine. It seems she has bad days with weather changes but her daughter states she has been more active when she takes her medication. She has not gotten relief from intervention. They are hesitant to see another pain  Management physician.    She has had her kidneys checked by Dr. Morales and this has been sent to Dr. Sanchez but they have not gotten the results. Her blood pressure remains elevated due to her pain.    PAST MEDICAL HISTORY:  Past Medical History:   Diagnosis Date    Anxiety     Arthritis     Coronary artery disease     Dementia     Depression     Diabetes type 2, controlled     sees Dr. Elena    GERD (gastroesophageal reflux disease)     Hyperlipidemia     Hypertension     Left posterior capsular opacification 5/11/2017    Osteoporosis     Thyroid disease     Ulcer     Ulcer        PAST SURGICAL HISTORY:  Past Surgical History:   Procedure Laterality Date    CATARACT EXTRACTION W/  INTRAOCULAR LENS IMPLANT Bilateral 2006    done at [facility]    cataract surgery  2006    CHOLECYSTECTOMY      EYE SURGERY      HAND SURGERY      HYSTERECTOMY      Injection,steroid,epidural N/A 6/13/2018    Performed by Marvel Castro Jr[facility] MD at [facility]    KNEE SURGERY      SHOULDER SURGERY      yag laser  Bilateral        SOCIAL HISTORY:  Social History     Socioeconomic History    Marital status:      Spouse name: Not on file    Number of children: Not on file    Years of education: Not on file    Highest education level: Not on file   Social Needs    Financial resource  strain: Not on file    Food insecurity - worry: Not on file    Food insecurity - inability: Not on file    Transportation needs - medical: Not on file    Transportation needs - non-medical: Not on file   Occupational History    Not on file   Tobacco Use    Smoking status: Never Smoker    Smokeless tobacco: Never Used   Substance and Sexual Activity    Alcohol use: No    Drug use: No    Sexual activity: No   Other Topics Concern    Not on file   Social History Narrative    Not on file       FAMILY HISTORY:  Family History   Problem Relation Age of Onset    Stroke Mother     Diabetes Mother     Arthritis Father     Early death Sister     Birth defects Brother     No Known Problems Daughter     No Known Problems Son     No Known Problems Sister     Birth defects Brother     Birth defects Brother     Birth defects Brother     Birth defects Brother     No Known Problems Maternal Aunt     No Known Problems Maternal Uncle     No Known Problems Paternal Aunt     No Known Problems Paternal Uncle     No Known Problems Maternal Grandmother     No Known Problems Maternal Grandfather     No Known Problems Paternal Grandmother     No Known Problems Paternal Grandfather     Amblyopia Neg Hx     Blindness Neg Hx     Cataracts Neg Hx     Glaucoma Neg Hx     Macular degeneration Neg Hx     Retinal detachment Neg Hx     Strabismus Neg Hx     Cancer Neg Hx     Hypertension Neg Hx     Thyroid disease Neg Hx        ALLERGIES AND MEDICATIONS: updated and reviewed.  Review of patient's allergies indicates:  No Known Allergies  Current Outpatient Medications   Medication Sig Dispense Refill    ALPRAZolam (XANAX) 0.25 MG tablet       amLODIPine (NORVASC) 10 MG tablet Take 1 tablet (10 mg total) by mouth once daily. 90 tablet 1    aspirin (ECOTRIN) 81 MG EC tablet Take 81 mg by mouth once daily.      atorvastatin (LIPITOR) 20 MG tablet Take 10 mg by mouth once daily.       FLUAD 8516-1583, 65 YR  UP,,PF, 45 mcg (15 mcg x 3)/0.5 mL Syrg INJECT  0    gabapentin (NEURONTIN) 300 MG capsule Take 2 capsules (600 mg total) by mouth 3 (three) times daily. 180 capsule 5    hydroCHLOROthiazide (HYDRODIURIL) 12.5 MG Tab Take 1 tablet (12.5 mg total) by mouth once daily. 90 tablet 0    linagliptin (TRADJENTA) 5 mg Tab tablet Take 1 tablet (5 mg total) by mouth once daily. 90 tablet 3    lisinopril (PRINIVIL,ZESTRIL) 40 MG tablet Take 1 tablet (40 mg total) by mouth once daily. 90 tablet 3    potassium chloride (KLOR-CON) 10 MEQ TbSR TK 1 T PO BID FOR 15 DAYS  0    pravastatin (PRAVACHOL) 10 MG tablet Take 1 tablet (10 mg total) by mouth once daily. 90 tablet 1    sertraline (ZOLOFT) 100 MG tablet Take 1 tablet (100 mg total) by mouth once daily. 90 tablet 3    TRUE METRIX GLUCOSE TEST STRIP Strp Check blood glucose 2 times daily before meals 200 each 0    [START ON 2/13/2019] HYDROcodone-acetaminophen (NORCO)  mg per tablet Take 1 tablet by mouth every 6 (six) hours as needed. 120 tablet 0    [START ON 1/13/2019] HYDROcodone-acetaminophen (NORCO)  mg per tablet Take 1 tablet by mouth every 6 (six) hours as needed. 120 tablet 0    HYDROcodone-acetaminophen (NORCO)  mg per tablet Take 1 tablet by mouth every 6 (six) hours as needed. 120 tablet 0     No current facility-administered medications for this visit.        ROS  Review of Systems   Constitutional: Negative for chills, diaphoresis, fatigue, fever and unexpected weight change.   HENT: Negative for rhinorrhea, sinus pressure, sore throat and tinnitus.    Eyes: Negative for photophobia and visual disturbance.   Respiratory: Negative for cough, shortness of breath and wheezing.    Cardiovascular: Negative for chest pain and palpitations.   Gastrointestinal: Negative for abdominal pain, blood in stool, constipation, diarrhea, nausea and vomiting.   Genitourinary: Negative for dysuria, flank pain, frequency and vaginal discharge.  "  Musculoskeletal: Positive for arthralgias and back pain. Negative for joint swelling.   Skin: Negative for rash.   Neurological: Negative for speech difficulty, weakness, light-headedness and headaches.   Psychiatric/Behavioral: Negative for behavioral problems and dysphoric mood.       Physical Exam  Vitals:    12/13/18 0816 12/13/18 0824   BP: (!) 182/64 (!) 179/60   Pulse: 70    Resp: 18    Temp: 98.3 °F (36.8 °C)    TempSrc: Oral    SpO2: 97%    Weight: 46.2 kg (101 lb 15.4 oz)    Height: 4' 11" (1.499 m)     Body mass index is 20.59 kg/m².  Weight: 46.2 kg (101 lb 15.4 oz)   Height: 4' 11" (149.9 cm)     Physical Exam   Constitutional: She is oriented to person, place, and time. She appears well-developed and well-nourished.   HENT:   Head: Normocephalic and atraumatic.   Eyes: EOM are normal.   Neurological: She is alert and oriented to person, place, and time.   Skin: Skin is warm and dry. No rash noted. No erythema.   Psychiatric: She has a normal mood and affect. Her behavior is normal.   Nursing note and vitals reviewed.      Health Maintenance       Date Due Completion Date    Lipid Panel 03/06/2019 3/6/2018    Urine Microalbumin 03/06/2019 3/6/2018    Hemoglobin A1c 06/03/2019 12/3/2018    Override on 10/18/2016: Done (Dr. Hollis Luther/Endocrinology- hemoglobin a1c 6.2)    Foot Exam 07/19/2019 7/19/2018    Override on 2/15/2018: Done    Override on 11/9/2016: Done (Dr. Hollis Luther/Endocrinology-bilateral feet normal by visual exam, normal pulses,sensations intact,by monofilament,right DP's2/4,PT's 2/4,monofilament 10/10,cap refill <3 secs,left  DP's 2/4,PT's 2/4,monofilament 10/10,cap refill <3 secs)    Eye Exam 11/07/2019 11/7/2018    Override on 11/7/2018: Done    Override on 5/16/2018: Declined    Override on 4/19/2017: Done    DEXA SCAN 03/08/2021 3/8/2018    Override on 6/22/2016: Done (LAURY Barnett/Dr. Hollis Luther/Endocrinology- normal range in the lumbar spine & hips,left wrist " Tscore -2.7 improved from -3.3. Patient has had to stop Prolia, but patient had 4 doses. Patient is also on calcium + vitamin d supplements)    Override on 7/22/2014: Done (Dr. Hollis Luther/Endocrinology- AP Spine-1.247 g/cm 2 w/ T score =0.3,dual femur=0.979 g/cm 2 w/ T score =0.2,left forearm= 0.586 g/cm 2 w/ T score=3.3,patient started on prolia)    TETANUS VACCINE 03/09/2027 3/9/2017 (Declined)    Override on 3/9/2017: Declined            ASSESSMENT     1. Chronic midline low back pain without sciatica    2. Decreased range of motion of lumbar spine    3. Osteoarthritis involving multiple joints on both sides of body    4. Essential hypertension    5. Chronic back pain, unspecified back location, unspecified back pain laterality        PLAN:     Problem List Items Addressed This Visit        Cardiac/Vascular    Essential hypertension  -check renal function  -may need to d/c lisinopril and begin different CCB  -need blood pressure control to preserve renal function    Relevant Orders    Comprehensive metabolic panel       Orthopedic    Osteoarthritis involving multiple joints on both sides of body  -continue current pain regimen for now    Chronic back pain  -[place]  database queried/reviewed  We discussed that if her pain remains inadequately controlled we should consider pain management for medication recommendations    Relevant Medications    HYDROcodone-acetaminophen (NORCO)  mg per tablet (Start on 2/13/2019)    HYDROcodone-acetaminophen (NORCO)  mg per tablet (Start on 1/13/2019)    HYDROcodone-acetaminophen (NORCO)  mg per tablet    Chronic midline low back pain without sciatica - Primary  -continue current regimen    Relevant Medications    HYDROcodone-acetaminophen (NORCO)  mg per tablet (Start on 2/13/2019)    HYDROcodone-acetaminophen (NORCO)  mg per tablet (Start on 1/13/2019)    HYDROcodone-acetaminophen (NORCO)  mg per tablet    Decreased range of motion of  lumbar spine  -pt maintaining a reasonable level of activity            Iona Jose MD  12/13/2018 8:31 AM        Follow-up in about 3 months (around 3/13/2019) for Follow up.

## 2018-12-14 ENCOUNTER — TELEPHONE (OUTPATIENT)
Dept: FAMILY MEDICINE | Facility: CLINIC | Age: 83
End: 2018-12-14

## 2018-12-14 RX ORDER — NIFEDIPINE 30 MG/1
30 TABLET, EXTENDED RELEASE ORAL DAILY
Qty: 90 TABLET | Refills: 1 | Status: SHIPPED | OUTPATIENT
Start: 2018-12-14 | End: 2019-03-18 | Stop reason: SDUPTHER

## 2018-12-14 NOTE — TELEPHONE ENCOUNTER
Please let them know her kidney function has improved and is mildly decreased which is what we would expect to see for her age. I recommend we change her blood pressure medicine though. She can stop amlodipine and I will send a different medicine called nifedipine to her pharmacy. She can start taking it when it comes in. This can be increased to higher doses and allow up to control her pressure without affecting her kidneys.

## 2019-01-15 DIAGNOSIS — E11.9 TYPE 2 DIABETES MELLITUS WITHOUT COMPLICATION, WITHOUT LONG-TERM CURRENT USE OF INSULIN: ICD-10-CM

## 2019-01-15 RX ORDER — PRAVASTATIN SODIUM 10 MG/1
TABLET ORAL
Qty: 90 TABLET | Refills: 1 | Status: SHIPPED | OUTPATIENT
Start: 2019-01-15 | End: 2019-03-18 | Stop reason: SDUPTHER

## 2019-03-18 ENCOUNTER — OFFICE VISIT (OUTPATIENT)
Dept: FAMILY MEDICINE | Facility: CLINIC | Age: 84
End: 2019-03-18
Payer: MEDICARE

## 2019-03-18 ENCOUNTER — LAB VISIT (OUTPATIENT)
Dept: LAB | Facility: HOSPITAL | Age: 84
End: 2019-03-18
Attending: FAMILY MEDICINE
Payer: MEDICARE

## 2019-03-18 VITALS
OXYGEN SATURATION: 98 % | WEIGHT: 107.5 LBS | HEIGHT: 58 IN | SYSTOLIC BLOOD PRESSURE: 135 MMHG | TEMPERATURE: 98 F | DIASTOLIC BLOOD PRESSURE: 60 MMHG | BODY MASS INDEX: 22.56 KG/M2 | RESPIRATION RATE: 18 BRPM | HEART RATE: 69 BPM

## 2019-03-18 DIAGNOSIS — E11.42 TYPE 2 DIABETES MELLITUS WITH DIABETIC POLYNEUROPATHY, WITHOUT LONG-TERM CURRENT USE OF INSULIN: ICD-10-CM

## 2019-03-18 DIAGNOSIS — G89.29 CHRONIC MIDLINE LOW BACK PAIN WITHOUT SCIATICA: Primary | ICD-10-CM

## 2019-03-18 DIAGNOSIS — M54.50 CHRONIC MIDLINE LOW BACK PAIN WITHOUT SCIATICA: Primary | ICD-10-CM

## 2019-03-18 DIAGNOSIS — E11.43 TYPE 2 DIABETES MELLITUS WITH DIABETIC AUTONOMIC NEUROPATHY, WITHOUT LONG-TERM CURRENT USE OF INSULIN: ICD-10-CM

## 2019-03-18 DIAGNOSIS — I10 ESSENTIAL HYPERTENSION: ICD-10-CM

## 2019-03-18 DIAGNOSIS — G89.29 CHRONIC BACK PAIN, UNSPECIFIED BACK LOCATION, UNSPECIFIED BACK PAIN LATERALITY: ICD-10-CM

## 2019-03-18 DIAGNOSIS — R80.9 CONTROLLED TYPE 2 DIABETES MELLITUS WITH MICROALBUMINURIA, WITHOUT LONG-TERM CURRENT USE OF INSULIN: ICD-10-CM

## 2019-03-18 DIAGNOSIS — F41.9 ANXIETY: ICD-10-CM

## 2019-03-18 DIAGNOSIS — M15.9 OSTEOARTHRITIS INVOLVING MULTIPLE JOINTS ON BOTH SIDES OF BODY: ICD-10-CM

## 2019-03-18 DIAGNOSIS — M54.9 CHRONIC BACK PAIN, UNSPECIFIED BACK LOCATION, UNSPECIFIED BACK PAIN LATERALITY: ICD-10-CM

## 2019-03-18 DIAGNOSIS — E11.29 CONTROLLED TYPE 2 DIABETES MELLITUS WITH MICROALBUMINURIA, WITHOUT LONG-TERM CURRENT USE OF INSULIN: ICD-10-CM

## 2019-03-18 DIAGNOSIS — E11.9 DM TYPE 2 WITHOUT RETINOPATHY: ICD-10-CM

## 2019-03-18 PROBLEM — T81.30XA WOUND DISRUPTION: Status: RESOLVED | Noted: 2018-02-10 | Resolved: 2019-03-18

## 2019-03-18 LAB
ALBUMIN/CREAT UR: 1135 UG/MG
CREAT UR-MCNC: 103 MG/DL
MICROALBUMIN UR DL<=1MG/L-MCNC: 1169 UG/ML

## 2019-03-18 PROCEDURE — 99999 PR PBB SHADOW E&M-EST. PATIENT-LVL IV: ICD-10-PCS | Mod: PBBFAC,,, | Performed by: FAMILY MEDICINE

## 2019-03-18 PROCEDURE — 1101F PT FALLS ASSESS-DOCD LE1/YR: CPT | Mod: CPTII,S$GLB,, | Performed by: FAMILY MEDICINE

## 2019-03-18 PROCEDURE — 99214 PR OFFICE/OUTPT VISIT, EST, LEVL IV, 30-39 MIN: ICD-10-PCS | Mod: S$GLB,,, | Performed by: FAMILY MEDICINE

## 2019-03-18 PROCEDURE — 1101F PR PT FALLS ASSESS DOC 0-1 FALLS W/OUT INJ PAST YR: ICD-10-PCS | Mod: CPTII,S$GLB,, | Performed by: FAMILY MEDICINE

## 2019-03-18 PROCEDURE — 3075F SYST BP GE 130 - 139MM HG: CPT | Mod: CPTII,S$GLB,, | Performed by: FAMILY MEDICINE

## 2019-03-18 PROCEDURE — 3078F PR MOST RECENT DIASTOLIC BLOOD PRESSURE < 80 MM HG: ICD-10-PCS | Mod: CPTII,S$GLB,, | Performed by: FAMILY MEDICINE

## 2019-03-18 PROCEDURE — 82043 UR ALBUMIN QUANTITATIVE: CPT

## 2019-03-18 PROCEDURE — 3078F DIAST BP <80 MM HG: CPT | Mod: CPTII,S$GLB,, | Performed by: FAMILY MEDICINE

## 2019-03-18 PROCEDURE — 99999 PR PBB SHADOW E&M-EST. PATIENT-LVL IV: CPT | Mod: PBBFAC,,, | Performed by: FAMILY MEDICINE

## 2019-03-18 PROCEDURE — 99214 OFFICE O/P EST MOD 30 MIN: CPT | Mod: S$GLB,,, | Performed by: FAMILY MEDICINE

## 2019-03-18 PROCEDURE — 3075F PR MOST RECENT SYSTOLIC BLOOD PRESS GE 130-139MM HG: ICD-10-PCS | Mod: CPTII,S$GLB,, | Performed by: FAMILY MEDICINE

## 2019-03-18 RX ORDER — PRAVASTATIN SODIUM 10 MG/1
10 TABLET ORAL DAILY
Qty: 90 TABLET | Refills: 1 | Status: SHIPPED | OUTPATIENT
Start: 2019-03-18 | End: 2019-12-18

## 2019-03-18 RX ORDER — NIFEDIPINE 30 MG/1
30 TABLET, EXTENDED RELEASE ORAL DAILY
Qty: 90 TABLET | Refills: 3 | Status: SHIPPED | OUTPATIENT
Start: 2019-03-18 | End: 2019-04-09

## 2019-03-18 RX ORDER — SERTRALINE HYDROCHLORIDE 100 MG/1
100 TABLET, FILM COATED ORAL DAILY
Qty: 90 TABLET | Refills: 3 | Status: SHIPPED | OUTPATIENT
Start: 2019-03-18 | End: 2019-03-28 | Stop reason: SDUPTHER

## 2019-03-18 RX ORDER — HYDROCODONE BITARTRATE AND ACETAMINOPHEN 10; 325 MG/1; MG/1
1 TABLET ORAL EVERY 6 HOURS PRN
Qty: 120 TABLET | Refills: 0 | Status: SHIPPED | OUTPATIENT
Start: 2019-03-18 | End: 2019-04-17

## 2019-03-18 RX ORDER — HYDROCODONE BITARTRATE AND ACETAMINOPHEN 10; 325 MG/1; MG/1
1 TABLET ORAL EVERY 6 HOURS PRN
Qty: 120 TABLET | Refills: 0 | Status: SHIPPED | OUTPATIENT
Start: 2019-04-18 | End: 2019-05-08 | Stop reason: SDUPTHER

## 2019-03-18 RX ORDER — HYDROCODONE BITARTRATE AND ACETAMINOPHEN 10; 325 MG/1; MG/1
1 TABLET ORAL EVERY 6 HOURS PRN
Qty: 120 TABLET | Refills: 0 | Status: SHIPPED | OUTPATIENT
Start: 2019-05-18 | End: 2019-05-08 | Stop reason: SDUPTHER

## 2019-03-18 NOTE — ASSESSMENT & PLAN NOTE
Continue current pain medicine regimen, encouraged to flex pain medicine dosage to increase it to take it more frequently when she has more pain

## 2019-03-18 NOTE — PROGRESS NOTES
Chief Complaint   Patient presents with    Chronic Pain    Hypertension    Follow-up       HPI  Kaycee Almanza is a 84 y.o. female with multiple medical diagnoses as listed in the medical history and problem list that presents for follow-up for chronic pain in her back and knees and hypertension. She is having pain today as the weather was colder.    She is taking one pill in the AM and a half pill for lunch and a whole pill in the evening. She reports she has pain control when she takes the medicine. She does have some pain in her feet that is worse in the evening. She has not gotten relief from oral neurontin.    PAST MEDICAL HISTORY:  Past Medical History:   Diagnosis Date    Anxiety     Arthritis     Coronary artery disease     Dementia     Depression     Diabetes type 2, controlled     sees Dr. Elena    GERD (gastroesophageal reflux disease)     Hyperlipidemia     Hypertension     Left posterior capsular opacification 5/11/2017    Osteoporosis     Thyroid disease     Ulcer     Ulcer        PAST SURGICAL HISTORY:  Past Surgical History:   Procedure Laterality Date    CATARACT EXTRACTION W/  INTRAOCULAR LENS IMPLANT Bilateral 2006    [facility] at [facility]    cataract surgery  2006    CHOLECYSTECTOMY      EYE SURGERY      HAND SURGERY      HYSTERECTOMY      Injection,steroid,epidural N/A 6/13/2018    Performed by Marvel Castro Jr., MD at [facility] ENDO    KNEE SURGERY      SHOULDER SURGERY      yag laser  Bilateral        SOCIAL HISTORY:  Social History     Socioeconomic History    Marital status:      Spouse name: Not on file    Number of children: Not on file    Years of education: Not on file    Highest education level: Not on file   Social Needs    Financial resource strain: Not on file    Food insecurity - worry: Not on file    Food insecurity - inability: Not on file    Transportation needs - medical: Not on file    Transportation needs - non-medical: Not on file    Occupational History    Not on file   Tobacco Use    Smoking status: Never Smoker    Smokeless tobacco: Never Used   Substance and Sexual Activity    Alcohol use: No    Drug use: No    Sexual activity: No   Other Topics Concern    Not on file   Social History Narrative    Not on file       FAMILY HISTORY:  Family History   Problem Relation Age of Onset    Stroke Mother     Diabetes Mother     Arthritis Father     Early death Sister     Birth defects Brother     No Known Problems Daughter     No Known Problems Son     No Known Problems Sister     Birth defects Brother     Birth defects Brother     Birth defects Brother     Birth defects Brother     No Known Problems Maternal Aunt     No Known Problems Maternal Uncle     No Known Problems Paternal Aunt     No Known Problems Paternal Uncle     No Known Problems Maternal Grandmother     No Known Problems Maternal Grandfather     No Known Problems Paternal Grandmother     No Known Problems Paternal Grandfather     Amblyopia Neg Hx     Blindness Neg Hx     Cataracts Neg Hx     Glaucoma Neg Hx     Macular degeneration Neg Hx     Retinal detachment Neg Hx     Strabismus Neg Hx     Cancer Neg Hx     Hypertension Neg Hx     Thyroid disease Neg Hx        ALLERGIES AND MEDICATIONS: updated and reviewed.  Review of patient's allergies indicates:  No Known Allergies  Current Outpatient Medications   Medication Sig Dispense Refill    ALPRAZolam (XANAX) 0.25 MG tablet       aspirin (ECOTRIN) 81 MG EC tablet Take 81 mg by mouth once daily.      FLUAD 1404-3282, 65 YR UP,,PF, 45 mcg (15 mcg x 3)/0.5 mL Syrg INJECT  0    hydroCHLOROthiazide (HYDRODIURIL) 12.5 MG Tab Take 1 tablet (12.5 mg total) by mouth once daily. 90 tablet 0    linagliptin (TRADJENTA) 5 mg Tab tablet Take 1 tablet (5 mg total) by mouth once daily. 90 tablet 3    NIFEdipine (PROCARDIA-XL) 30 MG (OSM) 24 hr tablet Take 1 tablet (30 mg total) by mouth once daily. 90 tablet  3    potassium chloride (KLOR-CON) 10 MEQ TbSR TK 1 T PO BID FOR 15 DAYS  0    pravastatin (PRAVACHOL) 10 MG tablet Take 1 tablet (10 mg total) by mouth once daily. 90 tablet 1    sertraline (ZOLOFT) 100 MG tablet Take 1 tablet (100 mg total) by mouth once daily. 90 tablet 3    TRUE METRIX GLUCOSE TEST STRIP Strp Check blood glucose 2 times daily before meals 200 each 0    AGDBCB amitriptyline 10%- gabapentin 6%- diclofenac 8%- baclofen 2%- cyclobenaprine 2%- bupivacaine 1% in transdermal cream Apply 1 to 2 grams 3 to 4 times daily 100 g 5    [START ON 5/18/2019] HYDROcodone-acetaminophen (NORCO)  mg per tablet Take 1 tablet by mouth every 6 (six) hours as needed. 120 tablet 0    [START ON 4/18/2019] HYDROcodone-acetaminophen (NORCO)  mg per tablet Take 1 tablet by mouth every 6 (six) hours as needed. 120 tablet 0    HYDROcodone-acetaminophen (NORCO)  mg per tablet Take 1 tablet by mouth every 6 (six) hours as needed. 120 tablet 0     No current facility-administered medications for this visit.        ROS  Review of Systems   Constitutional: Negative for chills, diaphoresis, fatigue, fever and unexpected weight change.   HENT: Negative for rhinorrhea, sinus pressure, sore throat and tinnitus.    Eyes: Negative for photophobia and visual disturbance.   Respiratory: Negative for cough, shortness of breath and wheezing.    Cardiovascular: Negative for chest pain and palpitations.   Gastrointestinal: Negative for abdominal pain, blood in stool, constipation, diarrhea, nausea and vomiting.   Genitourinary: Negative for dysuria, flank pain, frequency and vaginal discharge.   Musculoskeletal: Positive for arthralgias. Negative for joint swelling.   Skin: Negative for rash.   Neurological: Negative for speech difficulty, weakness, light-headedness and headaches.   Psychiatric/Behavioral: Negative for behavioral problems and dysphoric mood.       Physical Exam  Vitals:    03/18/19 0740   BP: 135/60  "  Pulse: 69   Resp: 18   Temp: 97.7 °F (36.5 °C)   TempSrc: Oral   SpO2: 98%   Weight: 48.8 kg (107 lb 7.6 oz)   Height: 4' 10" (1.473 m)    Body mass index is 22.46 kg/m².  Weight: 48.8 kg (107 lb 7.6 oz)   Height: 4' 10" (147.3 cm)     Physical Exam   Constitutional: She is oriented to person, place, and time. She appears well-developed and well-nourished. No distress.   HENT:   Head: Normocephalic and atraumatic.   Eyes: EOM are normal.   Neck: Neck supple.   Cardiovascular: Normal rate and regular rhythm. Exam reveals no gallop and no friction rub.   No murmur heard.  Pulmonary/Chest: Effort normal and breath sounds normal. No respiratory distress. She has no wheezes. She has no rales.   Neurological: She is alert and oriented to person, place, and time.   Skin: Skin is warm and dry. No rash noted.   Psychiatric: She has a normal mood and affect. Her behavior is normal.   Nursing note and vitals reviewed.      Health Maintenance       Date Due Completion Date    Lipid Panel 03/06/2019 3/6/2018    Urine Microalbumin 03/06/2019 3/6/2018    Hemoglobin A1c 06/03/2019 12/3/2018    Override on 10/18/2016: Done (Dr. Hollis Luther/Endocrinology- hemoglobin a1c 6.2)    Foot Exam 07/19/2019 7/19/2018    Override on 2/15/2018: Done    Override on 11/9/2016: Done (Dr. Hollis Luther/Endocrinology-bilateral feet normal by visual exam, normal pulses,sensations intact,by monofilament,right DP's2/4,PT's 2/4,monofilament 10/10,cap refill <3 secs,left  DP's 2/4,PT's 2/4,monofilament 10/10,cap refill <3 secs)    Eye Exam 11/07/2019 11/7/2018    Override on 11/7/2018: Done    Override on 5/16/2018: Declined    Override on 4/19/2017: Done    DEXA SCAN 03/08/2021 3/8/2018    Override on 6/22/2016: Done (NP Akilah Barnett/Dr. Hollis Luther/Endocrinology- normal range in the lumbar spine & hips,left wrist Tscore -2.7 improved from -3.3. Patient has had to stop Prolia, but patient had 4 doses. Patient is also on calcium + vitamin d " supplements)    Override on 7/22/2014: Done (Dr. Hollis Luther/Endocrinology- AP Spine-1.247 g/cm 2 w/ T score =0.3,dual femur=0.979 g/cm 2 w/ T score =0.2,left forearm= 0.586 g/cm 2 w/ T score=3.3,patient started on prolia)    TETANUS VACCINE 03/09/2027 3/9/2017 (Declined)    Override on 3/9/2017: Declined            ASSESSMENT     1. Chronic midline low back pain without sciatica    2. Anxiety    3. Type 2 diabetes mellitus with diabetic autonomic neuropathy, without long-term current use of insulin    4. Osteoarthritis involving multiple joints on both sides of body    5. Controlled type 2 diabetes mellitus with microalbuminuria, without long-term current use of insulin    6. Essential hypertension    7. DM type 2 without retinopathy    8. Chronic back pain, unspecified back location, unspecified back pain laterality    9. Type 2 diabetes mellitus with diabetic polyneuropathy, without long-term current use of insulin        PLAN:     Problem List Items Addressed This Visit        Psychiatric    Anxiety    Current Assessment & Plan     Continue zoloft          Relevant Medications    sertraline (ZOLOFT) 100 MG tablet       Cardiac/Vascular    Essential hypertension    Current Assessment & Plan     Improved on current dose of nifedipine         Relevant Medications    NIFEdipine (PROCARDIA-XL) 30 MG (OSM) 24 hr tablet       Endocrine    Controlled type 2 diabetes mellitus with microalbuminuria, without long-term current use of insulin    Current Assessment & Plan     Continue to monitor, check microalbumin today         Type 2 diabetes mellitus with diabetic polyneuropathy, without long-term current use of insulin    Current Assessment & Plan     Begin compound cream for topical therapy of both feet         Relevant Medications    pravastatin (PRAVACHOL) 10 MG tablet    AGDBCB amitriptyline 10%- gabapentin 6%- diclofenac 8%- baclofen 2%- cyclobenaprine 2%- bupivacaine 1% in transdermal cream    DM type 2 without  retinopathy    Current Assessment & Plan     Stable continue to monitor            Orthopedic    Osteoarthritis involving multiple joints on both sides of body    Current Assessment & Plan     Encourage her to continue exercise as much as she can tolerate         Chronic back pain    Current Assessment & Plan     [place]  database queried/reviewed  Continue current dose, she is maintaining activity with current medication dose         Chronic midline low back pain without sciatica - Primary    Current Assessment & Plan     Continue current pain medicine regimen, encouraged to flex pain medicine dosage to increase it to take it more frequently when she has more pain         Relevant Medications    HYDROcodone-acetaminophen (NORCO)  mg per tablet (Start on 5/18/2019)    HYDROcodone-acetaminophen (NORCO)  mg per tablet (Start on 4/18/2019)    HYDROcodone-acetaminophen (NORCO)  mg per tablet            Iona Jose MD  03/18/2019 8:15 AM        Follow-up in about 3 months (around 6/18/2019) for management of chronic conditions.

## 2019-03-18 NOTE — ASSESSMENT & PLAN NOTE
[place]  database queried/reviewed  Continue current dose, she is maintaining activity with current medication dose

## 2019-03-28 DIAGNOSIS — F41.9 ANXIETY: ICD-10-CM

## 2019-03-28 RX ORDER — SERTRALINE HYDROCHLORIDE 100 MG/1
100 TABLET, FILM COATED ORAL DAILY
Qty: 90 TABLET | Refills: 3 | Status: SHIPPED | OUTPATIENT
Start: 2019-03-28 | End: 2019-12-30

## 2019-04-03 DIAGNOSIS — E11.9 TYPE 2 DIABETES MELLITUS WITHOUT COMPLICATION, WITHOUT LONG-TERM CURRENT USE OF INSULIN: ICD-10-CM

## 2019-04-03 RX ORDER — LINAGLIPTIN 5 MG/1
TABLET, FILM COATED ORAL
Qty: 90 TABLET | Refills: 3 | Status: SHIPPED | OUTPATIENT
Start: 2019-04-03 | End: 2020-01-13

## 2019-04-09 ENCOUNTER — OFFICE VISIT (OUTPATIENT)
Dept: NEPHROLOGY | Facility: CLINIC | Age: 84
End: 2019-04-09
Payer: MEDICARE

## 2019-04-09 VITALS
DIASTOLIC BLOOD PRESSURE: 70 MMHG | WEIGHT: 107.38 LBS | BODY MASS INDEX: 22.44 KG/M2 | SYSTOLIC BLOOD PRESSURE: 180 MMHG

## 2019-04-09 DIAGNOSIS — M54.50 CHRONIC LOW BACK PAIN WITHOUT SCIATICA, UNSPECIFIED BACK PAIN LATERALITY: ICD-10-CM

## 2019-04-09 DIAGNOSIS — I10 ESSENTIAL HYPERTENSION: ICD-10-CM

## 2019-04-09 DIAGNOSIS — E11.29 CONTROLLED TYPE 2 DIABETES MELLITUS WITH MICROALBUMINURIA, WITHOUT LONG-TERM CURRENT USE OF INSULIN: Primary | ICD-10-CM

## 2019-04-09 DIAGNOSIS — G89.29 CHRONIC LOW BACK PAIN WITHOUT SCIATICA, UNSPECIFIED BACK PAIN LATERALITY: ICD-10-CM

## 2019-04-09 DIAGNOSIS — R80.1 PERSISTENT PROTEINURIA: ICD-10-CM

## 2019-04-09 DIAGNOSIS — R80.9 CONTROLLED TYPE 2 DIABETES MELLITUS WITH MICROALBUMINURIA, WITHOUT LONG-TERM CURRENT USE OF INSULIN: Primary | ICD-10-CM

## 2019-04-09 PROCEDURE — 3078F DIAST BP <80 MM HG: CPT | Mod: CPTII,S$GLB,, | Performed by: INTERNAL MEDICINE

## 2019-04-09 PROCEDURE — 99213 OFFICE O/P EST LOW 20 MIN: CPT | Mod: S$GLB,,, | Performed by: INTERNAL MEDICINE

## 2019-04-09 PROCEDURE — 99999 PR PBB SHADOW E&M-EST. PATIENT-LVL II: CPT | Mod: PBBFAC,,, | Performed by: INTERNAL MEDICINE

## 2019-04-09 PROCEDURE — 99213 PR OFFICE/OUTPT VISIT, EST, LEVL III, 20-29 MIN: ICD-10-PCS | Mod: S$GLB,,, | Performed by: INTERNAL MEDICINE

## 2019-04-09 PROCEDURE — 1101F PR PT FALLS ASSESS DOC 0-1 FALLS W/OUT INJ PAST YR: ICD-10-PCS | Mod: CPTII,S$GLB,, | Performed by: INTERNAL MEDICINE

## 2019-04-09 PROCEDURE — 99999 PR PBB SHADOW E&M-EST. PATIENT-LVL II: ICD-10-PCS | Mod: PBBFAC,,, | Performed by: INTERNAL MEDICINE

## 2019-04-09 PROCEDURE — 1101F PT FALLS ASSESS-DOCD LE1/YR: CPT | Mod: CPTII,S$GLB,, | Performed by: INTERNAL MEDICINE

## 2019-04-09 PROCEDURE — 3077F SYST BP >= 140 MM HG: CPT | Mod: CPTII,S$GLB,, | Performed by: INTERNAL MEDICINE

## 2019-04-09 PROCEDURE — 3077F PR MOST RECENT SYSTOLIC BLOOD PRESSURE >= 140 MM HG: ICD-10-PCS | Mod: CPTII,S$GLB,, | Performed by: INTERNAL MEDICINE

## 2019-04-09 PROCEDURE — 3078F PR MOST RECENT DIASTOLIC BLOOD PRESSURE < 80 MM HG: ICD-10-PCS | Mod: CPTII,S$GLB,, | Performed by: INTERNAL MEDICINE

## 2019-04-09 RX ORDER — NIFEDIPINE 60 MG/1
60 TABLET, EXTENDED RELEASE ORAL DAILY
Qty: 90 TABLET | Refills: 3 | Status: SHIPPED | OUTPATIENT
Start: 2019-04-09 | End: 2020-01-16

## 2019-04-11 ENCOUNTER — HOSPITAL ENCOUNTER (EMERGENCY)
Facility: HOSPITAL | Age: 84
Discharge: HOME | End: 2019-04-11
Attending: EMERGENCY MEDICINE
Payer: MEDICARE

## 2019-04-11 VITALS
RESPIRATION RATE: 20 BRPM | BODY MASS INDEX: 22.46 KG/M2 | WEIGHT: 107 LBS | DIASTOLIC BLOOD PRESSURE: 80 MMHG | HEIGHT: 58 IN | HEART RATE: 79 BPM | SYSTOLIC BLOOD PRESSURE: 184 MMHG | TEMPERATURE: 98 F | OXYGEN SATURATION: 96 %

## 2019-04-11 DIAGNOSIS — M25.552 LEFT HIP PAIN: ICD-10-CM

## 2019-04-11 PROCEDURE — 25000003 PHARM REV CODE 250: Performed by: EMERGENCY MEDICINE

## 2019-04-11 PROCEDURE — 99284 EMERGENCY DEPT VISIT MOD MDM: CPT | Mod: 25

## 2019-04-11 RX ORDER — HYDROCODONE BITARTRATE AND ACETAMINOPHEN 10; 325 MG/1; MG/1
1 TABLET ORAL
Status: COMPLETED | OUTPATIENT
Start: 2019-04-11 | End: 2019-04-11

## 2019-04-11 RX ADMIN — HYDROCODONE BITARTRATE AND ACETAMINOPHEN 1 TABLET: 10; 325 TABLET ORAL at 10:04

## 2019-04-12 NOTE — ED PROVIDER NOTES
Encounter Date: 4/11/2019    SCRIBE #1 NOTE: I, Michaela Aiken, am scribing for, and in the presence of,  Erasmo Quintana MD. I have scribed the following portions of the note - Other sections scribed: ROS, HPI, and PE.       History     Chief Complaint   Patient presents with    Hip Pain     Leftside hip pain x 2 day; Pt states pain worsen today;  Pt denies falling      CC: Hip Pain    HPI: This 84 y.o. female with a past medical history of Anxiety, Arthritis, Coronary artery disease, Dementia, Depression, Diabetes type 2, controlled, GERD , Hyperlipidemia, Hypertension, Left posterior capsular opacification (5/11/2017), Osteoporosis, Thyroid disease, Ulcer, and Ulcer, presents to the ED complaining of  atraumatic left hip pain for 2 days. Patient was walking had sudden onset hip pain. Denies fevers chills.  Patient able to bear weight though painfully so. She reports her back pain has been worse for last few days and using assisted walker more often. Denies any numbness to her L foot. Denies bowel/bladder incontinence. Denies saddle anesthesia. Takes Vicodin for her back pain ; last took at 1 PM ( 8 hrs ago PTA). Denies CP, SOB, N/V or any other sx.     The history is provided by the patient and a relative (daughter).     Review of patient's allergies indicates:  No Known Allergies  Past Medical History:   Diagnosis Date    Anxiety     Arthritis     Coronary artery disease     Dementia     Depression     Diabetes type 2, controlled     sees Dr. Elena    GERD (gastroesophageal reflux disease)     Hyperlipidemia     Hypertension     Left posterior capsular opacification 5/11/2017    Osteoporosis     Thyroid disease     Ulcer     Ulcer      Past Surgical History:   Procedure Laterality Date    CATARACT EXTRACTION W/  INTRAOCULAR LENS IMPLANT Bilateral 2006    done at [facility]    cataract surgery  2006    CHOLECYSTECTOMY      EYE SURGERY      HAND SURGERY      HYSTERECTOMY       Injection,steroid,epidural N/A 6/13/2018    Performed by Marvel Castro Jr., MD at [facility]    KNEE SURGERY      SHOULDER SURGERY      yag laser  Bilateral      Family History   Problem Relation Age of Onset    Stroke Mother     Diabetes Mother     Arthritis Father     Early death Sister     Birth defects Brother     No Known Problems Daughter     No Known Problems Son     No Known Problems Sister     Birth defects Brother     Birth defects Brother     Birth defects Brother     Birth defects Brother     No Known Problems Maternal Aunt     No Known Problems Maternal Uncle     No Known Problems Paternal Aunt     No Known Problems Paternal Uncle     No Known Problems Maternal Grandmother     No Known Problems Maternal Grandfather     No Known Problems Paternal Grandmother     No Known Problems Paternal Grandfather     Amblyopia Neg Hx     Blindness Neg Hx     Cataracts Neg Hx     Glaucoma Neg Hx     Macular degeneration Neg Hx     Retinal detachment Neg Hx     Strabismus Neg Hx     Cancer Neg Hx     Hypertension Neg Hx     Thyroid disease Neg Hx      Social History     Tobacco Use    Smoking status: Never Smoker    Smokeless tobacco: Never Used   Substance Use Topics    Alcohol use: No    Drug use: No     Review of Systems   Constitutional: Negative for chills and fever.   HENT: Negative for congestion, ear pain, rhinorrhea and sore throat.    Eyes: Negative for pain and visual disturbance.   Respiratory: Negative for cough and shortness of breath.    Cardiovascular: Negative for chest pain.   Gastrointestinal: Negative for abdominal pain, diarrhea, nausea and vomiting.   Genitourinary: Negative for difficulty urinating and dysuria.        (-) bowel/bladder incontinence, (-) saddle anesthesia   Musculoskeletal: Positive for arthralgias (L hip) and back pain (chronic). Negative for neck pain.   Skin: Negative for rash.   Neurological: Negative for headaches.    Psychiatric/Behavioral: Negative for confusion.       Physical Exam     Initial Vitals [04/11/19 2040]   BP Pulse Resp Temp SpO2   (!) 152/67 83 17 98.9 °F (37.2 °C) 96 %      MAP       --         Physical Exam    Nursing note and vitals reviewed.  Constitutional: She appears well-developed and well-nourished. She is not diaphoretic. No distress.   HENT:   Head: Normocephalic and atraumatic.   Nose: Nose normal.   Mouth/Throat: Oropharynx is clear and moist.   Eyes: Conjunctivae and EOM are normal. Pupils are equal, round, and reactive to light. No scleral icterus.   Neck: Normal range of motion. Neck supple.   Cardiovascular: Normal rate, regular rhythm, normal heart sounds and intact distal pulses. Exam reveals no gallop and no friction rub.    No murmur heard.  Pulmonary/Chest: Breath sounds normal. No stridor. No respiratory distress. She has no wheezes. She has no rhonchi. She has no rales.   Abdominal: Soft. Normal appearance and bowel sounds are normal. She exhibits no distension. There is no tenderness. There is no rebound and no guarding.   Musculoskeletal: Normal range of motion. She exhibits no edema or tenderness.   Good ROM to L hip.    Neurological: She is alert and oriented to person, place, and time. She has normal strength. No cranial nerve deficit.   Normal strength, sensation, and reflexes.   Skin: Skin is warm and dry. No rash noted. No erythema. No pallor.   Psychiatric: She has a normal mood and affect. Her behavior is normal.         ED Course   Procedures  Labs Reviewed - No data to display       Imaging Results          X-Ray Lumbar Spine Ap And Lateral (Final result)  Result time 04/11/19 22:48:37    Final result by Vinicio Mendez MD (04/11/19 22:48:37)                 Impression:      Multilevel lumbar DJD.  No acute lumbar spine abnormalities identified.      Electronically signed by: Vinicio Mendez MD  Date:    04/11/2019  Time:    22:48             Narrative:    EXAMINATION:  XR  LUMBAR SPINE AP AND LATERAL    CLINICAL HISTORY:  sudden onset hip/back/leg pain;    TECHNIQUE:  AP, lateral and spot images were performed of the lumbar spine.    COMPARISON:  MRI lumbar spine from May 2018.    FINDINGS:  No evidence of acute lumbar spine fracture or subluxation.  There is grade 1 anterolisthesis of L5 on S1 secondary to facet arthropathy.  There is mild retrolisthesis seen of L3 on L4.  Multilevel intervertebral disc space narrowing is visualized, most prominent at the L1-2 and L3-4 levels.  Similar findings were seen on previous MRI from 2018.  Visualized sacrum is unremarkable.  Aortic plaque is seen.                               CT Hip Without Contrast Left (Final result)  Result time 04/11/19 22:26:52    Final result by Vinicio Mendez MD (04/11/19 22:26:52)                 Impression:      No evidence of fracture.      Electronically signed by: Vinicio Mendez MD  Date:    04/11/2019  Time:    22:26             Narrative:    EXAMINATION:  CT HIP WITHOUT CONTRAST LEFT    CLINICAL HISTORY:  left hip pain, possible occult fracture;    TECHNIQUE:  CT of the left hip was performed without the use of IV contrast.    COMPARISON:  Left hip radiographs from the same date.    FINDINGS:  No evidence of acute pelvic or left proximal femur fracture.  No evidence of dislocation.  Degenerative changes are seen within the lower visualized lumbar spine with grade 1 anterolisthesis of L5 on S1 secondary to prominent facet arthropathy.  Soft tissues of the hip show no significant abnormalities.  Partially visualized intra-abdominal and intrapelvic contents show no acute abnormalities.                               X-Ray Hip 2 View Left (Final result)  Result time 04/11/19 21:26:13    Final result by Vinicio Mendez MD (04/11/19 21:26:13)                 Impression:      No acute osseous abnormality identified.      Electronically signed by: Vinicio Mendez MD  Date:    04/11/2019  Time:    21:26              Narrative:    EXAMINATION:  XR HIP 2 VIEW LEFT    CLINICAL HISTORY:  Pain in left hip    TECHNIQUE:  AP view of the pelvis and frog leg lateral view of the left hip were performed.    COMPARISON:  None    FINDINGS:  No evidence of acute displaced fracture, dislocation, or osseous destructive process.  Mild degenerative changes are seen involving the bilateral hips.                                 Medical Decision Making:   Initial Assessment:   A 4-year-old female presenting with atraumatic left hip pain. Patient was walking had sudden onset hip pain. Denies fevers chills.  Patient able to bear weight though painfully so.  Exam reveals range of motion with moderate difficulty.  No obvious deformity.  X-ray negative. Differential includes occult fracture, arthritis, sciatica.  Neurovascular exam of the lower extremities is normal. No unusual incontinence or urinary retention.  Normal sensation.  ED Management:  X-rays and CT scan negative. Patient feeling better with Vicodin.  Doubt occult fracture.  Discussed need follow-up with primary care and Ortho.  Otherwise safe for discharge home.  With L-spine without acute fracture.  Decent range of motion, vitals normal, doubt septic hip.            Scribe Attestation:   Scribe #1: I performed the above scribed service and the documentation accurately describes the services I performed. I attest to the accuracy of the note.    Attending Attestation:           Physician Attestation for Scribe:  Physician Attestation Statement for Scribe #1: I, Erasmo Quintana MD, reviewed documentation, as scribed by Michaela Aiken in my presence, and it is both accurate and complete.                    Clinical Impression:       ICD-10-CM ICD-9-CM   1. Left hip pain M25.552 719.45         Disposition:   Disposition: Discharged  Condition: Stable                        Erasmo Quintana MD  04/13/19 0002

## 2019-04-12 NOTE — ED NOTES
MD coley notified of pt bp of 184/80. MD said for pt to be discharged and take night medication at home. Pt refused taking anymore pain medication.

## 2019-04-12 NOTE — ED TRIAGE NOTES
Pt reports walking and step to the side all of sudden Leftside hip pain started x 2 day; Pt states pain worsen today; Pt denies falling, or injury. Pt level 0/10 at rest and 10/10 with activity.. Pt reports taking hydrocodone at 1300.

## 2019-04-12 NOTE — DISCHARGE INSTRUCTIONS
You were seen in the emergency department for hip pain.  Your CT scan and x-rays do not show any fractures.  We believe you're safe for discharge home.  Please rest your hip as needed for pain. Please follow up with your primary care provider or orthopedist.  Please return for any new or worsening pain, nausea, vomiting, fevers, numbness or weakness of her extremity, urinary incontinence, inability to urinate, numbness between her legs, fecal incontinence, or you become concerned in any other way.

## 2019-04-16 NOTE — PROGRESS NOTES
Subjective:       Patient ID: Kaycee Almanza is a 84 y.o. White female who presents for follow up of Proteinuria    HPI    Ms. Almanza is an 84 year old woman with medical history of diabetes, hypertension presenting for follow up of proteinuria.  Patient reports blood sugars have been better controlled, blood pressure at home usually well-controlled.  Patient denies any NSAID use, she reports adequate fluid intake, she reports better dietary discretion with salt.  She otherwise denies any fever, chest pain, shortness of breath, abdominal pain, diarrhea, dysuria/hematuria.      Review of Systems   Constitutional: Negative for appetite change, fatigue and fever.   Respiratory: Negative for chest tightness and shortness of breath.    Cardiovascular: Negative for chest pain and leg swelling.   Gastrointestinal: Negative for abdominal pain, constipation, diarrhea, nausea and vomiting.   Genitourinary: Negative for difficulty urinating, dysuria, flank pain, frequency, hematuria and urgency.   Musculoskeletal: Positive for back pain. Negative for arthralgias, joint swelling and myalgias.   Skin: Negative for rash and wound.   Neurological: Negative for dizziness, weakness and light-headedness.   All other systems reviewed and are negative.      Objective:      Physical Exam   Musculoskeletal: She exhibits no edema.   Vitals reviewed.      Assessment:       1. Controlled type 2 diabetes mellitus with microalbuminuria, without long-term current use of insulin    2. Persistent proteinuria    3. Essential hypertension        Plan:     Ms. Almanza is an 84 year old woman with medical history of diabetes, hypertension presenting for follow up of proteinuria.  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  Urine protein up to 4gm March  2018, possibly due to worse glycemic control, microalbuminuria down to 1g/day March 2019, will continue to trend, stressed importance of blood pressure/glycemic control to prevent any further progression of kidney disease, patient voiced understanding.  Further recommendations pending results of above.   - Hypokalemia: patient on HCTZ, continue potassium supplement    Return to clinic in 6-8 months pending urine studies within 3 months, then with renal/heme panel, iron/TIBC/ferritin, urinalysis/culture, urine protein/creatinine ratio prior to next visit

## 2019-05-07 ENCOUNTER — TELEPHONE (OUTPATIENT)
Dept: FAMILY MEDICINE | Facility: CLINIC | Age: 84
End: 2019-05-07

## 2019-05-07 NOTE — TELEPHONE ENCOUNTER
----- Message from Chayo Laird sent at 5/7/2019 12:04 PM CDT -----  ..Type:  Sooner Appointment Request    Patient is requesting a sooner appointment.  Patient declined first available appointment listed as well as another facility and provider .  Patient will not accept being placed on the waitlist and is requesting a message be sent to doctor.    Name of Caller: daughter/ Jacklyn    When is the first available appointment? 06/18    Symptoms: loss of balance/ 2 falls/ Pressure has been high. Pt's daughter states pt does not feel comfortable seeing anyone other than Dr. Jose. I was unable to transfer pt to [facility] b/c daughter was not near the pt when calling in. She is asking for a sooner appt with Dr. Jose only before appt on 06/18    Would the patient rather a call back or a response via [facility]? Call back     Best call back number: [phone]        .

## 2019-05-08 ENCOUNTER — OFFICE VISIT (OUTPATIENT)
Dept: FAMILY MEDICINE | Facility: CLINIC | Age: 84
End: 2019-05-08
Payer: MEDICARE

## 2019-05-08 VITALS
OXYGEN SATURATION: 96 % | TEMPERATURE: 98 F | HEART RATE: 80 BPM | WEIGHT: 108 LBS | DIASTOLIC BLOOD PRESSURE: 50 MMHG | HEIGHT: 58 IN | BODY MASS INDEX: 22.67 KG/M2 | SYSTOLIC BLOOD PRESSURE: 160 MMHG | RESPIRATION RATE: 17 BRPM

## 2019-05-08 DIAGNOSIS — M54.50 CHRONIC MIDLINE LOW BACK PAIN WITHOUT SCIATICA: ICD-10-CM

## 2019-05-08 DIAGNOSIS — E11.29 CONTROLLED TYPE 2 DIABETES MELLITUS WITH MICROALBUMINURIA, WITHOUT LONG-TERM CURRENT USE OF INSULIN: ICD-10-CM

## 2019-05-08 DIAGNOSIS — R29.6 FALLS FREQUENTLY: ICD-10-CM

## 2019-05-08 DIAGNOSIS — G89.29 CHRONIC MIDLINE BACK PAIN, UNSPECIFIED BACK LOCATION: Primary | ICD-10-CM

## 2019-05-08 DIAGNOSIS — M15.9 OSTEOARTHRITIS INVOLVING MULTIPLE JOINTS ON BOTH SIDES OF BODY: ICD-10-CM

## 2019-05-08 DIAGNOSIS — G89.29 CHRONIC MIDLINE LOW BACK PAIN WITHOUT SCIATICA: ICD-10-CM

## 2019-05-08 DIAGNOSIS — M48.061 SPINAL STENOSIS OF LUMBAR REGION WITHOUT NEUROGENIC CLAUDICATION: ICD-10-CM

## 2019-05-08 DIAGNOSIS — M54.9 CHRONIC MIDLINE BACK PAIN, UNSPECIFIED BACK LOCATION: Primary | ICD-10-CM

## 2019-05-08 DIAGNOSIS — I10 ESSENTIAL HYPERTENSION: ICD-10-CM

## 2019-05-08 DIAGNOSIS — R80.9 CONTROLLED TYPE 2 DIABETES MELLITUS WITH MICROALBUMINURIA, WITHOUT LONG-TERM CURRENT USE OF INSULIN: ICD-10-CM

## 2019-05-08 PROCEDURE — 1101F PT FALLS ASSESS-DOCD LE1/YR: CPT | Mod: CPTII,S$GLB,, | Performed by: FAMILY MEDICINE

## 2019-05-08 PROCEDURE — 3077F PR MOST RECENT SYSTOLIC BLOOD PRESSURE >= 140 MM HG: ICD-10-PCS | Mod: CPTII,S$GLB,, | Performed by: FAMILY MEDICINE

## 2019-05-08 PROCEDURE — 99214 PR OFFICE/OUTPT VISIT, EST, LEVL IV, 30-39 MIN: ICD-10-PCS | Mod: S$GLB,,, | Performed by: FAMILY MEDICINE

## 2019-05-08 PROCEDURE — 99999 PR PBB SHADOW E&M-EST. PATIENT-LVL III: ICD-10-PCS | Mod: PBBFAC,,, | Performed by: FAMILY MEDICINE

## 2019-05-08 PROCEDURE — 1101F PR PT FALLS ASSESS DOC 0-1 FALLS W/OUT INJ PAST YR: ICD-10-PCS | Mod: CPTII,S$GLB,, | Performed by: FAMILY MEDICINE

## 2019-05-08 PROCEDURE — 99214 OFFICE O/P EST MOD 30 MIN: CPT | Mod: S$GLB,,, | Performed by: FAMILY MEDICINE

## 2019-05-08 PROCEDURE — 3078F PR MOST RECENT DIASTOLIC BLOOD PRESSURE < 80 MM HG: ICD-10-PCS | Mod: CPTII,S$GLB,, | Performed by: FAMILY MEDICINE

## 2019-05-08 PROCEDURE — 99999 PR PBB SHADOW E&M-EST. PATIENT-LVL III: CPT | Mod: PBBFAC,,, | Performed by: FAMILY MEDICINE

## 2019-05-08 PROCEDURE — 3077F SYST BP >= 140 MM HG: CPT | Mod: CPTII,S$GLB,, | Performed by: FAMILY MEDICINE

## 2019-05-08 PROCEDURE — 3078F DIAST BP <80 MM HG: CPT | Mod: CPTII,S$GLB,, | Performed by: FAMILY MEDICINE

## 2019-05-08 RX ORDER — HYDROCODONE BITARTRATE AND ACETAMINOPHEN 10; 325 MG/1; MG/1
1 TABLET ORAL EVERY 6 HOURS PRN
Qty: 120 TABLET | Refills: 0 | Status: SHIPPED | OUTPATIENT
Start: 2019-05-18 | End: 2019-06-18

## 2019-05-08 RX ORDER — HYDROCHLOROTHIAZIDE 12.5 MG/1
12.5 TABLET ORAL DAILY
Qty: 90 TABLET | Refills: 3 | Status: SHIPPED | OUTPATIENT
Start: 2019-05-08 | End: 2020-03-12

## 2019-05-08 NOTE — PROGRESS NOTES
Chief Complaint   Patient presents with    Follow-up     hospital f/u fall       HPI  Kaycee Almanza is a 84 y.o. female with multiple medical diagnoses as listed in the medical history and problem list that presents for follow-up for left hip pain and for two falls along w/ elevated blood pressure    She would lean to the side when walking due to her pain, she had imaging w/ CT scans and x rays that shows DJD. She reports her pain is a 10/10. Her daughter is concerns that she seems off balance and she is only using a cane inside her house.    PAST MEDICAL HISTORY:  Past Medical History:   Diagnosis Date    Anxiety     Arthritis     Coronary artery disease     Dementia     Depression     Diabetes type 2, controlled     sees Dr. Elena    GERD (gastroesophageal reflux disease)     Hyperlipidemia     Hypertension     Left posterior capsular opacification 5/11/2017    Osteoporosis     Thyroid disease     Ulcer     Ulcer        PAST SURGICAL HISTORY:  Past Surgical History:   Procedure Laterality Date    CATARACT EXTRACTION W/  INTRAOCULAR LENS IMPLANT Bilateral 2006    [facility]    cataract surgery  2006    CHOLECYSTECTOMY      EYE SURGERY      HAND SURGERY      HYSTERECTOMY      Injection,steroid,epidural N/A 6/13/2018    Performed by [facility] Jr[facility] at [facility]O    KNEE SURGERY      SHOULDER SURGERY      yag laser  Bilateral        SOCIAL HISTORY:  Social History     Socioeconomic History    Marital status:      Spouse name: Not on file    Number of children: Not on file    Years of education: Not on file    Highest education level: Not on file   Occupational History    Not on file   Social Needs    Financial resource strain: Not on file    Food insecurity:     Worry: Not on file     Inability: Not on file    Transportation needs:     Medical: Not on file     Non-medical: Not on file   Tobacco Use    Smoking status: Never Smoker    Smokeless tobacco:  Never Used   Substance and Sexual Activity    Alcohol use: No    Drug use: No    Sexual activity: Never   Lifestyle    Physical activity:     Days per week: Not on file     Minutes per session: Not on file    Stress: Not on file   Relationships    Social connections:     Talks on phone: Not on file     Gets together: Not on file     Attends [employer] service: Not on file     Active member of club or organization: Not on file     Attends meetings of clubs or organizations: Not on file     Relationship status: Not on file   Other Topics Concern    Not on file   Social History Narrative    Not on file       FAMILY HISTORY:  Family History   Problem Relation Age of Onset    Stroke Mother     Diabetes Mother     Arthritis Father     Early death Sister     Birth defects Brother     No Known Problems Daughter     No Known Problems Son     No Known Problems Sister     Birth defects Brother     Birth defects Brother     Birth defects Brother     Birth defects Brother     No Known Problems Maternal Aunt     No Known Problems Maternal Uncle     No Known Problems Paternal Aunt     No Known Problems Paternal Uncle     No Known Problems Maternal Grandmother     No Known Problems Maternal Grandfather     No Known Problems Paternal Grandmother     No Known Problems Paternal Grandfather     Amblyopia Neg Hx     Blindness Neg Hx     Cataracts Neg Hx     Glaucoma Neg Hx     Macular degeneration Neg Hx     Retinal detachment Neg Hx     Strabismus Neg Hx     Cancer Neg Hx     Hypertension Neg Hx     Thyroid disease Neg Hx        ALLERGIES AND MEDICATIONS: updated and reviewed.  Review of patient's allergies indicates:  No Known Allergies  Current Outpatient Medications   Medication Sig Dispense Refill    AGDBCB amitriptyline 10%- gabapentin 6%- diclofenac 8%- baclofen 2%- cyclobenaprine 2%- bupivacaine 1% in transdermal cream Apply 1 to 2 grams 3 to 4 times daily 100 g 5    ALPRAZolam (XANAX) 0.25  MG tablet       amlodipine besylate (NORVASC ORAL) Take by mouth.      aspirin (ECOTRIN) 81 MG EC tablet Take 81 mg by mouth once daily.      ergocalciferol, vitamin D2, (VITAMIN D ORAL) Take by mouth.      [START ON 5/18/2019] HYDROcodone-acetaminophen (NORCO)  mg per tablet Take 1 tablet by mouth every 6 (six) hours as needed. 120 tablet 0    NIFEdipine (PROCARDIA-XL) 60 MG (OSM) 24 hr tablet Take 1 tablet (60 mg total) by mouth once daily. 90 tablet 3    potassium chloride (KLOR-CON) 10 MEQ TbSR TK 1 T PO BID FOR 15 DAYS  0    pravastatin (PRAVACHOL) 10 MG tablet Take 1 tablet (10 mg total) by mouth once daily. 90 tablet 1    sertraline (ZOLOFT) 100 MG tablet Take 1 tablet (100 mg total) by mouth once daily. 90 tablet 3    TRADJENTA 5 mg Tab tablet TAKE 1 TABLET EVERY DAY 90 tablet 3    TRUE METRIX GLUCOSE TEST STRIP Strp Check blood glucose 2 times daily before meals 200 each 0    FLUAD 7498-5475, 65 YR UP,,PF, 45 mcg (15 mcg x 3)/0.5 mL Syrg INJECT  0    hydroCHLOROthiazide (HYDRODIURIL) 12.5 MG Tab Take 1 tablet (12.5 mg total) by mouth once daily. 90 tablet 3     No current facility-administered medications for this visit.        ROS  Review of Systems   Constitutional: Negative for chills, diaphoresis, fatigue, fever and unexpected weight change.   HENT: Negative for rhinorrhea, sinus pressure, sore throat and tinnitus.    Eyes: Negative for photophobia and visual disturbance.   Respiratory: Negative for cough, shortness of breath and wheezing.    Cardiovascular: Negative for chest pain and palpitations.   Gastrointestinal: Negative for abdominal pain, blood in stool, constipation, diarrhea, nausea and vomiting.   Genitourinary: Negative for dysuria, flank pain, frequency and vaginal discharge.   Musculoskeletal: Positive for arthralgias and back pain. Negative for joint swelling.   Skin: Negative for rash.   Neurological: Negative for speech difficulty, weakness, light-headedness and  "headaches.   Psychiatric/Behavioral: Negative for behavioral problems and dysphoric mood.       Physical Exam  Vitals:    05/08/19 1402 05/08/19 1437   BP: (!) 160/50 (!) 160/50   BP Location: Right arm Right arm   Patient Position: Sitting Sitting   BP Method: Medium (Manual) Medium (Manual)   Pulse: 80    Resp: 17    Temp: 98.3 °F (36.8 °C)    TempSrc: Oral    SpO2: 96%    Weight: 49 kg (108 lb 0.4 oz)    Height: 4' 10" (1.473 m)     Body mass index is 22.58 kg/m².  Weight: 49 kg (108 lb 0.4 oz)   Height: 4' 10" (147.3 cm)     Physical Exam   Constitutional: She is oriented to person, place, and time. She appears well-developed and well-nourished. No distress.   HENT:   Head: Normocephalic and atraumatic.   Eyes: EOM are normal.   Neck: Neck supple.   Cardiovascular: Normal rate and regular rhythm. Exam reveals no gallop and no friction rub.   No murmur heard.  Pulmonary/Chest: Effort normal and breath sounds normal. No respiratory distress. She has no wheezes. She has no rales.   Neurological: She is alert and oriented to person, place, and time.   Skin: Skin is warm and dry. No rash noted.   Psychiatric: She has a normal mood and affect. Her behavior is normal.   Nursing note and vitals reviewed.      Health Maintenance       Date Due Completion Date    Shingles Vaccine (1 of 2) 10/13/1984 ---    Lipid Panel 03/06/2019 3/6/2018    Hemoglobin A1c 06/03/2019 12/3/2018    Override on 10/18/2016: Done (Dr. Hollis Luther/Endocrinology- hemoglobin a1c 6.2)    Foot Exam 07/19/2019 7/19/2018    Override on 2/15/2018: Done    Override on 11/9/2016: Done (Dr. Hollis Luther/Endocrinology-bilateral feet normal by visual exam, normal pulses,sensations intact,by monofilament,right DP's2/4,PT's 2/4,monofilament 10/10,cap refill <3 secs,left  DP's 2/4,PT's 2/4,monofilament 10/10,cap refill <3 secs)    Influenza Vaccine 08/01/2019 10/17/2018 (Done)    Override on 10/17/2018: Done ([facility])    Eye Exam 11/07/2019 " 11/7/2018    Override on 11/7/2018: Done    Override on 5/16/2018: Declined    Override on 4/19/2017: Done    Urine Microalbumin 03/18/2020 3/18/2019    DEXA SCAN 03/08/2021 3/8/2018    Override on 6/22/2016: Done (LAURY Barnett/Dr. Hollis Luther/Endocrinology- normal range in the lumbar spine & hips,left wrist Tscore -2.7 improved from -3.3. Patient has had to stop Prolia, but patient had 4 doses. Patient is also on calcium + vitamin d supplements)    Override on 7/22/2014: Done (Dr. Hollis Luther/Endocrinology- AP Spine-1.247 g/cm 2 w/ T score =0.3,dual femur=0.979 g/cm 2 w/ T score =0.2,left forearm= 0.586 g/cm 2 w/ T score=3.3,patient started on prolia)    TETANUS VACCINE 03/09/2027 3/9/2017 (Declined)    Override on 3/9/2017: Declined          Health maintenance reviewed and addressed as ordered      ASSESSMENT     1. Chronic midline back pain, unspecified back location    2. Essential hypertension    3. Controlled type 2 diabetes mellitus with microalbuminuria, without long-term current use of insulin    4. Spinal stenosis of lumbar region without neurogenic claudication    5. Falls frequently    6. Osteoarthritis involving multiple joints on both sides of body    7. Chronic midline low back pain without sciatica        PLAN:     Problem List Items Addressed This Visit        Neuro    Lumbar spinal stenosis  -will order wheelchair to ensure safe gait    Relevant Orders    WHEELCHAIR FOR HOME USE       Cardiac/Vascular    Essential hypertension  -controlled       Endocrine    Controlled type 2 diabetes mellitus with microalbuminuria, without long-term current use of insulin  -continue current regimen       Orthopedic    Chronic back pain - Primary  -refill her norco for pain control, she needs to ambulate with her walker in the house also as her gait is unsteady    Relevant Medications    HYDROcodone-acetaminophen (NORCO)  mg per tablet (Start on 5/18/2019)    Osteoarthritis involving multiple joints  on both sides of body  -her daughter is in the process of moving her to [place], we discussed that she would benefit from PT      Other Visit Diagnoses     Falls frequently      -we will order a wheelchair for her as the one she has is very heavy not easy to lift    Relevant Orders    WHEELCHAIR FOR HOME USE            Iona Jose MD  05/08/2019 2:17 PM        Follow up in about 2 weeks (around 5/22/2019) for Follow up.

## 2019-05-17 ENCOUNTER — TELEPHONE (OUTPATIENT)
Dept: FAMILY MEDICINE | Facility: CLINIC | Age: 84
End: 2019-05-17

## 2019-05-17 DIAGNOSIS — M48.061 SPINAL STENOSIS OF LUMBAR REGION WITHOUT NEUROGENIC CLAUDICATION: Primary | ICD-10-CM

## 2019-05-17 NOTE — TELEPHONE ENCOUNTER
----- Message from Afia Farias sent at 5/17/2019  9:06 AM CDT -----  Contact: pt's chaka [phone]  Type: Patient Call Back    Who called:pt    What is the request in detail:wheel chair that was ordered is incorrect. Needs a transport chair. Call pt    Can the clinic reply by [facility]?    Would the patient rather a call back or a response via [facility]? Call back    Best call back number:pt's chaka [phone]    Additional Information:

## 2019-05-17 NOTE — TELEPHONE ENCOUNTER
"Dr. Jose had previously sent in for lightweight wheelchair - not sure if this is different.  I printed an rx for "transport wheelchair", please send to DME  "

## 2019-05-17 NOTE — TELEPHONE ENCOUNTER
"Spoke with pt's daughter the wheel chair that was ordered is the wrong one they sent the chair back .could she get an order for a "TRANSPORT CHAIR". Please Advise  "

## 2019-06-04 ENCOUNTER — PATIENT OUTREACH (OUTPATIENT)
Dept: ADMINISTRATIVE | Facility: HOSPITAL | Age: 84
End: 2019-06-04

## 2019-06-04 DIAGNOSIS — R80.9 CONTROLLED TYPE 2 DIABETES MELLITUS WITH MICROALBUMINURIA, WITHOUT LONG-TERM CURRENT USE OF INSULIN: Primary | ICD-10-CM

## 2019-06-04 DIAGNOSIS — E78.00 HYPERCHOLESTEROLEMIA: ICD-10-CM

## 2019-06-04 DIAGNOSIS — E11.29 CONTROLLED TYPE 2 DIABETES MELLITUS WITH MICROALBUMINURIA, WITHOUT LONG-TERM CURRENT USE OF INSULIN: Primary | ICD-10-CM

## 2019-06-18 ENCOUNTER — OFFICE VISIT (OUTPATIENT)
Dept: FAMILY MEDICINE | Facility: CLINIC | Age: 84
End: 2019-06-18
Payer: MEDICARE

## 2019-06-18 ENCOUNTER — LAB VISIT (OUTPATIENT)
Dept: LAB | Facility: HOSPITAL | Age: 84
End: 2019-06-18
Attending: FAMILY MEDICINE
Payer: MEDICARE

## 2019-06-18 VITALS
HEIGHT: 58 IN | HEART RATE: 84 BPM | TEMPERATURE: 98 F | SYSTOLIC BLOOD PRESSURE: 156 MMHG | DIASTOLIC BLOOD PRESSURE: 60 MMHG | OXYGEN SATURATION: 96 % | WEIGHT: 104 LBS | BODY MASS INDEX: 21.83 KG/M2

## 2019-06-18 DIAGNOSIS — M48.061 SPINAL STENOSIS OF LUMBAR REGION WITHOUT NEUROGENIC CLAUDICATION: ICD-10-CM

## 2019-06-18 DIAGNOSIS — I10 ESSENTIAL HYPERTENSION: ICD-10-CM

## 2019-06-18 DIAGNOSIS — R80.9 CONTROLLED TYPE 2 DIABETES MELLITUS WITH MICROALBUMINURIA, WITHOUT LONG-TERM CURRENT USE OF INSULIN: ICD-10-CM

## 2019-06-18 DIAGNOSIS — E78.00 HYPERCHOLESTEROLEMIA: ICD-10-CM

## 2019-06-18 DIAGNOSIS — M15.9 OSTEOARTHRITIS INVOLVING MULTIPLE JOINTS ON BOTH SIDES OF BODY: ICD-10-CM

## 2019-06-18 DIAGNOSIS — E11.29 CONTROLLED TYPE 2 DIABETES MELLITUS WITH MICROALBUMINURIA, WITHOUT LONG-TERM CURRENT USE OF INSULIN: ICD-10-CM

## 2019-06-18 DIAGNOSIS — G89.29 CHRONIC MIDLINE LOW BACK PAIN WITHOUT SCIATICA: Primary | ICD-10-CM

## 2019-06-18 DIAGNOSIS — M54.50 CHRONIC MIDLINE LOW BACK PAIN WITHOUT SCIATICA: Primary | ICD-10-CM

## 2019-06-18 LAB
CHOLEST SERPL-MCNC: 283 MG/DL (ref 120–199)
CHOLEST/HDLC SERPL: 7.1 {RATIO} (ref 2–5)
ESTIMATED AVG GLUCOSE: 117 MG/DL (ref 68–131)
HBA1C MFR BLD HPLC: 5.7 % (ref 4–5.6)
HDLC SERPL-MCNC: 40 MG/DL (ref 40–75)
HDLC SERPL: 14.1 % (ref 20–50)
LDLC SERPL CALC-MCNC: 178.4 MG/DL (ref 63–159)
NONHDLC SERPL-MCNC: 243 MG/DL
TRIGL SERPL-MCNC: 323 MG/DL (ref 30–150)

## 2019-06-18 PROCEDURE — 83036 HEMOGLOBIN GLYCOSYLATED A1C: CPT

## 2019-06-18 PROCEDURE — 99999 PR PBB SHADOW E&M-EST. PATIENT-LVL III: CPT | Mod: PBBFAC,,, | Performed by: FAMILY MEDICINE

## 2019-06-18 PROCEDURE — 1101F PT FALLS ASSESS-DOCD LE1/YR: CPT | Mod: CPTII,S$GLB,, | Performed by: FAMILY MEDICINE

## 2019-06-18 PROCEDURE — 3078F DIAST BP <80 MM HG: CPT | Mod: CPTII,S$GLB,, | Performed by: FAMILY MEDICINE

## 2019-06-18 PROCEDURE — 3077F SYST BP >= 140 MM HG: CPT | Mod: CPTII,S$GLB,, | Performed by: FAMILY MEDICINE

## 2019-06-18 PROCEDURE — 36415 COLL VENOUS BLD VENIPUNCTURE: CPT | Mod: PO

## 2019-06-18 PROCEDURE — 80061 LIPID PANEL: CPT

## 2019-06-18 PROCEDURE — 3078F PR MOST RECENT DIASTOLIC BLOOD PRESSURE < 80 MM HG: ICD-10-PCS | Mod: CPTII,S$GLB,, | Performed by: FAMILY MEDICINE

## 2019-06-18 PROCEDURE — 3077F PR MOST RECENT SYSTOLIC BLOOD PRESSURE >= 140 MM HG: ICD-10-PCS | Mod: CPTII,S$GLB,, | Performed by: FAMILY MEDICINE

## 2019-06-18 PROCEDURE — 99999 PR PBB SHADOW E&M-EST. PATIENT-LVL III: ICD-10-PCS | Mod: PBBFAC,,, | Performed by: FAMILY MEDICINE

## 2019-06-18 PROCEDURE — 99214 PR OFFICE/OUTPT VISIT, EST, LEVL IV, 30-39 MIN: ICD-10-PCS | Mod: S$GLB,,, | Performed by: FAMILY MEDICINE

## 2019-06-18 PROCEDURE — 1101F PR PT FALLS ASSESS DOC 0-1 FALLS W/OUT INJ PAST YR: ICD-10-PCS | Mod: CPTII,S$GLB,, | Performed by: FAMILY MEDICINE

## 2019-06-18 PROCEDURE — 99214 OFFICE O/P EST MOD 30 MIN: CPT | Mod: S$GLB,,, | Performed by: FAMILY MEDICINE

## 2019-06-18 RX ORDER — OXYCODONE AND ACETAMINOPHEN 5; 325 MG/1; MG/1
1 TABLET ORAL EVERY 6 HOURS PRN
Qty: 120 TABLET | Refills: 0 | Status: SHIPPED | OUTPATIENT
Start: 2019-06-18 | End: 2019-08-09

## 2019-06-18 RX ORDER — OXYCODONE AND ACETAMINOPHEN 5; 325 MG/1; MG/1
1 TABLET ORAL EVERY 6 HOURS PRN
Qty: 120 TABLET | Refills: 0 | Status: SHIPPED | OUTPATIENT
Start: 2019-07-18 | End: 2019-08-09

## 2019-06-18 RX ORDER — OXYCODONE AND ACETAMINOPHEN 5; 325 MG/1; MG/1
1 TABLET ORAL EVERY 6 HOURS PRN
Qty: 120 TABLET | Refills: 0 | Status: SHIPPED | OUTPATIENT
Start: 2019-08-18 | End: 2019-08-09

## 2019-06-18 NOTE — PROGRESS NOTES
Chief Complaint   Patient presents with    Chronic Pain       HPI  Kaycee Almanza is a 84 y.o. female with multiple medical diagnoses as listed in the medical history and problem list that presents for follow-up for chronic pain and HTN    Chronic pain- she has been having this in her lower back, has seen pain mgmt but has not been a candidate for surgery due to age and risk factors and intervention has failed her. She been reluctant to take pain medication due to risk of tolerance and addiction. Has been taking medication as prescribed but pain is 8 out of ten. Quality of life diminished.     HTN- elevated due to pain, has been difficult to control, she does feel better and reports some improvement w/ medication adjustment. asymptomatic    PAST MEDICAL HISTORY:  Past Medical History:   Diagnosis Date    Anxiety     Arthritis     Coronary artery disease     Dementia     Depression     Diabetes type 2, controlled     sees Dr. Elena    GERD (gastroesophageal reflux disease)     Hyperlipidemia     Hypertension     Left posterior capsular opacification 5/11/2017    Osteoporosis     Thyroid disease     Ulcer     Ulcer        PAST SURGICAL HISTORY:  Past Surgical History:   Procedure Laterality Date    CATARACT EXTRACTION W/  INTRAOCULAR LENS IMPLANT Bilateral 2006    done at [facility]    cataract surgery  2006    CHOLECYSTECTOMY      EYE SURGERY      HAND SURGERY      HYSTERECTOMY      Injection,steroid,epidural N/A 6/13/2018    Performed by [facility]O    KNEE SURGERY      SHOULDER SURGERY      yag laser  Bilateral        SOCIAL HISTORY:  Social History     Socioeconomic History    Marital status:      Spouse name: Not on file    Number of children: Not on file    Years of education: Not on file    Highest education level: Not on file   Occupational History    Not on file   Social Needs    Financial resource strain: Not on file    Food insecurity:      Worry: Not on file     Inability: Not on file    Transportation needs:     Medical: Not on file     Non-medical: Not on file   Tobacco Use    Smoking status: Never Smoker    Smokeless tobacco: Never Used   Substance and Sexual Activity    Alcohol use: No    Drug use: No    Sexual activity: Never   Lifestyle    Physical activity:     Days per week: Not on file     Minutes per session: Not on file    Stress: Not on file   Relationships    Social connections:     Talks on phone: Not on file     Gets together: Not on file     Attends [employer] service: Not on file     Active member of club or organization: Not on file     Attends meetings of clubs or organizations: Not on file     Relationship status: Not on file   Other Topics Concern    Not on file   Social History Narrative    Not on file       FAMILY HISTORY:  Family History   Problem Relation Age of Onset    Stroke Mother     Diabetes Mother     Arthritis Father     Early death Sister     Birth defects Brother     No Known Problems Daughter     No Known Problems Son     No Known Problems Sister     Birth defects Brother     Birth defects Brother     Birth defects Brother     Birth defects Brother     No Known Problems Maternal Aunt     No Known Problems Maternal Uncle     No Known Problems Paternal Aunt     No Known Problems Paternal Uncle     No Known Problems Maternal Grandmother     No Known Problems Maternal Grandfather     No Known Problems Paternal Grandmother     No Known Problems Paternal Grandfather     Amblyopia Neg Hx     Blindness Neg Hx     Cataracts Neg Hx     Glaucoma Neg Hx     Macular degeneration Neg Hx     Retinal detachment Neg Hx     Strabismus Neg Hx     Cancer Neg Hx     Hypertension Neg Hx     Thyroid disease Neg Hx        ALLERGIES AND MEDICATIONS: updated and reviewed.  Review of patient's allergies indicates:  No Known Allergies  Current Outpatient Medications   Medication Sig Dispense Refill     AGDBCB amitriptyline 10%- gabapentin 6%- diclofenac 8%- baclofen 2%- cyclobenaprine 2%- bupivacaine 1% in transdermal cream Apply 1 to 2 grams 3 to 4 times daily 100 g 5    ALPRAZolam (XANAX) 0.25 MG tablet       amlodipine besylate (NORVASC ORAL) Take by mouth.      aspirin (ECOTRIN) 81 MG EC tablet Take 81 mg by mouth once daily.      ergocalciferol, vitamin D2, (VITAMIN D ORAL) Take by mouth.      FLUAD [id number], 65 YR UP,,PF, 45 mcg (15 mcg x 3)/0.5 mL Syrg INJECT  0    hydroCHLOROthiazide (HYDRODIURIL) 12.5 MG Tab Take 1 tablet (12.5 mg total) by mouth once daily. 90 tablet 3    HYDROcodone-acetaminophen (NORCO)  mg per tablet Take 1 tablet by mouth every 6 (six) hours as needed. 120 tablet 0    NIFEdipine (PROCARDIA-XL) 60 MG (OSM) 24 hr tablet Take 1 tablet (60 mg total) by mouth once daily. 90 tablet 3    potassium chloride (KLOR-CON) 10 MEQ TbSR TK 1 T PO BID FOR 15 DAYS  0    pravastatin (PRAVACHOL) 10 MG tablet Take 1 tablet (10 mg total) by mouth once daily. 90 tablet 1    sertraline (ZOLOFT) 100 MG tablet Take 1 tablet (100 mg total) by mouth once daily. 90 tablet 3    TRADJENTA 5 mg Tab tablet TAKE 1 TABLET EVERY DAY 90 tablet 3    TRUE METRIX GLUCOSE TEST STRIP Strp Check blood glucose 2 times daily before meals 200 each 0    wheelchair Becka Transport wheelchair 1 each 0    oxyCODONE-acetaminophen (PERCOCET) 5-325 mg per tablet Take 1 tablet by mouth every 6 (six) hours as needed for Pain. 120 tablet 0    [START ON 7/18/2019] oxyCODONE-acetaminophen (PERCOCET) 5-325 mg per tablet Take 1 tablet by mouth every 6 (six) hours as needed for Pain. 120 tablet 0    [START ON 8/18/2019] oxyCODONE-acetaminophen (PERCOCET) 5-325 mg per tablet Take 1 tablet by mouth every 6 (six) hours as needed for Pain. 120 tablet 0     No current facility-administered medications for this visit.        ROS  Review of Systems   Constitutional: Negative for chills, diaphoresis, fatigue, fever and  "unexpected weight change.   HENT: Negative for rhinorrhea, sinus pressure, sore throat and tinnitus.    Eyes: Negative for photophobia and visual disturbance.   Respiratory: Negative for cough, shortness of breath and wheezing.    Cardiovascular: Negative for chest pain and palpitations.   Gastrointestinal: Negative for abdominal pain, blood in stool, constipation, diarrhea, nausea and vomiting.   Genitourinary: Negative for dysuria, flank pain, frequency and vaginal discharge.   Musculoskeletal: Positive for arthralgias, back pain and gait problem. Negative for joint swelling.   Skin: Negative for rash.   Neurological: Negative for speech difficulty, weakness, light-headedness and headaches.   Psychiatric/Behavioral: Negative for behavioral problems and dysphoric mood.       Physical Exam  Vitals:    06/18/19 1052 06/18/19 1149   BP: (!) 160/68 (!) 156/60   Pulse: 84    Temp: 98.1 °F (36.7 °C)    TempSrc: Oral    SpO2: 96%    Weight: 47.2 kg (104 lb)    Height: 4' 10" (1.473 m)     Body mass index is 21.74 kg/m².  Weight: 47.2 kg (104 lb)   Height: 4' 10" (147.3 cm)     Physical Exam   Constitutional: She is oriented to person, place, and time. She appears well-developed and well-nourished.   Eyes: EOM are normal.   Neurological: She is alert and oriented to person, place, and time.   Skin: Skin is warm and dry. No rash noted. No erythema.   Psychiatric: She has a normal mood and affect. Her behavior is normal.   Nursing note and vitals reviewed.      Health Maintenance       Date Due Completion Date    Shingles Vaccine (1 of 2) 10/13/1984 ---    Lipid Panel 03/06/2019 3/6/2018    Hemoglobin A1c 06/03/2019 12/3/2018    Override on 10/18/2016: Done (Dr. oHllis Luther/Endocrinology- hemoglobin a1c 6.2)    Foot Exam 07/19/2019 7/19/2018    Override on 2/15/2018: Done    Override on 11/9/2016: Done (Dr. Hollis Luther/Endocrinology-bilateral feet normal by visual exam, normal pulses,sensations intact,by " monofilament,right DP's2/4,PT's 2/4,monofilament 10/10,cap refill <3 secs,left  DP's 2/4,PT's 2/4,monofilament 10/10,cap refill <3 secs)    Influenza Vaccine 08/01/2019 10/17/2018    Override on 10/17/2018: Done ([facility])    Eye Exam 11/07/2019 11/7/2018    Override on 11/7/2018: Done    Override on 5/16/2018: Declined    Override on 4/19/2017: Done    Urine Microalbumin 03/18/2020 3/18/2019    DEXA SCAN 03/08/2021 3/8/2018    Override on 6/22/2016: Done (NP Akilah Barnett/Dr. Hollis Luther/Endocrinology- normal range in the lumbar spine & hips,left wrist Tscore -2.7 improved from -3.3. Patient has had to stop Prolia, but patient had 4 doses. Patient is also on calcium + vitamin d supplements)    Override on 7/22/2014: Done (Dr. Hollis Luther/Endocrinology- AP Spine-1.247 g/cm 2 w/ T score =0.3,dual femur=0.979 g/cm 2 w/ T score =0.2,left forearm= 0.586 g/cm 2 w/ T score=3.3,patient started on prolia)    TETANUS VACCINE 03/09/2027 3/9/2017 (Declined)    Override on 3/9/2017: Declined          Health maintenance reviewed and addressed as ordered      ASSESSMENT     1. Chronic midline low back pain without sciatica    2. Essential hypertension    3. Osteoarthritis involving multiple joints on both sides of body    4. Spinal stenosis of lumbar region without neurogenic claudication        PLAN:     Problem List Items Addressed This Visit        Neuro    Lumbar spinal stenosis  -continue pain mgmt  -increase to percocet  -encouraged to take regularly       Cardiac/Vascular    Essential hypertension  -suspect this will improve w/ pain control       Orthopedic    Chronic midline low back pain without sciatica - Primary  -[place]  database queried/reviewed  -increase strength of medication to improve quality of life  -not surgical candidate and failed intervention  -consider resuming PT vs pain  for more longer lasting medications in future    Relevant Medications    oxyCODONE-acetaminophen (PERCOCET)  5-325 mg per tablet    oxyCODONE-acetaminophen (PERCOCET) 5-325 mg per tablet (Start on 7/18/2019)    oxyCODONE-acetaminophen (PERCOCET) 5-325 mg per tablet (Start on 8/18/2019)    Osteoarthritis involving multiple joints on both sides of body  -medication increase to improve quality of life    Relevant Medications    oxyCODONE-acetaminophen (PERCOCET) 5-325 mg per tablet    oxyCODONE-acetaminophen (PERCOCET) 5-325 mg per tablet (Start on 7/18/2019)    oxyCODONE-acetaminophen (PERCOCET) 5-325 mg per tablet (Start on 8/18/2019)            Iona Jose MD  06/18/2019 11:29 AM        Follow up in about 3 months (around 9/18/2019) for Follow up.

## 2019-07-08 ENCOUNTER — HOSPITAL ENCOUNTER (EMERGENCY)
Facility: HOSPITAL | Age: 84
Discharge: HOME | End: 2019-07-08
Attending: EMERGENCY MEDICINE
Payer: MEDICARE

## 2019-07-08 VITALS
WEIGHT: 104 LBS | TEMPERATURE: 99 F | HEART RATE: 81 BPM | RESPIRATION RATE: 18 BRPM | BODY MASS INDEX: 23.39 KG/M2 | HEIGHT: 56 IN | SYSTOLIC BLOOD PRESSURE: 177 MMHG | OXYGEN SATURATION: 96 % | DIASTOLIC BLOOD PRESSURE: 75 MMHG

## 2019-07-08 DIAGNOSIS — I10 ESSENTIAL HYPERTENSION: ICD-10-CM

## 2019-07-08 DIAGNOSIS — M15.9 OSTEOARTHRITIS INVOLVING MULTIPLE JOINTS ON BOTH SIDES OF BODY: Primary | ICD-10-CM
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
## 2019-07-08 DIAGNOSIS — M25.552 LEFT HIP PAIN: ICD-10-CM

## 2019-07-08 PROCEDURE — 25000003 PHARM REV CODE 250: Performed by: EMERGENCY MEDICINE

## 2019-07-08 PROCEDURE — 99284 EMERGENCY DEPT VISIT MOD MDM: CPT | Mod: 25

## 2019-07-08 RX ORDER — NIFEDIPINE 30 MG/1
60 TABLET, EXTENDED RELEASE ORAL DAILY
Status: DISCONTINUED | OUTPATIENT
Start: 2019-07-09 | End: 2019-07-08

## 2019-07-08 RX ORDER — ACETAMINOPHEN 500 MG
1000 TABLET ORAL
Status: COMPLETED | OUTPATIENT
Start: 2019-07-08 | End: 2019-07-08

## 2019-07-08 RX ORDER — NIFEDIPINE 30 MG/1
60 TABLET, EXTENDED RELEASE ORAL DAILY
Status: DISCONTINUED | OUTPATIENT
Start: 2019-07-08 | End: 2019-07-08 | Stop reason: HOSPADM

## 2019-07-08 RX ORDER — HYDROCHLOROTHIAZIDE 12.5 MG/1
12.5 TABLET ORAL
Status: COMPLETED | OUTPATIENT
Start: 2019-07-08 | End: 2019-07-08

## 2019-07-08 RX ADMIN — NIFEDIPINE 60 MG: 30 TABLET, FILM COATED, EXTENDED RELEASE ORAL at 04:07

## 2019-07-08 RX ADMIN — ACETAMINOPHEN 1000 MG: 500 TABLET, FILM COATED ORAL at 02:07

## 2019-07-08 RX ADMIN — HYDROCHLOROTHIAZIDE 12.5 MG: 12.5 TABLET ORAL at 04:07

## 2019-07-08 NOTE — DISCHARGE INSTRUCTIONS
X-rays do not show fracture or dislocation but do show significant arthritis in your hip as well as your knee.  Use the pain medication you have been prescribed as needed for pain. Your blood pressure was elevated in the emergency department but has begun to improve after receiving your prescribed blood pressure medication.  Is important to take your blood pressure medication as you have been prescribed.  You should make an appointment to see your primary care physician as soon as possible to monitor symptoms, check your blood pressure and address whether any changes to your blood pressure medication regiment are warranted.  Return to the emergency department for severe pain not improved by medications, numbness, weakness, vision changes, uncontrollable nausea and vomiting, changes in mental status or any new, worsening or concerning symptoms.

## 2019-07-08 NOTE — ED PROVIDER NOTES
Encounter Date: 7/8/2019       History     Chief Complaint   Patient presents with    Hip Pain     Pt reports left hip pain after a trip and fall (previous shortening of LLE, non acute). Pt denies loss of consciousness.     84-year-old female with history of arthritis, hypertension, diabetes presents for evaluation of left hip and knee pain beginning after a fall just prior to arrival.  Patient reports that she bent down to pick something up then lost her balance causing her to fall on her left side.  Patient reports striking the left side of her face on the ground but denies loss of consciousness.  She denies headache, vision changes, nausea or vomiting. Patient complains of aching left hip in knee pain.  Pain is worsened by attempted mange of motion.  Pain is improved by staying still.  Patient denies numbness, weakness or bowel or bladder symptoms.        Review of patient's allergies indicates:  No Known Allergies  Past Medical History:   Diagnosis Date    Anxiety     Arthritis     Coronary artery disease     Dementia     Depression     Diabetes type 2, controlled     sees Dr. Elena    GERD (gastroesophageal reflux disease)     Hyperlipidemia     Hypertension     Left posterior capsular opacification 5/11/2017    Osteoporosis     Thyroid disease     Ulcer     Ulcer      Past Surgical History:   Procedure Laterality Date    CATARACT EXTRACTION W/  INTRAOCULAR LENS IMPLANT Bilateral 2006    [facility] at [facility]    cataract surgery  2006    CHOLECYSTECTOMY      EYE SURGERY      HAND SURGERY      HYSTERECTOMY      Injection,steroid,epidural N/A 6/13/2018    Performed by Marvel Castro Jr., [facility] at [facility]    KNEE SURGERY      SHOULDER SURGERY      yag laser  Bilateral      Family History   Problem Relation Age of Onset    Stroke Mother     Diabetes Mother     Arthritis Father     Early death Sister     Birth defects Brother     No Known Problems Daughter     No Known Problems Son      No Known Problems Sister     Birth defects Brother     Birth defects Brother     Birth defects Brother     Birth defects Brother     No Known Problems Maternal Aunt     No Known Problems Maternal Uncle     No Known Problems Paternal Aunt     No Known Problems Paternal Uncle     No Known Problems Maternal Grandmother     No Known Problems Maternal Grandfather     No Known Problems Paternal Grandmother     No Known Problems Paternal Grandfather     Amblyopia Neg Hx     Blindness Neg Hx     Cataracts Neg Hx     Glaucoma Neg Hx     Macular degeneration Neg Hx     Retinal detachment Neg Hx     Strabismus Neg Hx     Cancer Neg Hx     Hypertension Neg Hx     Thyroid disease Neg Hx      Social History     Tobacco Use    Smoking status: Never Smoker    Smokeless tobacco: Never Used   Substance Use Topics    Alcohol use: No    Drug use: No     Review of Systems   Constitutional: Negative for fever.   HENT: Negative for sore throat.    Respiratory: Negative for shortness of breath.    Cardiovascular: Negative for chest pain.   Gastrointestinal: Negative for nausea.   Genitourinary: Negative for dysuria.   Musculoskeletal: Positive for arthralgias and gait problem. Negative for back pain.   Skin: Negative for rash.   Neurological: Negative for weakness.   Hematological: Does not bruise/bleed easily.       Physical Exam     Initial Vitals [07/08/19 1252]   BP Pulse Resp Temp SpO2   (!) 166/70 62 18 99.4 °F (37.4 °C) 97 %      MAP       --         Physical Exam    Nursing note and vitals reviewed.  Constitutional: She is not diaphoretic. No distress.   HENT:   Head: Normocephalic.   Mouth/Throat: Oropharynx is clear and moist.   Mild bruising to the lateral aspect of the left eye  No periorbital step-offs or tenderness  No scalp hematoma  No Petty sign  No raccoon eyes   Eyes: Conjunctivae and EOM are normal. Pupils are equal, round, and reactive to light. No scleral icterus.   Neck: Normal range of  motion. Neck supple. No JVD present.   No midline C-spine tenderness   Cardiovascular: Normal rate, regular rhythm and intact distal pulses.   Pulmonary/Chest: Breath sounds normal. No stridor. No respiratory distress.   Abdominal: Soft. Bowel sounds are normal. She exhibits no distension. There is no tenderness.   Musculoskeletal: Normal range of motion. She exhibits no edema or tenderness.   No midline vertebral tenderness  Pelvis stable  + pain with left hip range of motion  + pain with left knee range of motion  Full and symmetrical distal pulses   Neurological: She is alert and oriented to person, place, and time. She has normal strength. No cranial nerve deficit or sensory deficit.   Skin: Skin is warm and dry.   Psychiatric: She has a normal mood and affect.         ED Course   Procedures  Labs Reviewed - No data to display       Imaging Results          X-Ray Hip 2 View Left (Final result)  Result time 07/08/19 14:13:00    Final result by Chip Goodrich MD (07/08/19 14:13:00)                 Impression:      No acute displaced fractures.      Electronically signed by: Chip Goodrich MD  Date:    07/08/2019  Time:    14:13             Narrative:    EXAMINATION:  XR HIP 2 VIEW LEFT    CLINICAL HISTORY:  Pain in left hip    TECHNIQUE:  AP view of the pelvis and frog leg lateral view of the left hip were performed.    COMPARISON:  Radiograph 04/11/2019.    FINDINGS:  No acute displaced fractures.  No suspicious lytic or blastic lesions.  Degenerative changes of the lower lumbar spine noted.                               X-Ray Knee 1 or 2 View Left (Final result)  Result time 07/08/19 14:10:14    Final result by Chip Goodrich MD (07/08/19 14:10:14)                 Impression:      1. No acute displaced fractures.      Electronically signed by: Chip Goodrich MD  Date:    07/08/2019  Time:    14:10             Narrative:    EXAMINATION:  XR KNEE 1 OR 2 VIEW LEFT    CLINICAL HISTORY:  Unspecified fall,  initial encounter    TECHNIQUE:  AP and cross-table lateral views of the left knee.    COMPARISON:  None.    FINDINGS:  No acute displaced fractures.  No subluxation or dislocation.  There is tricompartmental osteoarthritis most pronounced within the medial tibiofemoral compartment.  No suspicious lytic or blastic lesions.  No joint effusions.  Vascular calcifications are identified.                                 Medical Decision Making:   History:   Old Medical Records: I decided to obtain old medical records.  Old Records Summarized: other records.  Initial Assessment:   Past ED visit for left hip pain  Differential Diagnosis:   Contusion  Fracture  Dislocation  Low clinical suspicion of intracranial injury given lack of loss of consciousness, lack of neurological deficit  Clinical Tests:   Radiological Study: Ordered and Reviewed  ED Management:  Patient is afebrile no acute distress time history physical.  She has no focal neurological deficits.  Patient declines analgesia.  X-rays without fracture or dislocation.  X-rays do show degenerative changes of the lumbar spine, hip and knee.  On reassessment patient reports feeling well. Patient noted to become hypertensive in the emergency department.  She reports not taking her blood pressure medication today.  Patient denies any preceding chest pain, palpitations, lightheadedness or dizziness prior to her fall.  On reassessment she denies headache, vision changes, numbness or weakness. Patient given her prescribed antihypertensives as well as Tylenol for pain. On reassessment patient has had improvement in her blood pressure.  She is able to ambulate with assistance and reports some improvement in her pain. Patient remains alert and oriented x3 with a GCS of 15.  Family member at the bedside reports that patient's mental status is consistent with her baseline.  I have low clinical suspicion of intracranial injury in this patient.  She is stable for discharge to  follow up with her primary physician.  Patient and family counseled on supportive care, appropriate medication usage, concerning symptoms for which to return to ER and the importance of follow up. Understanding and agreement with treatment plan was expressed.   This chart was completed using dictation software, as a result there may be some transcription errors.                       Clinical Impression:       ICD-10-CM ICD-9-CM   1. Osteoarthritis involving multiple joints on both sides of body M15.9 715.89   2. Left hip pain M25.552 719.45   3. Fall W19.XXXA E888.9   4. Essential hypertension I10 401.9         Disposition:   Disposition: Discharged  Condition: Stable                        Williams Jimenez MD  07/08/19 [id number]

## 2019-07-08 NOTE — ED TRIAGE NOTES
Pt is reporting a fall approx 2 hours ago after leaning over to pick something up this morning. Pt says she fell on the left side of her face and her left hip. Pt has a small hematoma on the left side of her face. Pt also has some shortening in her L lower extremity. Denies LOC.

## 2019-08-09 ENCOUNTER — TELEPHONE (OUTPATIENT)
Dept: FAMILY MEDICINE | Facility: CLINIC | Age: 84
End: 2019-08-09

## 2019-08-09 DIAGNOSIS — M54.50 CHRONIC MIDLINE LOW BACK PAIN WITHOUT SCIATICA: Primary | ICD-10-CM

## 2019-08-09 DIAGNOSIS — G89.29 CHRONIC MIDLINE LOW BACK PAIN WITHOUT SCIATICA: Primary | ICD-10-CM

## 2019-08-09 RX ORDER — OXYCODONE AND ACETAMINOPHEN 7.5; 325 MG/1; MG/1
1 TABLET ORAL EVERY 6 HOURS PRN
Qty: 120 TABLET | Refills: 0 | Status: SHIPPED | OUTPATIENT
Start: 2019-08-09 | End: 2019-09-18 | Stop reason: SDUPTHER

## 2019-08-09 NOTE — TELEPHONE ENCOUNTER
----- Message from Peyton Gray sent at 8/9/2019  1:51 PM CDT -----  Contact: Daughter   Type:  Patient Returning Call    Who Called:  Jacklyn- Daughter     Who Left Message for Patient:  Radha     Does the patient know what this is regarding?: no   Would the patient rather a call back or a response via [facility]? Call     Best Call Back Number:[phone]

## 2019-08-09 NOTE — TELEPHONE ENCOUNTER
----- Message from Yolanda Cheek MA sent at 8/9/2019 11:00 AM CDT -----  Contact: enzo Villasenor  [phone]      ----- Message -----  From: Hayley Mg  Sent: 8/8/2019   4:16 PM  To: Damon Monson Staff    Type: Patient Call Back    Who called: enzo Villasenor    What is the request in detail: Patient's pain medication is not helping at all. Would like to know if the medication can be stronger? Patient is taking oxyCODONE-acetaminophen (PERCOCET) 5-325 mg per tablet.   .  [facility] - [place], [place] - [address]  [address] [address]  Phone: [phone] Fax: [phone]    Would the patient rather a call back or a response via [facility]? Call back    Best call back number: [phone]

## 2019-08-09 NOTE — TELEPHONE ENCOUNTER
Spoke with daughter in law who states pain med oxycodone not helping to relieve pain at all. Please advise

## 2019-08-09 NOTE — TELEPHONE ENCOUNTER
Ok we are going to increase to percocet 7-325 with same number of pills and she can take every 6 hours also    Iona Jose MD

## 2019-08-09 NOTE — TELEPHONE ENCOUNTER
How often is the patient taking her medication? She has a habit of waiting until her pain is severe to take her medication which is too late

## 2019-08-09 NOTE — TELEPHONE ENCOUNTER
Spoke with Jacklyn and she states the patient is taking her percocet as the doctor prescribed.  Every 6 hours.  Patient verbalized understandings.

## 2019-08-27 RX ORDER — INSULIN PUMP SYRINGE, 3 ML
EACH MISCELLANEOUS
Qty: 1 EACH | Refills: 0 | Status: SHIPPED | OUTPATIENT
Start: 2019-08-27 | End: 2021-09-20

## 2019-08-27 NOTE — TELEPHONE ENCOUNTER
----- Message from Peyton Gray sent at 8/27/2019 10:34 AM CDT -----  Contact: self  Type: Patient Call Back    Who called:self    What is the request in detail: Pt would like the nurse to speak to the pharmacy about glucose meter and strips.     Can the clinic reply by [facility]? no    Would the patient rather a call back or a response via [facility]? call    Best call back number:[phone]    Additional Information:[facility] - [facility] [address] [phone] (Phone)  [phone] (Fax)

## 2019-09-04 ENCOUNTER — PATIENT OUTREACH (OUTPATIENT)
Dept: ADMINISTRATIVE | Facility: HOSPITAL | Age: 84
End: 2019-09-04

## 2019-09-18 ENCOUNTER — OFFICE VISIT (OUTPATIENT)
Dept: FAMILY MEDICINE | Facility: CLINIC | Age: 84
End: 2019-09-18
Payer: MEDICARE

## 2019-09-18 VITALS
SYSTOLIC BLOOD PRESSURE: 140 MMHG | BODY MASS INDEX: 22.64 KG/M2 | DIASTOLIC BLOOD PRESSURE: 50 MMHG | OXYGEN SATURATION: 98 % | WEIGHT: 104.94 LBS | HEIGHT: 57 IN | HEART RATE: 71 BPM | TEMPERATURE: 97 F

## 2019-09-18 DIAGNOSIS — I10 ESSENTIAL HYPERTENSION: Primary | ICD-10-CM

## 2019-09-18 DIAGNOSIS — E11.29 CONTROLLED TYPE 2 DIABETES MELLITUS WITH MICROALBUMINURIA, WITHOUT LONG-TERM CURRENT USE OF INSULIN: ICD-10-CM

## 2019-09-18 DIAGNOSIS — M48.061 SPINAL STENOSIS OF LUMBAR REGION WITHOUT NEUROGENIC CLAUDICATION: ICD-10-CM

## 2019-09-18 DIAGNOSIS — M15.9 OSTEOARTHRITIS INVOLVING MULTIPLE JOINTS ON BOTH SIDES OF BODY: ICD-10-CM

## 2019-09-18 DIAGNOSIS — M54.50 CHRONIC MIDLINE LOW BACK PAIN WITHOUT SCIATICA: ICD-10-CM

## 2019-09-18 DIAGNOSIS — R80.9 CONTROLLED TYPE 2 DIABETES MELLITUS WITH MICROALBUMINURIA, WITHOUT LONG-TERM CURRENT USE OF INSULIN: ICD-10-CM

## 2019-09-18 DIAGNOSIS — G89.29 CHRONIC MIDLINE LOW BACK PAIN WITHOUT SCIATICA: ICD-10-CM

## 2019-09-18 DIAGNOSIS — Z23 NEED FOR INFLUENZA VACCINATION: ICD-10-CM

## 2019-09-18 PROCEDURE — 3078F DIAST BP <80 MM HG: CPT | Mod: CPTII,S$GLB,, | Performed by: FAMILY MEDICINE

## 2019-09-18 PROCEDURE — 1101F PT FALLS ASSESS-DOCD LE1/YR: CPT | Mod: CPTII,S$GLB,, | Performed by: FAMILY MEDICINE

## 2019-09-18 PROCEDURE — 99999 PR PBB SHADOW E&M-EST. PATIENT-LVL III: ICD-10-PCS | Mod: PBBFAC,,, | Performed by: FAMILY MEDICINE

## 2019-09-18 PROCEDURE — 99214 PR OFFICE/OUTPT VISIT, EST, LEVL IV, 30-39 MIN: ICD-10-PCS | Mod: S$GLB,,, | Performed by: FAMILY MEDICINE

## 2019-09-18 PROCEDURE — 3077F PR MOST RECENT SYSTOLIC BLOOD PRESSURE >= 140 MM HG: ICD-10-PCS | Mod: CPTII,S$GLB,, | Performed by: FAMILY MEDICINE

## 2019-09-18 PROCEDURE — 3077F SYST BP >= 140 MM HG: CPT | Mod: CPTII,S$GLB,, | Performed by: FAMILY MEDICINE

## 2019-09-18 PROCEDURE — 99214 OFFICE O/P EST MOD 30 MIN: CPT | Mod: S$GLB,,, | Performed by: FAMILY MEDICINE

## 2019-09-18 PROCEDURE — 3078F PR MOST RECENT DIASTOLIC BLOOD PRESSURE < 80 MM HG: ICD-10-PCS | Mod: CPTII,S$GLB,, | Performed by: FAMILY MEDICINE

## 2019-09-18 PROCEDURE — 99999 PR PBB SHADOW E&M-EST. PATIENT-LVL III: CPT | Mod: PBBFAC,,, | Performed by: FAMILY MEDICINE

## 2019-09-18 PROCEDURE — 1101F PR PT FALLS ASSESS DOC 0-1 FALLS W/OUT INJ PAST YR: ICD-10-PCS | Mod: CPTII,S$GLB,, | Performed by: FAMILY MEDICINE

## 2019-09-18 RX ORDER — OXYCODONE AND ACETAMINOPHEN 7.5; 325 MG/1; MG/1
1 TABLET ORAL EVERY 6 HOURS PRN
Qty: 120 TABLET | Refills: 0 | Status: SHIPPED | OUTPATIENT
Start: 2019-11-18 | End: 2019-12-18

## 2019-09-18 RX ORDER — CALCIUM CITRATE/VITAMIN D3 200MG-6.25
TABLET ORAL
Qty: 200 EACH | Refills: 11 | Status: SHIPPED | OUTPATIENT
Start: 2019-09-18

## 2019-09-18 RX ORDER — OXYCODONE AND ACETAMINOPHEN 7.5; 325 MG/1; MG/1
1 TABLET ORAL EVERY 6 HOURS PRN
Qty: 120 TABLET | Refills: 0 | Status: SHIPPED | OUTPATIENT
Start: 2019-10-18 | End: 2019-11-17

## 2019-09-18 RX ORDER — OXYCODONE AND ACETAMINOPHEN 7.5; 325 MG/1; MG/1
1 TABLET ORAL EVERY 6 HOURS PRN
Qty: 120 TABLET | Refills: 0 | Status: SHIPPED | OUTPATIENT
Start: 2019-09-18 | End: 2019-10-18

## 2019-09-18 NOTE — PROGRESS NOTES
Chief Complaint   Patient presents with    Chronic Pain       HPI  Kaycee Almanza is a 84 y.o. female with multiple medical diagnoses as listed in the medical history and problem list that presents for follow-up for chronic pain    Chronic pain in low back and other joints from arthritis-  She had a fall in July when she fell while leaning forward and had an ED visit in 7/8; negative xrays; increased percocet to 7/325  At worst pain is at 10; percocet improves pain to a 3; she does feel comfortable        PAST MEDICAL HISTORY:  Past Medical History:   Diagnosis Date    Anxiety     Arthritis     Coronary artery disease     Dementia     Depression     Diabetes type 2, controlled     sees Dr. Elena    GERD (gastroesophageal reflux disease)     Hyperlipidemia     Hypertension     Left posterior capsular opacification 5/11/2017    Osteoporosis     Thyroid disease     Ulcer     Ulcer        PAST SURGICAL HISTORY:  Past Surgical History:   Procedure Laterality Date    CATARACT EXTRACTION W/  INTRAOCULAR LENS IMPLANT Bilateral 2006    [facility]    cataract surgery  2006    CHOLECYSTECTOMY      EYE SURGERY      HAND SURGERY      HYSTERECTOMY      Injection,steroid,epidural N/A 6/13/2018    [facility]    KNEE SURGERY      SHOULDER SURGERY      yag laser  Bilateral        SOCIAL HISTORY:  Social History     Socioeconomic History    Marital status:      Spouse name: Not on file    Number of children: Not on file    Years of education: Not on file    Highest education level: Not on file   Occupational History    Not on file   Social Needs    Financial resource strain: Not on file    Food insecurity:     Worry: Not on file     Inability: Not on file    Transportation needs:     Medical: Not on file     Non-medical: Not on file   Tobacco Use    Smoking status: Never Smoker    Smokeless tobacco: Never Used   Substance and Sexual  Activity    Alcohol use: No    Drug use: No    Sexual activity: Never   Lifestyle    Physical activity:     Days per week: Not on file     Minutes per session: Not on file    Stress: Not at all   Relationships    Social connections:     Talks on phone: Not on file     Gets together: Not on file     Attends [employer] service: Not on file     Active member of club or organization: Not on file     Attends meetings of clubs or organizations: Not on file     Relationship status: Not on file   Other Topics Concern    Not on file   Social History Narrative    Not on file       FAMILY HISTORY:  Family History   Problem Relation Age of Onset    Stroke Mother     Diabetes Mother     Arthritis Father     Early death Sister     Birth defects Brother     No Known Problems Daughter     No Known Problems Son     No Known Problems Sister     Birth defects Brother     Birth defects Brother     Birth defects Brother     Birth defects Brother     No Known Problems Maternal Aunt     No Known Problems Maternal Uncle     No Known Problems Paternal Aunt     No Known Problems Paternal Uncle     No Known Problems Maternal Grandmother     No Known Problems Maternal Grandfather     No Known Problems Paternal Grandmother     No Known Problems Paternal Grandfather     Amblyopia Neg Hx     Blindness Neg Hx     Cataracts Neg Hx     Glaucoma Neg Hx     Macular degeneration Neg Hx     Retinal detachment Neg Hx     Strabismus Neg Hx     Cancer Neg Hx     Hypertension Neg Hx     Thyroid disease Neg Hx        ALLERGIES AND MEDICATIONS: updated and reviewed.  Review of patient's allergies indicates:  No Known Allergies  Current Outpatient Medications   Medication Sig Dispense Refill    AGDBCB amitriptyline 10%- gabapentin 6%- diclofenac 8%- baclofen 2%- cyclobenaprine 2%- bupivacaine 1% in transdermal cream Apply 1 to 2 grams 3 to 4 times daily 100 g 5    ALPRAZolam (XANAX) 0.25 MG tablet       amlodipine  besylate (NORVASC ORAL) Take by mouth.      aspirin (ECOTRIN) 81 MG EC tablet Take 81 mg by mouth once daily.      blood-glucose meter (FREESTYLE SYSTEM KIT) kit Use as instructed 1 each 0    ergocalciferol, vitamin D2, (VITAMIN D ORAL) Take by mouth.      FLUAD 7438-0002, 65 YR UP,,PF, 45 mcg (15 mcg x 3)/0.5 mL Syrg INJECT  0    hydroCHLOROthiazide (HYDRODIURIL) 12.5 MG Tab Take 1 tablet (12.5 mg total) by mouth once daily. 90 tablet 3    NIFEdipine (PROCARDIA-XL) 60 MG (OSM) 24 hr tablet Take 1 tablet (60 mg total) by mouth once daily. 90 tablet 3    potassium chloride (KLOR-CON) 10 MEQ TbSR TK 1 T PO BID FOR 15 DAYS  0    pravastatin (PRAVACHOL) 10 MG tablet Take 1 tablet (10 mg total) by mouth once daily. 90 tablet 1    sertraline (ZOLOFT) 100 MG tablet Take 1 tablet (100 mg total) by mouth once daily. 90 tablet 3    TRADJENTA 5 mg Tab tablet TAKE 1 TABLET EVERY DAY 90 tablet 3    TRUE METRIX GLUCOSE TEST STRIP Strp Check blood glucose 2 times daily before meals 200 each 11    wheelchair [employer] wheelchair 1 each 0    oxyCODONE-acetaminophen (PERCOCET) 7.5-325 mg per tablet Take 1 tablet by mouth every 6 (six) hours as needed for Pain. 120 tablet 0    [START ON 10/18/2019] oxyCODONE-acetaminophen (PERCOCET) 7.5-325 mg per tablet Take 1 tablet by mouth every 6 (six) hours as needed for Pain. 120 tablet 0    [START ON 11/18/2019] oxyCODONE-acetaminophen (PERCOCET) 7.5-325 mg per tablet Take 1 tablet by mouth every 6 (six) hours as needed for Pain. 120 tablet 0     No current facility-administered medications for this visit.        ROS  Review of Systems   Constitutional: Negative for chills, diaphoresis, fatigue, fever and unexpected weight change.   HENT: Negative for rhinorrhea, sinus pressure, sore throat and tinnitus.    Eyes: Negative for photophobia and visual disturbance.   Respiratory: Negative for cough, shortness of breath and wheezing.    Cardiovascular: Negative for chest pain  "and palpitations.   Gastrointestinal: Negative for abdominal pain, blood in stool, constipation, diarrhea, nausea and vomiting.   Genitourinary: Negative for dysuria, flank pain, frequency and vaginal discharge.   Musculoskeletal: Positive for arthralgias and back pain. Negative for joint swelling.   Skin: Negative for rash.   Neurological: Negative for speech difficulty, weakness, light-headedness and headaches.   Psychiatric/Behavioral: Negative for behavioral problems and dysphoric mood.       Physical Exam  Vitals:    09/18/19 0912 09/18/19 1004   BP: (!) 140/50 (!) 140/50   BP Location: Right arm    Patient Position: Sitting    BP Method: Medium (Manual)    Pulse: 71    Temp: 97.4 °F (36.3 °C)    TempSrc: Oral    SpO2: 98%    Weight: 47.6 kg (104 lb 15 oz)    Height: 4' 9" (1.448 m)     Body mass index is 22.71 kg/m².  Weight: 47.6 kg (104 lb 15 oz)   Height: 4' 9" (144.8 cm)     Physical Exam   Constitutional: She is oriented to person, place, and time. She appears well-developed and well-nourished.   HENT:   Head: Normocephalic and atraumatic.   Eyes: EOM are normal.   Neurological: She is alert and oriented to person, place, and time.   Skin: Skin is warm and dry. No rash noted. No erythema.   Psychiatric: She has a normal mood and affect. Her behavior is normal.   Nursing note and vitals reviewed.      Health Maintenance       Date Due Completion Date    Shingles Vaccine (1 of 2) 10/13/1984 ---    Foot Exam 07/19/2019 7/19/2018    Override on 2/15/2018: Done    Override on 11/9/2016: Done (Dr. Hollis Luther/Endocrinology-bilateral feet normal by visual exam, normal pulses,sensations intact,by monofilament,right DP's2/4,PT's 2/4,monofilament 10/10,cap refill <3 secs,left  DP's 2/4,PT's 2/4,monofilament 10/10,cap refill <3 secs)    Influenza Vaccine (1) 09/01/2019 10/17/2018    Override on 10/17/2018: Done ([facility])    Eye Exam 11/07/2019 11/7/2018    Override on 11/7/2018: Done    Override on " 5/16/2018: Declined    Override on 4/19/2017: Done    Hemoglobin A1c 12/18/2019 6/18/2019    Override on 10/18/2016: Done (Dr. Hollis Luther/Endocrinology- hemoglobin a1c 6.2)    Lipid Panel 06/18/2020 6/18/2019    Urine Microalbumin 06/18/2020 6/18/2019    DEXA SCAN 03/08/2021 3/8/2018    Override on 6/22/2016: Done (LAURY Barnett/Dr. Hollis Luther/Endocrinology- normal range in the lumbar spine & hips,left wrist Tscore -2.7 improved from -3.3. Patient has had to stop Prolia, but patient had 4 doses. Patient is also on calcium + vitamin d supplements)    Override on 7/22/2014: Done (Dr. Hollis Luther/Endocrinology- AP Spine-1.247 g/cm 2 w/ T score =0.3,dual femur=0.979 g/cm 2 w/ T score =0.2,left forearm= 0.586 g/cm 2 w/ T score=3.3,patient started on prolia)    TETANUS VACCINE 03/09/2027 3/9/2017 (Declined)    Override on 3/9/2017: Declined          Health maintenance reviewed and addressed as ordered      ASSESSMENT     1. Essential hypertension    2. Spinal stenosis of lumbar region without neurogenic claudication    3. Osteoarthritis involving multiple joints on both sides of body    4. Chronic midline low back pain without sciatica    5. Need for influenza vaccination    6. Controlled type 2 diabetes mellitus with microalbuminuria, without long-term current use of insulin        PLAN:     Problem List Items Addressed This Visit        Neuro    Lumbar spinal stenosis  -stable on current medication  -declined referral for HH with PT       Cardiac/Vascular    Essential hypertension - Primary  -continue current regimen, improving due to improved pain control       Orthopedic    Chronic back pain  -[place]  database queried/reviewed  -stable on improved regimen    Relevant Medications    oxyCODONE-acetaminophen (PERCOCET) 7.5-325 mg per tablet    oxyCODONE-acetaminophen (PERCOCET) 7.5-325 mg per tablet (Start on 10/18/2019)    oxyCODONE-acetaminophen (PERCOCET) 7.5-325 mg per tablet (Start on 11/18/2019)     Osteoarthritis involving multiple joints on both sides of body  -continue current regimen      Other Visit Diagnoses     Need for influenza vaccination      -as ordered       Relevant Orders    Influenza - High Dose (65+) (PF) (IM)            Iona Jose MD  09/18/2019 9:29 AM        Follow up in about 3 months (around 12/18/2019) for Follow up.

## 2019-10-02 ENCOUNTER — CLINICAL SUPPORT (OUTPATIENT)
Dept: FAMILY MEDICINE | Facility: CLINIC | Age: 84
End: 2019-10-02
Payer: MEDICARE

## 2019-10-02 DIAGNOSIS — Z23 NEED FOR PROPHYLACTIC VACCINATION AND INOCULATION AGAINST INFLUENZA: Primary | ICD-10-CM

## 2019-10-02 PROCEDURE — 90662 FLU VACCINE - HIGH DOSE (65+) PRESERVATIVE FREE IM: ICD-10-PCS | Mod: S$GLB,,, | Performed by: FAMILY MEDICINE

## 2019-10-02 PROCEDURE — 99499 UNLISTED E&M SERVICE: CPT | Mod: S$GLB,,, | Performed by: FAMILY MEDICINE

## 2019-10-02 PROCEDURE — G0008 FLU VACCINE - HIGH DOSE (65+) PRESERVATIVE FREE IM: ICD-10-PCS | Mod: S$GLB,,, | Performed by: FAMILY MEDICINE

## 2019-10-02 PROCEDURE — 99499 NO LOS: ICD-10-PCS | Mod: S$GLB,,, | Performed by: FAMILY MEDICINE

## 2019-10-02 PROCEDURE — G0008 ADMIN INFLUENZA VIRUS VAC: HCPCS | Mod: S$GLB,,, | Performed by: FAMILY MEDICINE

## 2019-10-02 PROCEDURE — 90662 IIV NO PRSV INCREASED AG IM: CPT | Mod: S$GLB,,, | Performed by: FAMILY MEDICINE

## 2019-10-31 ENCOUNTER — TELEPHONE (OUTPATIENT)
Dept: FAMILY MEDICINE | Facility: CLINIC | Age: 84
End: 2019-10-31

## 2019-10-31 NOTE — TELEPHONE ENCOUNTER
----- Message from Leslye Praveen sent at 10/29/2019  3:28 PM CDT -----  Contact: [facility]   Type: Patient Call Back    Who called: Julian    What is the request in detail:Julian called to speak about sending CMN over to be signed denying the claim for a wheelchair due to invalid diagnostic code. Please call to discuss    Can the clinic reply by [facility]?    Would the patient rather a call back or a response via [facility]? Call    Best call back number:[phone]

## 2019-11-15 DIAGNOSIS — M25.512 CHRONIC LEFT SHOULDER PAIN: Primary | ICD-10-CM

## 2019-11-15 DIAGNOSIS — G89.29 CHRONIC LEFT SHOULDER PAIN: Primary | ICD-10-CM

## 2019-11-15 NOTE — PROGRESS NOTES
For at least 2 months, Mrs. Almanza has been complaining about pain in her left shoulder that radiates down her left upper extremity towards her elbow.  The pain is there nearly constantly but is worse with movement and with certain positions.  She has not had any weakness of her left arm or hand.    Due to profound weakness of the left upper extremity and significant difference in the blood pressure measured in each arm, with a left upper extremity systolic blood pressure being much less than 20 mmHg lower than the right upper extremity, the patient underwent a left subclavian arteriogram and subsequently, had a stent placed in the proximal portion of the left subclavian artery on 06/06/2008.    Impression:  1.  Remote left subclavian artery stenosis with successful stent placement 06/06/2008 at [facility].  2.  Left shoulder pain and left upper arm pain with reportedly, no significant difference in the blood pressure measured in each the left and right upper extremity.    Discussion:  Since the patient's symptoms and physical exam findings are not consistent with of stenosis/restenosis of the left subclavian artery, will order chest x-ray series of the left shoulder.  It is my understanding that she has PCP appointment with Dr. Jose in early December.    Plan:  1.  Left shoulder x-ray series at [facility].  [facility].  If that is not feasible, we will order it to be done at the [facility] on [facility].  2.  Schedule outpatient follow-up.  3.  Primary care will have the left shoulder x-ray series available to review at the time of her appointment in approximately 2 weeks.

## 2019-11-21 ENCOUNTER — HOSPITAL ENCOUNTER (OUTPATIENT)
Dept: RADIOLOGY | Facility: HOSPITAL | Age: 84
Discharge: HOME | End: 2019-11-21
Attending: SPECIALIST
Payer: MEDICARE

## 2019-11-21 DIAGNOSIS — M25.512 CHRONIC LEFT SHOULDER PAIN: ICD-10-CM

## 2019-11-21 DIAGNOSIS — G89.29 CHRONIC LEFT SHOULDER PAIN: ICD-10-CM

## 2019-11-21 PROCEDURE — 73030 X-RAY EXAM OF SHOULDER: CPT | Mod: TC,FY,LT

## 2019-11-21 PROCEDURE — 73030 XR SHOULDER COMPLETE 2 OR MORE VIEWS LEFT: ICD-10-PCS | Mod: 26,LT,, | Performed by: RADIOLOGY

## 2019-11-21 PROCEDURE — 73030 X-RAY EXAM OF SHOULDER: CPT | Mod: 26,LT,, | Performed by: RADIOLOGY

## 2019-12-04 ENCOUNTER — PATIENT OUTREACH (OUTPATIENT)
Dept: ADMINISTRATIVE | Facility: HOSPITAL | Age: 84
End: 2019-12-04

## 2019-12-18 ENCOUNTER — OFFICE VISIT (OUTPATIENT)
Dept: FAMILY MEDICINE | Facility: CLINIC | Age: 84
End: 2019-12-18
Payer: MEDICARE

## 2019-12-18 ENCOUNTER — LAB VISIT (OUTPATIENT)
Dept: LAB | Facility: HOSPITAL | Age: 84
End: 2019-12-18
Attending: FAMILY MEDICINE
Payer: MEDICARE

## 2019-12-18 VITALS
TEMPERATURE: 98 F | SYSTOLIC BLOOD PRESSURE: 150 MMHG | DIASTOLIC BLOOD PRESSURE: 60 MMHG | WEIGHT: 104.38 LBS | HEIGHT: 57 IN | BODY MASS INDEX: 22.52 KG/M2 | OXYGEN SATURATION: 97 % | HEART RATE: 75 BPM

## 2019-12-18 DIAGNOSIS — R80.9 CONTROLLED TYPE 2 DIABETES MELLITUS WITH MICROALBUMINURIA, WITHOUT LONG-TERM CURRENT USE OF INSULIN: ICD-10-CM

## 2019-12-18 DIAGNOSIS — E11.29 CONTROLLED TYPE 2 DIABETES MELLITUS WITH MICROALBUMINURIA, WITHOUT LONG-TERM CURRENT USE OF INSULIN: ICD-10-CM

## 2019-12-18 DIAGNOSIS — M54.50 CHRONIC MIDLINE LOW BACK PAIN WITHOUT SCIATICA: ICD-10-CM

## 2019-12-18 DIAGNOSIS — G89.29 CHRONIC MIDLINE LOW BACK PAIN WITHOUT SCIATICA: ICD-10-CM

## 2019-12-18 DIAGNOSIS — M15.9 OSTEOARTHRITIS INVOLVING MULTIPLE JOINTS ON BOTH SIDES OF BODY: ICD-10-CM

## 2019-12-18 DIAGNOSIS — E11.29 CONTROLLED TYPE 2 DIABETES MELLITUS WITH MICROALBUMINURIA, WITHOUT LONG-TERM CURRENT USE OF INSULIN: Primary | ICD-10-CM

## 2019-12-18 DIAGNOSIS — R80.9 CONTROLLED TYPE 2 DIABETES MELLITUS WITH MICROALBUMINURIA, WITHOUT LONG-TERM CURRENT USE OF INSULIN: Primary | ICD-10-CM

## 2019-12-18 DIAGNOSIS — I10 ESSENTIAL HYPERTENSION: ICD-10-CM

## 2019-12-18 LAB
ESTIMATED AVG GLUCOSE: 126 MG/DL (ref 68–131)
HBA1C MFR BLD HPLC: 6 % (ref 4–5.6)

## 2019-12-18 PROCEDURE — 99214 PR OFFICE/OUTPT VISIT, EST, LEVL IV, 30-39 MIN: ICD-10-PCS | Mod: S$GLB,,, | Performed by: FAMILY MEDICINE

## 2019-12-18 PROCEDURE — 99999 PR PBB SHADOW E&M-EST. PATIENT-LVL III: CPT | Mod: PBBFAC,,, | Performed by: FAMILY MEDICINE

## 2019-12-18 PROCEDURE — 99214 OFFICE O/P EST MOD 30 MIN: CPT | Mod: S$GLB,,, | Performed by: FAMILY MEDICINE

## 2019-12-18 PROCEDURE — 99999 PR PBB SHADOW E&M-EST. PATIENT-LVL III: ICD-10-PCS | Mod: PBBFAC,,, | Performed by: FAMILY MEDICINE

## 2019-12-18 PROCEDURE — 36415 COLL VENOUS BLD VENIPUNCTURE: CPT | Mod: PO

## 2019-12-18 PROCEDURE — 83036 HEMOGLOBIN GLYCOSYLATED A1C: CPT

## 2019-12-18 RX ORDER — OXYCODONE AND ACETAMINOPHEN 10; 325 MG/1; MG/1
1 TABLET ORAL EVERY 6 HOURS PRN
Qty: 120 TABLET | Refills: 0 | Status: SHIPPED | OUTPATIENT
Start: 2019-12-18 | End: 2020-01-17

## 2019-12-18 RX ORDER — OXYCODONE AND ACETAMINOPHEN 7.5; 325 MG/1; MG/1
1 TABLET ORAL EVERY 6 HOURS PRN
Qty: 120 TABLET | Refills: 0 | Status: CANCELLED | OUTPATIENT
Start: 2019-12-18 | End: 2020-01-17

## 2019-12-18 RX ORDER — OXYCODONE AND ACETAMINOPHEN 10; 325 MG/1; MG/1
1 TABLET ORAL EVERY 6 HOURS PRN
Qty: 120 TABLET | Refills: 0 | Status: SHIPPED | OUTPATIENT
Start: 2020-02-18 | End: 2020-04-09 | Stop reason: SDUPTHER

## 2019-12-18 RX ORDER — OXYCODONE AND ACETAMINOPHEN 7.5; 325 MG/1; MG/1
1 TABLET ORAL EVERY 6 HOURS PRN
Qty: 120 TABLET | Refills: 0 | Status: CANCELLED | OUTPATIENT
Start: 2020-01-18 | End: 2020-02-17

## 2019-12-18 RX ORDER — OXYCODONE AND ACETAMINOPHEN 10; 325 MG/1; MG/1
1 TABLET ORAL EVERY 6 HOURS PRN
Qty: 120 TABLET | Refills: 0 | Status: SHIPPED | OUTPATIENT
Start: 2020-01-18 | End: 2020-02-17

## 2019-12-18 RX ORDER — OXYCODONE AND ACETAMINOPHEN 7.5; 325 MG/1; MG/1
1 TABLET ORAL EVERY 6 HOURS PRN
Qty: 120 TABLET | Refills: 0 | Status: CANCELLED | OUTPATIENT
Start: 2020-02-18 | End: 2020-03-19

## 2019-12-18 NOTE — PROGRESS NOTES
Chief Complaint   Patient presents with    Hypertension       HPI  Kaycee Almanza is a 85 y.o. female with multiple medical diagnoses as listed in the medical history and problem list that presents for follow-up for HTN a chronic pain    She was also recently sick with a flu like illness that she self treated at home, this is resolving presently    Chronic low back pain and other joints from arthritis-  She has been taking her percocet on time and her pain has decreased however she is having to take two pills at times and is worried about falls. She has been more active with cooking and baking  Has been evaluated with cardiology for left shoulder pain, x ray shows DJD    PAST MEDICAL HISTORY:  Past Medical History:   Diagnosis Date    Anxiety     Arthritis     Coronary artery disease     Dementia     Depression     Diabetes type 2, controlled     sees Dr. Elena    GERD (gastroesophageal reflux disease)     Hyperlipidemia     Hypertension     Left posterior capsular opacification 5/11/2017    Osteoporosis     Thyroid disease     Ulcer     Ulcer        PAST SURGICAL HISTORY:  Past Surgical History:   Procedure Laterality Date    CATARACT EXTRACTION W/  INTRAOCULAR LENS IMPLANT Bilateral 2006    [facility] at [facility]    cataract surgery  2006    CHOLECYSTECTOMY      EYE SURGERY      HAND SURGERY      HYSTERECTOMY      KNEE SURGERY      SHOULDER SURGERY      yag laser  Bilateral        SOCIAL HISTORY:  Social History     Socioeconomic History    Marital status:      Spouse name: Not on file    Number of children: Not on file    Years of education: Not on file    Highest education level: Not on file   Occupational History    Not on file   Social Needs    Financial resource strain: Not on file    Food insecurity:     Worry: Not on file     Inability: Not on file    Transportation needs:     Medical: Not on file     Non-medical: Not on file   Tobacco Use    Smoking status: Never  Smoker    Smokeless tobacco: Never Used   Substance and Sexual Activity    Alcohol use: No    Drug use: No    Sexual activity: Never   Lifestyle    Physical activity:     Days per week: Not on file     Minutes per session: Not on file    Stress: Not at all   Relationships    Social connections:     Talks on phone: Not on file     Gets together: Not on file     Attends Confucianism service: Not on file     Active member of club or organization: Not on file     Attends meetings of clubs or organizations: Not on file     Relationship status: Not on file   Other Topics Concern    Not on file   Social History Narrative    Not on file       FAMILY HISTORY:  Family History   Problem Relation Age of Onset    Stroke Mother     Diabetes Mother     Arthritis Father     Early death Sister     Birth defects Brother     No Known Problems Daughter     No Known Problems Son     No Known Problems Sister     Birth defects Brother     Birth defects Brother     Birth defects Brother     Birth defects Brother     No Known Problems Maternal Aunt     No Known Problems Maternal Uncle     No Known Problems Paternal Aunt     No Known Problems Paternal Uncle     No Known Problems Maternal Grandmother     No Known Problems Maternal Grandfather     No Known Problems Paternal Grandmother     No Known Problems Paternal Grandfather     Amblyopia Neg Hx     Blindness Neg Hx     Cataracts Neg Hx     Glaucoma Neg Hx     Macular degeneration Neg Hx     Retinal detachment Neg Hx     Strabismus Neg Hx     Cancer Neg Hx     Hypertension Neg Hx     Thyroid disease Neg Hx        ALLERGIES AND MEDICATIONS: updated and reviewed.  Review of patient's allergies indicates:  No Known Allergies  Current Outpatient Medications   Medication Sig Dispense Refill    AGDBCB amitriptyline 10%- gabapentin 6%- diclofenac 8%- baclofen 2%- cyclobenaprine 2%- bupivacaine 1% in transdermal cream Apply 1 to 2 grams 3 to 4 times daily 100 g  5    amlodipine besylate (NORVASC ORAL) Take by mouth.      aspirin (ECOTRIN) 81 MG EC tablet Take 81 mg by mouth once daily.      blood-glucose meter (FREESTYLE SYSTEM KIT) kit Use as instructed 1 each 0    ergocalciferol, vitamin D2, (VITAMIN D ORAL) Take by mouth.      FLUAD 9719-2845, 65 YR UP,,PF, 45 mcg (15 mcg x 3)/0.5 mL Syrg INJECT  0    hydroCHLOROthiazide (HYDRODIURIL) 12.5 MG Tab Take 1 tablet (12.5 mg total) by mouth once daily. 90 tablet 3    NIFEdipine (PROCARDIA-XL) 60 MG (OSM) 24 hr tablet Take 1 tablet (60 mg total) by mouth once daily. 90 tablet 3    potassium chloride (KLOR-CON) 10 MEQ TbSR TK 1 T PO BID FOR 15 DAYS  0    sertraline (ZOLOFT) 100 MG tablet Take 1 tablet (100 mg total) by mouth once daily. 90 tablet 3    TRADJENTA 5 mg Tab tablet TAKE 1 TABLET EVERY DAY 90 tablet 3    TRUE METRIX GLUCOSE TEST STRIP Strp Check blood glucose 2 times daily before meals 200 each 11    wheelchair [employer] wheelchair 1 each 0    oxyCODONE-acetaminophen (PERCOCET)  mg per tablet Take 1 tablet by mouth every 6 (six) hours as needed for Pain. 120 tablet 0    [START ON 1/18/2020] oxyCODONE-acetaminophen (PERCOCET)  mg per tablet Take 1 tablet by mouth every 6 (six) hours as needed for Pain. 120 tablet 0    [START ON 2/18/2020] oxyCODONE-acetaminophen (PERCOCET)  mg per tablet Take 1 tablet by mouth every 6 (six) hours as needed for Pain. 120 tablet 0     No current facility-administered medications for this visit.        ROS  Review of Systems   Constitutional: Negative for chills, diaphoresis, fatigue, fever and unexpected weight change.   HENT: Negative for rhinorrhea, sinus pressure, sore throat and tinnitus.    Eyes: Negative for photophobia and visual disturbance.   Respiratory: Negative for cough, shortness of breath and wheezing.    Cardiovascular: Negative for chest pain and palpitations.   Gastrointestinal: Negative for abdominal pain, blood in stool,  "constipation, diarrhea, nausea and vomiting.   Genitourinary: Negative for dysuria, flank pain, frequency and vaginal discharge.   Musculoskeletal: Positive for arthralgias and back pain. Negative for joint swelling.   Skin: Negative for rash.   Neurological: Negative for speech difficulty, weakness, light-headedness and headaches.   Psychiatric/Behavioral: Negative for behavioral problems and dysphoric mood.       Physical Exam  Vitals:    12/18/19 0801 12/18/19 0838   BP: (!) 150/70 (!) 150/60   BP Location: Left arm Left arm   Patient Position: Sitting Sitting   BP Method: Medium (Manual) Medium (Manual)   Pulse: 75    Temp: 97.8 °F (36.6 °C)    TempSrc: Oral    SpO2: 97%    Weight: 47.3 kg (104 lb 6.2 oz)    Height: 4' 9" (1.448 m)     Body mass index is 22.59 kg/m².  Weight: 47.3 kg (104 lb 6.2 oz)   Height: 4' 9" (144.8 cm)     Physical Exam   Constitutional: She is oriented to person, place, and time. She appears well-developed and well-nourished. No distress.   Eyes: EOM are normal.   Neck: Neck supple.   Cardiovascular: Normal rate and regular rhythm. Exam reveals no gallop and no friction rub.   No murmur heard.  Pulmonary/Chest: Effort normal and breath sounds normal. No respiratory distress. She has no wheezes. She has no rales.   Neurological: She is alert and oriented to person, place, and time.   Skin: Skin is warm and dry. No rash noted.   Psychiatric: She has a normal mood and affect. Her behavior is normal.   Nursing note and vitals reviewed.      Health Maintenance       Date Due Completion Date    Shingles Vaccine (1 of 2) 10/13/1984 ---    Hemoglobin A1c 12/18/2019 6/18/2019    Override on 10/18/2016: Done (Dr. Hollis Luther/Endocrinology- hemoglobin a1c 6.2)    Lipid Panel 06/18/2020 6/18/2019    Urine Microalbumin 06/18/2020 6/18/2019    DEXA SCAN 03/08/2021 3/8/2018    Override on 6/22/2016: Done (LAURY Barnett/Dr. Hollis Luther/Endocrinology- normal range in the lumbar spine & hips,left " wrist Tscore -2.7 improved from -3.3. Patient has had to stop Prolia, but patient had 4 doses. Patient is also on calcium + vitamin d supplements)    Override on 7/22/2014: Done (Dr. Hollis Luther/Endocrinology- AP Spine-1.247 g/cm 2 w/ T score =0.3,dual femur=0.979 g/cm 2 w/ T score =0.2,left forearm= 0.586 g/cm 2 w/ T score=3.3,patient started on prolia)    TETANUS VACCINE 03/09/2027 3/9/2017 (Declined)    Override on 3/9/2017: Declined          Health maintenance reviewed and addressed as ordered      ASSESSMENT     1. Controlled type 2 diabetes mellitus with microalbuminuria, without long-term current use of insulin    2. Chronic midline low back pain without sciatica    3. Essential hypertension    4. Osteoarthritis involving multiple joints on both sides of body        PLAN:     Problem List Items Addressed This Visit        Cardiac/Vascular    Essential hypertension  -recheck elevated likely due to pain       Endocrine    Controlled type 2 diabetes mellitus with microalbuminuria, without long-term current use of insulin - Primary  -due for A1C check    Relevant Orders    Hemoglobin A1c       Orthopedic    Chronic midline low back pain without sciatica  -continue to take on schedule  -encourage movement    Relevant Medications    oxyCODONE-acetaminophen (PERCOCET)  mg per tablet    oxyCODONE-acetaminophen (PERCOCET)  mg per tablet (Start on 1/18/2020)    oxyCODONE-acetaminophen (PERCOCET)  mg per tablet (Start on 2/18/2020)    Osteoarthritis involving multiple joints on both sides of body  -[place]  database queried/reviewed  -increase percocet to 10mg    Relevant Medications    oxyCODONE-acetaminophen (PERCOCET)  mg per tablet    oxyCODONE-acetaminophen (PERCOCET)  mg per tablet (Start on 1/18/2020)    oxyCODONE-acetaminophen (PERCOCET)  mg per tablet (Start on 2/18/2020)            Iona Jose MD  12/18/2019 8:27 AM        Follow up in about 3 months (around 3/18/2020)  for Follow up.

## 2019-12-30 DIAGNOSIS — F41.9 ANXIETY: ICD-10-CM

## 2019-12-30 RX ORDER — SERTRALINE HYDROCHLORIDE 100 MG/1
TABLET, FILM COATED ORAL
Qty: 90 TABLET | Refills: 1 | Status: SHIPPED | OUTPATIENT
Start: 2019-12-30 | End: 2020-05-08

## 2020-01-06 DIAGNOSIS — E11.9 DM TYPE 2 WITHOUT RETINOPATHY: ICD-10-CM

## 2020-01-13 DIAGNOSIS — I10 ESSENTIAL HYPERTENSION: ICD-10-CM

## 2020-01-13 DIAGNOSIS — E11.9 TYPE 2 DIABETES MELLITUS WITHOUT COMPLICATION, WITHOUT LONG-TERM CURRENT USE OF INSULIN: ICD-10-CM

## 2020-01-13 RX ORDER — LINAGLIPTIN 5 MG/1
TABLET, FILM COATED ORAL
Qty: 90 TABLET | Refills: 3 | Status: SHIPPED | OUTPATIENT
Start: 2020-01-13 | End: 2021-02-23

## 2020-01-16 ENCOUNTER — TELEPHONE (OUTPATIENT)
Dept: FAMILY MEDICINE | Facility: CLINIC | Age: 85
End: 2020-01-16

## 2020-01-16 RX ORDER — NIFEDIPINE 60 MG/1
TABLET, EXTENDED RELEASE ORAL
Qty: 90 TABLET | Refills: 3 | Status: SHIPPED | OUTPATIENT
Start: 2020-01-16 | End: 2021-01-21

## 2020-01-16 NOTE — TELEPHONE ENCOUNTER
Received a call from Dennise Campo, stating patient called because she was told by [employer] that Steff Omer NP needs to call [facility] to approve her Tradjenta. Patient states she is 85 yrs old and can hardly hear.    I called [facility] , spoke with Gui, he states patient has $0 co-pay on Tradjenta. He states patient needs to call [facility] to have an approval done.    Patient informed.  [facility] phone number provided for patient [phone].  Patient verbally confirmed.

## 2020-03-03 ENCOUNTER — OFFICE VISIT (OUTPATIENT)
Dept: NEPHROLOGY | Facility: CLINIC | Age: 85
End: 2020-03-03
Payer: MEDICARE

## 2020-03-03 VITALS
OXYGEN SATURATION: 97 % | SYSTOLIC BLOOD PRESSURE: 140 MMHG | BODY MASS INDEX: 22.02 KG/M2 | WEIGHT: 102.06 LBS | DIASTOLIC BLOOD PRESSURE: 60 MMHG | HEART RATE: 77 BPM | HEIGHT: 57 IN

## 2020-03-03 DIAGNOSIS — R80.1 PERSISTENT PROTEINURIA: Primary | ICD-10-CM

## 2020-03-03 DIAGNOSIS — R80.9 CONTROLLED TYPE 2 DIABETES MELLITUS WITH MICROALBUMINURIA, WITHOUT LONG-TERM CURRENT USE OF INSULIN: ICD-10-CM

## 2020-03-03 DIAGNOSIS — E11.29 CONTROLLED TYPE 2 DIABETES MELLITUS WITH MICROALBUMINURIA, WITHOUT LONG-TERM CURRENT USE OF INSULIN: ICD-10-CM

## 2020-03-03 PROCEDURE — 1125F PR PAIN SEVERITY QUANTIFIED, PAIN PRESENT: ICD-10-PCS | Mod: HCNC,S$GLB,, | Performed by: INTERNAL MEDICINE

## 2020-03-03 PROCEDURE — 1125F AMNT PAIN NOTED PAIN PRSNT: CPT | Mod: HCNC,S$GLB,, | Performed by: INTERNAL MEDICINE

## 2020-03-03 PROCEDURE — 99999 PR PBB SHADOW E&M-EST. PATIENT-LVL III: CPT | Mod: PBBFAC,HCNC,, | Performed by: INTERNAL MEDICINE

## 2020-03-03 PROCEDURE — 1159F MED LIST DOCD IN RCRD: CPT | Mod: HCNC,S$GLB,, | Performed by: INTERNAL MEDICINE

## 2020-03-03 PROCEDURE — 99213 OFFICE O/P EST LOW 20 MIN: CPT | Mod: HCNC,S$GLB,, | Performed by: INTERNAL MEDICINE

## 2020-03-03 PROCEDURE — 99213 PR OFFICE/OUTPT VISIT, EST, LEVL III, 20-29 MIN: ICD-10-PCS | Mod: HCNC,S$GLB,, | Performed by: INTERNAL MEDICINE

## 2020-03-03 PROCEDURE — 99999 PR PBB SHADOW E&M-EST. PATIENT-LVL III: ICD-10-PCS | Mod: PBBFAC,HCNC,, | Performed by: INTERNAL MEDICINE

## 2020-03-03 PROCEDURE — 1101F PT FALLS ASSESS-DOCD LE1/YR: CPT | Mod: HCNC,CPTII,S$GLB, | Performed by: INTERNAL MEDICINE

## 2020-03-03 PROCEDURE — 3078F DIAST BP <80 MM HG: CPT | Mod: HCNC,CPTII,S$GLB, | Performed by: INTERNAL MEDICINE

## 2020-03-03 PROCEDURE — 1159F PR MEDICATION LIST DOCUMENTED IN MEDICAL RECORD: ICD-10-PCS | Mod: HCNC,S$GLB,, | Performed by: INTERNAL MEDICINE

## 2020-03-03 PROCEDURE — 3077F SYST BP >= 140 MM HG: CPT | Mod: HCNC,CPTII,S$GLB, | Performed by: INTERNAL MEDICINE

## 2020-03-03 PROCEDURE — 3077F PR MOST RECENT SYSTOLIC BLOOD PRESSURE >= 140 MM HG: ICD-10-PCS | Mod: HCNC,CPTII,S$GLB, | Performed by: INTERNAL MEDICINE

## 2020-03-03 PROCEDURE — 1101F PR PT FALLS ASSESS DOC 0-1 FALLS W/OUT INJ PAST YR: ICD-10-PCS | Mod: HCNC,CPTII,S$GLB, | Performed by: INTERNAL MEDICINE

## 2020-03-03 PROCEDURE — 99499 RISK ADDL DX/OHS AUDIT: ICD-10-PCS | Mod: HCNC,S$GLB,, | Performed by: INTERNAL MEDICINE

## 2020-03-03 PROCEDURE — 3078F PR MOST RECENT DIASTOLIC BLOOD PRESSURE < 80 MM HG: ICD-10-PCS | Mod: HCNC,CPTII,S$GLB, | Performed by: INTERNAL MEDICINE

## 2020-03-03 PROCEDURE — 99499 UNLISTED E&M SERVICE: CPT | Mod: HCNC,S$GLB,, | Performed by: INTERNAL MEDICINE

## 2020-03-03 NOTE — PROGRESS NOTES
Subjective:       Patient ID: Kaycee Almanza is a 85 y.o. White female who presents for follow up of Proteinuria    HPI    Ms. Almanza is an 85 year old woman with medical history of diabetes, hypertension presenting for follow up of proteinuria.  Patient reports blood sugars have been well-controlled, blood pressure at home usually well-controlled.  Patient denies any NSAID use, she reports adequate fluid intake, she reports better dietary discretion with salt.  She otherwise denies any fever, chest pain, shortness of breath, abdominal pain, diarrhea, dysuria/hematuria.      Review of Systems   Constitutional: Negative for appetite change, fatigue and fever.   Respiratory: Negative for chest tightness and shortness of breath.    Cardiovascular: Negative for chest pain and leg swelling.   Gastrointestinal: Negative for abdominal pain, constipation, diarrhea, nausea and vomiting.   Genitourinary: Negative for difficulty urinating, dysuria, flank pain, frequency, hematuria and urgency.   Musculoskeletal: Positive for back pain. Negative for arthralgias, joint swelling and myalgias.   Skin: Negative for rash and wound.   Neurological: Negative for dizziness, weakness and light-headedness.   All other systems reviewed and are negative.      Objective:      Physical Exam   Constitutional: She appears well-developed and well-nourished.   Cardiovascular: Normal rate, regular rhythm and normal heart sounds. Exam reveals no gallop and no friction rub.   No murmur heard.  Pulmonary/Chest: Effort normal and breath sounds normal. No respiratory distress. She has no wheezes. She has no rales.   Abdominal: Soft. Bowel sounds are normal. There is no tenderness.   Musculoskeletal: She exhibits no edema.   Neurological: She is alert.   Skin: Skin is warm and dry. No rash noted. No erythema.   Psychiatric: She has a normal mood and affect.   Vitals reviewed.      Assessment:       1. Persistent proteinuria    2. Controlled type  2 diabetes mellitus with microalbuminuria, without long-term current use of insulin        Plan:     Ms. Almanza is an 85 year old woman with medical history of diabetes, hypertension presenting for follow up of proteinuria.  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  Urine protein up to 4gm March 2018, possibly due to worse glycemic control, microalbuminuria down to 0.69g/day Sept 2019, will continue to trend, stressed importance of blood pressure/glycemic control to prevent any further progression of kidney disease, patient voiced understanding.  Further recommendations pending results of above.   - Hypokalemia: patient on HCTZ, continue potassium supplement    Return to clinic in 6-8 months pending urine studies within 2 weeks, then with renal/heme panel, iron/TIBC/ferritin, urinalysis/culture, urine protein/creatinine ratio prior to next visit

## 2020-03-09 ENCOUNTER — LAB VISIT (OUTPATIENT)
Dept: LAB | Facility: HOSPITAL | Age: 85
End: 2020-03-09
Attending: INTERNAL MEDICINE
Payer: MEDICARE

## 2020-03-09 DIAGNOSIS — R80.1 PERSISTENT PROTEINURIA: ICD-10-CM

## 2020-03-09 LAB
BACTERIA #/AREA URNS AUTO: NORMAL /HPF
BILIRUB UR QL STRIP: NEGATIVE
CLARITY UR REFRACT.AUTO: CLEAR
COLOR UR AUTO: YELLOW
CREAT UR-MCNC: 76 MG/DL (ref 15–325)
GLUCOSE UR QL STRIP: NEGATIVE
HGB UR QL STRIP: NEGATIVE
HYALINE CASTS UR QL AUTO: 1 /LPF
KETONES UR QL STRIP: NEGATIVE
LEUKOCYTE ESTERASE UR QL STRIP: ABNORMAL
MICROSCOPIC COMMENT: NORMAL
NITRITE UR QL STRIP: NEGATIVE
PH UR STRIP: 5 [PH] (ref 5–8)
PROT UR QL STRIP: ABNORMAL
PROT UR-MCNC: 60 MG/DL (ref 0–15)
PROT/CREAT UR: 0.79 MG/G{CREAT} (ref 0–0.2)
RBC #/AREA URNS AUTO: 0 /HPF (ref 0–4)
SP GR UR STRIP: 1.02 (ref 1–1.03)
URN SPEC COLLECT METH UR: ABNORMAL
WBC #/AREA URNS AUTO: 5 /HPF (ref 0–5)

## 2020-03-09 PROCEDURE — 82570 ASSAY OF URINE CREATININE: CPT | Mod: HCNC

## 2020-03-09 PROCEDURE — 81001 URINALYSIS AUTO W/SCOPE: CPT | Mod: HCNC

## 2020-03-10 DIAGNOSIS — I10 ESSENTIAL HYPERTENSION: Primary | ICD-10-CM

## 2020-03-12 RX ORDER — HYDROCHLOROTHIAZIDE 12.5 MG/1
TABLET ORAL
Qty: 90 TABLET | Refills: 3 | Status: SHIPPED | OUTPATIENT
Start: 2020-03-12 | End: 2020-03-30 | Stop reason: SDUPTHER

## 2020-03-17 ENCOUNTER — TELEPHONE (OUTPATIENT)
Dept: FAMILY MEDICINE | Facility: CLINIC | Age: 85
End: 2020-03-17

## 2020-03-17 NOTE — TELEPHONE ENCOUNTER
This nurse returned call regarding message below. Spoke with pt's daughter, Jacklyn, who states she wants the results of recent U/A. Also requests advise on when to bring pt back for a DM check up, is concerned about bringing pt to clinic with the current viral outbreak. Daughter informed that concerns will be sent to Dr. Jose.

## 2020-03-17 NOTE — TELEPHONE ENCOUNTER
Pt's daughter, Jacklyn, notified of Dr. Jose's instructions below. Daughter verbalized understanding.

## 2020-03-17 NOTE — TELEPHONE ENCOUNTER
----- Message from Merle Rubalcava sent at 3/17/2020  3:43 PM CDT -----  Contact: Daughter Jacklyn: [phone]  Type: Patient Call Back    Who called:Daughter    What is the request in detail:Discuss mother's test results    Can the clinic reply by [facility]? NO    Would the patient rather a call back or a response via [facility]? Callback    Best call back number:[phone]

## 2020-03-17 NOTE — TELEPHONE ENCOUNTER
She can schedule her next check up w/i the next 3 mos and we can reassess then  DM is well controlled her kidney doctor ordered the urine test so she needs to get those results from them    Iona Jose MD

## 2020-03-24 PROBLEM — M53.86 DECREASED RANGE OF MOTION OF LUMBAR SPINE: Status: RESOLVED | Noted: 2018-06-28 | Resolved: 2020-03-24

## 2020-03-24 PROBLEM — M54.50 CHRONIC MIDLINE LOW BACK PAIN WITHOUT SCIATICA: Status: RESOLVED | Noted: 2018-06-28 | Resolved: 2020-03-24

## 2020-03-24 PROBLEM — G89.29 CHRONIC MIDLINE LOW BACK PAIN WITHOUT SCIATICA: Status: RESOLVED | Noted: 2018-06-28 | Resolved: 2020-03-24

## 2020-03-24 PROBLEM — M62.81 WEAKNESS OF TRUNK MUSCULATURE: Status: RESOLVED | Noted: 2018-06-28 | Resolved: 2020-03-24

## 2020-03-30 RX ORDER — HYDROCHLOROTHIAZIDE 12.5 MG/1
12.5 TABLET ORAL DAILY
Qty: 90 TABLET | Refills: 3 | Status: SHIPPED | OUTPATIENT
Start: 2020-03-30 | End: 2021-01-25

## 2020-03-30 NOTE — TELEPHONE ENCOUNTER
----- Message from Sammie Gray sent at 3/30/2020  9:52 AM CDT -----  Contact:  Ailin Daughter   Can the clinic reply in [facility]:     Please refill the medication(s) listed below. The patient can be reached at this phone number once it is called into the pharmacy. [phone]     Medication #1hydroCHLOROthiazide (HYDRODIURIL) 12.5 MG Tab    Medication #2    Preferred Pharmacy: [facility] - [facility] [address]

## 2020-04-09 DIAGNOSIS — M54.50 CHRONIC MIDLINE LOW BACK PAIN WITHOUT SCIATICA: ICD-10-CM

## 2020-04-09 DIAGNOSIS — G89.29 CHRONIC MIDLINE LOW BACK PAIN WITHOUT SCIATICA: ICD-10-CM

## 2020-04-09 DIAGNOSIS — M15.9 OSTEOARTHRITIS INVOLVING MULTIPLE JOINTS ON BOTH SIDES OF BODY: ICD-10-CM

## 2020-04-09 RX ORDER — OXYCODONE AND ACETAMINOPHEN 10; 325 MG/1; MG/1
1 TABLET ORAL EVERY 6 HOURS PRN
Qty: 120 TABLET | Refills: 0 | Status: SHIPPED | OUTPATIENT
Start: 2020-04-09 | End: 2020-05-09

## 2020-04-17 ENCOUNTER — OFFICE VISIT (OUTPATIENT)
Dept: URGENT CARE | Facility: CLINIC | Age: 85
End: 2020-04-17
Payer: MEDICARE

## 2020-04-17 VITALS
BODY MASS INDEX: 22.01 KG/M2 | TEMPERATURE: 99 F | HEIGHT: 57 IN | WEIGHT: 102 LBS | HEART RATE: 85 BPM | SYSTOLIC BLOOD PRESSURE: 160 MMHG | DIASTOLIC BLOOD PRESSURE: 73 MMHG | OXYGEN SATURATION: 96 %

## 2020-04-17 DIAGNOSIS — S61.412A LACERATION OF LEFT HAND WITHOUT FOREIGN BODY, INITIAL ENCOUNTER: ICD-10-CM
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
## 2020-04-17 DIAGNOSIS — S60.222A CONTUSION OF LEFT HAND, INITIAL ENCOUNTER: ICD-10-CM

## 2020-04-17 PROCEDURE — 99214 OFFICE O/P EST MOD 30 MIN: CPT | Mod: S$GLB,,, | Performed by: PHYSICIAN ASSISTANT

## 2020-04-17 PROCEDURE — 73130 XR HAND COMPLETE 3 VIEW LEFT: ICD-10-PCS | Mod: LT,S$GLB,, | Performed by: RADIOLOGY

## 2020-04-17 PROCEDURE — 99214 PR OFFICE/OUTPT VISIT, EST, LEVL IV, 30-39 MIN: ICD-10-PCS | Mod: S$GLB,,, | Performed by: PHYSICIAN ASSISTANT

## 2020-04-17 PROCEDURE — 73130 X-RAY EXAM OF HAND: CPT | Mod: LT,S$GLB,, | Performed by: RADIOLOGY

## 2020-04-17 RX ORDER — CLINDAMYCIN HYDROCHLORIDE 300 MG/1
300 CAPSULE ORAL EVERY 8 HOURS
Qty: 30 CAPSULE | Refills: 0 | Status: SHIPPED | OUTPATIENT
Start: 2020-04-17 | End: 2020-04-27

## 2020-04-17 NOTE — PATIENT INSTRUCTIONS
- Rest.  - Drink plenty of fluids.  - Take Tylenol and/or Ibuprofen as directed as needed for fever/pain.  Do not take more than the recommended dose.  - follow up with your PCP within the next 1-2 weeks as needed.  - Keep the wound clean and dry.  Keep the wound completely dry for 24 hours.  - Wash daily with soap and water after 24 hours.    - Change dressing daily.  The dressing placed today can stay on for 24 hours.  - No soaking the wound (No swimming, no bath, no dishwashing).  - Ice for the next 24-48 hours.    - Elevate when possible.   - You must understand that you have received an Urgent Care treatment only and that you may be released before all of your medical problems are known or treated.   - You, the patient, will arrange for follow up care as instructed.   - If your condition worsens or fails to improve we recommend that you receive another evaluation at the ER immediately or contact your PCP to discuss your concerns.   - You can call [phone] or [phone] to help schedule an appointment with the appropriate provider.        Hand Contusion  You have a contusion. This is also called a bruise. There is swelling and some bleeding under the skin, but no broken bones. This injury generally takes a few days to a few weeks to heal.  During that time, the bruise will typically change in color from reddish, to purple-blue, to greenish-yellow, then to yellow-brown.  Home care  · Elevate the hand to reduce pain and swelling. As much as possible, sit or lie down with the hand raised about the level of your heart. This is especially important during the first 48 hours.  · Ice the hand to help reduce pain and swelling. Wrap a cold source (ice pack or ice cubes in a plastic bag) in a thin towel. Apply to the bruised area for 20 minutes every 1 to 2 hours the first day. Continue this 3 to 4 times a day until the pain and swelling goes away.  · Unless another medicine was prescribed, you can take  acetaminophen, ibuprofen, or naproxen to control pain. (If you have chronic liver or kidney disease or ever had a stomach ulcer or gastrointestinal bleeding, talk with your doctor before using these medicines.)  Follow up  Follow up with your healthcare provider or our staff as advised. Call if you are not improving within 1 to 2 weeks.  When to seek medical advice   Call your healthcare provider right away if you have any of the following:  · Increased pain or swelling  · Arm becomes cold, blue, numb or tingly  · Signs of infection: Warmth, drainage, or increased redness or pain around the bruise  · Inability to move the injured hand   · Frequent bruising for unknown reasons  Date Last Reviewed: 2/1/2017  © 9100-0997 [employer]. [address] [place] [address], [place] [address]. All rights reserved. This information is not intended as a substitute for professional medical care. Always follow your healthcare professional's instructions.        Old Laceration: Not Sutured  A laceration is a cut through the skin. This will usually require stitches if it is deep. However, if a laceration remains open for too long, the risk of infection increases. In your case, too much time has passed before coming for treatment. The danger of infection from suturing at this time is too high. That is why your wound was not sutured.  If the wound is spread open, it will heal by filling in from the bottom and sides. A wound that is not sutured may take 1 to 4 weeks to heal, depending on the size of the opening. A visible scar will probably occur. You can discuss revision of the scar with your healthcare provider at a later time.  Home care  The following guidelines will help you care for your laceration at home:  · Keep the wound clean and dry. If a bandage was applied and it becomes wet or dirty, replace it. Otherwise, leave it in place for the first 24 hours, then change it once a day or as directed.  · Clean the wound  daily:  ¨ After removing any bandage, wash the area with soap and water. Use a wet cotton swab to loosen and remove any blood or crust that forms.  ¨ Talk with your doctor before applying any antibiotic ointment to the wound. Reapply a fresh bandage.  ¨ You may remove the bandage to shower as usual after the first 24 hours, but do not soak the area in water (no tub baths or swimming) for the next five days. Avoid activities that may reinjure your wound.  · The healthcare provider may prescribe an antibiotic cream or ointment to prevent infection. Do not stop using this medication until you have finished the prescribed course or the provider tells you to stop.  · The healthcare provider may also prescribe medications for pain. Follow instructions for taking these medications.  · Check the wound daily for signs of infection listed below.  Follow-up care  Follow up with your healthcare provider as advised.  When to seek medical advice  Call your healthcare provider right away if any of these occur:  · Wound bleeding not controlled by direct pressure  · Signs of infection, including increasing pain in the wound, increasing wound redness or swelling, or pus or bad odor coming from the wound  · Fever of 100.4°F (38.ºC) or higher or as directed by your healthcare provider  · Wound edges re-open  · Wound changes colors  · Numbness around the wound   · Decreased movement around the injured area  Date Last Reviewed: 6/10/2015  © 8213-4675 [employer]. [address] [place] [address]. All rights reserved. This information is not intended as a substitute for professional medical care. Always follow your healthcare professional's instructions.

## 2020-04-17 NOTE — PROGRESS NOTES
"Subjective:       Patient ID: Kaycee Almanza is a 85 y.o. female.    Vitals:  height is 4' 9" (1.448 m) and weight is 46.3 kg (102 lb). Her temperature is 99.1 °F (37.3 °C). Her blood pressure is 160/73 (abnormal) and her pulse is 85. Her oxygen saturation is 96%.     Chief Complaint: Hand Injury    Pt. States she fell in her back yard and tripped.  She landed on her hand on some roots.  She has a laceration between her 4th and 5th fingers, and on her 3rd finger of her left hand.  This happened yesterday at approximately 2:30 pm.  Pain is mostly on her 4th and 5th metacarpals.  Pt. States she did not take her BP meds yet today.  She also states she has ulcers and had to be careful of what meds she takes.  She only takes tylenol for pain.      Hand Pain    The incident occurred 12 to 24 hours ago. The injury mechanism was a fall. The pain is present in the left hand. Pertinent negatives include no chest pain.       Constitution: Negative for chills, fatigue and fever.   HENT: Negative for congestion and sore throat.    Neck: Negative for painful lymph nodes.   Cardiovascular: Negative for chest pain and leg swelling.   Eyes: Negative for double vision and blurred vision.   Respiratory: Negative for cough and shortness of breath.    Gastrointestinal: Negative for nausea, vomiting and diarrhea.   Genitourinary: Negative for dysuria, frequency, urgency and history of kidney stones.   Musculoskeletal: Positive for pain, joint pain and joint swelling. Negative for muscle cramps and muscle ache.   Skin: Negative for color change, pale, rash and bruising.   Allergic/Immunologic: Negative for seasonal allergies.   Neurological: Negative for dizziness, history of vertigo, light-headedness, passing out and headaches.   Hematologic/Lymphatic: Negative for swollen lymph nodes.   Psychiatric/Behavioral: Negative for nervous/anxious, sleep disturbance and depression. The patient is not nervous/anxious.        Objective:    "   Physical Exam   Constitutional: She is oriented to person, place, and time. She appears well-developed and well-nourished. No distress.   HENT:   Head: Normocephalic and atraumatic.   Eyes: Conjunctivae are normal.   Cardiovascular: Normal rate, regular rhythm and normal heart sounds. Exam reveals no gallop and no friction rub.   No murmur heard.  Pulmonary/Chest: Effort normal and breath sounds normal. No respiratory distress. She has no wheezes.   Musculoskeletal: She exhibits no tenderness.        Left hand: She exhibits decreased range of motion (4th and 5th fingers), bony tenderness (4th and 5th MCP joints and distal metacarpals), laceration and swelling.        Hands:  Neurological: She is alert and oriented to person, place, and time.   Skin: Skin is warm, dry and not diaphoretic. Lacerations - upper ext.:  left hand  Psychiatric: She has a normal mood and affect. Her behavior is normal. Judgment and thought content normal.             8:41 AM - the wounds were cleaned with peroxide.  Do not feel suturing is necessary at this time as greater than 12 hr has passed and the wounds are relatively superficial.  I have applied a nonstick dressing to the hand.  X-rays are negative.  There is some swelling and mild erythema around the wound so I will cover with antibiotics.  She is up-to-date on her tetanus shot.    Xr Hand Complete 3 View Left    Result Date: 4/17/2020  EXAMINATION: XR HAND COMPLETE 3 VIEW LEFT CLINICAL HISTORY: Unspecified fall, initial encounter TECHNIQUE: Three-view LEFT hand COMPARISON: None. FINDINGS: The alignment is within normal limits.  No displaced fractures identified.  No evidence of lytic or blastic lesions.Degenerative changes identified 1st carpometacarpal joint.  Mild ulnar minus variance.  Mild degenerative changes identified distal interphalangeal joint of the LEFT thumb and 2nd and to a lesser degree 5th fingers.  Joint space narrowing identified level of the metacarpophalangeal  joint.  Soft tissue calcification with soft tissue swelling level of the LEFT 2nd and 3rd metacarpal heads.  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     No evidence of fracture.Mild degenerative changes. If there is persistent clinical concern for acute nondisplaced fracture which can be radiographically occult, consider immobilization and repeat radiograph after an interval of 7 to 10 days to allow for osteoclastic resorption or proceed to nuclear medicine, MRI or other more sensitive assessment if warranted. Electronically signed by: Lenny Lantigua MD Date:    04/17/2020 Time:    08:30    Assessment:       1. Fall, initial encounter    2. Laceration of left hand without foreign body, initial encounter    3. Contusion of left hand, initial encounter        Plan:         Fall, initial encounter  -     XR HAND COMPLETE 3 VIEW LEFT; Future; Expected date: 04/17/2020    Laceration of left hand without foreign body, initial encounter  -     clindamycin (CLEOCIN) 300 MG capsule; Take 1 capsule (300 mg total) by mouth every 8 (eight) hours. for 10 days  Dispense: 30 capsule; Refill: 0    Contusion of left hand, initial encounter         Kaycee was seen today for hand injury.    Diagnoses and all orders for this visit:    Fall, initial encounter  -     XR HAND COMPLETE 3 VIEW LEFT; Future    Laceration of left hand without foreign body, initial encounter  -     clindamycin (CLEOCIN) 300 MG capsule; Take 1 capsule (300 mg total) by mouth every 8 (eight) hours. for 10 days    Contusion of left hand, initial encounter      Patient Instructions   - Rest.  - Drink plenty of fluids.  - Take Tylenol and/or Ibuprofen as directed as needed for fever/pain.  Do not take more than the recommended dose.  - follow up with your PCP within the next 1-2 weeks as needed.  - Keep the wound clean and dry.  Keep the wound completely dry for 24 hours.  - Wash daily with soap and water after 24 hours.    - Change dressing daily.  The dressing placed  today can stay on for 24 hours.  - No soaking the wound (No swimming, no bath, no dishwashing).  - Ice for the next 24-48 hours.    - Elevate when possible.   - You must understand that you have received an Urgent Care treatment only and that you may be released before all of your medical problems are known or treated.   - You, the patient, will arrange for follow up care as instructed.   - If your condition worsens or fails to improve we recommend that you receive another evaluation at the ER immediately or contact your PCP to discuss your concerns.   - You can call [phone] or [phone] to help schedule an appointment with the appropriate provider.        Hand Contusion  You have a contusion. This is also called a bruise. There is swelling and some bleeding under the skin, but no broken bones. This injury generally takes a few days to a few weeks to heal.  During that time, the bruise will typically change in color from reddish, to purple-blue, to greenish-yellow, then to yellow-brown.  Home care  · Elevate the hand to reduce pain and swelling. As much as possible, sit or lie down with the hand raised about the level of your heart. This is especially important during the first 48 hours.  · Ice the hand to help reduce pain and swelling. Wrap a cold source (ice pack or ice cubes in a plastic bag) in a thin towel. Apply to the bruised area for 20 minutes every 1 to 2 hours the first day. Continue this 3 to 4 times a day until the pain and swelling goes away.  · Unless another medicine was prescribed, you can take acetaminophen, ibuprofen, or naproxen to control pain. (If you have chronic liver or kidney disease or ever had a stomach ulcer or gastrointestinal bleeding, talk with your doctor before using these medicines.)  Follow up  Follow up with your healthcare provider or our staff as advised. Call if you are not improving within 1 to 2 weeks.  When to seek medical advice   Call your healthcare provider  right away if you have any of the following:  · Increased pain or swelling  · Arm becomes cold, blue, numb or tingly  · Signs of infection: Warmth, drainage, or increased redness or pain around the bruise  · Inability to move the injured hand   · Frequent bruising for unknown reasons  Date Last Reviewed: 2/1/2017  © 9955-8356 [employer]. [address] [place] [address], [place] [address]. All rights reserved. This information is not intended as a substitute for professional medical care. Always follow your healthcare professional's instructions.        Old Laceration: Not Sutured  A laceration is a cut through the skin. This will usually require stitches if it is deep. However, if a laceration remains open for too long, the risk of infection increases. In your case, too much time has passed before coming for treatment. The danger of infection from suturing at this time is too high. That is why your wound was not sutured.  If the wound is spread open, it will heal by filling in from the bottom and sides. A wound that is not sutured may take 1 to 4 weeks to heal, depending on the size of the opening. A visible scar will probably occur. You can discuss revision of the scar with your healthcare provider at a later time.  Home care  The following guidelines will help you care for your laceration at home:  · Keep the wound clean and dry. If a bandage was applied and it becomes wet or dirty, replace it. Otherwise, leave it in place for the first 24 hours, then change it once a day or as directed.  · Clean the wound daily:  ¨ After removing any bandage, wash the area with soap and water. Use a wet cotton swab to loosen and remove any blood or crust that forms.  ¨ Talk with your doctor before applying any antibiotic ointment to the wound. Reapply a fresh bandage.  ¨ You may remove the bandage to shower as usual after the first 24 hours, but do not soak the area in water (no tub baths or swimming) for the next five  days. Avoid activities that may reinjure your wound.  · The healthcare provider may prescribe an antibiotic cream or ointment to prevent infection. Do not stop using this medication until you have finished the prescribed course or the provider tells you to stop.  · The healthcare provider may also prescribe medications for pain. Follow instructions for taking these medications.  · Check the wound daily for signs of infection listed below.  Follow-up care  Follow up with your healthcare provider as advised.  When to seek medical advice  Call your healthcare provider right away if any of these occur:  · Wound bleeding not controlled by direct pressure  · Signs of infection, including increasing pain in the wound, increasing wound redness or swelling, or pus or bad odor coming from the wound  · Fever of 100.4°F (38.ºC) or higher or as directed by your healthcare provider  · Wound edges re-open  · Wound changes colors  · Numbness around the wound   · Decreased movement around the injured area  Date Last Reviewed: 6/10/2015  © 7244-8891 [employer]. [address] [place] [address], [place] [address]. All rights reserved. This information is not intended as a substitute for professional medical care. Always follow your healthcare professional's instructions.

## 2020-05-07 DIAGNOSIS — F41.9 ANXIETY: ICD-10-CM

## 2020-05-08 RX ORDER — SERTRALINE HYDROCHLORIDE 100 MG/1
TABLET, FILM COATED ORAL
Qty: 90 TABLET | Refills: 1 | Status: SHIPPED | OUTPATIENT
Start: 2020-05-08 | End: 2020-08-17

## 2020-06-12 DIAGNOSIS — G89.29 CHRONIC BACK PAIN, UNSPECIFIED BACK LOCATION, UNSPECIFIED BACK PAIN LATERALITY: ICD-10-CM

## 2020-06-12 DIAGNOSIS — M54.50 CHRONIC MIDLINE LOW BACK PAIN WITHOUT SCIATICA: ICD-10-CM

## 2020-06-12 DIAGNOSIS — M54.9 CHRONIC BACK PAIN, UNSPECIFIED BACK LOCATION, UNSPECIFIED BACK PAIN LATERALITY: ICD-10-CM

## 2020-06-12 DIAGNOSIS — G89.29 CHRONIC MIDLINE LOW BACK PAIN WITHOUT SCIATICA: ICD-10-CM

## 2020-06-12 NOTE — TELEPHONE ENCOUNTER
----- Message from Hayley Mg sent at 6/12/2020  3:00 PM CDT -----  Contact: Patient [phone]  Type: RX Refill Request    Who Called: Patient    Have you contacted your pharmacy: No    Refill or New Rx: Refill    RX Name and Strength: HYDROcodone-acetaminophen (NORCO)  mg per tablet    Is this a 30 day or 90 day RX: 90 day    Preferred Pharmacy with phone number: .  [facility] - [place], [place] - [address]  [address] [facility] [address]  Phone: [phone] Fax: [phone]    Local or Mail Order: Mail Order     Would the patient rather a call back or a response via [facility]? Call back    Best Call Back Number: [phone]    Additional Information:  Patient states that she's been out of her medication for almost 2 months.

## 2020-06-15 RX ORDER — HYDROCODONE BITARTRATE AND ACETAMINOPHEN 10; 325 MG/1; MG/1
1 TABLET ORAL EVERY 6 HOURS PRN
Qty: 120 TABLET | Refills: 0 | Status: SHIPPED | OUTPATIENT
Start: 2020-06-15 | End: 2020-07-14

## 2020-06-15 NOTE — TELEPHONE ENCOUNTER
Please encourage patient to schedule an appointment.    One refill given, has not been seen in 6 mos and is taking controlled substance    Can be either in office or virtual.    Please do not overbook    Iona Jose MD

## 2020-08-10 DIAGNOSIS — G89.29 CHRONIC BACK PAIN, UNSPECIFIED BACK LOCATION, UNSPECIFIED BACK PAIN LATERALITY: ICD-10-CM

## 2020-08-10 DIAGNOSIS — M54.9 CHRONIC BACK PAIN, UNSPECIFIED BACK LOCATION, UNSPECIFIED BACK PAIN LATERALITY: ICD-10-CM

## 2020-08-10 DIAGNOSIS — M54.50 CHRONIC MIDLINE LOW BACK PAIN WITHOUT SCIATICA: ICD-10-CM

## 2020-08-10 DIAGNOSIS — G89.29 CHRONIC MIDLINE LOW BACK PAIN WITHOUT SCIATICA: ICD-10-CM

## 2020-08-10 RX ORDER — HYDROCODONE BITARTRATE AND ACETAMINOPHEN 10; 325 MG/1; MG/1
1 TABLET ORAL EVERY 6 HOURS PRN
Qty: 120 TABLET | Refills: 0 | Status: CANCELLED | OUTPATIENT
Start: 2020-08-10 | End: 2020-09-08

## 2020-08-10 NOTE — TELEPHONE ENCOUNTER
----- Message from Leah Benton sent at 8/10/2020 11:03 AM CDT -----  Contact: BRANDT MEHTA [[id number]]  Can the clinic reply in [facility]: no    Please refill the medication(s) listed below. The patient can be reached at this phone number once it is called into the pharmacy [phone]    Medication #1: HYDROcodone-acetaminophen (NORCO)  mg per tablet    Preferred Pharmacy: [facility] - [place], [place] - [address]

## 2020-08-10 NOTE — TELEPHONE ENCOUNTER
Needs OV for further refills of controlled substance    Ok to take restrictions off my schedule, try to avoid urgent slots. Please do not overbook    Iona Jose MD

## 2020-08-17 ENCOUNTER — OFFICE VISIT (OUTPATIENT)
Dept: FAMILY MEDICINE | Facility: CLINIC | Age: 85
End: 2020-08-17
Payer: MEDICARE

## 2020-08-17 VITALS
SYSTOLIC BLOOD PRESSURE: 130 MMHG | OXYGEN SATURATION: 98 % | TEMPERATURE: 98 F | DIASTOLIC BLOOD PRESSURE: 70 MMHG | HEART RATE: 84 BPM | BODY MASS INDEX: 21.62 KG/M2 | HEIGHT: 57 IN | WEIGHT: 100.19 LBS

## 2020-08-17 DIAGNOSIS — M54.9 CHRONIC BACK PAIN, UNSPECIFIED BACK LOCATION, UNSPECIFIED BACK PAIN LATERALITY: ICD-10-CM

## 2020-08-17 DIAGNOSIS — M54.50 CHRONIC MIDLINE LOW BACK PAIN WITHOUT SCIATICA: ICD-10-CM

## 2020-08-17 DIAGNOSIS — G89.29 CHRONIC MIDLINE LOW BACK PAIN WITHOUT SCIATICA: ICD-10-CM

## 2020-08-17 DIAGNOSIS — F41.9 ANXIETY: ICD-10-CM

## 2020-08-17 DIAGNOSIS — E11.9 DM TYPE 2 WITHOUT RETINOPATHY: ICD-10-CM

## 2020-08-17 DIAGNOSIS — I10 ESSENTIAL HYPERTENSION: ICD-10-CM

## 2020-08-17 DIAGNOSIS — G89.29 CHRONIC BACK PAIN, UNSPECIFIED BACK LOCATION, UNSPECIFIED BACK PAIN LATERALITY: ICD-10-CM

## 2020-08-17 DIAGNOSIS — E11.42 TYPE 2 DIABETES MELLITUS WITH DIABETIC POLYNEUROPATHY, WITHOUT LONG-TERM CURRENT USE OF INSULIN: Primary | ICD-10-CM

## 2020-08-17 PROCEDURE — 1159F MED LIST DOCD IN RCRD: CPT | Mod: HCNC,S$GLB,, | Performed by: FAMILY MEDICINE

## 2020-08-17 PROCEDURE — 99999 PR PBB SHADOW E&M-EST. PATIENT-LVL IV: CPT | Mod: PBBFAC,HCNC,, | Performed by: FAMILY MEDICINE

## 2020-08-17 PROCEDURE — 1125F PR PAIN SEVERITY QUANTIFIED, PAIN PRESENT: ICD-10-PCS | Mod: HCNC,S$GLB,, | Performed by: FAMILY MEDICINE

## 2020-08-17 PROCEDURE — 3078F PR MOST RECENT DIASTOLIC BLOOD PRESSURE < 80 MM HG: ICD-10-PCS | Mod: HCNC,CPTII,S$GLB, | Performed by: FAMILY MEDICINE

## 2020-08-17 PROCEDURE — 1159F PR MEDICATION LIST DOCUMENTED IN MEDICAL RECORD: ICD-10-PCS | Mod: HCNC,S$GLB,, | Performed by: FAMILY MEDICINE

## 2020-08-17 PROCEDURE — 3288F FALL RISK ASSESSMENT DOCD: CPT | Mod: HCNC,CPTII,S$GLB, | Performed by: FAMILY MEDICINE

## 2020-08-17 PROCEDURE — 1100F PR PT FALLS ASSESS DOC 2+ FALLS/FALL W/INJURY/YR: ICD-10-PCS | Mod: HCNC,CPTII,S$GLB, | Performed by: FAMILY MEDICINE

## 2020-08-17 PROCEDURE — 3075F SYST BP GE 130 - 139MM HG: CPT | Mod: HCNC,CPTII,S$GLB, | Performed by: FAMILY MEDICINE

## 2020-08-17 PROCEDURE — 1100F PTFALLS ASSESS-DOCD GE2>/YR: CPT | Mod: HCNC,CPTII,S$GLB, | Performed by: FAMILY MEDICINE

## 2020-08-17 PROCEDURE — 99999 PR PBB SHADOW E&M-EST. PATIENT-LVL IV: ICD-10-PCS | Mod: PBBFAC,HCNC,, | Performed by: FAMILY MEDICINE

## 2020-08-17 PROCEDURE — 99214 PR OFFICE/OUTPT VISIT, EST, LEVL IV, 30-39 MIN: ICD-10-PCS | Mod: HCNC,S$GLB,, | Performed by: FAMILY MEDICINE

## 2020-08-17 PROCEDURE — 3078F DIAST BP <80 MM HG: CPT | Mod: HCNC,CPTII,S$GLB, | Performed by: FAMILY MEDICINE

## 2020-08-17 PROCEDURE — 3288F PR FALLS RISK ASSESSMENT DOCUMENTED: ICD-10-PCS | Mod: HCNC,CPTII,S$GLB, | Performed by: FAMILY MEDICINE

## 2020-08-17 PROCEDURE — 99214 OFFICE O/P EST MOD 30 MIN: CPT | Mod: HCNC,S$GLB,, | Performed by: FAMILY MEDICINE

## 2020-08-17 PROCEDURE — 1125F AMNT PAIN NOTED PAIN PRSNT: CPT | Mod: HCNC,S$GLB,, | Performed by: FAMILY MEDICINE

## 2020-08-17 PROCEDURE — 3075F PR MOST RECENT SYSTOLIC BLOOD PRESS GE 130-139MM HG: ICD-10-PCS | Mod: HCNC,CPTII,S$GLB, | Performed by: FAMILY MEDICINE

## 2020-08-17 RX ORDER — HYDROCODONE BITARTRATE AND ACETAMINOPHEN 10; 325 MG/1; MG/1
1 TABLET ORAL EVERY 6 HOURS PRN
Qty: 120 TABLET | Refills: 0 | Status: SHIPPED | OUTPATIENT
Start: 2020-08-17 | End: 2020-09-15

## 2020-08-17 RX ORDER — MIRTAZAPINE 7.5 MG/1
7.5 TABLET, FILM COATED ORAL NIGHTLY
Qty: 90 TABLET | Refills: 0 | Status: SHIPPED | OUTPATIENT
Start: 2020-08-17 | End: 2020-10-14

## 2020-08-17 RX ORDER — HYDROCODONE BITARTRATE AND ACETAMINOPHEN 10; 325 MG/1; MG/1
1 TABLET ORAL EVERY 6 HOURS PRN
Qty: 120 TABLET | Refills: 0 | Status: SHIPPED | OUTPATIENT
Start: 2020-10-17 | End: 2020-10-16 | Stop reason: SDUPTHER

## 2020-08-17 RX ORDER — HYDROCODONE BITARTRATE AND ACETAMINOPHEN 10; 325 MG/1; MG/1
1 TABLET ORAL EVERY 6 HOURS PRN
Qty: 120 TABLET | Refills: 0 | Status: SHIPPED | OUTPATIENT
Start: 2020-09-17 | End: 2020-10-16

## 2020-08-17 NOTE — PROGRESS NOTES
Chief Complaint   Patient presents with    Diabetes    Chronic Pain       HPI  Kaycee Almanza is a 85 y.o. female with multiple medical diagnoses as listed in the medical history and problem list that presents for follow-up for diabetes and chronic pain    Chronic pain  She has pain in hands, wrists and lower spine and has been out of medication for the past two months. She has not been very active and this is making things worse    Low appetite  She has been forgetting to eat. Per her daughter she will not move in with a family member so her son next door makes her meals but they are not sure if she is eating all of it. She likes certain foods and eats once a day. She has lost some weight but is picking it back up.     HPI    Patient Active Problem List   Diagnosis    Osteoarthritis involving multiple joints on both sides of body    Essential hypertension    Controlled type 2 diabetes mellitus with microalbuminuria, without long-term current use of insulin    Chronic back pain    Type 2 diabetes mellitus with diabetic polyneuropathy, without long-term current use of insulin    DM type 2 without retinopathy    Left posterior capsular opacification    Pseudophakia    Post-operative state    Refractive error    Rectal bleeding    External hemorrhoid, bleeding    Anemia    Anxiety    Tinea pedis of both feet    Lumbar spinal stenosis         ROS  Review of Systems   Constitutional: Positive for unexpected weight change. Negative for chills, diaphoresis, fatigue and fever.   HENT: Negative for rhinorrhea, sinus pressure, sore throat and tinnitus.    Eyes: Negative for photophobia and visual disturbance.   Respiratory: Negative for cough, shortness of breath and wheezing.    Cardiovascular: Negative for chest pain and palpitations.   Gastrointestinal: Negative for abdominal pain, blood in stool, constipation, diarrhea, nausea and vomiting.   Genitourinary: Negative for dysuria, flank pain, frequency  "and vaginal discharge.   Musculoskeletal: Positive for arthralgias and back pain. Negative for joint swelling.   Skin: Negative for rash.   Neurological: Negative for speech difficulty, weakness, light-headedness and headaches.   Psychiatric/Behavioral: Negative for behavioral problems and dysphoric mood.       Physical Exam  Vitals:    08/17/20 1406   BP: 130/70   BP Location: Right arm   Patient Position: Sitting   BP Method: Small (Manual)   Pulse: 84   Temp: 97.8 °F (36.6 °C)   TempSrc: Temporal   SpO2: 98%   Weight: 45.5 kg (100 lb 3.2 oz)   Height: 4' 9" (1.448 m)    Body mass index is 21.68 kg/m².  Weight: 45.5 kg (100 lb 3.2 oz)   Height: 4' 9" (144.8 cm)     Physical Exam  Vitals signs and nursing note reviewed.   Constitutional:       General: She is not in acute distress.     Appearance: She is well-developed.   Neck:      Musculoskeletal: Neck supple.   Cardiovascular:      Rate and Rhythm: Normal rate and regular rhythm.      Heart sounds: No murmur. No friction rub. No gallop.    Pulmonary:      Effort: Pulmonary effort is normal. No respiratory distress.      Breath sounds: Normal breath sounds. No wheezing or rales.   Lymphadenopathy:      Cervical: No cervical adenopathy.   Skin:     General: Skin is warm and dry.      Findings: No rash.   Neurological:      Mental Status: She is alert and oriented to person, place, and time.   Psychiatric:         Behavior: Behavior normal.         ALLERGIES AND MEDICATIONS: updated and reviewed.  Review of patient's allergies indicates:  No Known Allergies  Medication List with Changes/Refills   New Medications    HYDROCODONE-ACETAMINOPHEN (NORCO)  MG PER TABLET    Take 1 tablet by mouth every 6 (six) hours as needed.    HYDROCODONE-ACETAMINOPHEN (NORCO)  MG PER TABLET    Take 1 tablet by mouth every 6 (six) hours as needed.    MIRTAZAPINE (REMERON) 7.5 MG TAB    Take 1 tablet (7.5 mg total) by mouth every evening.   Current Medications    AGDBCB " AMITRIPTYLINE 10%- GABAPENTIN 6%- DICLOFENAC 8%- BACLOFEN 2%- CYCLOBENAPRINE 2%- BUPIVACAINE 1% IN TRANSDERMAL CREAM    Apply 1 to 2 grams 3 to 4 times daily    ASPIRIN (ECOTRIN) 81 MG EC TABLET    Take 81 mg by mouth once daily.    BLOOD-GLUCOSE METER (FREESTYLE SYSTEM KIT) KIT    Use as instructed    CALCIUM CARBONATE/VITAMIN D3 (CALCIUM 600 + D,3, ORAL)    Take by mouth.    ERGOCALCIFEROL, VITAMIN D2, (VITAMIN D ORAL)    Take by mouth.    FLUAD 7785-3272, 65 YR UP,,PF, 45 MCG (15 MCG X 3)/0.5 ML SYRG    INJECT    HYDROCHLOROTHIAZIDE (HYDRODIURIL) 12.5 MG TAB    Take 1 tablet (12.5 mg total) by mouth once daily.    MULTIVIT-MIN/IRON/FOLIC/LUTEIN (CENTRUM SILVER WOMEN ORAL)    Take by mouth.    NIFEDIPINE (PROCARDIA-XL) 60 MG (OSM) 24 HR TABLET    TAKE 1 TABLET EVERY DAY    POTASSIUM CHLORIDE (KLOR-CON) 10 MEQ TBSR    TK 1 T PO BID FOR 15 DAYS    TRADJENTA 5 MG TAB TABLET    TAKE 1 TABLET EVERY DAY    TRUE METRIX GLUCOSE TEST STRIP STRP    Check blood glucose 2 times daily before meals    WHEELCHAIR ASHER    Transport wheelchair   Changed and/or Refilled Medications    Modified Medication Previous Medication    HYDROCODONE-ACETAMINOPHEN (NORCO)  MG PER TABLET HYDROcodone-acetaminophen (NORCO)  mg per tablet       Take 1 tablet by mouth every 6 (six) hours as needed.    Take 1 tablet by mouth every 6 (six) hours as needed.   Discontinued Medications    SERTRALINE (ZOLOFT) 100 MG TABLET    TAKE 1 TABLET EVERY DAY       Assessment & Plan  1. Type 2 diabetes mellitus with diabetic polyneuropathy, without long-term current use of insulin    2. Chronic back pain, unspecified back location, unspecified back pain laterality    3. Chronic midline low back pain without sciatica    4. Essential hypertension    5. DM type 2 without retinopathy    6. Anxiety        Problem List Items Addressed This Visit        Psychiatric    Anxiety  -begin remeron  D/c zoloft  Begin remeron to aid in increasing appetite as family  is concerned she is not eating enough    Relevant Medications    mirtazapine (REMERON) 7.5 MG Tab       Cardiac/Vascular    Essential hypertension  -controlled on current regimen    Relevant Orders    Comprehensive metabolic panel    Lipid Panel    CBC auto differential    Comprehensive metabolic panel       Endocrine    DM type 2 without retinopathy  -continue current regimen, update labs appetite has been poor    Relevant Orders    Hemoglobin A1C    Type 2 diabetes mellitus with diabetic polyneuropathy, without long-term current use of insulin - Primary  -continue current regimen will update labs       Orthopedic    Chronic back pain  [place]  database queried/reviewed  Continue current regimen  Encourage movement as much as possible    Relevant Medications    HYDROcodone-acetaminophen (NORCO)  mg per tablet    HYDROcodone-acetaminophen (NORCO)  mg per tablet (Start on 9/17/2020)    HYDROcodone-acetaminophen (NORCO)  mg per tablet (Start on 10/17/2020)          Follow up in about 3 months (around 11/17/2020) for Follow up.    Other Orders Placed This Visit:  Orders Placed This Encounter   Procedures    Comprehensive metabolic panel    Lipid Panel    Hemoglobin A1C    CBC auto differential    Comprehensive metabolic panel

## 2020-08-21 ENCOUNTER — LAB VISIT (OUTPATIENT)
Dept: LAB | Facility: HOSPITAL | Age: 85
End: 2020-08-21
Attending: FAMILY MEDICINE
Payer: MEDICARE

## 2020-08-21 DIAGNOSIS — E11.9 DM TYPE 2 WITHOUT RETINOPATHY: ICD-10-CM

## 2020-08-21 DIAGNOSIS — I10 ESSENTIAL HYPERTENSION: ICD-10-CM

## 2020-08-21 LAB
ALBUMIN SERPL BCP-MCNC: 3.9 G/DL (ref 3.5–5.2)
ALP SERPL-CCNC: 53 U/L (ref 55–135)
ALT SERPL W/O P-5'-P-CCNC: 17 U/L (ref 10–44)
ANION GAP SERPL CALC-SCNC: 7 MMOL/L (ref 8–16)
AST SERPL-CCNC: 24 U/L (ref 10–40)
BASOPHILS # BLD AUTO: 0.03 K/UL (ref 0–0.2)
BASOPHILS NFR BLD: 0.5 % (ref 0–1.9)
BILIRUB SERPL-MCNC: 0.5 MG/DL (ref 0.1–1)
BUN SERPL-MCNC: 21 MG/DL (ref 8–23)
CALCIUM SERPL-MCNC: 9.7 MG/DL (ref 8.7–10.5)
CHLORIDE SERPL-SCNC: 103 MMOL/L (ref 95–110)
CHOLEST SERPL-MCNC: 314 MG/DL (ref 120–199)
CHOLEST/HDLC SERPL: 8.7 {RATIO} (ref 2–5)
CO2 SERPL-SCNC: 31 MMOL/L (ref 23–29)
CREAT SERPL-MCNC: 1 MG/DL (ref 0.5–1.4)
DIFFERENTIAL METHOD: ABNORMAL
EOSINOPHIL # BLD AUTO: 0.3 K/UL (ref 0–0.5)
EOSINOPHIL NFR BLD: 5.3 % (ref 0–8)
ERYTHROCYTE [DISTWIDTH] IN BLOOD BY AUTOMATED COUNT: 13.4 % (ref 11.5–14.5)
EST. GFR  (AFRICAN AMERICAN): 59.4 ML/MIN/1.73 M^2
EST. GFR  (NON AFRICAN AMERICAN): 51.5 ML/MIN/1.73 M^2
ESTIMATED AVG GLUCOSE: 111 MG/DL (ref 68–131)
GLUCOSE SERPL-MCNC: 118 MG/DL (ref 70–110)
HBA1C MFR BLD HPLC: 5.5 % (ref 4–5.6)
HCT VFR BLD AUTO: 37 % (ref 37–48.5)
HDLC SERPL-MCNC: 36 MG/DL (ref 40–75)
HDLC SERPL: 11.5 % (ref 20–50)
HGB BLD-MCNC: 11.7 G/DL (ref 12–16)
IMM GRANULOCYTES # BLD AUTO: 0.02 K/UL (ref 0–0.04)
IMM GRANULOCYTES NFR BLD AUTO: 0.3 % (ref 0–0.5)
LDLC SERPL CALC-MCNC: 220 MG/DL (ref 63–159)
LYMPHOCYTES # BLD AUTO: 2.3 K/UL (ref 1–4.8)
LYMPHOCYTES NFR BLD: 38.7 % (ref 18–48)
MCH RBC QN AUTO: 31.1 PG (ref 27–31)
MCHC RBC AUTO-ENTMCNC: 31.6 G/DL (ref 32–36)
MCV RBC AUTO: 98 FL (ref 82–98)
MONOCYTES # BLD AUTO: 0.6 K/UL (ref 0.3–1)
MONOCYTES NFR BLD: 9.3 % (ref 4–15)
NEUTROPHILS # BLD AUTO: 2.8 K/UL (ref 1.8–7.7)
NEUTROPHILS NFR BLD: 45.9 % (ref 38–73)
NONHDLC SERPL-MCNC: 278 MG/DL
NRBC BLD-RTO: 0 /100 WBC
PLATELET # BLD AUTO: 163 K/UL (ref 150–350)
PMV BLD AUTO: 11.3 FL (ref 9.2–12.9)
POTASSIUM SERPL-SCNC: 4.3 MMOL/L (ref 3.5–5.1)
PROT SERPL-MCNC: 7.3 G/DL (ref 6–8.4)
RBC # BLD AUTO: 3.76 M/UL (ref 4–5.4)
SODIUM SERPL-SCNC: 141 MMOL/L (ref 136–145)
TRIGL SERPL-MCNC: 290 MG/DL (ref 30–150)
WBC # BLD AUTO: 6 K/UL (ref 3.9–12.7)

## 2020-08-21 PROCEDURE — 80061 LIPID PANEL: CPT | Mod: HCNC

## 2020-08-21 PROCEDURE — 80053 COMPREHEN METABOLIC PANEL: CPT | Mod: HCNC

## 2020-08-21 PROCEDURE — 36415 COLL VENOUS BLD VENIPUNCTURE: CPT | Mod: HCNC,PO

## 2020-08-21 PROCEDURE — 83036 HEMOGLOBIN GLYCOSYLATED A1C: CPT | Mod: HCNC

## 2020-08-21 PROCEDURE — 85025 COMPLETE CBC W/AUTO DIFF WBC: CPT | Mod: HCNC

## 2020-08-25 NOTE — PROGRESS NOTES
1. Your blood count is mildly anemic. We can continue to monitor this with your labs.  2. Your cholesterol is very high.   3. Your liver and kidney function is normal however kidneys are slightly decreased but this is appropriate for your age.  4. Diabetes is well controlled.    Iona Jose MD

## 2020-10-07 RX ORDER — SERTRALINE HYDROCHLORIDE 100 MG/1
TABLET, FILM COATED ORAL
Qty: 90 TABLET | Refills: 1 | Status: SHIPPED | OUTPATIENT
Start: 2020-10-07 | End: 2021-03-24

## 2020-10-14 DIAGNOSIS — F41.9 ANXIETY: ICD-10-CM

## 2020-10-14 RX ORDER — MIRTAZAPINE 7.5 MG/1
TABLET, FILM COATED ORAL
Qty: 90 TABLET | Refills: 1 | Status: SHIPPED | OUTPATIENT
Start: 2020-10-14 | End: 2021-03-24

## 2020-10-16 DIAGNOSIS — G89.29 CHRONIC MIDLINE LOW BACK PAIN WITHOUT SCIATICA: ICD-10-CM

## 2020-10-16 DIAGNOSIS — M54.9 CHRONIC BACK PAIN, UNSPECIFIED BACK LOCATION, UNSPECIFIED BACK PAIN LATERALITY: ICD-10-CM

## 2020-10-16 DIAGNOSIS — M54.50 CHRONIC MIDLINE LOW BACK PAIN WITHOUT SCIATICA: ICD-10-CM

## 2020-10-16 DIAGNOSIS — G89.29 CHRONIC BACK PAIN, UNSPECIFIED BACK LOCATION, UNSPECIFIED BACK PAIN LATERALITY: ICD-10-CM

## 2020-10-16 RX ORDER — HYDROCODONE BITARTRATE AND ACETAMINOPHEN 10; 325 MG/1; MG/1
1 TABLET ORAL EVERY 6 HOURS PRN
Qty: 120 TABLET | Refills: 0 | Status: SHIPPED | OUTPATIENT
Start: 2020-10-17 | End: 2020-11-15

## 2020-10-21 ENCOUNTER — OFFICE VISIT (OUTPATIENT)
Dept: OPTOMETRY | Facility: CLINIC | Age: 85
End: 2020-10-21
Payer: MEDICARE

## 2020-10-21 ENCOUNTER — PATIENT OUTREACH (OUTPATIENT)
Dept: ADMINISTRATIVE | Facility: OTHER | Age: 85
End: 2020-10-21

## 2020-10-21 DIAGNOSIS — E11.9 DIABETIC EYE EXAM: Primary | ICD-10-CM

## 2020-10-21 DIAGNOSIS — H04.123 DRY EYE SYNDROME, BILATERAL: ICD-10-CM

## 2020-10-21 DIAGNOSIS — Z01.00 DIABETIC EYE EXAM: Primary | ICD-10-CM

## 2020-10-21 DIAGNOSIS — H52.7 REFRACTIVE ERROR: ICD-10-CM

## 2020-10-21 DIAGNOSIS — Z96.1 PSEUDOPHAKIA: ICD-10-CM

## 2020-10-21 PROCEDURE — 92015 PR REFRACTION: ICD-10-PCS | Mod: HCNC,S$GLB,, | Performed by: OPTOMETRIST

## 2020-10-21 PROCEDURE — 99999 PR PBB SHADOW E&M-EST. PATIENT-LVL III: ICD-10-PCS | Mod: PBBFAC,HCNC,, | Performed by: OPTOMETRIST

## 2020-10-21 PROCEDURE — 92015 DETERMINE REFRACTIVE STATE: CPT | Mod: HCNC,S$GLB,, | Performed by: OPTOMETRIST

## 2020-10-21 PROCEDURE — 99499 UNLISTED E&M SERVICE: CPT | Mod: S$GLB,,, | Performed by: OPTOMETRIST

## 2020-10-21 PROCEDURE — 92014 PR EYE EXAM, EST PATIENT,COMPREHESV: ICD-10-PCS | Mod: HCNC,S$GLB,, | Performed by: OPTOMETRIST

## 2020-10-21 PROCEDURE — 92014 COMPRE OPH EXAM EST PT 1/>: CPT | Mod: HCNC,S$GLB,, | Performed by: OPTOMETRIST

## 2020-10-21 PROCEDURE — 99999 PR PBB SHADOW E&M-EST. PATIENT-LVL III: CPT | Mod: PBBFAC,HCNC,, | Performed by: OPTOMETRIST

## 2020-10-21 PROCEDURE — 99499 RISK ADDL DX/OHS AUDIT: ICD-10-PCS | Mod: S$GLB,,, | Performed by: OPTOMETRIST

## 2020-10-21 NOTE — PROGRESS NOTES
Subjective:       Patient ID: Kaycee Almanza is a 86 y.o. female      Chief Complaint   Patient presents with    Concerns About Ocular Health    Diabetic Eye Exam     History of Present Illness  Dls: 11/7/18 Dr. Massey     85 y/o female presents today for diabetic eye exam.  Pt c/o fbs ou. Pt states no changes in vision.   Pt broke bifocal glasses using older rx today.      this am     No tearing  + itching  No burning  No pain  No ha's  No floaters  No flashes    Eye meds  otc gtts ou prn     Hemoglobin A1C       Date                     Value               Ref Range           Status                08/21/2020               5.5                 4.0 - 5.6 %         Final                  03/09/2020               5.8 (H)             4.0 - 5.6 %         Final                 12/18/2019               6.0 (H)             4.0 - 5.6 %         Final              Assessment/Plan:     1. Diabetic eye exam  [facility] vision    No diabetic retinopathy. Discussed with pt the effects of diabetes on vision, importance of good blood sugar control, compliance with meds, and follow up care with PCP. Return in 1 year for dilated eye exam, sooner PRN.    2. Pseudophakia  Well-centered. Clear.     3. Dry eye syndrome, bilateral  Recommend artificial tears (Systane/Refresh) 1 drop 2-4x per day and PRN. Discussed different drop options - AT/PFAT/gel/ointment. Chronicity of disease and treatment discussed. Avoid Visine and Clear Eyes.     4. Refractive error  .gvplansp    Eyeglass Final Rx     Eyeglass Final Rx       Sphere Cylinder Axis Add    Right +0.50 +2.00 005 +2.75    Left [place] +1.00 155 +2.75    Expiration Date: 10/22/2021                  Follow up in about 1 year (around 10/21/2021) for Diabetic Eye Exam.

## 2020-12-10 ENCOUNTER — OFFICE VISIT (OUTPATIENT)
Dept: URGENT CARE | Facility: CLINIC | Age: 85
End: 2020-12-10
Payer: MEDICARE

## 2020-12-10 VITALS
TEMPERATURE: 98 F | HEIGHT: 57 IN | HEART RATE: 85 BPM | OXYGEN SATURATION: 97 % | DIASTOLIC BLOOD PRESSURE: 84 MMHG | WEIGHT: 100 LBS | BODY MASS INDEX: 21.57 KG/M2 | SYSTOLIC BLOOD PRESSURE: 184 MMHG | RESPIRATION RATE: 18 BRPM

## 2020-12-10 DIAGNOSIS — R19.7 NAUSEA VOMITING AND DIARRHEA: ICD-10-CM

## 2020-12-10 DIAGNOSIS — R11.2 NAUSEA VOMITING AND DIARRHEA: ICD-10-CM

## 2020-12-10 DIAGNOSIS — A08.4 VIRAL GASTROENTERITIS: Primary | ICD-10-CM

## 2020-12-10 LAB
BILIRUB UR QL STRIP: NEGATIVE
CTP QC/QA: YES
CTP QC/QA: YES
FLUAV AG NPH QL: NEGATIVE
FLUBV AG NPH QL: NEGATIVE
GLUCOSE UR QL STRIP: NEGATIVE
KETONES UR QL STRIP: NEGATIVE
LEUKOCYTE ESTERASE UR QL STRIP: NEGATIVE
PH, POC UA: 7 (ref 5–8)
POC BLOOD, URINE: NEGATIVE
POC NITRATES, URINE: NEGATIVE
PROT UR QL STRIP: POSITIVE
SARS-COV-2 RDRP RESP QL NAA+PROBE: NEGATIVE
SP GR UR STRIP: 1.01 (ref 1–1.03)
UROBILINOGEN UR STRIP-ACNC: ABNORMAL (ref 0.1–1.1)

## 2020-12-10 PROCEDURE — U0002 COVID-19 LAB TEST NON-CDC: HCPCS | Mod: QW,S$GLB,, | Performed by: PHYSICIAN ASSISTANT

## 2020-12-10 PROCEDURE — 87804 POCT INFLUENZA A/B: ICD-10-PCS | Mod: 59,QW,S$GLB, | Performed by: PHYSICIAN ASSISTANT

## 2020-12-10 PROCEDURE — 81003 URINALYSIS AUTO W/O SCOPE: CPT | Mod: QW,S$GLB,, | Performed by: PHYSICIAN ASSISTANT

## 2020-12-10 PROCEDURE — U0002: ICD-10-PCS | Mod: QW,S$GLB,, | Performed by: PHYSICIAN ASSISTANT

## 2020-12-10 PROCEDURE — 81003 POCT URINALYSIS, DIPSTICK, AUTOMATED, W/O SCOPE: ICD-10-PCS | Mod: QW,S$GLB,, | Performed by: PHYSICIAN ASSISTANT

## 2020-12-10 PROCEDURE — 99214 OFFICE O/P EST MOD 30 MIN: CPT | Mod: 25,S$GLB,CS, | Performed by: PHYSICIAN ASSISTANT

## 2020-12-10 PROCEDURE — 99214 PR OFFICE/OUTPT VISIT, EST, LEVL IV, 30-39 MIN: ICD-10-PCS | Mod: 25,S$GLB,CS, | Performed by: PHYSICIAN ASSISTANT

## 2020-12-10 PROCEDURE — 87804 INFLUENZA ASSAY W/OPTIC: CPT | Mod: QW,S$GLB,, | Performed by: PHYSICIAN ASSISTANT

## 2020-12-10 RX ORDER — DICYCLOMINE HYDROCHLORIDE 10 MG/1
10 CAPSULE ORAL 4 TIMES DAILY
Qty: 28 CAPSULE | Refills: 0 | Status: SHIPPED | OUTPATIENT
Start: 2020-12-10 | End: 2020-12-17

## 2020-12-10 RX ORDER — ONDANSETRON 4 MG/1
4 TABLET, ORALLY DISINTEGRATING ORAL EVERY 8 HOURS PRN
Qty: 15 TABLET | Refills: 0 | Status: SHIPPED | OUTPATIENT
Start: 2020-12-10 | End: 2021-03-12

## 2020-12-10 NOTE — PATIENT INSTRUCTIONS
If your condition worsens or fails to improve we recommend that you receive another evaluation at the ER immediately or contact your PCP to discuss your concerns or return here. You must understand that you've received an urgent care treatment only and that you may be released before all your medical problems are known or treated. You the patient will arrange for followup care as instructed.     Use prescribed anti-nausea medicine as needed and prescribed   Take prescribed bentyl as directed as needed for the cramping associated with diarrhea.   Pepto bismol over the counter to help with diarrhea. Avoid Imodium.    Increase fluids and rest is important .  Start off with liquid diet and progress as tolerated (see below)  · Water and clear liquids are important so you do not get dehydrated. Drink a small amount at a time.  · Do not force yourself to eat, especially if you have cramps, vomiting, or diarrhea. When you finally decide to start eating, do not eat large amounts at a time, even if you are hungry.  · If you eat, avoid fatty, greasy, spicy, or fried foods.  · Do not eat dairy products if you have diarrhea; they can make the diarrhea worse    Watch for any increase pain, fever, localized pain to right lower abdomen or continued vomiting or diarrhea.          Food Poisoning or Viral Gastroenteritis (Adult)  You have a stomach illness that is likely either food poisoning or viral gastroenteritis.  Food poisoning is illness that is passed along in food and affects the stomach and intestinal tract. It usually occurs from 1 to 24 hours after eating food that has spoiled. When it happens within a few hours of eating, it is often caused by toxins from bacteria in food that has not been cooked or refrigerated properly.  Viral gastroenteritis is also an illness from a virus that affects the stomach and intestinal tract. Many people call it the stomach flu, but it has nothing to do with influenza. In fact, it can  happen from food poisoning, but it can also happen when germs are passed from person-to-person or contaminated surface (toothbrush, cutting board, toilet) to a person.  Either illness can cause these symptoms:  · Abdominal pain and cramping  · Nausea  · Vomiting  · Diarrhea  · Fever and chills  · Loss of bowel control  The symptoms of food poisoning usually last 1 to 2 days. The symptoms of viral gastroenteritis can sometimes last up to 7 days but usually end sooner.  Antibiotics are not effective for either illness.  Other causes of gastroenteritis include bacteria and parasites which are not discussed here.  Home care  Follow all instructions given by your healthcare provider. Rest at home for the next 24 hours, or until you feel better. Avoid caffeine, tobacco, and alcohol. These can make diarrhea, cramping, and pain worse.  If taking medicines:  · Dont take over-the-counter diarrhea or nausea medicines unless your healthcare provider tells you to.  · You may use acetaminophen or NSAID medicines like ibuprofen or naproxen to reduce pain and fever. Dont use these if you have chronic liver or kidney disease, or ever had a stomach ulcer or GI bleeding. Talk with your healthcare provider first. Don't use NSAID medicines if you are already taking one for another condition (like arthritis) or are on daily aspirin therapy (such as for heart disease or after a stroke).  To prevent the spread of illness:  · Remember that washing with soap and water is the best way to prevent the spread of infection. Wash your hands before and after caring for a sick person.  · Clean the toilet after each use.  · Wash your hands or use alcohol-based hand  before eating.  · Wash your hands or use alcohol-based hand  before and after preparing food. Keep in mind that people with diarrhea or vomiting should not prepare food for others.  · Wash your hands or use alcohol-based hand  after using cutting boards,  counter-tops, and knives (and other utensils) that have been in contact with raw foods.  · Wash and then peel fruits and vegetables.  · Keep uncooked meats away from cooked and ready-to-eat foods.  · Use a food thermometer when cooking. Cook poultry to at least 165°F (74°C). Cook ground meat (beef, veal, pork, lamb) to at least 160°F (71°C). Cook fresh beef, veal, lamb, and pork to at least 145°F (63°C).  · Dont eat raw or undercooked eggs (poached or mary lou side up), poultry, meat or unpasteurized milk and juices.  Food and drinks  The main goal while treating vomiting or diarrhea is to prevent dehydration. This is done by taking small amounts of liquids often.  · Keep in mind that liquids are more important than food right now.  · Drink only small amounts of liquids at a time.  · Dont force yourself to eat, especially if you are having cramping, vomiting, or diarrhea. Dont eat large amounts at a time, even if you are hungry.  · If you eat, avoid fatty, greasy, spicy, or fried foods.  · Dont eat dairy foods or drink milk if you have diarrhea. These can make diarrhea worse.  The first 24 hours you can try:  · Oral rehydration solutions, available at grocery stores and pharmacies. Sports drinks are not a good choice if you are very dehydrated. They have too much sugar and not enough electrolytes.  · Soft drinks without caffeine  · Ginger ale  · Water (plain or flavored)  · Decaf tea or coffee  · Clear broth, consommé, or bouillon  · Gelatin, popsicles, or frozen fruit juice bars   The second 24 hours, if you are feeling better, you can add:  · Hot cereal, plain toast, bread, rolls, or crackers  · Plain noodles, rice, mashed potatoes, chicken noodle soup, or rice soup  · Unsweetened canned fruit (no pineapple)  · Bananas  As you recover:  · Limit fat intake to less than 15 grams per day. Dont eat margarine, butter, oils, mayonnaise, sauces, gravies, fried foods, peanut butter, meat, poultry, or fish.  · Limit  fiber. Dont eat raw or cooked vegetables, fresh fruits except bananas, and bran cereals.  · Limit caffeine and chocolate.  · Dont use spices or seasonings except salt.  · Resume a normal diet over time, as you feel better and your symptoms improve.  · If the symptoms come back, go back to a simple diet or clear liquids.  Follow-up care  Follow up with your healthcare provider, or as advised. If a stool sample was taken or cultures were done, call the healthcare provider for the results as instructed.  Call 911  Call 911 if you have any of these symptoms:  · Trouble breathing  · Confusion  · Extreme drowsiness or trouble walking  · Loss of consciousness  · Rapid heart rate  · Chest pain  · Stiff neck  · Seizure  When to seek medical advice  Call your healthcare provider right away if any of these occur:  · Abdominal pain that gets worse  · Constant lower right abdominal pain  · Continued vomiting and inability to keep liquids down  · Diarrhea more than 5 times a day  · Blood in vomit or stool  · Dark urine or no urine for 8 hours, dry mouth and tongue, tiredness, weakness, or dizziness  · New rash  · You dont get better in 2 to 3 days  · Fever of 100.4°F (38°C) or higher that doesnt get lower with medicine  · You have new symptoms of arthritis  Date Last Reviewed: 1/3/2016  © 0923-9409 [employer]. [address] [place] [address], [place] [address]. All rights reserved. This information is not intended as a substitute for professional medical care. Always follow your healthcare professional's instructions.

## 2021-01-20 ENCOUNTER — TELEPHONE (OUTPATIENT)
Dept: FAMILY MEDICINE | Facility: CLINIC | Age: 86
End: 2021-01-20

## 2021-02-04 ENCOUNTER — PES CALL (OUTPATIENT)
Dept: ADMINISTRATIVE | Facility: CLINIC | Age: 86
End: 2021-02-04

## 2021-02-23 ENCOUNTER — OFFICE VISIT (OUTPATIENT)
Dept: NEPHROLOGY | Facility: CLINIC | Age: 86
End: 2021-02-23
Payer: MEDICARE

## 2021-02-23 VITALS
DIASTOLIC BLOOD PRESSURE: 60 MMHG | HEIGHT: 57 IN | WEIGHT: 105.19 LBS | BODY MASS INDEX: 22.69 KG/M2 | SYSTOLIC BLOOD PRESSURE: 150 MMHG

## 2021-02-23 DIAGNOSIS — E11.29 CONTROLLED TYPE 2 DIABETES MELLITUS WITH MICROALBUMINURIA, WITHOUT LONG-TERM CURRENT USE OF INSULIN: ICD-10-CM

## 2021-02-23 DIAGNOSIS — R80.1 PERSISTENT PROTEINURIA: Primary | ICD-10-CM

## 2021-02-23 DIAGNOSIS — R80.9 CONTROLLED TYPE 2 DIABETES MELLITUS WITH MICROALBUMINURIA, WITHOUT LONG-TERM CURRENT USE OF INSULIN: ICD-10-CM

## 2021-02-23 PROCEDURE — 3288F FALL RISK ASSESSMENT DOCD: CPT | Mod: CPTII,S$GLB,, | Performed by: INTERNAL MEDICINE

## 2021-02-23 PROCEDURE — 3288F PR FALLS RISK ASSESSMENT DOCUMENTED: ICD-10-PCS | Mod: CPTII,S$GLB,, | Performed by: INTERNAL MEDICINE

## 2021-02-23 PROCEDURE — 99213 OFFICE O/P EST LOW 20 MIN: CPT | Mod: S$GLB,,, | Performed by: INTERNAL MEDICINE

## 2021-02-23 PROCEDURE — 1101F PR PT FALLS ASSESS DOC 0-1 FALLS W/OUT INJ PAST YR: ICD-10-PCS | Mod: CPTII,S$GLB,, | Performed by: INTERNAL MEDICINE

## 2021-02-23 PROCEDURE — 1126F PR PAIN SEVERITY QUANTIFIED, NO PAIN PRESENT: ICD-10-PCS | Mod: S$GLB,,, | Performed by: INTERNAL MEDICINE

## 2021-02-23 PROCEDURE — 99999 PR PBB SHADOW E&M-EST. PATIENT-LVL III: CPT | Mod: PBBFAC,,, | Performed by: INTERNAL MEDICINE

## 2021-02-23 PROCEDURE — 1159F PR MEDICATION LIST DOCUMENTED IN MEDICAL RECORD: ICD-10-PCS | Mod: S$GLB,,, | Performed by: INTERNAL MEDICINE

## 2021-02-23 PROCEDURE — 1159F MED LIST DOCD IN RCRD: CPT | Mod: S$GLB,,, | Performed by: INTERNAL MEDICINE

## 2021-02-23 PROCEDURE — 1101F PT FALLS ASSESS-DOCD LE1/YR: CPT | Mod: CPTII,S$GLB,, | Performed by: INTERNAL MEDICINE

## 2021-02-23 PROCEDURE — 99999 PR PBB SHADOW E&M-EST. PATIENT-LVL III: ICD-10-PCS | Mod: PBBFAC,,, | Performed by: INTERNAL MEDICINE

## 2021-02-23 PROCEDURE — 1126F AMNT PAIN NOTED NONE PRSNT: CPT | Mod: S$GLB,,, | Performed by: INTERNAL MEDICINE

## 2021-02-23 PROCEDURE — 99213 PR OFFICE/OUTPT VISIT, EST, LEVL III, 20-29 MIN: ICD-10-PCS | Mod: S$GLB,,, | Performed by: INTERNAL MEDICINE

## 2021-03-09 ENCOUNTER — OFFICE VISIT (OUTPATIENT)
Dept: FAMILY MEDICINE | Facility: CLINIC | Age: 86
End: 2021-03-09
Payer: MEDICARE

## 2021-03-09 VITALS
BODY MASS INDEX: 22.99 KG/M2 | HEART RATE: 83 BPM | OXYGEN SATURATION: 97 % | WEIGHT: 106.56 LBS | TEMPERATURE: 98 F | DIASTOLIC BLOOD PRESSURE: 70 MMHG | SYSTOLIC BLOOD PRESSURE: 138 MMHG | HEIGHT: 57 IN

## 2021-03-09 DIAGNOSIS — E11.42 TYPE 2 DIABETES MELLITUS WITH DIABETIC POLYNEUROPATHY, WITHOUT LONG-TERM CURRENT USE OF INSULIN: ICD-10-CM

## 2021-03-09 DIAGNOSIS — I10 ESSENTIAL HYPERTENSION: ICD-10-CM

## 2021-03-09 DIAGNOSIS — F41.9 ANXIETY: ICD-10-CM

## 2021-03-09 DIAGNOSIS — M48.061 SPINAL STENOSIS OF LUMBAR REGION WITHOUT NEUROGENIC CLAUDICATION: ICD-10-CM

## 2021-03-09 DIAGNOSIS — H52.7 REFRACTIVE ERROR: ICD-10-CM

## 2021-03-09 DIAGNOSIS — M54.9 CHRONIC MIDLINE BACK PAIN, UNSPECIFIED BACK LOCATION: ICD-10-CM

## 2021-03-09 DIAGNOSIS — E11.9 DM TYPE 2 WITHOUT RETINOPATHY: ICD-10-CM

## 2021-03-09 DIAGNOSIS — D64.9 ANEMIA, UNSPECIFIED TYPE: ICD-10-CM

## 2021-03-09 DIAGNOSIS — Z00.00 ENCOUNTER FOR PREVENTIVE HEALTH EXAMINATION: Primary | ICD-10-CM

## 2021-03-09 DIAGNOSIS — M15.9 OSTEOARTHRITIS INVOLVING MULTIPLE JOINTS ON BOTH SIDES OF BODY: ICD-10-CM

## 2021-03-09 DIAGNOSIS — G89.29 CHRONIC MIDLINE BACK PAIN, UNSPECIFIED BACK LOCATION: ICD-10-CM

## 2021-03-09 DIAGNOSIS — Z96.1 PSEUDOPHAKIA: ICD-10-CM

## 2021-03-09 DIAGNOSIS — R80.9 CONTROLLED TYPE 2 DIABETES MELLITUS WITH MICROALBUMINURIA, WITHOUT LONG-TERM CURRENT USE OF INSULIN: ICD-10-CM

## 2021-03-09 DIAGNOSIS — E11.29 CONTROLLED TYPE 2 DIABETES MELLITUS WITH MICROALBUMINURIA, WITHOUT LONG-TERM CURRENT USE OF INSULIN: ICD-10-CM

## 2021-03-09 DIAGNOSIS — N18.30 STAGE 3 CHRONIC KIDNEY DISEASE, UNSPECIFIED WHETHER STAGE 3A OR 3B CKD: ICD-10-CM

## 2021-03-09 DIAGNOSIS — K64.4 EXTERNAL HEMORRHOID, BLEEDING: ICD-10-CM

## 2021-03-09 DIAGNOSIS — H26.492 LEFT POSTERIOR CAPSULAR OPACIFICATION: ICD-10-CM

## 2021-03-09 PROCEDURE — 1101F PT FALLS ASSESS-DOCD LE1/YR: CPT | Mod: CPTII,S$GLB,, | Performed by: NURSE PRACTITIONER

## 2021-03-09 PROCEDURE — 1125F PR PAIN SEVERITY QUANTIFIED, PAIN PRESENT: ICD-10-PCS | Mod: S$GLB,,, | Performed by: NURSE PRACTITIONER

## 2021-03-09 PROCEDURE — 99999 PR PBB SHADOW E&M-EST. PATIENT-LVL V: ICD-10-PCS | Mod: PBBFAC,,, | Performed by: NURSE PRACTITIONER

## 2021-03-09 PROCEDURE — 1101F PR PT FALLS ASSESS DOC 0-1 FALLS W/OUT INJ PAST YR: ICD-10-PCS | Mod: CPTII,S$GLB,, | Performed by: NURSE PRACTITIONER

## 2021-03-09 PROCEDURE — G0439 PPPS, SUBSEQ VISIT: HCPCS | Mod: S$GLB,,, | Performed by: NURSE PRACTITIONER

## 2021-03-09 PROCEDURE — G0439 PR MEDICARE ANNUAL WELLNESS SUBSEQUENT VISIT: ICD-10-PCS | Mod: S$GLB,,, | Performed by: NURSE PRACTITIONER

## 2021-03-09 PROCEDURE — 99999 PR PBB SHADOW E&M-EST. PATIENT-LVL V: CPT | Mod: PBBFAC,,, | Performed by: NURSE PRACTITIONER

## 2021-03-09 PROCEDURE — 3288F FALL RISK ASSESSMENT DOCD: CPT | Mod: CPTII,S$GLB,, | Performed by: NURSE PRACTITIONER

## 2021-03-09 PROCEDURE — 1125F AMNT PAIN NOTED PAIN PRSNT: CPT | Mod: S$GLB,,, | Performed by: NURSE PRACTITIONER

## 2021-03-09 PROCEDURE — 3288F PR FALLS RISK ASSESSMENT DOCUMENTED: ICD-10-PCS | Mod: CPTII,S$GLB,, | Performed by: NURSE PRACTITIONER

## 2021-03-12 ENCOUNTER — LAB VISIT (OUTPATIENT)
Dept: LAB | Facility: HOSPITAL | Age: 86
End: 2021-03-12
Attending: FAMILY MEDICINE
Payer: MEDICARE

## 2021-03-12 ENCOUNTER — OFFICE VISIT (OUTPATIENT)
Dept: FAMILY MEDICINE | Facility: CLINIC | Age: 86
End: 2021-03-12
Payer: MEDICARE

## 2021-03-12 VITALS
HEIGHT: 57 IN | HEART RATE: 86 BPM | WEIGHT: 106.25 LBS | TEMPERATURE: 98 F | OXYGEN SATURATION: 96 % | SYSTOLIC BLOOD PRESSURE: 158 MMHG | DIASTOLIC BLOOD PRESSURE: 64 MMHG | BODY MASS INDEX: 22.92 KG/M2

## 2021-03-12 DIAGNOSIS — E11.42 TYPE 2 DIABETES MELLITUS WITH DIABETIC POLYNEUROPATHY, WITHOUT LONG-TERM CURRENT USE OF INSULIN: Primary | ICD-10-CM

## 2021-03-12 DIAGNOSIS — E07.9 THYROID DISEASE: ICD-10-CM

## 2021-03-12 DIAGNOSIS — M48.061 SPINAL STENOSIS OF LUMBAR REGION WITHOUT NEUROGENIC CLAUDICATION: ICD-10-CM

## 2021-03-12 DIAGNOSIS — E11.42 TYPE 2 DIABETES MELLITUS WITH DIABETIC POLYNEUROPATHY, WITHOUT LONG-TERM CURRENT USE OF INSULIN: ICD-10-CM

## 2021-03-12 DIAGNOSIS — N30.00 ACUTE CYSTITIS WITHOUT HEMATURIA: ICD-10-CM

## 2021-03-12 DIAGNOSIS — N18.31 STAGE 3A CHRONIC KIDNEY DISEASE: ICD-10-CM

## 2021-03-12 DIAGNOSIS — I10 ESSENTIAL HYPERTENSION: ICD-10-CM

## 2021-03-12 LAB
ALBUMIN SERPL BCP-MCNC: 3.7 G/DL (ref 3.5–5.2)
ALP SERPL-CCNC: 53 U/L (ref 55–135)
ALT SERPL W/O P-5'-P-CCNC: 14 U/L (ref 10–44)
ANION GAP SERPL CALC-SCNC: 8 MMOL/L (ref 8–16)
AST SERPL-CCNC: 19 U/L (ref 10–40)
BASOPHILS # BLD AUTO: 0.04 K/UL (ref 0–0.2)
BASOPHILS NFR BLD: 0.5 % (ref 0–1.9)
BILIRUB SERPL-MCNC: 0.4 MG/DL (ref 0.1–1)
BUN SERPL-MCNC: 20 MG/DL (ref 8–23)
CALCIUM SERPL-MCNC: 9.1 MG/DL (ref 8.7–10.5)
CHLORIDE SERPL-SCNC: 102 MMOL/L (ref 95–110)
CHOLEST SERPL-MCNC: 313 MG/DL (ref 120–199)
CHOLEST/HDLC SERPL: 6.3 {RATIO} (ref 2–5)
CO2 SERPL-SCNC: 32 MMOL/L (ref 23–29)
CREAT SERPL-MCNC: 0.9 MG/DL (ref 0.5–1.4)
DIFFERENTIAL METHOD: ABNORMAL
EOSINOPHIL # BLD AUTO: 0.4 K/UL (ref 0–0.5)
EOSINOPHIL NFR BLD: 5 % (ref 0–8)
ERYTHROCYTE [DISTWIDTH] IN BLOOD BY AUTOMATED COUNT: 13 % (ref 11.5–14.5)
EST. GFR  (AFRICAN AMERICAN): >60 ML/MIN/1.73 M^2
EST. GFR  (NON AFRICAN AMERICAN): 58 ML/MIN/1.73 M^2
ESTIMATED AVG GLUCOSE: 114 MG/DL (ref 68–131)
GLUCOSE SERPL-MCNC: 116 MG/DL (ref 70–110)
HBA1C MFR BLD: 5.6 % (ref 4–5.6)
HCT VFR BLD AUTO: 36.8 % (ref 37–48.5)
HDLC SERPL-MCNC: 50 MG/DL (ref 40–75)
HDLC SERPL: 16 % (ref 20–50)
HGB BLD-MCNC: 11.8 G/DL (ref 12–16)
IMM GRANULOCYTES # BLD AUTO: 0.03 K/UL (ref 0–0.04)
IMM GRANULOCYTES NFR BLD AUTO: 0.4 % (ref 0–0.5)
LDLC SERPL CALC-MCNC: 230 MG/DL (ref 63–159)
LYMPHOCYTES # BLD AUTO: 1.7 K/UL (ref 1–4.8)
LYMPHOCYTES NFR BLD: 21.8 % (ref 18–48)
MCH RBC QN AUTO: 30.6 PG (ref 27–31)
MCHC RBC AUTO-ENTMCNC: 32.1 G/DL (ref 32–36)
MCV RBC AUTO: 96 FL (ref 82–98)
MONOCYTES # BLD AUTO: 0.8 K/UL (ref 0.3–1)
MONOCYTES NFR BLD: 9.6 % (ref 4–15)
NEUTROPHILS # BLD AUTO: 5 K/UL (ref 1.8–7.7)
NEUTROPHILS NFR BLD: 62.7 % (ref 38–73)
NONHDLC SERPL-MCNC: 263 MG/DL
NRBC BLD-RTO: 0 /100 WBC
PLATELET # BLD AUTO: 173 K/UL (ref 150–350)
PMV BLD AUTO: 11.3 FL (ref 9.2–12.9)
POTASSIUM SERPL-SCNC: 4.2 MMOL/L (ref 3.5–5.1)
PROT SERPL-MCNC: 7.5 G/DL (ref 6–8.4)
RBC # BLD AUTO: 3.85 M/UL (ref 4–5.4)
SODIUM SERPL-SCNC: 142 MMOL/L (ref 136–145)
TRIGL SERPL-MCNC: 165 MG/DL (ref 30–150)
TSH SERPL DL<=0.005 MIU/L-ACNC: 1.07 UIU/ML (ref 0.4–4)
WBC # BLD AUTO: 7.94 K/UL (ref 3.9–12.7)

## 2021-03-12 PROCEDURE — 99499 RISK ADDL DX/OHS AUDIT: ICD-10-PCS | Mod: S$GLB,,, | Performed by: FAMILY MEDICINE

## 2021-03-12 PROCEDURE — 99999 PR PBB SHADOW E&M-EST. PATIENT-LVL IV: ICD-10-PCS | Mod: PBBFAC,,, | Performed by: FAMILY MEDICINE

## 2021-03-12 PROCEDURE — 80061 LIPID PANEL: CPT | Performed by: FAMILY MEDICINE

## 2021-03-12 PROCEDURE — 99999 PR PBB SHADOW E&M-EST. PATIENT-LVL IV: CPT | Mod: PBBFAC,,, | Performed by: FAMILY MEDICINE

## 2021-03-12 PROCEDURE — 1101F PT FALLS ASSESS-DOCD LE1/YR: CPT | Mod: CPTII,S$GLB,, | Performed by: FAMILY MEDICINE

## 2021-03-12 PROCEDURE — 1125F AMNT PAIN NOTED PAIN PRSNT: CPT | Mod: S$GLB,,, | Performed by: FAMILY MEDICINE

## 2021-03-12 PROCEDURE — 3288F FALL RISK ASSESSMENT DOCD: CPT | Mod: CPTII,S$GLB,, | Performed by: FAMILY MEDICINE

## 2021-03-12 PROCEDURE — 36415 COLL VENOUS BLD VENIPUNCTURE: CPT | Mod: PO | Performed by: FAMILY MEDICINE

## 2021-03-12 PROCEDURE — 1159F MED LIST DOCD IN RCRD: CPT | Mod: S$GLB,,, | Performed by: FAMILY MEDICINE

## 2021-03-12 PROCEDURE — 99214 PR OFFICE/OUTPT VISIT, EST, LEVL IV, 30-39 MIN: ICD-10-PCS | Mod: S$GLB,,, | Performed by: FAMILY MEDICINE

## 2021-03-12 PROCEDURE — 99499 UNLISTED E&M SERVICE: CPT | Mod: S$GLB,,, | Performed by: FAMILY MEDICINE

## 2021-03-12 PROCEDURE — 83036 HEMOGLOBIN GLYCOSYLATED A1C: CPT | Performed by: FAMILY MEDICINE

## 2021-03-12 PROCEDURE — 1125F PR PAIN SEVERITY QUANTIFIED, PAIN PRESENT: ICD-10-PCS | Mod: S$GLB,,, | Performed by: FAMILY MEDICINE

## 2021-03-12 PROCEDURE — 85025 COMPLETE CBC W/AUTO DIFF WBC: CPT | Performed by: FAMILY MEDICINE

## 2021-03-12 PROCEDURE — 80053 COMPREHEN METABOLIC PANEL: CPT | Performed by: FAMILY MEDICINE

## 2021-03-12 PROCEDURE — 3288F PR FALLS RISK ASSESSMENT DOCUMENTED: ICD-10-PCS | Mod: CPTII,S$GLB,, | Performed by: FAMILY MEDICINE

## 2021-03-12 PROCEDURE — 1159F PR MEDICATION LIST DOCUMENTED IN MEDICAL RECORD: ICD-10-PCS | Mod: S$GLB,,, | Performed by: FAMILY MEDICINE

## 2021-03-12 PROCEDURE — 1101F PR PT FALLS ASSESS DOC 0-1 FALLS W/OUT INJ PAST YR: ICD-10-PCS | Mod: CPTII,S$GLB,, | Performed by: FAMILY MEDICINE

## 2021-03-12 PROCEDURE — 84443 ASSAY THYROID STIM HORMONE: CPT | Performed by: FAMILY MEDICINE

## 2021-03-12 PROCEDURE — 99214 OFFICE O/P EST MOD 30 MIN: CPT | Mod: S$GLB,,, | Performed by: FAMILY MEDICINE

## 2021-03-12 RX ORDER — CIPROFLOXACIN 250 MG/1
250 TABLET, FILM COATED ORAL 2 TIMES DAILY
Qty: 6 TABLET | Refills: 0 | Status: SHIPPED | OUTPATIENT
Start: 2021-03-12 | End: 2021-03-15

## 2021-03-12 RX ORDER — HYDROCODONE BITARTRATE AND ACETAMINOPHEN 10; 325 MG/1; MG/1
1 TABLET ORAL EVERY 6 HOURS PRN
Qty: 90 TABLET | Refills: 0 | Status: SHIPPED | OUTPATIENT
Start: 2021-04-12 | End: 2021-05-12

## 2021-03-12 RX ORDER — FLUCONAZOLE 150 MG/1
150 TABLET ORAL ONCE
Qty: 2 TABLET | Refills: 2 | Status: SHIPPED | OUTPATIENT
Start: 2021-03-12 | End: 2021-03-12

## 2021-03-12 RX ORDER — HYDROCODONE BITARTRATE AND ACETAMINOPHEN 10; 325 MG/1; MG/1
1 TABLET ORAL EVERY 6 HOURS PRN
Qty: 90 TABLET | Refills: 0 | Status: SHIPPED | OUTPATIENT
Start: 2021-05-12 | End: 2021-06-14 | Stop reason: SDUPTHER

## 2021-03-12 RX ORDER — ACETAMINOPHEN 500 MG
500 TABLET ORAL EVERY 6 HOURS PRN
COMMUNITY

## 2021-03-12 RX ORDER — HYDROCODONE BITARTRATE AND ACETAMINOPHEN 10; 325 MG/1; MG/1
1 TABLET ORAL EVERY 6 HOURS PRN
Qty: 90 TABLET | Refills: 0 | Status: SHIPPED | OUTPATIENT
Start: 2021-03-12 | End: 2021-04-11

## 2021-03-12 RX ORDER — HYDROCODONE BITARTRATE AND ACETAMINOPHEN 10; 325 MG/1; MG/1
1 TABLET ORAL
COMMUNITY
End: 2021-03-12 | Stop reason: SDUPTHER

## 2021-03-19 ENCOUNTER — TELEPHONE (OUTPATIENT)
Dept: FAMILY MEDICINE | Facility: CLINIC | Age: 86
End: 2021-03-19

## 2021-03-29 ENCOUNTER — TELEPHONE (OUTPATIENT)
Dept: FAMILY MEDICINE | Facility: CLINIC | Age: 86
End: 2021-03-29

## 2021-03-29 DIAGNOSIS — I10 HYPERTENSION, UNCONTROLLED: Primary | ICD-10-CM

## 2021-03-29 RX ORDER — LOSARTAN POTASSIUM 25 MG/1
25 TABLET ORAL DAILY
Qty: 90 TABLET | Refills: 0 | Status: SHIPPED | OUTPATIENT
Start: 2021-03-29 | End: 2021-05-13 | Stop reason: SDUPTHER

## 2021-03-29 RX ORDER — LOSARTAN POTASSIUM 25 MG/1
25 TABLET ORAL DAILY
Qty: 90 TABLET | Refills: 0 | Status: SHIPPED | OUTPATIENT
Start: 2021-03-29 | End: 2021-03-29 | Stop reason: SDUPTHER

## 2021-03-30 RX ORDER — HYDROCHLOROTHIAZIDE 12.5 MG/1
TABLET ORAL
Qty: 90 TABLET | Refills: 1 | Status: SHIPPED | OUTPATIENT
Start: 2021-03-30 | End: 2021-08-14

## 2021-04-12 ENCOUNTER — TELEPHONE (OUTPATIENT)
Dept: FAMILY MEDICINE | Facility: CLINIC | Age: 86
End: 2021-04-12

## 2021-05-13 DIAGNOSIS — I10 HYPERTENSION, UNCONTROLLED: ICD-10-CM

## 2021-05-13 RX ORDER — LOSARTAN POTASSIUM 25 MG/1
25 TABLET ORAL DAILY
Qty: 90 TABLET | Refills: 1 | Status: SHIPPED | OUTPATIENT
Start: 2021-05-13 | End: 2021-05-24 | Stop reason: SDUPTHER

## 2021-05-24 ENCOUNTER — TELEPHONE (OUTPATIENT)
Dept: FAMILY MEDICINE | Facility: CLINIC | Age: 86
End: 2021-05-24

## 2021-05-24 DIAGNOSIS — I10 HYPERTENSION, UNCONTROLLED: ICD-10-CM

## 2021-05-25 DIAGNOSIS — I10 HYPERTENSION: ICD-10-CM

## 2021-05-25 DIAGNOSIS — N17.9 AKI (ACUTE KIDNEY INJURY): ICD-10-CM

## 2021-05-25 DIAGNOSIS — I21.9 HEART ATTACK: ICD-10-CM

## 2021-05-25 DIAGNOSIS — R09.89 CAROTID BRUIT: Primary | ICD-10-CM

## 2021-06-02 ENCOUNTER — LAB VISIT (OUTPATIENT)
Dept: LAB | Facility: HOSPITAL | Age: 86
End: 2021-06-02
Attending: FAMILY MEDICINE
Payer: MEDICARE

## 2021-06-02 DIAGNOSIS — I10 ESSENTIAL HYPERTENSION: ICD-10-CM

## 2021-06-02 LAB
ALBUMIN SERPL BCP-MCNC: 3.7 G/DL (ref 3.5–5.2)
ALP SERPL-CCNC: 52 U/L (ref 55–135)
ALT SERPL W/O P-5'-P-CCNC: 15 U/L (ref 10–44)
ANION GAP SERPL CALC-SCNC: 7 MMOL/L (ref 8–16)
AST SERPL-CCNC: 17 U/L (ref 10–40)
BILIRUB SERPL-MCNC: 0.3 MG/DL (ref 0.1–1)
BUN SERPL-MCNC: 33 MG/DL (ref 8–23)
CALCIUM SERPL-MCNC: 9.6 MG/DL (ref 8.7–10.5)
CHLORIDE SERPL-SCNC: 102 MMOL/L (ref 95–110)
CO2 SERPL-SCNC: 31 MMOL/L (ref 23–29)
CREAT SERPL-MCNC: 1.5 MG/DL (ref 0.5–1.4)
EST. GFR  (AFRICAN AMERICAN): 36.1 ML/MIN/1.73 M^2
EST. GFR  (NON AFRICAN AMERICAN): 31.3 ML/MIN/1.73 M^2
GLUCOSE SERPL-MCNC: 113 MG/DL (ref 70–110)
POTASSIUM SERPL-SCNC: 4.8 MMOL/L (ref 3.5–5.1)
PROT SERPL-MCNC: 7.2 G/DL (ref 6–8.4)
SODIUM SERPL-SCNC: 140 MMOL/L (ref 136–145)

## 2021-06-02 PROCEDURE — 80053 COMPREHEN METABOLIC PANEL: CPT | Performed by: FAMILY MEDICINE

## 2021-06-02 PROCEDURE — 36415 COLL VENOUS BLD VENIPUNCTURE: CPT | Mod: PO | Performed by: FAMILY MEDICINE

## 2021-06-03 ENCOUNTER — HOSPITAL ENCOUNTER (OUTPATIENT)
Dept: CARDIOLOGY | Facility: HOSPITAL | Age: 86
Discharge: HOME | End: 2021-06-03
Attending: INTERNAL MEDICINE
Payer: MEDICARE

## 2021-06-03 VITALS — BODY MASS INDEX: 22.87 KG/M2 | WEIGHT: 106 LBS | HEIGHT: 57 IN

## 2021-06-03 DIAGNOSIS — R09.89 CAROTID BRUIT: ICD-10-CM

## 2021-06-03 DIAGNOSIS — I21.9 HEART ATTACK: ICD-10-CM

## 2021-06-03 DIAGNOSIS — I10 HYPERTENSION: ICD-10-CM

## 2021-06-03 LAB
AORTIC ROOT ANNULUS: 2.77 CM
AORTIC VALVE CUSP SEPERATION: 1.72 CM
ASCENDING AORTA: 2 CM
AV INDEX (PROSTH): 0.64
AV MEAN GRADIENT: 6 MMHG
AV PEAK GRADIENT: 10 MMHG
AV VALVE AREA: 1.08 CM2
AV VELOCITY RATIO: 0.6
BSA FOR ECHO PROCEDURE: 1.39 M2
CV ECHO LV RWT: 0.76 CM
DOP CALC AO PEAK VEL: 1.58 M/S
DOP CALC AO VTI: 37.86 CM
DOP CALC LVOT AREA: 1.7 CM2
DOP CALC LVOT DIAMETER: 1.47 CM
DOP CALC LVOT PEAK VEL: 0.95 M/S
DOP CALC LVOT STROKE VOLUME: 40.91 CM3
DOP CALCLVOT PEAK VEL VTI: 24.12 CM
E WAVE DECELERATION TIME: 271.25 MSEC
E/A RATIO: 0.73
E/E' RATIO: 25.75 M/S
ECHO LV POSTERIOR WALL: 1.41 CM (ref 0.6–1.1)
EJECTION FRACTION: 45 %
FRACTIONAL SHORTENING: 30 % (ref 28–44)
INTERVENTRICULAR SEPTUM: 1.4 CM (ref 0.6–1.1)
IVRT: 128.03 MSEC
LA MAJOR: 4.85 CM
LA MINOR: 4.8 CM
LA WIDTH: 4.67 CM
LEFT ATRIUM SIZE: 3.82 CM
LEFT ATRIUM VOLUME INDEX: 53.4 ML/M2
LEFT ATRIUM VOLUME: 73.16 CM3
LEFT INTERNAL DIMENSION IN SYSTOLE: 2.6 CM (ref 2.1–4)
LEFT VENTRICLE DIASTOLIC VOLUME INDEX: 42.26 ML/M2
LEFT VENTRICLE DIASTOLIC VOLUME: 57.9 ML
LEFT VENTRICLE MASS INDEX: 137 G/M2
LEFT VENTRICLE SYSTOLIC VOLUME INDEX: 18 ML/M2
LEFT VENTRICLE SYSTOLIC VOLUME: 24.68 ML
LEFT VENTRICULAR INTERNAL DIMENSION IN DIASTOLE: 3.69 CM (ref 3.5–6)
LEFT VENTRICULAR MASS: 187.29 G
LV LATERAL E/E' RATIO: 20.6 M/S
LV SEPTAL E/E' RATIO: 34.33 M/S
MV PEAK A VEL: 1.41 M/S
MV PEAK E VEL: 1.03 M/S
MV STENOSIS PRESSURE HALF TIME: 78.66 MS
MV VALVE AREA P 1/2 METHOD: 2.8 CM2
PISA TR MAX VEL: 2.79 M/S
PULM VEIN S/D RATIO: 2.33
PV PEAK D VEL: 0.33 M/S
PV PEAK S VEL: 0.77 M/S
PV PEAK VELOCITY: 1.12 CM/S
RA MAJOR: 4.08 CM
RA PRESSURE: 3 MMHG
RA WIDTH: 3.95 CM
RIGHT VENTRICULAR END-DIASTOLIC DIMENSION: 3.69 CM
RV TISSUE DOPPLER FREE WALL SYSTOLIC VELOCITY 1 (APICAL 4 CHAMBER VIEW): 15.12 CM/S
SINUS: 2.69 CM
STJ: 2.1 CM
TDI LATERAL: 0.05 M/S
TDI SEPTAL: 0.03 M/S
TDI: 0.04 M/S
TR MAX PG: 31 MMHG
TRICUSPID ANNULAR PLANE SYSTOLIC EXCURSION: 2.21 CM
TV REST PULMONARY ARTERY PRESSURE: 34 MMHG

## 2021-06-03 PROCEDURE — 93880 EXTRACRANIAL BILAT STUDY: CPT

## 2021-06-03 PROCEDURE — 93880 CV US DOPPLER CAROTID (CUPID ONLY): ICD-10-PCS | Mod: 26,,, | Performed by: INTERNAL MEDICINE

## 2021-06-03 PROCEDURE — 93306 TTE W/DOPPLER COMPLETE: CPT

## 2021-06-03 PROCEDURE — 93880 EXTRACRANIAL BILAT STUDY: CPT | Mod: 26,,, | Performed by: INTERNAL MEDICINE

## 2021-06-03 PROCEDURE — 93306 ECHO (CUPID ONLY): ICD-10-PCS | Mod: 26,,, | Performed by: INTERNAL MEDICINE

## 2021-06-03 PROCEDURE — 93306 TTE W/DOPPLER COMPLETE: CPT | Mod: 26,,, | Performed by: INTERNAL MEDICINE

## 2021-06-04 LAB
LEFT CBA DIAS: 5 CM/S
LEFT CBA SYS: 137 CM/S
LEFT CCA DIST DIAS: 8 CM/S
LEFT CCA DIST SYS: 168 CM/S
LEFT CCA MID DIAS: 10 CM/S
LEFT CCA MID SYS: 77 CM/S
LEFT CCA PROX DIAS: 10 CM/S
LEFT CCA PROX SYS: 86 CM/S
LEFT ECA DIAS: 8 CM/S
LEFT ECA SYS: 85 CM/S
LEFT ICA DIST DIAS: 27 CM/S
LEFT ICA DIST SYS: 105 CM/S
LEFT ICA MID DIAS: 29 CM/S
LEFT ICA MID SYS: 212 CM/S
LEFT ICA PROX DIAS: 18 CM/S
LEFT ICA PROX SYS: 100 CM/S
LEFT VERTEBRAL DIAS: 14 CM/S
LEFT VERTEBRAL SYS: 74 CM/S
OHS CV CAROTID RIGHT ICA EDV HIGHEST: 16
OHS CV CAROTID ULTRASOUND LEFT ICA/CCA RATIO: 1.26
OHS CV CAROTID ULTRASOUND RIGHT ICA/CCA RATIO: 0.95
OHS CV PV CAROTID LEFT HIGHEST CCA: 168
OHS CV PV CAROTID LEFT HIGHEST ICA: 212
OHS CV PV CAROTID RIGHT HIGHEST CCA: 109
OHS CV PV CAROTID RIGHT HIGHEST ICA: 96
OHS CV US CAROTID LEFT HIGHEST EDV: 29
RIGHT CBA DIAS: 12 CM/S
RIGHT CBA SYS: 83 CM/S
RIGHT CCA DIST DIAS: 13 CM/S
RIGHT CCA DIST SYS: 101 CM/S
RIGHT CCA MID DIAS: 14 CM/S
RIGHT CCA MID SYS: 109 CM/S
RIGHT CCA PROX DIAS: 10 CM/S
RIGHT CCA PROX SYS: 95 CM/S
RIGHT ECA DIAS: 0 CM/S
RIGHT ECA SYS: 100 CM/S
RIGHT ICA DIST DIAS: 16 CM/S
RIGHT ICA DIST SYS: 75 CM/S
RIGHT ICA MID DIAS: 13 CM/S
RIGHT ICA MID SYS: 91 CM/S
RIGHT ICA PROX DIAS: 15 CM/S
RIGHT ICA PROX SYS: 96 CM/S
RIGHT VERTEBRAL DIAS: 13 CM/S
RIGHT VERTEBRAL SYS: 93 CM/S

## 2021-06-14 ENCOUNTER — OFFICE VISIT (OUTPATIENT)
Dept: FAMILY MEDICINE | Facility: CLINIC | Age: 86
End: 2021-06-14
Payer: MEDICARE

## 2021-06-14 VITALS
OXYGEN SATURATION: 96 % | DIASTOLIC BLOOD PRESSURE: 59 MMHG | BODY MASS INDEX: 20.4 KG/M2 | HEART RATE: 91 BPM | WEIGHT: 94.56 LBS | SYSTOLIC BLOOD PRESSURE: 136 MMHG | TEMPERATURE: 99 F | HEIGHT: 57 IN

## 2021-06-14 DIAGNOSIS — R80.9 CONTROLLED TYPE 2 DIABETES MELLITUS WITH MICROALBUMINURIA, WITHOUT LONG-TERM CURRENT USE OF INSULIN: ICD-10-CM

## 2021-06-14 DIAGNOSIS — N18.31 STAGE 3A CHRONIC KIDNEY DISEASE: ICD-10-CM

## 2021-06-14 DIAGNOSIS — I10 ESSENTIAL HYPERTENSION: Primary | ICD-10-CM

## 2021-06-14 DIAGNOSIS — M48.061 SPINAL STENOSIS OF LUMBAR REGION WITHOUT NEUROGENIC CLAUDICATION: ICD-10-CM

## 2021-06-14 DIAGNOSIS — M15.9 OSTEOARTHRITIS INVOLVING MULTIPLE JOINTS ON BOTH SIDES OF BODY: ICD-10-CM

## 2021-06-14 DIAGNOSIS — E11.29 CONTROLLED TYPE 2 DIABETES MELLITUS WITH MICROALBUMINURIA, WITHOUT LONG-TERM CURRENT USE OF INSULIN: ICD-10-CM

## 2021-06-14 PROBLEM — Z95.828 HISTORY OF LEFT COMMON CAROTID ARTERY STENT PLACEMENT: Status: ACTIVE | Noted: 2017-05-25

## 2021-06-14 PROCEDURE — 99214 OFFICE O/P EST MOD 30 MIN: CPT | Mod: S$GLB,,, | Performed by: FAMILY MEDICINE

## 2021-06-14 PROCEDURE — 1125F AMNT PAIN NOTED PAIN PRSNT: CPT | Mod: S$GLB,,, | Performed by: FAMILY MEDICINE

## 2021-06-14 PROCEDURE — 99999 PR PBB SHADOW E&M-EST. PATIENT-LVL IV: CPT | Mod: PBBFAC,,, | Performed by: FAMILY MEDICINE

## 2021-06-14 PROCEDURE — 3288F FALL RISK ASSESSMENT DOCD: CPT | Mod: CPTII,S$GLB,, | Performed by: FAMILY MEDICINE

## 2021-06-14 PROCEDURE — 99214 PR OFFICE/OUTPT VISIT, EST, LEVL IV, 30-39 MIN: ICD-10-PCS | Mod: S$GLB,,, | Performed by: FAMILY MEDICINE

## 2021-06-14 PROCEDURE — 3288F PR FALLS RISK ASSESSMENT DOCUMENTED: ICD-10-PCS | Mod: CPTII,S$GLB,, | Performed by: FAMILY MEDICINE

## 2021-06-14 PROCEDURE — 1101F PR PT FALLS ASSESS DOC 0-1 FALLS W/OUT INJ PAST YR: ICD-10-PCS | Mod: CPTII,S$GLB,, | Performed by: FAMILY MEDICINE

## 2021-06-14 PROCEDURE — 99999 PR PBB SHADOW E&M-EST. PATIENT-LVL IV: ICD-10-PCS | Mod: PBBFAC,,, | Performed by: FAMILY MEDICINE

## 2021-06-14 PROCEDURE — 1159F MED LIST DOCD IN RCRD: CPT | Mod: S$GLB,,, | Performed by: FAMILY MEDICINE

## 2021-06-14 PROCEDURE — 1101F PT FALLS ASSESS-DOCD LE1/YR: CPT | Mod: CPTII,S$GLB,, | Performed by: FAMILY MEDICINE

## 2021-06-14 PROCEDURE — 1159F PR MEDICATION LIST DOCUMENTED IN MEDICAL RECORD: ICD-10-PCS | Mod: S$GLB,,, | Performed by: FAMILY MEDICINE

## 2021-06-14 PROCEDURE — 1125F PR PAIN SEVERITY QUANTIFIED, PAIN PRESENT: ICD-10-PCS | Mod: S$GLB,,, | Performed by: FAMILY MEDICINE

## 2021-06-14 RX ORDER — HYDROCODONE BITARTRATE AND ACETAMINOPHEN 10; 325 MG/1; MG/1
1 TABLET ORAL EVERY 6 HOURS PRN
Qty: 90 TABLET | Refills: 0 | Status: SHIPPED | OUTPATIENT
Start: 2021-07-14 | End: 2021-08-02 | Stop reason: SDUPTHER

## 2021-06-14 RX ORDER — FLUCONAZOLE 150 MG/1
TABLET ORAL
COMMUNITY
Start: 2021-03-16

## 2021-06-14 RX ORDER — HYDROCODONE BITARTRATE AND ACETAMINOPHEN 10; 325 MG/1; MG/1
1 TABLET ORAL EVERY 6 HOURS PRN
Qty: 90 TABLET | Refills: 0 | Status: SHIPPED | OUTPATIENT
Start: 2021-06-14 | End: 2021-07-14

## 2021-06-14 RX ORDER — HYDROCODONE BITARTRATE AND ACETAMINOPHEN 10; 325 MG/1; MG/1
1 TABLET ORAL EVERY 6 HOURS PRN
Qty: 90 TABLET | Refills: 0 | Status: SHIPPED | OUTPATIENT
Start: 2021-08-14 | End: 2021-06-28 | Stop reason: SDUPTHER

## 2021-06-15 ENCOUNTER — LAB VISIT (OUTPATIENT)
Dept: LAB | Facility: HOSPITAL | Age: 86
End: 2021-06-15
Attending: FAMILY MEDICINE
Payer: MEDICARE

## 2021-06-15 DIAGNOSIS — I10 ESSENTIAL HYPERTENSION: ICD-10-CM

## 2021-06-15 LAB
ALBUMIN SERPL BCP-MCNC: 3.9 G/DL (ref 3.5–5.2)
ALP SERPL-CCNC: 52 U/L (ref 55–135)
ALT SERPL W/O P-5'-P-CCNC: 14 U/L (ref 10–44)
ANION GAP SERPL CALC-SCNC: 11 MMOL/L (ref 8–16)
AST SERPL-CCNC: 19 U/L (ref 10–40)
BILIRUB SERPL-MCNC: 0.3 MG/DL (ref 0.1–1)
BUN SERPL-MCNC: 33 MG/DL (ref 8–23)
CALCIUM SERPL-MCNC: 10.2 MG/DL (ref 8.7–10.5)
CHLORIDE SERPL-SCNC: 102 MMOL/L (ref 95–110)
CO2 SERPL-SCNC: 27 MMOL/L (ref 23–29)
CREAT SERPL-MCNC: 1.3 MG/DL (ref 0.5–1.4)
EST. GFR  (AFRICAN AMERICAN): 42.9 ML/MIN/1.73 M^2
EST. GFR  (NON AFRICAN AMERICAN): 37.2 ML/MIN/1.73 M^2
GLUCOSE SERPL-MCNC: 94 MG/DL (ref 70–110)
POTASSIUM SERPL-SCNC: 5 MMOL/L (ref 3.5–5.1)
PROT SERPL-MCNC: 7.4 G/DL (ref 6–8.4)
SODIUM SERPL-SCNC: 140 MMOL/L (ref 136–145)

## 2021-06-15 PROCEDURE — 36415 COLL VENOUS BLD VENIPUNCTURE: CPT | Mod: PO | Performed by: FAMILY MEDICINE

## 2021-06-15 PROCEDURE — 80053 COMPREHEN METABOLIC PANEL: CPT | Performed by: FAMILY MEDICINE

## 2021-06-18 ENCOUNTER — TELEPHONE (OUTPATIENT)
Dept: FAMILY MEDICINE | Facility: CLINIC | Age: 86
End: 2021-06-18

## 2021-06-28 DIAGNOSIS — M48.061 SPINAL STENOSIS OF LUMBAR REGION WITHOUT NEUROGENIC CLAUDICATION: ICD-10-CM

## 2021-06-28 RX ORDER — HYDROCODONE BITARTRATE AND ACETAMINOPHEN 10; 325 MG/1; MG/1
1 TABLET ORAL EVERY 6 HOURS PRN
Qty: 90 TABLET | Refills: 0 | Status: CANCELLED | OUTPATIENT
Start: 2021-06-28 | End: 2021-07-28

## 2021-06-28 RX ORDER — HYDROCODONE BITARTRATE AND ACETAMINOPHEN 10; 325 MG/1; MG/1
1 TABLET ORAL EVERY 6 HOURS PRN
Qty: 90 TABLET | Refills: 0 | Status: SHIPPED | OUTPATIENT
Start: 2021-06-28 | End: 2021-07-28

## 2021-06-28 RX ORDER — HYDROCODONE BITARTRATE AND ACETAMINOPHEN 10; 325 MG/1; MG/1
1 TABLET ORAL EVERY 6 HOURS PRN
Qty: 90 TABLET | Refills: 0 | Status: CANCELLED | OUTPATIENT
Start: 2021-07-28 | End: 2021-08-27

## 2021-07-19 ENCOUNTER — TELEPHONE (OUTPATIENT)
Dept: FAMILY MEDICINE | Facility: CLINIC | Age: 86
End: 2021-07-19

## 2021-07-23 ENCOUNTER — LAB VISIT (OUTPATIENT)
Dept: LAB | Facility: HOSPITAL | Age: 86
End: 2021-07-23
Attending: FAMILY MEDICINE
Payer: MEDICARE

## 2021-07-23 DIAGNOSIS — N17.9 AKI (ACUTE KIDNEY INJURY): ICD-10-CM

## 2021-07-23 LAB
ANION GAP SERPL CALC-SCNC: 9 MMOL/L (ref 8–16)
BUN SERPL-MCNC: 32 MG/DL (ref 8–23)
CALCIUM SERPL-MCNC: 9.9 MG/DL (ref 8.7–10.5)
CHLORIDE SERPL-SCNC: 103 MMOL/L (ref 95–110)
CO2 SERPL-SCNC: 28 MMOL/L (ref 23–29)
CREAT SERPL-MCNC: 1.2 MG/DL (ref 0.5–1.4)
EST. GFR  (AFRICAN AMERICAN): 47.3 ML/MIN/1.73 M^2
EST. GFR  (NON AFRICAN AMERICAN): 41 ML/MIN/1.73 M^2
GLUCOSE SERPL-MCNC: 116 MG/DL (ref 70–110)
POTASSIUM SERPL-SCNC: 4.3 MMOL/L (ref 3.5–5.1)
SODIUM SERPL-SCNC: 140 MMOL/L (ref 136–145)

## 2021-07-23 PROCEDURE — 80048 BASIC METABOLIC PNL TOTAL CA: CPT | Performed by: FAMILY MEDICINE

## 2021-07-23 PROCEDURE — 36415 COLL VENOUS BLD VENIPUNCTURE: CPT | Mod: PO | Performed by: FAMILY MEDICINE

## 2021-07-28 ENCOUNTER — TELEPHONE (OUTPATIENT)
Dept: FAMILY MEDICINE | Facility: CLINIC | Age: 86
End: 2021-07-28

## 2021-08-02 DIAGNOSIS — M48.061 SPINAL STENOSIS OF LUMBAR REGION WITHOUT NEUROGENIC CLAUDICATION: ICD-10-CM

## 2021-08-03 RX ORDER — HYDROCODONE BITARTRATE AND ACETAMINOPHEN 10; 325 MG/1; MG/1
1 TABLET ORAL EVERY 6 HOURS PRN
Qty: 90 TABLET | Refills: 0 | Status: SHIPPED | OUTPATIENT
Start: 2021-08-03 | End: 2021-09-20 | Stop reason: SDUPTHER

## 2021-08-11 DIAGNOSIS — F41.9 ANXIETY: ICD-10-CM

## 2021-08-12 RX ORDER — MIRTAZAPINE 7.5 MG/1
TABLET, FILM COATED ORAL
Qty: 90 TABLET | Refills: 1 | Status: SHIPPED | OUTPATIENT
Start: 2021-08-12 | End: 2021-12-29

## 2021-08-12 RX ORDER — SERTRALINE HYDROCHLORIDE 100 MG/1
TABLET, FILM COATED ORAL
Qty: 90 TABLET | Refills: 1 | Status: SHIPPED | OUTPATIENT
Start: 2021-08-12 | End: 2021-12-29

## 2021-09-15 ENCOUNTER — LAB VISIT (OUTPATIENT)
Dept: LAB | Facility: HOSPITAL | Age: 86
End: 2021-09-15
Attending: FAMILY MEDICINE
Payer: MEDICARE

## 2021-09-15 ENCOUNTER — TELEPHONE (OUTPATIENT)
Dept: FAMILY MEDICINE | Facility: CLINIC | Age: 86
End: 2021-09-15

## 2021-09-15 DIAGNOSIS — N17.9 AKI (ACUTE KIDNEY INJURY): ICD-10-CM

## 2021-09-15 DIAGNOSIS — N17.9 AKI (ACUTE KIDNEY INJURY): Primary | ICD-10-CM

## 2021-09-15 LAB
ANION GAP SERPL CALC-SCNC: 9 MMOL/L (ref 8–16)
BUN SERPL-MCNC: 23 MG/DL (ref 8–23)
CALCIUM SERPL-MCNC: 9.5 MG/DL (ref 8.7–10.5)
CHLORIDE SERPL-SCNC: 105 MMOL/L (ref 95–110)
CO2 SERPL-SCNC: 25 MMOL/L (ref 23–29)
CREAT SERPL-MCNC: 0.9 MG/DL (ref 0.5–1.4)
EST. GFR  (AFRICAN AMERICAN): >60 ML/MIN/1.73 M^2
EST. GFR  (NON AFRICAN AMERICAN): 58.1 ML/MIN/1.73 M^2
GLUCOSE SERPL-MCNC: 111 MG/DL (ref 70–110)
POTASSIUM SERPL-SCNC: 4.2 MMOL/L (ref 3.5–5.1)
SODIUM SERPL-SCNC: 139 MMOL/L (ref 136–145)

## 2021-09-15 PROCEDURE — 36415 COLL VENOUS BLD VENIPUNCTURE: CPT | Mod: PO | Performed by: FAMILY MEDICINE

## 2021-09-15 PROCEDURE — 80048 BASIC METABOLIC PNL TOTAL CA: CPT | Performed by: FAMILY MEDICINE

## 2021-09-20 ENCOUNTER — TELEPHONE (OUTPATIENT)
Dept: FAMILY MEDICINE | Facility: CLINIC | Age: 86
End: 2021-09-20

## 2021-09-20 ENCOUNTER — OFFICE VISIT (OUTPATIENT)
Dept: FAMILY MEDICINE | Facility: CLINIC | Age: 86
End: 2021-09-20
Payer: MEDICARE

## 2021-09-20 ENCOUNTER — HOSPITAL ENCOUNTER (OUTPATIENT)
Dept: RADIOLOGY | Facility: HOSPITAL | Age: 86
Discharge: HOME | End: 2021-09-20
Attending: FAMILY MEDICINE
Payer: MEDICARE

## 2021-09-20 VITALS
HEIGHT: 57 IN | SYSTOLIC BLOOD PRESSURE: 126 MMHG | HEART RATE: 86 BPM | BODY MASS INDEX: 21.38 KG/M2 | OXYGEN SATURATION: 96 % | DIASTOLIC BLOOD PRESSURE: 58 MMHG | TEMPERATURE: 99 F | WEIGHT: 99.13 LBS

## 2021-09-20 DIAGNOSIS — I10 ESSENTIAL HYPERTENSION: ICD-10-CM

## 2021-09-20 DIAGNOSIS — R80.9 CONTROLLED TYPE 2 DIABETES MELLITUS WITH MICROALBUMINURIA, WITHOUT LONG-TERM CURRENT USE OF INSULIN: ICD-10-CM

## 2021-09-20 DIAGNOSIS — M48.061 SPINAL STENOSIS OF LUMBAR REGION WITHOUT NEUROGENIC CLAUDICATION: Primary | ICD-10-CM

## 2021-09-20 DIAGNOSIS — R20.2 PARESTHESIA: ICD-10-CM

## 2021-09-20 DIAGNOSIS — Z95.828 HISTORY OF LEFT COMMON CAROTID ARTERY STENT PLACEMENT: ICD-10-CM

## 2021-09-20 DIAGNOSIS — E11.29 CONTROLLED TYPE 2 DIABETES MELLITUS WITH MICROALBUMINURIA, WITHOUT LONG-TERM CURRENT USE OF INSULIN: ICD-10-CM

## 2021-09-20 DIAGNOSIS — Z98.890 HISTORY OF LEFT COMMON CAROTID ARTERY STENT PLACEMENT: ICD-10-CM

## 2021-09-20 PROCEDURE — 99999 PR PBB SHADOW E&M-EST. PATIENT-LVL IV: CPT | Mod: PBBFAC,,, | Performed by: FAMILY MEDICINE

## 2021-09-20 PROCEDURE — 99499 UNLISTED E&M SERVICE: CPT | Mod: S$GLB,,, | Performed by: FAMILY MEDICINE

## 2021-09-20 PROCEDURE — 72040 XR CERVICAL SPINE AP LATERAL: ICD-10-PCS | Mod: 26,,, | Performed by: RADIOLOGY

## 2021-09-20 PROCEDURE — 3288F FALL RISK ASSESSMENT DOCD: CPT | Mod: CPTII,S$GLB,, | Performed by: FAMILY MEDICINE

## 2021-09-20 PROCEDURE — 1101F PR PT FALLS ASSESS DOC 0-1 FALLS W/OUT INJ PAST YR: ICD-10-PCS | Mod: CPTII,S$GLB,, | Performed by: FAMILY MEDICINE

## 2021-09-20 PROCEDURE — 1125F AMNT PAIN NOTED PAIN PRSNT: CPT | Mod: CPTII,S$GLB,, | Performed by: FAMILY MEDICINE

## 2021-09-20 PROCEDURE — 99499 RISK ADDL DX/OHS AUDIT: ICD-10-PCS | Mod: S$GLB,,, | Performed by: FAMILY MEDICINE

## 2021-09-20 PROCEDURE — 1101F PT FALLS ASSESS-DOCD LE1/YR: CPT | Mod: CPTII,S$GLB,, | Performed by: FAMILY MEDICINE

## 2021-09-20 PROCEDURE — 99214 PR OFFICE/OUTPT VISIT, EST, LEVL IV, 30-39 MIN: ICD-10-PCS | Mod: S$GLB,,, | Performed by: FAMILY MEDICINE

## 2021-09-20 PROCEDURE — 99214 OFFICE O/P EST MOD 30 MIN: CPT | Mod: S$GLB,,, | Performed by: FAMILY MEDICINE

## 2021-09-20 PROCEDURE — 3288F PR FALLS RISK ASSESSMENT DOCUMENTED: ICD-10-PCS | Mod: CPTII,S$GLB,, | Performed by: FAMILY MEDICINE

## 2021-09-20 PROCEDURE — 72040 X-RAY EXAM NECK SPINE 2-3 VW: CPT | Mod: 26,,, | Performed by: RADIOLOGY

## 2021-09-20 PROCEDURE — 1125F PR PAIN SEVERITY QUANTIFIED, PAIN PRESENT: ICD-10-PCS | Mod: CPTII,S$GLB,, | Performed by: FAMILY MEDICINE

## 2021-09-20 PROCEDURE — 99999 PR PBB SHADOW E&M-EST. PATIENT-LVL IV: ICD-10-PCS | Mod: PBBFAC,,, | Performed by: FAMILY MEDICINE

## 2021-09-20 PROCEDURE — 72040 X-RAY EXAM NECK SPINE 2-3 VW: CPT | Mod: TC,FY,PO

## 2021-09-20 RX ORDER — HYDROCODONE BITARTRATE AND ACETAMINOPHEN 10; 325 MG/1; MG/1
1 TABLET ORAL EVERY 6 HOURS PRN
Qty: 90 TABLET | Refills: 0 | Status: SHIPPED | OUTPATIENT
Start: 2021-09-20 | End: 2021-10-20

## 2021-09-20 RX ORDER — HYDROCODONE BITARTRATE AND ACETAMINOPHEN 10; 325 MG/1; MG/1
1 TABLET ORAL EVERY 6 HOURS PRN
Qty: 90 TABLET | Refills: 0 | Status: SHIPPED | OUTPATIENT
Start: 2021-10-20 | End: 2021-11-19

## 2021-09-20 RX ORDER — HYDROCODONE BITARTRATE AND ACETAMINOPHEN 10; 325 MG/1; MG/1
1 TABLET ORAL EVERY 6 HOURS PRN
Qty: 90 TABLET | Refills: 0 | Status: SHIPPED | OUTPATIENT
Start: 2021-11-20 | End: 2021-12-21 | Stop reason: SDUPTHER

## 2021-09-24 ENCOUNTER — TELEPHONE (OUTPATIENT)
Dept: FAMILY MEDICINE | Facility: CLINIC | Age: 86
End: 2021-09-24

## 2021-09-27 ENCOUNTER — TELEPHONE (OUTPATIENT)
Dept: FAMILY MEDICINE | Facility: CLINIC | Age: 86
End: 2021-09-27

## 2021-10-21 ENCOUNTER — TELEPHONE (OUTPATIENT)
Dept: FAMILY MEDICINE | Facility: CLINIC | Age: 86
End: 2021-10-21

## 2021-10-27 ENCOUNTER — OFFICE VISIT (OUTPATIENT)
Dept: NEPHROLOGY | Facility: CLINIC | Age: 86
End: 2021-10-27
Payer: MEDICARE

## 2021-10-27 ENCOUNTER — TELEPHONE (OUTPATIENT)
Dept: FAMILY MEDICINE | Facility: CLINIC | Age: 86
End: 2021-10-27
Payer: MEDICARE

## 2021-10-27 VITALS
HEIGHT: 57 IN | HEART RATE: 83 BPM | WEIGHT: 98.56 LBS | SYSTOLIC BLOOD PRESSURE: 160 MMHG | OXYGEN SATURATION: 98 % | BODY MASS INDEX: 21.27 KG/M2 | DIASTOLIC BLOOD PRESSURE: 60 MMHG

## 2021-10-27 DIAGNOSIS — R80.1 PERSISTENT PROTEINURIA: Primary | ICD-10-CM

## 2021-10-27 DIAGNOSIS — R80.9 CONTROLLED TYPE 2 DIABETES MELLITUS WITH MICROALBUMINURIA, WITHOUT LONG-TERM CURRENT USE OF INSULIN: ICD-10-CM

## 2021-10-27 DIAGNOSIS — E11.29 CONTROLLED TYPE 2 DIABETES MELLITUS WITH MICROALBUMINURIA, WITHOUT LONG-TERM CURRENT USE OF INSULIN: ICD-10-CM

## 2021-10-27 PROCEDURE — 99999 PR PBB SHADOW E&M-EST. PATIENT-LVL III: ICD-10-PCS | Mod: PBBFAC,,, | Performed by: INTERNAL MEDICINE

## 2021-10-27 PROCEDURE — 1159F PR MEDICATION LIST DOCUMENTED IN MEDICAL RECORD: ICD-10-PCS | Mod: CPTII,S$GLB,, | Performed by: INTERNAL MEDICINE

## 2021-10-27 PROCEDURE — 1101F PT FALLS ASSESS-DOCD LE1/YR: CPT | Mod: CPTII,S$GLB,, | Performed by: INTERNAL MEDICINE

## 2021-10-27 PROCEDURE — 3288F PR FALLS RISK ASSESSMENT DOCUMENTED: ICD-10-PCS | Mod: CPTII,S$GLB,, | Performed by: INTERNAL MEDICINE

## 2021-10-27 PROCEDURE — 99213 OFFICE O/P EST LOW 20 MIN: CPT | Mod: S$GLB,,, | Performed by: INTERNAL MEDICINE

## 2021-10-27 PROCEDURE — 1101F PR PT FALLS ASSESS DOC 0-1 FALLS W/OUT INJ PAST YR: ICD-10-PCS | Mod: CPTII,S$GLB,, | Performed by: INTERNAL MEDICINE

## 2021-10-27 PROCEDURE — 99999 PR PBB SHADOW E&M-EST. PATIENT-LVL III: CPT | Mod: PBBFAC,,, | Performed by: INTERNAL MEDICINE

## 2021-10-27 PROCEDURE — 1159F MED LIST DOCD IN RCRD: CPT | Mod: CPTII,S$GLB,, | Performed by: INTERNAL MEDICINE

## 2021-10-27 PROCEDURE — 99213 PR OFFICE/OUTPT VISIT, EST, LEVL III, 20-29 MIN: ICD-10-PCS | Mod: S$GLB,,, | Performed by: INTERNAL MEDICINE

## 2021-10-27 PROCEDURE — 3288F FALL RISK ASSESSMENT DOCD: CPT | Mod: CPTII,S$GLB,, | Performed by: INTERNAL MEDICINE

## 2021-10-27 PROCEDURE — 1125F PR PAIN SEVERITY QUANTIFIED, PAIN PRESENT: ICD-10-PCS | Mod: CPTII,S$GLB,, | Performed by: INTERNAL MEDICINE

## 2021-10-27 PROCEDURE — 1125F AMNT PAIN NOTED PAIN PRSNT: CPT | Mod: CPTII,S$GLB,, | Performed by: INTERNAL MEDICINE

## 2021-11-01 ENCOUNTER — CLINICAL SUPPORT (OUTPATIENT)
Dept: FAMILY MEDICINE | Facility: CLINIC | Age: 86
End: 2021-11-01
Payer: MEDICARE

## 2021-11-01 VITALS — TEMPERATURE: 99 F

## 2021-11-01 DIAGNOSIS — Z23 NEEDS FLU SHOT: Primary | ICD-10-CM

## 2021-11-01 PROCEDURE — 99999 PR PBB SHADOW E&M-EST. PATIENT-LVL I: CPT | Mod: PBBFAC,,,

## 2021-11-01 PROCEDURE — 90694 FLU VACCINE - QUADRIVALENT - ADJUVANTED: ICD-10-PCS | Mod: S$GLB,,, | Performed by: FAMILY MEDICINE

## 2021-11-01 PROCEDURE — 99999 PR PBB SHADOW E&M-EST. PATIENT-LVL I: ICD-10-PCS | Mod: PBBFAC,,,

## 2021-11-01 PROCEDURE — G0008 ADMIN INFLUENZA VIRUS VAC: HCPCS | Mod: S$GLB,,, | Performed by: FAMILY MEDICINE

## 2021-11-01 PROCEDURE — 90694 VACC AIIV4 NO PRSRV 0.5ML IM: CPT | Mod: S$GLB,,, | Performed by: FAMILY MEDICINE

## 2021-11-01 PROCEDURE — G0008 FLU VACCINE - QUADRIVALENT - ADJUVANTED: ICD-10-PCS | Mod: S$GLB,,, | Performed by: FAMILY MEDICINE

## 2021-11-08 DIAGNOSIS — I10 HYPERTENSION, UNCONTROLLED: ICD-10-CM

## 2021-11-09 RX ORDER — LOSARTAN POTASSIUM 50 MG/1
50 TABLET ORAL DAILY
Qty: 90 TABLET | Refills: 1 | Status: SHIPPED | OUTPATIENT
Start: 2021-11-09 | End: 2022-01-06

## 2021-12-06 ENCOUNTER — TELEPHONE (OUTPATIENT)
Dept: FAMILY MEDICINE | Facility: CLINIC | Age: 86
End: 2021-12-06
Payer: MEDICARE

## 2021-12-21 ENCOUNTER — OFFICE VISIT (OUTPATIENT)
Dept: FAMILY MEDICINE | Facility: CLINIC | Age: 86
End: 2021-12-21
Payer: MEDICARE

## 2021-12-21 ENCOUNTER — LAB VISIT (OUTPATIENT)
Dept: LAB | Facility: HOSPITAL | Age: 86
End: 2021-12-21
Attending: INTERNAL MEDICINE
Payer: MEDICARE

## 2021-12-21 VITALS
BODY MASS INDEX: 22.09 KG/M2 | DIASTOLIC BLOOD PRESSURE: 60 MMHG | HEART RATE: 68 BPM | WEIGHT: 102.38 LBS | SYSTOLIC BLOOD PRESSURE: 144 MMHG | OXYGEN SATURATION: 99 % | HEIGHT: 57 IN | TEMPERATURE: 98 F

## 2021-12-21 DIAGNOSIS — G89.29 CHRONIC MIDLINE BACK PAIN, UNSPECIFIED BACK LOCATION: Primary | ICD-10-CM

## 2021-12-21 DIAGNOSIS — M54.9 CHRONIC MIDLINE BACK PAIN, UNSPECIFIED BACK LOCATION: Primary | ICD-10-CM

## 2021-12-21 DIAGNOSIS — R80.1 PERSISTENT PROTEINURIA: ICD-10-CM

## 2021-12-21 DIAGNOSIS — E11.29 CONTROLLED TYPE 2 DIABETES MELLITUS WITH MICROALBUMINURIA, WITHOUT LONG-TERM CURRENT USE OF INSULIN: ICD-10-CM

## 2021-12-21 DIAGNOSIS — N18.31 STAGE 3A CHRONIC KIDNEY DISEASE: ICD-10-CM

## 2021-12-21 DIAGNOSIS — M48.061 SPINAL STENOSIS OF LUMBAR REGION WITHOUT NEUROGENIC CLAUDICATION: ICD-10-CM

## 2021-12-21 DIAGNOSIS — R80.9 CONTROLLED TYPE 2 DIABETES MELLITUS WITH MICROALBUMINURIA, WITHOUT LONG-TERM CURRENT USE OF INSULIN: ICD-10-CM

## 2021-12-21 DIAGNOSIS — I10 ESSENTIAL HYPERTENSION: ICD-10-CM

## 2021-12-21 LAB
BACTERIA #/AREA URNS AUTO: ABNORMAL /HPF
BILIRUB UR QL STRIP: NEGATIVE
CLARITY UR REFRACT.AUTO: CLEAR
COLOR UR AUTO: ABNORMAL
CREAT UR-MCNC: 37 MG/DL (ref 15–325)
GLUCOSE UR QL STRIP: NEGATIVE
HGB UR QL STRIP: NEGATIVE
HYALINE CASTS UR QL AUTO: 2 /LPF
KETONES UR QL STRIP: NEGATIVE
LEUKOCYTE ESTERASE UR QL STRIP: NEGATIVE
MICROSCOPIC COMMENT: ABNORMAL
NITRITE UR QL STRIP: NEGATIVE
PH UR STRIP: 5 [PH] (ref 5–8)
PROT UR QL STRIP: ABNORMAL
PROT UR-MCNC: 92 MG/DL (ref 0–15)
PROT/CREAT UR: 2.49 MG/G{CREAT} (ref 0–0.2)
RBC #/AREA URNS AUTO: 1 /HPF (ref 0–4)
SP GR UR STRIP: 1.01 (ref 1–1.03)
SQUAMOUS #/AREA URNS AUTO: 0 /HPF
URN SPEC COLLECT METH UR: ABNORMAL
WBC #/AREA URNS AUTO: 1 /HPF (ref 0–5)

## 2021-12-21 PROCEDURE — 99214 PR OFFICE/OUTPT VISIT, EST, LEVL IV, 30-39 MIN: ICD-10-PCS | Mod: S$GLB,,, | Performed by: FAMILY MEDICINE

## 2021-12-21 PROCEDURE — 99214 OFFICE O/P EST MOD 30 MIN: CPT | Mod: S$GLB,,, | Performed by: FAMILY MEDICINE

## 2021-12-21 PROCEDURE — 99499 UNLISTED E&M SERVICE: CPT | Mod: S$GLB,,, | Performed by: FAMILY MEDICINE

## 2021-12-21 PROCEDURE — 99999 PR PBB SHADOW E&M-EST. PATIENT-LVL V: CPT | Mod: PBBFAC,,, | Performed by: FAMILY MEDICINE

## 2021-12-21 PROCEDURE — 84156 ASSAY OF PROTEIN URINE: CPT | Performed by: INTERNAL MEDICINE

## 2021-12-21 PROCEDURE — 99499 RISK ADDL DX/OHS AUDIT: ICD-10-PCS | Mod: S$GLB,,, | Performed by: FAMILY MEDICINE

## 2021-12-21 PROCEDURE — 81001 URINALYSIS AUTO W/SCOPE: CPT | Performed by: INTERNAL MEDICINE

## 2021-12-21 PROCEDURE — 99999 PR PBB SHADOW E&M-EST. PATIENT-LVL V: ICD-10-PCS | Mod: PBBFAC,,, | Performed by: FAMILY MEDICINE

## 2021-12-21 RX ORDER — HYDROCODONE BITARTRATE AND ACETAMINOPHEN 10; 325 MG/1; MG/1
1 TABLET ORAL EVERY 6 HOURS PRN
Qty: 90 TABLET | Refills: 0 | Status: SHIPPED | OUTPATIENT
Start: 2022-01-21 | End: 2022-02-20

## 2021-12-21 RX ORDER — HYDROCODONE BITARTRATE AND ACETAMINOPHEN 10; 325 MG/1; MG/1
1 TABLET ORAL EVERY 6 HOURS PRN
Qty: 90 TABLET | Refills: 0 | Status: SHIPPED | OUTPATIENT
Start: 2022-02-21 | End: 2022-03-21 | Stop reason: SDUPTHER

## 2021-12-21 RX ORDER — HYDROCODONE BITARTRATE AND ACETAMINOPHEN 10; 325 MG/1; MG/1
1 TABLET ORAL EVERY 6 HOURS PRN
Qty: 90 TABLET | Refills: 0 | Status: SHIPPED | OUTPATIENT
Start: 2021-12-21 | End: 2022-01-20

## 2021-12-21 RX ORDER — HYDROCODONE BITARTRATE AND ACETAMINOPHEN 10; 325 MG/1; MG/1
1 TABLET ORAL EVERY 6 HOURS PRN
Qty: 90 TABLET | Refills: 0 | Status: CANCELLED | OUTPATIENT
Start: 2021-12-21 | End: 2022-01-20

## 2022-01-01 ENCOUNTER — TELEPHONE (OUTPATIENT)
Dept: ENDOSCOPY | Facility: HOSPITAL | Age: 87
End: 2022-01-01
Payer: MEDICARE

## 2022-01-01 ENCOUNTER — ANESTHESIA (OUTPATIENT)
Dept: ENDOSCOPY | Facility: HOSPITAL | Age: 87
End: 2022-01-01
Payer: MEDICARE

## 2022-01-01 ENCOUNTER — PATIENT OUTREACH (OUTPATIENT)
Dept: ADMINISTRATIVE | Facility: HOSPITAL | Age: 87
End: 2022-01-01
Payer: MEDICARE

## 2022-01-01 ENCOUNTER — ANESTHESIA EVENT (OUTPATIENT)
Dept: ENDOSCOPY | Facility: HOSPITAL | Age: 87
End: 2022-01-01
Payer: MEDICARE

## 2022-01-01 ENCOUNTER — TELEPHONE (OUTPATIENT)
Dept: FAMILY MEDICINE | Facility: CLINIC | Age: 87
End: 2022-01-01
Payer: MEDICARE

## 2022-01-01 ENCOUNTER — LAB VISIT (OUTPATIENT)
Dept: LAB | Facility: HOSPITAL | Age: 87
End: 2022-01-01
Attending: INTERNAL MEDICINE
Payer: MEDICARE

## 2022-01-01 ENCOUNTER — TELEPHONE (OUTPATIENT)
Dept: GASTROENTEROLOGY | Facility: CLINIC | Age: 87
End: 2022-01-01
Payer: MEDICARE

## 2022-01-01 ENCOUNTER — HOSPITAL ENCOUNTER (OUTPATIENT)
Facility: HOSPITAL | Age: 87
Discharge: HOME | End: 2022-07-19
Attending: STUDENT IN AN ORGANIZED HEALTH CARE EDUCATION/TRAINING PROGRAM | Admitting: STUDENT IN AN ORGANIZED HEALTH CARE EDUCATION/TRAINING PROGRAM
Payer: MEDICARE

## 2022-01-01 ENCOUNTER — OFFICE VISIT (OUTPATIENT)
Dept: FAMILY MEDICINE | Facility: CLINIC | Age: 87
End: 2022-01-01
Payer: MEDICARE

## 2022-01-01 ENCOUNTER — HOSPITAL ENCOUNTER (OUTPATIENT)
Dept: RADIOLOGY | Facility: CLINIC | Age: 87
Discharge: HOME | End: 2022-06-23
Attending: FAMILY MEDICINE
Payer: MEDICARE

## 2022-01-01 ENCOUNTER — OFFICE VISIT (OUTPATIENT)
Dept: NEPHROLOGY | Facility: CLINIC | Age: 87
End: 2022-01-01
Payer: MEDICARE

## 2022-01-01 VITALS
BODY MASS INDEX: 23.88 KG/M2 | OXYGEN SATURATION: 96 % | HEART RATE: 71 BPM | TEMPERATURE: 98 F | RESPIRATION RATE: 16 BRPM | DIASTOLIC BLOOD PRESSURE: 60 MMHG | WEIGHT: 110.69 LBS | HEIGHT: 57 IN | SYSTOLIC BLOOD PRESSURE: 142 MMHG

## 2022-01-01 VITALS
HEIGHT: 57 IN | BODY MASS INDEX: 23.45 KG/M2 | RESPIRATION RATE: 16 BRPM | HEART RATE: 78 BPM | TEMPERATURE: 99 F | OXYGEN SATURATION: 98 % | DIASTOLIC BLOOD PRESSURE: 60 MMHG | SYSTOLIC BLOOD PRESSURE: 122 MMHG | WEIGHT: 108.69 LBS

## 2022-01-01 VITALS
RESPIRATION RATE: 16 BRPM | HEART RATE: 69 BPM | OXYGEN SATURATION: 98 % | SYSTOLIC BLOOD PRESSURE: 164 MMHG | DIASTOLIC BLOOD PRESSURE: 69 MMHG | TEMPERATURE: 98 F

## 2022-01-01 VITALS
WEIGHT: 111.31 LBS | BODY MASS INDEX: 24.01 KG/M2 | SYSTOLIC BLOOD PRESSURE: 158 MMHG | HEIGHT: 57 IN | DIASTOLIC BLOOD PRESSURE: 60 MMHG

## 2022-01-01 DIAGNOSIS — R80.9 CONTROLLED TYPE 2 DIABETES MELLITUS WITH MICROALBUMINURIA, WITHOUT LONG-TERM CURRENT USE OF INSULIN: ICD-10-CM

## 2022-01-01 DIAGNOSIS — F41.9 ANXIETY: ICD-10-CM

## 2022-01-01 DIAGNOSIS — I10 ESSENTIAL HYPERTENSION: ICD-10-CM

## 2022-01-01 DIAGNOSIS — M54.9 CHRONIC MIDLINE BACK PAIN, UNSPECIFIED BACK LOCATION: Primary | ICD-10-CM

## 2022-01-01 DIAGNOSIS — R80.1 PERSISTENT PROTEINURIA: Primary | ICD-10-CM

## 2022-01-01 DIAGNOSIS — R13.10 DYSPHAGIA, UNSPECIFIED TYPE: ICD-10-CM

## 2022-01-01 DIAGNOSIS — M54.9 CHRONIC MIDLINE BACK PAIN, UNSPECIFIED BACK LOCATION: ICD-10-CM

## 2022-01-01 DIAGNOSIS — R80.1 PERSISTENT PROTEINURIA: ICD-10-CM

## 2022-01-01 DIAGNOSIS — M48.061 SPINAL STENOSIS OF LUMBAR REGION WITHOUT NEUROGENIC CLAUDICATION: ICD-10-CM

## 2022-01-01 DIAGNOSIS — I10 PRIMARY HYPERTENSION: ICD-10-CM

## 2022-01-01 DIAGNOSIS — N18.32 STAGE 3B CHRONIC KIDNEY DISEASE: ICD-10-CM

## 2022-01-01 DIAGNOSIS — N18.32 STAGE 3B CHRONIC KIDNEY DISEASE: Primary | ICD-10-CM

## 2022-01-01 DIAGNOSIS — I10 ESSENTIAL HYPERTENSION: Primary | ICD-10-CM

## 2022-01-01 DIAGNOSIS — E11.29 CONTROLLED TYPE 2 DIABETES MELLITUS WITH MICROALBUMINURIA, WITHOUT LONG-TERM CURRENT USE OF INSULIN: ICD-10-CM

## 2022-01-01 DIAGNOSIS — G89.29 CHRONIC MIDLINE BACK PAIN, UNSPECIFIED BACK LOCATION: ICD-10-CM

## 2022-01-01 DIAGNOSIS — Z78.0 ASYMPTOMATIC MENOPAUSAL STATE: ICD-10-CM

## 2022-01-01 DIAGNOSIS — G89.29 CHRONIC MIDLINE BACK PAIN, UNSPECIFIED BACK LOCATION: Primary | ICD-10-CM

## 2022-01-01 DIAGNOSIS — R13.10 DYSPHAGIA: ICD-10-CM

## 2022-01-01 DIAGNOSIS — E04.9 NONTOXIC GOITER: ICD-10-CM

## 2022-01-01 LAB
ALBUMIN SERPL BCP-MCNC: 3.6 G/DL (ref 3.5–5.2)
ALP SERPL-CCNC: 63 U/L (ref 55–135)
ALT SERPL W/O P-5'-P-CCNC: 17 U/L (ref 10–44)
ANION GAP SERPL CALC-SCNC: 8 MMOL/L (ref 8–16)
AST SERPL-CCNC: 20 U/L (ref 10–40)
BASOPHILS # BLD AUTO: 0.05 K/UL (ref 0–0.2)
BASOPHILS NFR BLD: 0.7 % (ref 0–1.9)
BILIRUB SERPL-MCNC: 0.2 MG/DL (ref 0.1–1)
BUN SERPL-MCNC: 33 MG/DL (ref 8–23)
CALCIUM SERPL-MCNC: 9.5 MG/DL (ref 8.7–10.5)
CHLORIDE SERPL-SCNC: 104 MMOL/L (ref 95–110)
CO2 SERPL-SCNC: 27 MMOL/L (ref 23–29)
CREAT 24H UR-MRATE: 24.4 MG/HR (ref 40–75)
CREAT CL/1.73 SQ M 12H UR+SERPL-ARVRAT: 34 ML/MIN (ref 70–110)
CREAT SERPL-MCNC: 1.1 MG/DL (ref 0.5–1.4)
CREAT SERPL-MCNC: 1.2 MG/DL (ref 0.5–1.4)
CREAT SERPL-MCNC: 1.2 MG/DL (ref 0.5–1.4)
CREAT UR-MCNC: 51 MG/DL (ref 15–325)
CREAT UR-MCNC: 51 MG/DL (ref 15–325)
CREATININE, URINE (MG/SPEC): 586.5 MG/SPEC
CREATININE, URINE (MG/SPEC): 586.5 MG/SPEC
DIFFERENTIAL METHOD: ABNORMAL
EOSINOPHIL # BLD AUTO: 0.6 K/UL (ref 0–0.5)
EOSINOPHIL NFR BLD: 8.7 % (ref 0–8)
ERYTHROCYTE [DISTWIDTH] IN BLOOD BY AUTOMATED COUNT: 13 % (ref 11.5–14.5)
EST. GFR  (NO RACE VARIABLE): 43.8 ML/MIN/1.73 M^2
EST. GFR  (NO RACE VARIABLE): 48.6 ML/MIN/1.73 M^2
FINAL PATHOLOGIC DIAGNOSIS: NORMAL
GLUCOSE SERPL-MCNC: 111 MG/DL (ref 70–110)
GROSS: NORMAL
HCT VFR BLD AUTO: 34.6 % (ref 37–48.5)
HGB BLD-MCNC: 10.9 G/DL (ref 12–16)
IMM GRANULOCYTES # BLD AUTO: 0.01 K/UL (ref 0–0.04)
IMM GRANULOCYTES NFR BLD AUTO: 0.1 % (ref 0–0.5)
LYMPHOCYTES # BLD AUTO: 2.5 K/UL (ref 1–4.8)
LYMPHOCYTES NFR BLD: 34.5 % (ref 18–48)
Lab: NORMAL
MCH RBC QN AUTO: 30.1 PG (ref 27–31)
MCHC RBC AUTO-ENTMCNC: 31.5 G/DL (ref 32–36)
MCV RBC AUTO: 96 FL (ref 82–98)
MONOCYTES # BLD AUTO: 0.7 K/UL (ref 0.3–1)
MONOCYTES NFR BLD: 9.6 % (ref 4–15)
NEUTROPHILS # BLD AUTO: 3.4 K/UL (ref 1.8–7.7)
NEUTROPHILS NFR BLD: 46.4 % (ref 38–73)
NRBC BLD-RTO: 0 /100 WBC
PHOSPHATE SERPL-MCNC: 3.9 MG/DL (ref 2.7–4.5)
PLATELET # BLD AUTO: 208 K/UL (ref 150–450)
PMV BLD AUTO: 11.7 FL (ref 9.2–12.9)
POTASSIUM SERPL-SCNC: 4.9 MMOL/L (ref 3.5–5.1)
PROT 24H UR-MRATE: 690 MG/SPEC (ref 0–100)
PROT SERPL-MCNC: 7.1 G/DL (ref 6–8.4)
PROT UR-MCNC: 60 MG/DL (ref 0–15)
PTH-INTACT SERPL-MCNC: 57.8 PG/ML (ref 9–77)
RBC # BLD AUTO: 3.62 M/UL (ref 4–5.4)
SODIUM 24H UR-SRATE: 2.8 MMOL/HR (ref 2–9)
SODIUM SERPL-SCNC: 139 MMOL/L (ref 136–145)
SODIUM UR-SCNC: 59 MMOL/L (ref 20–250)
SODIUM URINE (MMOL/SPEC): 68 MMOL/SPEC
URINE COLLECTION DURATION: 24 HR
URINE VOLUME: 1150 ML
WBC # BLD AUTO: 7.27 K/UL (ref 3.9–12.7)

## 2022-01-01 PROCEDURE — 83970 ASSAY OF PARATHORMONE: CPT | Performed by: INTERNAL MEDICINE

## 2022-01-01 PROCEDURE — 1101F PT FALLS ASSESS-DOCD LE1/YR: CPT | Mod: CPTII,S$GLB,, | Performed by: INTERNAL MEDICINE

## 2022-01-01 PROCEDURE — 99999 PR PBB SHADOW E&M-EST. PATIENT-LVL IV: ICD-10-PCS | Mod: PBBFAC,,, | Performed by: FAMILY MEDICINE

## 2022-01-01 PROCEDURE — 63600175 PHARM REV CODE 636 W HCPCS: Performed by: NURSE ANESTHETIST, CERTIFIED REGISTERED

## 2022-01-01 PROCEDURE — 84300 ASSAY OF URINE SODIUM: CPT | Performed by: INTERNAL MEDICINE

## 2022-01-01 PROCEDURE — 1125F PR PAIN SEVERITY QUANTIFIED, PAIN PRESENT: ICD-10-PCS | Mod: CPTII,S$GLB,, | Performed by: INTERNAL MEDICINE

## 2022-01-01 PROCEDURE — 1126F PR PAIN SEVERITY QUANTIFIED, NO PAIN PRESENT: ICD-10-PCS | Mod: CPTII,S$GLB,, | Performed by: FAMILY MEDICINE

## 2022-01-01 PROCEDURE — 27201012 HC FORCEPS, HOT/COLD, DISP: Performed by: STUDENT IN AN ORGANIZED HEALTH CARE EDUCATION/TRAINING PROGRAM

## 2022-01-01 PROCEDURE — 36415 COLL VENOUS BLD VENIPUNCTURE: CPT | Mod: PO | Performed by: INTERNAL MEDICINE

## 2022-01-01 PROCEDURE — 82575 CREATININE CLEARANCE TEST: CPT | Performed by: INTERNAL MEDICINE

## 2022-01-01 PROCEDURE — 88305 TISSUE EXAM BY PATHOLOGIST: CPT | Performed by: PATHOLOGY

## 2022-01-01 PROCEDURE — 43249 PR EGD, FLEX, W/BALL DILATION, < 30MM: ICD-10-PCS | Mod: ,,, | Performed by: STUDENT IN AN ORGANIZED HEALTH CARE EDUCATION/TRAINING PROGRAM

## 2022-01-01 PROCEDURE — 80053 COMPREHEN METABOLIC PANEL: CPT | Performed by: INTERNAL MEDICINE

## 2022-01-01 PROCEDURE — 99499 RISK ADDL DX/OHS AUDIT: ICD-10-PCS | Mod: S$GLB,,, | Performed by: INTERNAL MEDICINE

## 2022-01-01 PROCEDURE — 82570 ASSAY OF URINE CREATININE: CPT | Mod: 59 | Performed by: INTERNAL MEDICINE

## 2022-01-01 PROCEDURE — 43239 EGD BIOPSY SINGLE/MULTIPLE: CPT | Mod: 59 | Performed by: STUDENT IN AN ORGANIZED HEALTH CARE EDUCATION/TRAINING PROGRAM

## 2022-01-01 PROCEDURE — 43249 ESOPH EGD DILATION <30 MM: CPT | Performed by: STUDENT IN AN ORGANIZED HEALTH CARE EDUCATION/TRAINING PROGRAM

## 2022-01-01 PROCEDURE — 3288F FALL RISK ASSESSMENT DOCD: CPT | Mod: CPTII,S$GLB,, | Performed by: INTERNAL MEDICINE

## 2022-01-01 PROCEDURE — 1159F PR MEDICATION LIST DOCUMENTED IN MEDICAL RECORD: ICD-10-PCS | Mod: CPTII,S$GLB,, | Performed by: FAMILY MEDICINE

## 2022-01-01 PROCEDURE — 88305 TISSUE EXAM BY PATHOLOGIST: ICD-10-PCS | Mod: 26,,, | Performed by: PATHOLOGY

## 2022-01-01 PROCEDURE — 1101F PR PT FALLS ASSESS DOC 0-1 FALLS W/OUT INJ PAST YR: ICD-10-PCS | Mod: CPTII,S$GLB,, | Performed by: FAMILY MEDICINE

## 2022-01-01 PROCEDURE — 1159F MED LIST DOCD IN RCRD: CPT | Mod: CPTII,S$GLB,, | Performed by: FAMILY MEDICINE

## 2022-01-01 PROCEDURE — 99999 PR PBB SHADOW E&M-EST. PATIENT-LVL III: CPT | Mod: PBBFAC,,, | Performed by: INTERNAL MEDICINE

## 2022-01-01 PROCEDURE — C1726 CATH, BAL DIL, NON-VASCULAR: HCPCS | Performed by: STUDENT IN AN ORGANIZED HEALTH CARE EDUCATION/TRAINING PROGRAM

## 2022-01-01 PROCEDURE — 99999 PR PBB SHADOW E&M-EST. PATIENT-LVL IV: CPT | Mod: PBBFAC,,, | Performed by: FAMILY MEDICINE

## 2022-01-01 PROCEDURE — 1101F PT FALLS ASSESS-DOCD LE1/YR: CPT | Mod: CPTII,S$GLB,, | Performed by: FAMILY MEDICINE

## 2022-01-01 PROCEDURE — 82565 ASSAY OF CREATININE: CPT | Performed by: INTERNAL MEDICINE

## 2022-01-01 PROCEDURE — 25000003 PHARM REV CODE 250: Performed by: STUDENT IN AN ORGANIZED HEALTH CARE EDUCATION/TRAINING PROGRAM

## 2022-01-01 PROCEDURE — 3288F PR FALLS RISK ASSESSMENT DOCUMENTED: ICD-10-PCS | Mod: CPTII,S$GLB,, | Performed by: INTERNAL MEDICINE

## 2022-01-01 PROCEDURE — 99214 PR OFFICE/OUTPT VISIT, EST, LEVL IV, 30-39 MIN: ICD-10-PCS | Mod: S$GLB,,, | Performed by: FAMILY MEDICINE

## 2022-01-01 PROCEDURE — 3288F FALL RISK ASSESSMENT DOCD: CPT | Mod: CPTII,S$GLB,, | Performed by: FAMILY MEDICINE

## 2022-01-01 PROCEDURE — 1125F AMNT PAIN NOTED PAIN PRSNT: CPT | Mod: CPTII,S$GLB,, | Performed by: INTERNAL MEDICINE

## 2022-01-01 PROCEDURE — 85025 COMPLETE CBC W/AUTO DIFF WBC: CPT | Performed by: INTERNAL MEDICINE

## 2022-01-01 PROCEDURE — 1125F PR PAIN SEVERITY QUANTIFIED, PAIN PRESENT: ICD-10-PCS | Mod: CPTII,S$GLB,, | Performed by: FAMILY MEDICINE

## 2022-01-01 PROCEDURE — 1159F MED LIST DOCD IN RCRD: CPT | Mod: CPTII,S$GLB,, | Performed by: INTERNAL MEDICINE

## 2022-01-01 PROCEDURE — 99214 OFFICE O/P EST MOD 30 MIN: CPT | Mod: S$GLB,,, | Performed by: FAMILY MEDICINE

## 2022-01-01 PROCEDURE — 43239 PR EGD, FLEX, W/BIOPSY, SGL/MULTI: ICD-10-PCS | Mod: 59,,, | Performed by: STUDENT IN AN ORGANIZED HEALTH CARE EDUCATION/TRAINING PROGRAM

## 2022-01-01 PROCEDURE — 84100 ASSAY OF PHOSPHORUS: CPT | Performed by: INTERNAL MEDICINE

## 2022-01-01 PROCEDURE — 1126F AMNT PAIN NOTED NONE PRSNT: CPT | Mod: CPTII,S$GLB,, | Performed by: FAMILY MEDICINE

## 2022-01-01 PROCEDURE — 99499 UNLISTED E&M SERVICE: CPT | Mod: S$GLB,,, | Performed by: INTERNAL MEDICINE

## 2022-01-01 PROCEDURE — 37000009 HC ANESTHESIA EA ADD 15 MINS: Performed by: STUDENT IN AN ORGANIZED HEALTH CARE EDUCATION/TRAINING PROGRAM

## 2022-01-01 PROCEDURE — 25000003 PHARM REV CODE 250: Performed by: NURSE ANESTHETIST, CERTIFIED REGISTERED

## 2022-01-01 PROCEDURE — 99999 PR PBB SHADOW E&M-EST. PATIENT-LVL III: ICD-10-PCS | Mod: PBBFAC,,, | Performed by: INTERNAL MEDICINE

## 2022-01-01 PROCEDURE — 1101F PR PT FALLS ASSESS DOC 0-1 FALLS W/OUT INJ PAST YR: ICD-10-PCS | Mod: CPTII,S$GLB,, | Performed by: INTERNAL MEDICINE

## 2022-01-01 PROCEDURE — 43239 EGD BIOPSY SINGLE/MULTIPLE: CPT | Mod: 59,,, | Performed by: STUDENT IN AN ORGANIZED HEALTH CARE EDUCATION/TRAINING PROGRAM

## 2022-01-01 PROCEDURE — 84156 ASSAY OF PROTEIN URINE: CPT | Performed by: INTERNAL MEDICINE

## 2022-01-01 PROCEDURE — 99215 OFFICE O/P EST HI 40 MIN: CPT | Mod: S$GLB,,, | Performed by: INTERNAL MEDICINE

## 2022-01-01 PROCEDURE — 88305 TISSUE EXAM BY PATHOLOGIST: CPT | Mod: 26,,, | Performed by: PATHOLOGY

## 2022-01-01 PROCEDURE — 99215 PR OFFICE/OUTPT VISIT, EST, LEVL V, 40-54 MIN: ICD-10-PCS | Mod: S$GLB,,, | Performed by: INTERNAL MEDICINE

## 2022-01-01 PROCEDURE — 77080 DXA BONE DENSITY AXIAL: CPT | Mod: 26,,, | Performed by: INTERNAL MEDICINE

## 2022-01-01 PROCEDURE — 43249 ESOPH EGD DILATION <30 MM: CPT | Mod: ,,, | Performed by: STUDENT IN AN ORGANIZED HEALTH CARE EDUCATION/TRAINING PROGRAM

## 2022-01-01 PROCEDURE — 37000008 HC ANESTHESIA 1ST 15 MINUTES: Performed by: STUDENT IN AN ORGANIZED HEALTH CARE EDUCATION/TRAINING PROGRAM

## 2022-01-01 PROCEDURE — 3288F PR FALLS RISK ASSESSMENT DOCUMENTED: ICD-10-PCS | Mod: CPTII,S$GLB,, | Performed by: FAMILY MEDICINE

## 2022-01-01 PROCEDURE — 1125F AMNT PAIN NOTED PAIN PRSNT: CPT | Mod: CPTII,S$GLB,, | Performed by: FAMILY MEDICINE

## 2022-01-01 PROCEDURE — 1159F PR MEDICATION LIST DOCUMENTED IN MEDICAL RECORD: ICD-10-PCS | Mod: CPTII,S$GLB,, | Performed by: INTERNAL MEDICINE

## 2022-01-01 PROCEDURE — 77080 DEXA BONE DENSITY SPINE HIP: ICD-10-PCS | Mod: 26,,, | Performed by: INTERNAL MEDICINE

## 2022-01-01 PROCEDURE — 77080 DXA BONE DENSITY AXIAL: CPT | Mod: TC,PO

## 2022-01-01 RX ORDER — OXYCODONE AND ACETAMINOPHEN 5; 325 MG/1; MG/1
1 TABLET ORAL 3 TIMES DAILY PRN
Qty: 90 TABLET | Refills: 0 | Status: SHIPPED | OUTPATIENT
Start: 2022-01-01 | End: 2022-01-01

## 2022-01-01 RX ORDER — SERTRALINE HYDROCHLORIDE 100 MG/1
TABLET, FILM COATED ORAL
Qty: 90 TABLET | Refills: 1 | Status: SHIPPED | OUTPATIENT
Start: 2022-01-01 | End: 2023-01-01

## 2022-01-01 RX ORDER — NIFEDIPINE 60 MG/1
60 TABLET, EXTENDED RELEASE ORAL DAILY
Qty: 90 TABLET | Refills: 1 | Status: SHIPPED | OUTPATIENT
Start: 2022-01-01

## 2022-01-01 RX ORDER — SODIUM CHLORIDE 9 MG/ML
INJECTION, SOLUTION INTRAVENOUS CONTINUOUS
Status: DISCONTINUED | OUTPATIENT
Start: 2022-01-01 | End: 2022-01-01 | Stop reason: HOSPADM

## 2022-01-01 RX ORDER — MIRTAZAPINE 7.5 MG/1
TABLET, FILM COATED ORAL
Qty: 90 TABLET | Refills: 1 | Status: SHIPPED | OUTPATIENT
Start: 2022-01-01 | End: 2023-01-01 | Stop reason: SDUPTHER

## 2022-01-01 RX ORDER — OXYCODONE AND ACETAMINOPHEN 5; 325 MG/1; MG/1
1 TABLET ORAL 3 TIMES DAILY PRN
Qty: 90 TABLET | Refills: 0 | Status: SHIPPED | OUTPATIENT
Start: 2022-01-01 | End: 2023-01-01 | Stop reason: SDUPTHER

## 2022-01-01 RX ORDER — LIDOCAINE HYDROCHLORIDE 10 MG/ML
1 INJECTION, SOLUTION EPIDURAL; INFILTRATION; INTRACAUDAL; PERINEURAL ONCE
Status: CANCELLED | OUTPATIENT
Start: 2022-01-01 | End: 2022-01-01

## 2022-01-01 RX ORDER — LIDOCAINE HYDROCHLORIDE 20 MG/ML
INJECTION, SOLUTION EPIDURAL; INFILTRATION; INTRACAUDAL; PERINEURAL
Status: DISCONTINUED
Start: 2022-01-01 | End: 2022-01-01 | Stop reason: HOSPADM

## 2022-01-01 RX ORDER — HYDROCODONE BITARTRATE AND ACETAMINOPHEN 10; 325 MG/1; MG/1
1 TABLET ORAL EVERY 6 HOURS PRN
Qty: 90 TABLET | Refills: 0 | Status: CANCELLED | OUTPATIENT
Start: 2022-01-01 | End: 2022-01-01

## 2022-01-01 RX ORDER — SODIUM CHLORIDE, SODIUM LACTATE, POTASSIUM CHLORIDE, CALCIUM CHLORIDE 600; 310; 30; 20 MG/100ML; MG/100ML; MG/100ML; MG/100ML
INJECTION, SOLUTION INTRAVENOUS CONTINUOUS
Status: CANCELLED | OUTPATIENT
Start: 2022-01-01

## 2022-01-01 RX ORDER — VALSARTAN 320 MG/1
320 TABLET ORAL DAILY
Qty: 90 TABLET | Refills: 3 | Status: SHIPPED | OUTPATIENT
Start: 2022-01-01 | End: 2023-09-23

## 2022-01-01 RX ORDER — HYDROCHLOROTHIAZIDE 12.5 MG/1
TABLET ORAL
Qty: 90 TABLET | Refills: 1 | Status: SHIPPED | OUTPATIENT
Start: 2022-01-01

## 2022-01-01 RX ORDER — LIDOCAINE HCL/PF 100 MG/5ML
SYRINGE (ML) INTRAVENOUS
Status: DISCONTINUED | OUTPATIENT
Start: 2022-01-01 | End: 2022-01-01

## 2022-01-01 RX ORDER — OXYCODONE AND ACETAMINOPHEN 5; 325 MG/1; MG/1
1 TABLET ORAL 3 TIMES DAILY PRN
Qty: 90 TABLET | Refills: 0 | Status: SHIPPED | OUTPATIENT
Start: 2022-01-01 | End: 2022-01-01 | Stop reason: SDUPTHER

## 2022-01-01 RX ORDER — PROPOFOL 10 MG/ML
INJECTION, EMULSION INTRAVENOUS
Status: DISCONTINUED
Start: 2022-01-01 | End: 2022-01-01 | Stop reason: HOSPADM

## 2022-01-01 RX ORDER — PROPOFOL 10 MG/ML
INJECTION, EMULSION INTRAVENOUS
Status: DISCONTINUED | OUTPATIENT
Start: 2022-01-01 | End: 2022-01-01

## 2022-01-01 RX ORDER — OMEPRAZOLE 40 MG/1
40 CAPSULE, DELAYED RELEASE ORAL DAILY
Qty: 90 CAPSULE | Refills: 3 | Status: SHIPPED | OUTPATIENT
Start: 2022-01-01 | End: 2023-01-01 | Stop reason: SDUPTHER

## 2022-01-01 RX ADMIN — LIDOCAINE HYDROCHLORIDE 50 MG: 20 INJECTION, SOLUTION INTRAVENOUS at 09:07

## 2022-01-01 RX ADMIN — PROPOFOL 10 MG: 10 INJECTION, EMULSION INTRAVENOUS at 09:07

## 2022-01-01 RX ADMIN — PROPOFOL 20 MG: 10 INJECTION, EMULSION INTRAVENOUS at 09:07

## 2022-01-01 RX ADMIN — PROPOFOL 30 MG: 10 INJECTION, EMULSION INTRAVENOUS at 09:07

## 2022-01-01 RX ADMIN — SODIUM CHLORIDE: 0.9 INJECTION, SOLUTION INTRAVENOUS at 09:07

## 2022-01-01 RX ADMIN — SODIUM CHLORIDE: 0.9 INJECTION, SOLUTION INTRAVENOUS at 08:07

## 2022-01-04 ENCOUNTER — CLINICAL SUPPORT (OUTPATIENT)
Dept: FAMILY MEDICINE | Facility: CLINIC | Age: 87
End: 2022-01-04
Payer: MEDICARE

## 2022-01-04 VITALS — SYSTOLIC BLOOD PRESSURE: 150 MMHG | DIASTOLIC BLOOD PRESSURE: 60 MMHG | HEART RATE: 86 BPM | OXYGEN SATURATION: 98 %

## 2022-01-04 DIAGNOSIS — I10 ESSENTIAL HYPERTENSION: Primary | ICD-10-CM

## 2022-01-04 PROCEDURE — 99999 PR PBB SHADOW E&M-EST. PATIENT-LVL II: CPT | Mod: PBBFAC,,,

## 2022-01-04 PROCEDURE — 99999 PR PBB SHADOW E&M-EST. PATIENT-LVL II: ICD-10-PCS | Mod: PBBFAC,,,

## 2022-01-04 NOTE — PROGRESS NOTES
Kaycee Almanza 87 y.o. female is here today for Blood Pressure check.   History of HTN yes.    Review of patient's allergies indicates:   Allergen Reactions    Pneumovax-23 [pneumococcal 23-melva ps vaccine] Swelling     Creatinine   Date Value Ref Range Status   09/15/2021 0.9 0.5 - 1.4 mg/dL Final   09/06/2017 0.9 (H) 0.6 - 0.9 mg/dL Final     Sodium   Date Value Ref Range Status   09/15/2021 139 136 - 145 mmol/L Final   09/06/2017 143 135 - 146 Final     Potassium   Date Value Ref Range Status   09/15/2021 4.2 3.5 - 5.1 mmol/L Final   09/06/2017 4.2 3.5 - 5.3 Final   ]  Patient verifies taking blood pressure medications on a regular basis at the same time of the day.     Current Outpatient Medications:     acetaminophen (TYLENOL) 500 MG tablet, Take 500 mg by mouth every 6 (six) hours as needed for Pain., Disp: , Rfl:     AGDBCB amitriptyline 10%- gabapentin 6%- diclofenac 8%- baclofen 2%- cyclobenaprine 2%- bupivacaine 1% in transdermal cream, Apply 1 to 2 grams 3 to 4 times daily, Disp: 100 g, Rfl: 5    aspirin (ECOTRIN) 81 MG EC tablet, Take 81 mg by mouth once daily., Disp: , Rfl:     blood-glucose meter (FREESTYLE SYSTEM KIT) kit, Use as instructed, Disp: 1 each, Rfl: 0    calcium carbonate/vitamin D3 (CALCIUM 600 + D,3, ORAL), Take by mouth., Disp: , Rfl:     ergocalciferol, vitamin D2, (VITAMIN D ORAL), Take by mouth., Disp: , Rfl:     fluconazole (DIFLUCAN) 150 MG Tab, , Disp: , Rfl:     hydroCHLOROthiazide (HYDRODIURIL) 12.5 MG Tab, TAKE 1 TABLET EVERY DAY, Disp: 90 tablet, Rfl: 1    HYDROcodone-acetaminophen (NORCO)  mg per tablet, Take 1 tablet by mouth every 6 (six) hours as needed for Pain., Disp: 90 tablet, Rfl: 0    [START ON 1/21/2022] HYDROcodone-acetaminophen (NORCO)  mg per tablet, Take 1 tablet by mouth every 6 (six) hours as needed for Pain., Disp: 90 tablet, Rfl: 0    [START ON 2/21/2022] HYDROcodone-acetaminophen (NORCO)  mg per tablet, Take 1 tablet by mouth  every 6 (six) hours as needed for Pain., Disp: 90 tablet, Rfl: 0    losartan (COZAAR) 50 MG tablet, Take 1 tablet (50 mg total) by mouth once daily., Disp: 90 tablet, Rfl: 1    mirtazapine (REMERON) 7.5 MG Tab, TAKE 1 TABLET EVERY EVENING, Disp: 90 tablet, Rfl: 1    multivit-min/iron/folic/lutein (CENTRUM SILVER WOMEN ORAL), Take by mouth., Disp: , Rfl:     NIFEdipine (PROCARDIA-XL) 60 MG (OSM) 24 hr tablet, TAKE 1 TABLET EVERY DAY, Disp: 90 tablet, Rfl: 1    potassium chloride (KLOR-CON) 10 MEQ TbSR, TK 1 T PO BID FOR 15 DAYS, Disp: , Rfl: 0    sertraline (ZOLOFT) 100 MG tablet, TAKE 1 TABLET EVERY DAY, Disp: 90 tablet, Rfl: 1    TRUE METRIX GLUCOSE TEST STRIP Strp, Check blood glucose 2 times daily before meals, Disp: 200 each, Rfl: 11    wheelchair Becka, Transport wheelchair, Disp: 1 each, Rfl: 0  Does patient have record of home blood pressure readings no.   Last dose of blood pressure medication was taken at 8am  Patient is asymptomatic.   Complains of none offered patient information on digital HTN program states she does not have a cell phone.    Vitals:    01/04/22 1117 01/04/22 1131   BP: (!) 158/60 (!) 150/60   BP Location: Left arm Left arm   Patient Position: Sitting Sitting   BP Method: Medium (Manual) Medium (Manual)   Pulse: 88 86   SpO2: 98% 98%          informed of nurse visit.

## 2022-01-06 ENCOUNTER — TELEPHONE (OUTPATIENT)
Dept: FAMILY MEDICINE | Facility: CLINIC | Age: 87
End: 2022-01-06
Payer: MEDICARE

## 2022-01-06 RX ORDER — LOSARTAN POTASSIUM 100 MG/1
100 TABLET ORAL DAILY
Qty: 90 TABLET | Refills: 1 | Status: SHIPPED | OUTPATIENT
Start: 2022-01-06 | End: 2022-01-21

## 2022-01-06 NOTE — TELEPHONE ENCOUNTER
Patient called informed of med change per Dr Jose;2 week blood pressure appointment scheduled understanding verbalized.

## 2022-01-19 ENCOUNTER — PATIENT OUTREACH (OUTPATIENT)
Dept: ADMINISTRATIVE | Facility: HOSPITAL | Age: 87
End: 2022-01-19
Payer: MEDICARE

## 2022-01-19 NOTE — PROGRESS NOTES
[facility] Medication Review gap report - medication review last done on 12/21/2021.

## 2022-01-20 ENCOUNTER — CLINICAL SUPPORT (OUTPATIENT)
Dept: FAMILY MEDICINE | Facility: CLINIC | Age: 87
End: 2022-01-20
Payer: MEDICARE

## 2022-01-20 VITALS — HEART RATE: 69 BPM | OXYGEN SATURATION: 99 % | DIASTOLIC BLOOD PRESSURE: 60 MMHG | SYSTOLIC BLOOD PRESSURE: 156 MMHG

## 2022-01-20 DIAGNOSIS — I10 ESSENTIAL HYPERTENSION: Primary | ICD-10-CM

## 2022-01-20 PROCEDURE — 99999 PR PBB SHADOW E&M-EST. PATIENT-LVL II: CPT | Mod: PBBFAC,,,

## 2022-01-20 PROCEDURE — 99999 PR PBB SHADOW E&M-EST. PATIENT-LVL II: ICD-10-PCS | Mod: PBBFAC,,,

## 2022-01-20 NOTE — PROGRESS NOTES
Kaycee Almanza 87 y.o. female is here today for Blood Pressure check.   History of HTN yes.    Review of patient's allergies indicates:   Allergen Reactions    Pneumovax-23 [pneumococcal 23-melva ps vaccine] Swelling     Creatinine   Date Value Ref Range Status   09/15/2021 0.9 0.5 - 1.4 mg/dL Final   09/06/2017 0.9 (H) 0.6 - 0.9 mg/dL Final     Sodium   Date Value Ref Range Status   09/15/2021 139 136 - 145 mmol/L Final   09/06/2017 143 135 - 146 Final     Potassium   Date Value Ref Range Status   09/15/2021 4.2 3.5 - 5.1 mmol/L Final   09/06/2017 4.2 3.5 - 5.3 Final   ]  Patient verifies taking blood pressure medications on a regular basis at the same time of the day.     Current Outpatient Medications:     acetaminophen (TYLENOL) 500 MG tablet, Take 500 mg by mouth every 6 (six) hours as needed for Pain., Disp: , Rfl:     AGDBCB amitriptyline 10%- gabapentin 6%- diclofenac 8%- baclofen 2%- cyclobenaprine 2%- bupivacaine 1% in transdermal cream, Apply 1 to 2 grams 3 to 4 times daily, Disp: 100 g, Rfl: 5    aspirin (ECOTRIN) 81 MG EC tablet, Take 81 mg by mouth once daily., Disp: , Rfl:     blood-glucose meter (FREESTYLE SYSTEM KIT) kit, Use as instructed, Disp: 1 each, Rfl: 0    calcium carbonate/vitamin D3 (CALCIUM 600 + D,3, ORAL), Take by mouth., Disp: , Rfl:     ergocalciferol, vitamin D2, (VITAMIN D ORAL), Take by mouth., Disp: , Rfl:     fluconazole (DIFLUCAN) 150 MG Tab, , Disp: , Rfl:     hydroCHLOROthiazide (HYDRODIURIL) 12.5 MG Tab, TAKE 1 TABLET EVERY DAY, Disp: 90 tablet, Rfl: 1    HYDROcodone-acetaminophen (NORCO)  mg per tablet, Take 1 tablet by mouth every 6 (six) hours as needed for Pain., Disp: 90 tablet, Rfl: 0    [START ON 1/21/2022] HYDROcodone-acetaminophen (NORCO)  mg per tablet, Take 1 tablet by mouth every 6 (six) hours as needed for Pain., Disp: 90 tablet, Rfl: 0    [START ON 2/21/2022] HYDROcodone-acetaminophen (NORCO)  mg per tablet, Take 1 tablet by mouth  every 6 (six) hours as needed for Pain., Disp: 90 tablet, Rfl: 0    losartan (COZAAR) 100 MG tablet, Take 1 tablet (100 mg total) by mouth once daily., Disp: 90 tablet, Rfl: 1    mirtazapine (REMERON) 7.5 MG Tab, TAKE 1 TABLET EVERY EVENING, Disp: 90 tablet, Rfl: 1    multivit-min/iron/folic/lutein (CENTRUM SILVER WOMEN ORAL), Take by mouth., Disp: , Rfl:     NIFEdipine (PROCARDIA-XL) 60 MG (OSM) 24 hr tablet, TAKE 1 TABLET EVERY DAY, Disp: 90 tablet, Rfl: 1    potassium chloride (KLOR-CON) 10 MEQ TbSR, TK 1 T PO BID FOR 15 DAYS, Disp: , Rfl: 0    sertraline (ZOLOFT) 100 MG tablet, TAKE 1 TABLET EVERY DAY, Disp: 90 tablet, Rfl: 1    TRUE METRIX GLUCOSE TEST STRIP Strp, Check blood glucose 2 times daily before meals, Disp: 200 each, Rfl: 11    wheelchair Becka, Transport wheelchair, Disp: 1 each, Rfl: 0  Does patient have record of home blood pressure readings yes. Readings have been averaging 175//64  Last dose of blood pressure medication was taken at 7:45am  Patient is asymptomatic.   Complains of generalized arthritis pain   Patient brought in her wrist blood pressure cuff to check accuracy #1 B/P166/74 P71 #2 164/75 P68  Patient given information on digital HTN program  Vitals:    01/20/22 0832 01/20/22 0847   BP: (!) 170/60 (!) 156/60   BP Location: Left arm Left arm   Patient Position: Sitting Sitting   BP Method: Medium (Manual) Medium (Manual)   Pulse: 79 69   SpO2: 98% 99%         Dr. Jose informed of nurse visit.

## 2022-01-21 ENCOUNTER — TELEPHONE (OUTPATIENT)
Dept: FAMILY MEDICINE | Facility: CLINIC | Age: 87
End: 2022-01-21
Payer: MEDICARE

## 2022-01-21 DIAGNOSIS — I10 ESSENTIAL HYPERTENSION: Primary | ICD-10-CM

## 2022-01-21 RX ORDER — VALSARTAN 320 MG/1
320 TABLET ORAL DAILY
Qty: 90 TABLET | Refills: 3 | Status: SHIPPED | OUTPATIENT
Start: 2022-01-21 | End: 2022-01-01 | Stop reason: SDUPTHER

## 2022-01-21 NOTE — TELEPHONE ENCOUNTER
I would like to change her losartan to valsartan which is a stronger med and see if this improves her BP    We can do a nurse visit in 1 month or her next f/u visit whichever is closer    Iona Jose MD

## 2022-01-28 ENCOUNTER — TELEPHONE (OUTPATIENT)
Dept: FAMILY MEDICINE | Facility: CLINIC | Age: 87
End: 2022-01-28
Payer: MEDICARE

## 2022-01-28 NOTE — TELEPHONE ENCOUNTER
----- Message from Maritza Pastrana sent at 1/28/2022  1:39 PM CST -----  Regarding: medication concern  Name of Who is Calling: Jacklyn, daughter in law           What is the request in detail: Jacklyn is requesting a call back to find out when the new blood pressure medication will be sent because the old one was sent.           Can the clinic reply by [facility]: No           What Number to Call Back if not in [facility]: [phone]

## 2022-02-03 ENCOUNTER — TELEPHONE (OUTPATIENT)
Dept: FAMILY MEDICINE | Facility: CLINIC | Age: 87
End: 2022-02-03
Payer: MEDICARE

## 2022-02-03 NOTE — TELEPHONE ENCOUNTER
----- Message from Afia Farias sent at 2/3/2022 10:42 AM CST -----  Type: Patient Call Back    Who called:pt's daughter in law chaka [phone] (home)       What is the request in detail:has questions in regards to medications. Call chaka    Can the clinic reply by [facility]?    Would the patient rather a call back or a response via [facility]? call    Best call back number:[phone] (home)       Additional Information:

## 2022-03-21 ENCOUNTER — OFFICE VISIT (OUTPATIENT)
Dept: FAMILY MEDICINE | Facility: CLINIC | Age: 87
End: 2022-03-21
Payer: MEDICARE

## 2022-03-21 VITALS
SYSTOLIC BLOOD PRESSURE: 158 MMHG | BODY MASS INDEX: 23.64 KG/M2 | DIASTOLIC BLOOD PRESSURE: 64 MMHG | OXYGEN SATURATION: 97 % | WEIGHT: 109.56 LBS | RESPIRATION RATE: 16 BRPM | TEMPERATURE: 98 F | HEIGHT: 57 IN | HEART RATE: 81 BPM

## 2022-03-21 DIAGNOSIS — M54.9 CHRONIC MIDLINE BACK PAIN, UNSPECIFIED BACK LOCATION: Primary | ICD-10-CM

## 2022-03-21 DIAGNOSIS — R80.9 CONTROLLED TYPE 2 DIABETES MELLITUS WITH MICROALBUMINURIA, WITHOUT LONG-TERM CURRENT USE OF INSULIN: ICD-10-CM

## 2022-03-21 DIAGNOSIS — E04.9 GOITER: ICD-10-CM

## 2022-03-21 DIAGNOSIS — M48.061 SPINAL STENOSIS OF LUMBAR REGION WITHOUT NEUROGENIC CLAUDICATION: ICD-10-CM

## 2022-03-21 DIAGNOSIS — I10 ESSENTIAL HYPERTENSION: ICD-10-CM

## 2022-03-21 DIAGNOSIS — N18.31 STAGE 3A CHRONIC KIDNEY DISEASE: ICD-10-CM

## 2022-03-21 DIAGNOSIS — G89.29 CHRONIC MIDLINE BACK PAIN, UNSPECIFIED BACK LOCATION: Primary | ICD-10-CM

## 2022-03-21 DIAGNOSIS — E11.29 CONTROLLED TYPE 2 DIABETES MELLITUS WITH MICROALBUMINURIA, WITHOUT LONG-TERM CURRENT USE OF INSULIN: ICD-10-CM

## 2022-03-21 PROCEDURE — 1159F PR MEDICATION LIST DOCUMENTED IN MEDICAL RECORD: ICD-10-PCS | Mod: CPTII,S$GLB,, | Performed by: FAMILY MEDICINE

## 2022-03-21 PROCEDURE — 99214 PR OFFICE/OUTPT VISIT, EST, LEVL IV, 30-39 MIN: ICD-10-PCS | Mod: S$GLB,,, | Performed by: FAMILY MEDICINE

## 2022-03-21 PROCEDURE — 99999 PR PBB SHADOW E&M-EST. PATIENT-LVL IV: CPT | Mod: PBBFAC,,, | Performed by: FAMILY MEDICINE

## 2022-03-21 PROCEDURE — 99214 OFFICE O/P EST MOD 30 MIN: CPT | Mod: S$GLB,,, | Performed by: FAMILY MEDICINE

## 2022-03-21 PROCEDURE — 1159F MED LIST DOCD IN RCRD: CPT | Mod: CPTII,S$GLB,, | Performed by: FAMILY MEDICINE

## 2022-03-21 PROCEDURE — 99999 PR PBB SHADOW E&M-EST. PATIENT-LVL IV: ICD-10-PCS | Mod: PBBFAC,,, | Performed by: FAMILY MEDICINE

## 2022-03-21 RX ORDER — HYDROCODONE BITARTRATE AND ACETAMINOPHEN 10; 325 MG/1; MG/1
1 TABLET ORAL EVERY 6 HOURS PRN
Qty: 90 TABLET | Refills: 0 | Status: SHIPPED | OUTPATIENT
Start: 2022-04-21 | End: 2022-05-21

## 2022-03-21 RX ORDER — INFLUENZA VACCINE, ADJUVANTED 15; 15; 15; 15 UG/.5ML; UG/.5ML; UG/.5ML; UG/.5ML
INJECTION, SUSPENSION INTRAMUSCULAR
COMMUNITY
Start: 2021-11-01 | End: 2022-03-21

## 2022-03-21 RX ORDER — HYDROCODONE BITARTRATE AND ACETAMINOPHEN 10; 325 MG/1; MG/1
1 TABLET ORAL EVERY 6 HOURS PRN
Qty: 90 TABLET | Refills: 0 | Status: SHIPPED | OUTPATIENT
Start: 2022-05-21 | End: 2022-01-01

## 2022-03-21 RX ORDER — HYDROCODONE BITARTRATE AND ACETAMINOPHEN 10; 325 MG/1; MG/1
1 TABLET ORAL EVERY 6 HOURS PRN
Qty: 90 TABLET | Refills: 0 | Status: SHIPPED | OUTPATIENT
Start: 2022-03-21 | End: 2022-04-20

## 2022-03-21 NOTE — PROGRESS NOTES
"Chief Complaint   Patient presents with    Hypertension    Chronic Pain       HPI  Kaycee Almanza is a 87 y.o. female with a past medical history in the problem list  below     Patient Active Problem List   Diagnosis    Osteoarthritis involving multiple joints on both sides of body    Essential hypertension    Controlled type 2 diabetes mellitus with microalbuminuria, without long-term current use of insulin    Chronic back pain    Type 2 diabetes mellitus with diabetic polyneuropathy, without long-term current use of insulin    DM type 2 without retinopathy    Left posterior capsular opacification    Pseudophakia    History of left common carotid artery stent placement    Refractive error    Rectal bleeding    External hemorrhoid, bleeding    Anemia    Anxiety    Tinea pedis of both feet    Lumbar spinal stenosis    Chronic kidney disease, stage III (moderate)       Presenting for follow-up for HTN and chronic pain    Chronic pain  She is having difficulty swallowing due to enlarged thyroid but is not a candidate for surgery  She has seen Dr. Morales  She has an US pending  She has chronic back pain along with a crawling sensation in her neck  She also has hx of goiter but has "nodules in her neck" and her heart doctor has not recommended tx for carotid artery disease as it is not advanced to a surgical stage    She has had a c-spine x ray showing advanced DJD    ROS  Review of Systems   Constitutional: Negative for chills, diaphoresis, fatigue, fever and unexpected weight change.   HENT: Negative for rhinorrhea, sinus pressure, sore throat and tinnitus.    Eyes: Negative for photophobia and visual disturbance.   Respiratory: Negative for cough, shortness of breath and wheezing.    Cardiovascular: Negative for chest pain and palpitations.   Gastrointestinal: Negative for abdominal pain, blood in stool, constipation, diarrhea, nausea and vomiting.   Genitourinary: Negative for dysuria, flank " "pain, frequency and vaginal discharge.   Musculoskeletal: Positive for arthralgias. Negative for joint swelling.   Skin: Negative for rash.   Neurological: Positive for numbness. Negative for speech difficulty, weakness, light-headedness and headaches.   Psychiatric/Behavioral: Negative for behavioral problems and dysphoric mood.       Physical Exam  Vitals:    03/21/22 0858   BP: (!) 140/70   Pulse: 81   Resp: 16   Temp: 98.3 °F (36.8 °C)   TempSrc: Oral   SpO2: 97%   Weight: 49.7 kg (109 lb 9.1 oz)   Height: 4' 9" (1.448 m)    Body mass index is 23.71 kg/m².  Weight: 49.7 kg (109 lb 9.1 oz)   Height: 4' 9" (144.8 cm)     Physical Exam  Vitals and nursing note reviewed.   Constitutional:       Appearance: She is well-developed.   Skin:     General: Skin is warm and dry.      Findings: No erythema or rash.   Neurological:      Mental Status: She is alert and oriented to person, place, and time.   Psychiatric:         Behavior: Behavior normal.         ALLERGIES AND MEDICATIONS: updated and reviewed.  Review of patient's allergies indicates:   Allergen Reactions    Pneumovax-23 [pneumococcal 23-melva ps vaccine] Swelling     Medication List with Changes/Refills   New Medications    HYDROCODONE-ACETAMINOPHEN (NORCO)  MG PER TABLET    Take 1 tablet by mouth every 6 (six) hours as needed for Pain.    HYDROCODONE-ACETAMINOPHEN (NORCO)  MG PER TABLET    Take 1 tablet by mouth every 6 (six) hours as needed for Pain.   Current Medications    ACETAMINOPHEN (TYLENOL) 500 MG TABLET    Take 500 mg by mouth every 6 (six) hours as needed for Pain.    AGDBCB AMITRIPTYLINE 10%- GABAPENTIN 6%- DICLOFENAC 8%- BACLOFEN 2%- CYCLOBENAPRINE 2%- BUPIVACAINE 1% IN TRANSDERMAL CREAM    Apply 1 to 2 grams 3 to 4 times daily    ASPIRIN (ECOTRIN) 81 MG EC TABLET    Take 81 mg by mouth once daily.    BLOOD-GLUCOSE METER (FREESTYLE SYSTEM KIT) KIT    Use as instructed    CALCIUM CARBONATE/VITAMIN D3 (CALCIUM 600 + D,3, ORAL)    Take " by mouth.    ERGOCALCIFEROL, VITAMIN D2, (VITAMIN D ORAL)    Take by mouth.    FLUCONAZOLE (DIFLUCAN) 150 MG TAB        HYDROCHLOROTHIAZIDE (HYDRODIURIL) 12.5 MG TAB    TAKE 1 TABLET EVERY DAY    MIRTAZAPINE (REMERON) 7.5 MG TAB    TAKE 1 TABLET EVERY EVENING    MULTIVIT-MIN/IRON/FOLIC/LUTEIN (CENTRUM SILVER WOMEN ORAL)    Take by mouth.    NIFEDIPINE (PROCARDIA-XL) 60 MG (OSM) 24 HR TABLET    TAKE 1 TABLET EVERY DAY    POTASSIUM CHLORIDE (KLOR-CON) 10 MEQ TBSR    TK 1 T PO BID FOR 15 DAYS    SERTRALINE (ZOLOFT) 100 MG TABLET    TAKE 1 TABLET EVERY DAY    TRUE METRIX GLUCOSE TEST STRIP STRP    Check blood glucose 2 times daily before meals    VALSARTAN (DIOVAN) 320 MG TABLET    Take 1 tablet (320 mg total) by mouth once daily.    WHEELCHAIR ASHER    Transport wheelchair   Changed and/or Refilled Medications    Modified Medication Previous Medication    HYDROCODONE-ACETAMINOPHEN (NORCO)  MG PER TABLET HYDROcodone-acetaminophen (NORCO)  mg per tablet       Take 1 tablet by mouth every 6 (six) hours as needed for Pain.    Take 1 tablet by mouth every 6 (six) hours as needed for Pain.   Discontinued Medications    FLUAD QUAD 2021-22,65Y UP,,PF, 60 MCG (15 MCG X 4)/0.5 ML SYRG           Assessment & Plan  1. Chronic midline back pain, unspecified back location    2. Controlled type 2 diabetes mellitus with microalbuminuria, without long-term current use of insulin    3. Stage 3a chronic kidney disease    4. Goiter    5. Essential hypertension    6. Spinal stenosis of lumbar region without neurogenic claudication        Problem List Items Addressed This Visit        Neuro    Lumbar spinal stenosis   [place] [facility] database queried/reviewed  Continue current regimen    Relevant Medications    HYDROcodone-acetaminophen (NORCO)  mg per tablet    HYDROcodone-acetaminophen (NORCO)  mg per tablet (Start on 4/21/2022)    HYDROcodone-acetaminophen (NORCO)  mg per tablet (Start on 5/21/2022)        Cardiac/Vascular    Essential hypertension  BP recheck       Renal/    Chronic kidney disease, stage III (moderate)  She has f/u with renal       Endocrine    Controlled type 2 diabetes mellitus with microalbuminuria, without long-term current use of insulin  Continue current regimen       Orthopedic    Chronic back pain - Primary   [place] queried[place]reviewed  Continue current regimen    Relevant Medications    HYDROcodone-acetaminophen (NORCO)  mg per tablet    HYDROcodone-acetaminophen (NORCO)  mg per tablet (Start on 4/21/2022)    HYDROcodone-acetaminophen (NORCO)  mg per tablet (Start on 5/21/2022)      Other Visit Diagnoses     Goiter      She has f/u with Dr. Morales  She does have dysphagia from this  Has gained some weight now that she is drinking boost          Follow up in about 3 months (around 6/21/2022) for management of chronic conditions.    Other Orders Placed This Visit:  No orders of the defined types were placed in this encounter.

## 2022-03-23 ENCOUNTER — TELEPHONE (OUTPATIENT)
Dept: FAMILY MEDICINE | Facility: CLINIC | Age: 87
End: 2022-03-23
Payer: MEDICARE

## 2022-03-23 NOTE — TELEPHONE ENCOUNTER
----- Message from Brenda Amato sent at 3/23/2022  3:35 PM CDT -----  Type: Patient Call Back    Who called: [facility]     What is the request in detail: Patient is having an ultrasound done in the office tomorrow and they need to speak with patient's primary care physician first regarding a prior authorization.     Can the clinic reply by [facility]? No     Would the patient rather a call back or a response via [facility]? Call back     Best call back number: [phone]

## 2022-03-24 ENCOUNTER — TELEPHONE (OUTPATIENT)
Dept: FAMILY MEDICINE | Facility: CLINIC | Age: 87
End: 2022-03-24
Payer: MEDICARE

## 2022-03-24 NOTE — TELEPHONE ENCOUNTER
Spoke with daughter in law and she said test cancelled due to insurance issues they will revisit this at another time.

## 2022-03-24 NOTE — TELEPHONE ENCOUNTER
----- Message from Martha Curtis sent at 3/24/2022 12:03 PM CDT -----  Regarding: Ailin daughter in law [phone]  Type: Patient Call Back    Who called:  Jacklyn daughter in law    What is the request in detail: Suppose to have ultrasound and she needs a referral. And its scheduled for tomorrow at 9.    Can the clinic reply by [facility]? no    Would the patient rather a call back or a response via [facility]?  Call back    Best call back number: [phone]

## 2022-03-24 NOTE — TELEPHONE ENCOUNTER
I'm not able to do a prior authorization for an US that I did not order.     If PA is needed for a medication I can help them if this is a medication that I have ordered    Iona Jose MD

## 2022-04-21 ENCOUNTER — HOSPITAL ENCOUNTER (EMERGENCY)
Facility: HOSPITAL | Age: 87
Discharge: HOME | End: 2022-04-21
Attending: EMERGENCY MEDICINE
Payer: MEDICARE

## 2022-04-21 VITALS
WEIGHT: 105 LBS | DIASTOLIC BLOOD PRESSURE: 115 MMHG | TEMPERATURE: 98 F | SYSTOLIC BLOOD PRESSURE: 148 MMHG | HEART RATE: 81 BPM | RESPIRATION RATE: 20 BRPM | BODY MASS INDEX: 22.65 KG/M2 | OXYGEN SATURATION: 98 % | HEIGHT: 57 IN

## 2022-04-21 DIAGNOSIS — K92.1 BLACK STOOL: Primary | ICD-10-CM

## 2022-04-21 LAB
ABO + RH BLD: NORMAL
ALBUMIN SERPL BCP-MCNC: 3.7 G/DL (ref 3.5–5.2)
ALP SERPL-CCNC: 54 U/L (ref 55–135)
ALT SERPL W/O P-5'-P-CCNC: 17 U/L (ref 10–44)
ANION GAP SERPL CALC-SCNC: 11 MMOL/L (ref 8–16)
APTT BLDCRRT: 24.5 SEC (ref 21–32)
AST SERPL-CCNC: 19 U/L (ref 10–40)
BASOPHILS # BLD AUTO: 0.04 K/UL (ref 0–0.2)
BASOPHILS NFR BLD: 0.6 % (ref 0–1.9)
BILIRUB SERPL-MCNC: 0.3 MG/DL (ref 0.1–1)
BLD GP AB SCN CELLS X3 SERPL QL: NORMAL
BUN SERPL-MCNC: 31 MG/DL (ref 8–23)
CALCIUM SERPL-MCNC: 8.9 MG/DL (ref 8.7–10.5)
CHLORIDE SERPL-SCNC: 108 MMOL/L (ref 95–110)
CO2 SERPL-SCNC: 21 MMOL/L (ref 23–29)
CREAT SERPL-MCNC: 1.1 MG/DL (ref 0.5–1.4)
DIFFERENTIAL METHOD: ABNORMAL
EOSINOPHIL # BLD AUTO: 0.7 K/UL (ref 0–0.5)
EOSINOPHIL NFR BLD: 9.4 % (ref 0–8)
ERYTHROCYTE [DISTWIDTH] IN BLOOD BY AUTOMATED COUNT: 12.9 % (ref 11.5–14.5)
EST. GFR  (AFRICAN AMERICAN): 52 ML/MIN/1.73 M^2
EST. GFR  (NON AFRICAN AMERICAN): 45 ML/MIN/1.73 M^2
GLUCOSE SERPL-MCNC: 120 MG/DL (ref 70–110)
HCT VFR BLD AUTO: 36.4 % (ref 37–48.5)
HGB BLD-MCNC: 11.9 G/DL (ref 12–16)
IMM GRANULOCYTES # BLD AUTO: 0.02 K/UL (ref 0–0.04)
IMM GRANULOCYTES NFR BLD AUTO: 0.3 % (ref 0–0.5)
INR PPP: 1 (ref 0.8–1.2)
LIPASE SERPL-CCNC: 36 U/L (ref 4–60)
LYMPHOCYTES # BLD AUTO: 1.7 K/UL (ref 1–4.8)
LYMPHOCYTES NFR BLD: 25.3 % (ref 18–48)
MCH RBC QN AUTO: 30.9 PG (ref 27–31)
MCHC RBC AUTO-ENTMCNC: 32.7 G/DL (ref 32–36)
MCV RBC AUTO: 95 FL (ref 82–98)
MONOCYTES # BLD AUTO: 0.5 K/UL (ref 0.3–1)
MONOCYTES NFR BLD: 7.7 % (ref 4–15)
NEUTROPHILS # BLD AUTO: 3.9 K/UL (ref 1.8–7.7)
NEUTROPHILS NFR BLD: 56.7 % (ref 38–73)
NRBC BLD-RTO: 0 /100 WBC
PLATELET # BLD AUTO: 176 K/UL (ref 150–450)
PMV BLD AUTO: 10.7 FL (ref 9.2–12.9)
POTASSIUM SERPL-SCNC: 4 MMOL/L (ref 3.5–5.1)
PROT SERPL-MCNC: 7.6 G/DL (ref 6–8.4)
PROTHROMBIN TIME: 10.3 SEC (ref 9–12.5)
RBC # BLD AUTO: 3.85 M/UL (ref 4–5.4)
SODIUM SERPL-SCNC: 140 MMOL/L (ref 136–145)
WBC # BLD AUTO: 6.89 K/UL (ref 3.9–12.7)

## 2022-04-21 PROCEDURE — 85025 COMPLETE CBC W/AUTO DIFF WBC: CPT | Performed by: EMERGENCY MEDICINE

## 2022-04-21 PROCEDURE — 86850 RBC ANTIBODY SCREEN: CPT | Performed by: EMERGENCY MEDICINE

## 2022-04-21 PROCEDURE — 80053 COMPREHEN METABOLIC PANEL: CPT | Performed by: EMERGENCY MEDICINE

## 2022-04-21 PROCEDURE — 85730 THROMBOPLASTIN TIME PARTIAL: CPT | Performed by: EMERGENCY MEDICINE

## 2022-04-21 PROCEDURE — 85610 PROTHROMBIN TIME: CPT | Performed by: EMERGENCY MEDICINE

## 2022-04-21 PROCEDURE — 83690 ASSAY OF LIPASE: CPT | Performed by: EMERGENCY MEDICINE

## 2022-04-21 PROCEDURE — 99283 EMERGENCY DEPT VISIT LOW MDM: CPT | Mod: 25

## 2022-04-21 NOTE — DISCHARGE INSTRUCTIONS
You did not have any signs of blood in your stool.  We believe your stool was black due to Pepto-Bismol.  Please return for any new or worsening concerns, chest pain, shortness of breath, nausea, vomiting, abdominal pain, fevers or you become concerned in any other way.

## 2022-04-21 NOTE — ED PROVIDER NOTES
Encounter Date: 4/21/2022       History     Chief Complaint   Patient presents with    Diarrhea    Rectal Bleeding     Patient reports diarrhea x 2 days, and then 2 episodes of black stools. Denies abdominal pain, nausea, or vomiting. Patient reports hx of GI bleed. Denies taking any medication for symptoms. Denies fevers, chills.      87-year-old female presenting with black stool.  Patient reports a distant history of GI bleed.  Denies abdominal pain, fevers, chills, nausea, vomiting.  Reports she had an upset stomach a few days ago.  She reports she took Pepto-Bismol.  She reports the next day she noted black stool.  Denies diarrhea or constipation.  Denies other complaints, chest pain, shortness of breath, fatigue.  Previously in usual state of health.        Review of patient's allergies indicates:   Allergen Reactions    Pneumovax-23 [pneumococcal 23-melva ps vaccine] Swelling     Past Medical History:   Diagnosis Date    Anxiety     Arthritis     Coronary artery disease     Dementia     Depression     Diabetes type 2, controlled     sees Dr. Elena    GERD (gastroesophageal reflux disease)     Hyperlipidemia     Hypertension     Left posterior capsular opacification 5/11/2017    Osteoporosis     Thyroid disease     Ulcer     Ulcer      Past Surgical History:   Procedure Laterality Date    CATARACT EXTRACTION W/  INTRAOCULAR LENS IMPLANT Bilateral 2006    done at [facility]    cataract surgery  2006    CHOLECYSTECTOMY      ELBOW SURGERY      EYE SURGERY      HAND SURGERY      HYSTERECTOMY      KNEE SURGERY      SHOULDER SURGERY      yag laser  Bilateral      Family History   Problem Relation Age of Onset    Stroke Mother     Diabetes Mother     Arthritis Father     Early death Sister     Birth defects Brother     No Known Problems Daughter     No Known Problems Son     No Known Problems Sister     Birth defects Brother     Birth defects Brother     Birth defects Brother      Birth defects Brother     No Known Problems Maternal Aunt     No Known Problems Maternal Uncle     No Known Problems Paternal Aunt     No Known Problems Paternal Uncle     No Known Problems Maternal Grandmother     No Known Problems Maternal Grandfather     No Known Problems Paternal Grandmother     No Known Problems Paternal Grandfather     Amblyopia Neg Hx     Blindness Neg Hx     Cataracts Neg Hx     Glaucoma Neg Hx     Macular degeneration Neg Hx     Retinal detachment Neg Hx     Strabismus Neg Hx     Cancer Neg Hx     Hypertension Neg Hx     Thyroid disease Neg Hx      Social History     Tobacco Use    Smoking status: Never Smoker    Smokeless tobacco: Never Used   Substance Use Topics    Alcohol use: No    Drug use: No     Review of Systems   Constitutional: Negative for chills and fever.   HENT: Negative for sore throat.    Respiratory: Negative for shortness of breath.    Cardiovascular: Negative for chest pain.   Gastrointestinal: Negative for abdominal distention, abdominal pain, anal bleeding, blood in stool, constipation, diarrhea, nausea, rectal pain and vomiting.   Genitourinary: Negative for dysuria.   Musculoskeletal: Negative for back pain.   Skin: Negative for rash.   Neurological: Negative for weakness.   Psychiatric/Behavioral: Negative for confusion.       Physical Exam     Initial Vitals [04/21/22 1057]   BP Pulse Resp Temp SpO2   (!) 153/67 84 18 98.4 °F (36.9 °C) 98 %      MAP       --         Physical Exam    Nursing note and vitals reviewed.  Constitutional: She appears well-developed and well-nourished. She is not diaphoretic. No distress.   HENT:   Head: Normocephalic and atraumatic.   Nose: Nose normal.   Eyes: EOM are normal. Pupils are equal, round, and reactive to light. No scleral icterus.   Neck: Neck supple.   Normal range of motion.  Cardiovascular: Normal rate, regular rhythm, normal heart sounds and intact distal pulses. Exam reveals no gallop and no  friction rub.    No murmur heard.  Pulmonary/Chest: Breath sounds normal. No stridor. No respiratory distress. She has no wheezes. She has no rhonchi. She has no rales.   Abdominal: Abdomen is soft. Bowel sounds are normal. She exhibits no distension. There is no abdominal tenderness. There is no rebound and no guarding.   Genitourinary: Rectum:      Guaiac result negative.   Guaiac negative stool.    Genitourinary Comments: Black guaiac-negative stool     Musculoskeletal:         General: No tenderness or edema. Normal range of motion.      Cervical back: Normal range of motion and neck supple.     Neurological: She is alert and oriented to person, place, and time. No cranial nerve deficit. GCS score is 15. GCS eye subscore is 4. GCS verbal subscore is 5. GCS motor subscore is 6.   Skin: Skin is warm and dry. No rash noted.   Psychiatric: She has a normal mood and affect. Her behavior is normal.         ED Course   Procedures  Labs Reviewed   CBC W/ AUTO DIFFERENTIAL - Abnormal; Notable for the following components:       Result Value    RBC 3.85 (*)     Hemoglobin 11.9 (*)     Hematocrit 36.4 (*)     Eos # 0.7 (*)     Eosinophil % 9.4 (*)     All other components within normal limits   COMPREHENSIVE METABOLIC PANEL - Abnormal; Notable for the following components:    CO2 21 (*)     Glucose 120 (*)     BUN 31 (*)     Alkaline Phosphatase 54 (*)     eGFR if  52 (*)     eGFR if non  45 (*)     All other components within normal limits   APTT   PROTIME-INR   LIPASE   TYPE & SCREEN          Imaging Results    None          Medications - No data to display  Medical Decision Making:   Initial Assessment:   87-year-old female presenting with black stool.  Recently took Pepto-Bismol.  Reports symptoms for which she took Pepto-Bismol have since resolved, namely upset stomach.  Denies chest pain, abdominal pain, nausea, vomiting, diarrhea, constipation.  Has no further complaints aside from  black stool.  Exam reassuring.  No evidence of GI bleed.  Vitals normal.  Abdomen soft, nontender, benign.  Will get labs.  If negative, believe she will be safe for discharge home.  Suspect cause black stool as Pepto-Bismol.  Discussed return precautions.                      Clinical Impression:   Final diagnoses:  [K92.1] Black stool (Primary)          ED Disposition Condition    Discharge Stable        ED Prescriptions     None        Follow-up Information     Follow up With Specialties Details Why Contact Info    Iona Jose MD Family Medicine Schedule an appointment as soon as possible for a visit  As needed [address] [facility] [address]  [phone]      [facility] Emergency [facility]  As needed, If symptoms worsen [address] [facility] [address] [phone]           Erasmo Quintana MD  04/22/22 3445

## 2022-04-21 NOTE — ED TRIAGE NOTES
Patiet reports two episodes bloody diarrhea, reports dark red. Reports hx of GI bleed. Denies abdominal pain, N,V, dizziness, CP, or SOB.

## 2022-04-25 ENCOUNTER — TELEPHONE (OUTPATIENT)
Dept: FAMILY MEDICINE | Facility: CLINIC | Age: 87
End: 2022-04-25
Payer: MEDICARE

## 2022-04-25 NOTE — TELEPHONE ENCOUNTER
----- Message from Afia Farias sent at 4/25/2022  9:03 AM CDT -----  Type: Patient Call Back    Who called:Savanna Oviedo [phone]    What is the request in detail:calling to get a verbal for confirmation of diabetes. Call Savanna.    Can the clinic reply by [facility]?    Would the patient rather a call back or a response via [facility]? call    Best call back number:    Additional Information:

## 2022-05-13 ENCOUNTER — OFFICE VISIT (OUTPATIENT)
Dept: GASTROENTEROLOGY | Facility: CLINIC | Age: 87
End: 2022-05-13
Payer: MEDICARE

## 2022-05-13 VITALS
WEIGHT: 114.5 LBS | HEIGHT: 57 IN | BODY MASS INDEX: 24.7 KG/M2 | DIASTOLIC BLOOD PRESSURE: 69 MMHG | SYSTOLIC BLOOD PRESSURE: 139 MMHG | HEART RATE: 77 BPM

## 2022-05-13 DIAGNOSIS — R13.10 DYSPHAGIA, UNSPECIFIED TYPE: Primary | ICD-10-CM

## 2022-05-13 PROCEDURE — 99999 PR PBB SHADOW E&M-EST. PATIENT-LVL IV: ICD-10-PCS | Mod: PBBFAC,,, | Performed by: STUDENT IN AN ORGANIZED HEALTH CARE EDUCATION/TRAINING PROGRAM

## 2022-05-13 PROCEDURE — 1125F PR PAIN SEVERITY QUANTIFIED, PAIN PRESENT: ICD-10-PCS | Mod: CPTII,S$GLB,, | Performed by: STUDENT IN AN ORGANIZED HEALTH CARE EDUCATION/TRAINING PROGRAM

## 2022-05-13 PROCEDURE — 1159F MED LIST DOCD IN RCRD: CPT | Mod: CPTII,S$GLB,, | Performed by: STUDENT IN AN ORGANIZED HEALTH CARE EDUCATION/TRAINING PROGRAM

## 2022-05-13 PROCEDURE — 99204 PR OFFICE/OUTPT VISIT, NEW, LEVL IV, 45-59 MIN: ICD-10-PCS | Mod: S$GLB,,, | Performed by: STUDENT IN AN ORGANIZED HEALTH CARE EDUCATION/TRAINING PROGRAM

## 2022-05-13 PROCEDURE — 3288F PR FALLS RISK ASSESSMENT DOCUMENTED: ICD-10-PCS | Mod: CPTII,S$GLB,, | Performed by: STUDENT IN AN ORGANIZED HEALTH CARE EDUCATION/TRAINING PROGRAM

## 2022-05-13 PROCEDURE — 1159F PR MEDICATION LIST DOCUMENTED IN MEDICAL RECORD: ICD-10-PCS | Mod: CPTII,S$GLB,, | Performed by: STUDENT IN AN ORGANIZED HEALTH CARE EDUCATION/TRAINING PROGRAM

## 2022-05-13 PROCEDURE — 99204 OFFICE O/P NEW MOD 45 MIN: CPT | Mod: S$GLB,,, | Performed by: STUDENT IN AN ORGANIZED HEALTH CARE EDUCATION/TRAINING PROGRAM

## 2022-05-13 PROCEDURE — 1125F AMNT PAIN NOTED PAIN PRSNT: CPT | Mod: CPTII,S$GLB,, | Performed by: STUDENT IN AN ORGANIZED HEALTH CARE EDUCATION/TRAINING PROGRAM

## 2022-05-13 PROCEDURE — 99999 PR PBB SHADOW E&M-EST. PATIENT-LVL IV: CPT | Mod: PBBFAC,,, | Performed by: STUDENT IN AN ORGANIZED HEALTH CARE EDUCATION/TRAINING PROGRAM

## 2022-05-13 PROCEDURE — 1101F PR PT FALLS ASSESS DOC 0-1 FALLS W/OUT INJ PAST YR: ICD-10-PCS | Mod: CPTII,S$GLB,, | Performed by: STUDENT IN AN ORGANIZED HEALTH CARE EDUCATION/TRAINING PROGRAM

## 2022-05-13 PROCEDURE — 3288F FALL RISK ASSESSMENT DOCD: CPT | Mod: CPTII,S$GLB,, | Performed by: STUDENT IN AN ORGANIZED HEALTH CARE EDUCATION/TRAINING PROGRAM

## 2022-05-13 PROCEDURE — 1101F PT FALLS ASSESS-DOCD LE1/YR: CPT | Mod: CPTII,S$GLB,, | Performed by: STUDENT IN AN ORGANIZED HEALTH CARE EDUCATION/TRAINING PROGRAM

## 2022-05-13 RX ORDER — OMEPRAZOLE 20 MG/1
20 CAPSULE, DELAYED RELEASE ORAL DAILY
Qty: 30 CAPSULE | Refills: 1 | Status: SHIPPED | OUTPATIENT
Start: 2022-05-13 | End: 2022-05-13

## 2022-05-13 NOTE — PROGRESS NOTES
[facility] Consultation Note    Reason for Consult:  Dysphagia     PCP:   Iona Jose   No address on file    Referring MD:  Aaareferral Self  No address on file    HPI:  This is a 87 y.o. female with PMHx of Arthtitis, CAD, DM, GERD, HTN here for evaluation of dysphagia. She has had worsening dysphagia over the past few months which occurs with solids and also liquids. She feels that she swallows food and liquid without issue but they feel they are stuck in her chest. She either has to throw up the food or stop eating until it passes. Denied any choking episodes or issues swallowing saliva. Does not take any reflux medications. She does not have any endoscopy on file. No recent abdominal imaging. She states she did have an EGD 15 years ago which showed she had stomach ulcers which were attributed to aleve which she was taking at the time for arthritis. Denied NSAID use currently. No hematemesis, melena, change in bowel habits. Weight has been stable. She has had cscopes in her life time and did not have polyps. Recent blood works showed Hg 11 but otherwise normal and CMP wnl. She has a small goiter but her endocrine doctor does not think this is large enough to be causing issue with swallowing.     ROS:  Constitutional: No fevers, chills, No weight loss  ENT: No allergies  CV: No chest pain  Pulm: No cough, No shortness of breath  Ophtho: No vision changes  GI: see HPI  Derm: No rash  Heme: No lymphadenopathy, No bruising  MSK: No arthritis  : No dysuria, No hematuria  Endo: No hot or cold intolerance  Neuro: No syncope, No seizure  Psych: No anxiety, No depression    Medical History:  has a past medical history of Anxiety, Arthritis, Coronary artery disease, Dementia, Depression, Diabetes type 2, controlled, GERD (gastroesophageal reflux disease), Hyperlipidemia, Hypertension, Left posterior capsular opacification (5/11/2017), Osteoporosis, Thyroid disease, Ulcer, and  Ulcer.    Surgical History:  has a past surgical history that includes Cholecystectomy; cataract surgery (2006); Shoulder surgery; Knee surgery; Hand surgery; Hysterectomy; Eye surgery; Cataract extraction w/  intraocular lens implant (Bilateral, 2006); yag laser  (Bilateral); and Elbow surgery.    Family History: family history includes Arthritis in her father; Birth defects in her brother, brother, brother, brother, and brother; Diabetes in her mother; Early death in her sister; No Known Problems in her daughter, maternal aunt, maternal grandfather, maternal grandmother, maternal uncle, paternal aunt, paternal grandfather, paternal grandmother, paternal uncle, and sister; Stroke in her mother..        Social History:  reports that she has never smoked. She has never used smokeless tobacco. She reports that she does not drink alcohol and does not use drugs.    Review of patient's allergies indicates:   Allergen Reactions    Pneumovax-23 [pneumococcal 23-melva ps vaccine] Swelling       Current Outpatient Rx   Medication Sig Dispense Refill    acetaminophen (TYLENOL) 500 MG tablet Take 500 mg by mouth every 6 (six) hours as needed for Pain.      aspirin (ECOTRIN) 81 MG EC tablet Take 81 mg by mouth once daily.      ergocalciferol, vitamin D2, (VITAMIN D ORAL) Take by mouth.      hydroCHLOROthiazide (HYDRODIURIL) 12.5 MG Tab TAKE 1 TABLET EVERY DAY 90 tablet 1    HYDROcodone-acetaminophen (NORCO)  mg per tablet Take 1 tablet by mouth every 6 (six) hours as needed for Pain. 90 tablet 0    mirtazapine (REMERON) 7.5 MG Tab TAKE 1 TABLET EVERY EVENING 90 tablet 1    multivit-min/iron/folic/lutein (CENTRUM SILVER WOMEN ORAL) Take by mouth.      NIFEdipine (PROCARDIA-XL) 60 MG (OSM) 24 hr tablet TAKE 1 TABLET EVERY DAY 90 tablet 1    potassium chloride (KLOR-CON) 10 MEQ TbSR TK 1 T PO BID FOR 15 DAYS  0    sertraline (ZOLOFT) 100 MG tablet TAKE 1 TABLET EVERY DAY 90 tablet 1    TRUE METRIX GLUCOSE TEST  "STRIP Strp Check blood glucose 2 times daily before meals 200 each 11    valsartan (DIOVAN) 320 MG tablet Take 1 tablet (320 mg total) by mouth once daily. 90 tablet 3    wheelchair [employer] wheelchair 1 each 0    AGDBCB amitriptyline 10%- gabapentin 6%- diclofenac 8%- baclofen 2%- cyclobenaprine 2%- bupivacaine 1% in transdermal cream Apply 1 to 2 grams 3 to 4 times daily (Patient not taking: Reported on 5/13/2022) 100 g 5    blood-glucose meter (FREESTYLE SYSTEM KIT) kit Use as instructed 1 each 0    calcium carbonate/vitamin D3 (CALCIUM 600 + D,3, ORAL) Take by mouth.      fluconazole (DIFLUCAN) 150 MG Tab       [START ON 5/21/2022] HYDROcodone-acetaminophen (NORCO)  mg per tablet Take 1 tablet by mouth every 6 (six) hours as needed for Pain. 90 tablet 0    omeprazole (PRILOSEC) 20 MG capsule Take 1 capsule (20 mg total) by mouth once daily. 30 capsule 1       Objective Findings:    Vital Signs:  /69   Pulse 77   Ht 4' 9" (1.448 m)   Wt 51.9 kg (114 lb 8.5 oz)   BMI 24.78 kg/m²   Body mass index is 24.78 kg/m².    Physical Exam:  General Appearance: Well appearing in no acute distress  Head:   Normocephalic, without obvious abnormality  Eyes:    No scleral icterus, EOMI  ENT: Neck supple, Lips, mucosa, and tongue normal    Lungs: CTA bilaterally in anterior and posterior fields, no wheezes, no crackles.  Heart:  Regular rate and rhythm, S1, S2 normal, no murmurs heard  Abdomen: Soft, non tender, non distended with positive bowel sounds in all four quadrants. No hepatosplenomegaly, ascites, or mass  Extremities: 2+ pulses, no clubbing, cyanosis or edema  Skin: No rash  Neurologic: no focal deficits       Labs:  Lab Results   Component Value Date    WBC 6.89 04/21/2022    HGB 11.9 (L) 04/21/2022    HCT 36.4 (L) 04/21/2022     04/21/2022    CHOL 313 (H) 03/12/2021    TRIG 165 (H) 03/12/2021    HDL 50 03/12/2021    ALT 17 04/21/2022    AST 19 04/21/2022     04/21/2022    K " 4.0 04/21/2022     04/21/2022    CREATININE 1.1 04/21/2022    BUN 31 (H) 04/21/2022    CO2 21 (L) 04/21/2022    TSH 1.066 03/12/2021    INR 1.0 04/21/2022    HGBA1C 5.6 03/12/2021       Imaging: reviewed     Endoscopy:  None     Assessment:    1. Dysphagia   2. GERD   3. Hx of PUD     Patient with few months of worsening dysphagia now to both solids and liquids. History of reflux and ulcers on prior EGD not currently on PPI. Possibly peptic stricture. Other ddx includes motility issue or pseudoachalasia as symptoms seem to have worsened quickly over past few months. We discussed esophagram and conservative management with medications vs endoscopic evaluation due to her age as well as the risks/benefits of sedation and endoscopy and she and daughter elected for the procedure. Will start low dose PPI and plan for EGD    Recommendations:    - EGD for dysphagia evaluation   - esophagram ordered   - Start PPI daily, take 30 mins before meals   - Further plan based on results     RTC after endoscopy     Order summary:  Orders Placed This Encounter    FL Esophagram Complete    omeprazole (PRILOSEC) 20 MG capsule    Case Request Endoscopy: EGD (ESOPHAGOGASTRODUODENOSCOPY)         Thank you so much for allowing me to participate in the care of Kyacee Palmer MD  Gastroenterology   [facility]

## 2022-05-16 ENCOUNTER — HOSPITAL ENCOUNTER (OUTPATIENT)
Dept: RADIOLOGY | Facility: HOSPITAL | Age: 87
Discharge: HOME | End: 2022-05-16
Attending: STUDENT IN AN ORGANIZED HEALTH CARE EDUCATION/TRAINING PROGRAM
Payer: MEDICARE

## 2022-05-16 DIAGNOSIS — R13.10 DYSPHAGIA, UNSPECIFIED TYPE: ICD-10-CM

## 2022-05-16 PROCEDURE — 74220 FL ESOPHAGRAM COMPLETE: ICD-10-PCS | Mod: 26,,, | Performed by: RADIOLOGY

## 2022-05-16 PROCEDURE — 74220 X-RAY XM ESOPHAGUS 1CNTRST: CPT | Mod: TC

## 2022-05-16 PROCEDURE — 74220 X-RAY XM ESOPHAGUS 1CNTRST: CPT | Mod: 26,,, | Performed by: RADIOLOGY

## 2022-05-25 DIAGNOSIS — Z78.0 ASYMPTOMATIC MENOPAUSAL STATE: ICD-10-CM

## 2022-06-14 NOTE — TELEPHONE ENCOUNTER
Refill Routing Note   Medication(s) are not appropriate for processing by [facility] for the following reason(s):      - Patient has been seen in the ED/Hospital since the last PCP visit    [facility] action(s):  Route Medication-related problems identified: Requires labs        Medication reconciliation completed: No     Appointments  past 12m or future 3m with PCP    Date Provider   Last Visit   3/21/2022 Iona Jose MD   Next Visit   6/22/2022 Iona Jose MD   ED visits in past 90 days: 1        Note composed:10:31 AM 06/14/2022

## 2022-06-16 NOTE — TELEPHONE ENCOUNTER
----- Message from Ines Haley sent at 3/28/2019  3:17 PM CDT -----  Contact: self [phone]  .Type: RX Refill Request    Who Called: self    Refill or New Rx:reill    RX Name and Strength:sertraline (ZOLOFT) 100 MG tablet    Is this a 30 day or 90 day RX:90 day    Preferred Pharmacy with phone number:.  [facility] - [place], [place] - [address]  [address] [address]  Phone: [phone] Fax: [phone]    Local or Mail Order: mail order    Ordering Provider:dolores    Would the patient rather a call back or a response via [facility]? Call back    Best Call Back Number: [phone]         Azathioprine Counseling:  I discussed with the patient the risks of azathioprine including but not limited to myelosuppression, immunosuppression, hepatotoxicity, lymphoma, and infections.  The patient understands that monitoring is required including baseline LFTs, Creatinine, possible TPMP genotyping and weekly CBCs for the first month and then every 2 weeks thereafter.  The patient verbalized understanding of the proper use and possible adverse effects of azathioprine.  All of the patient's questions and concerns were addressed.

## 2022-06-22 NOTE — PROGRESS NOTES
Chief Complaint   Patient presents with    Hypertension       HPI  Kaycee Almanza is a 87 y.o. female with a past medical history in the problem list  below     Patient Active Problem List   Diagnosis    Osteoarthritis involving multiple joints on both sides of body    Essential hypertension    Controlled type 2 diabetes mellitus with microalbuminuria, without long-term current use of insulin    Chronic back pain    Type 2 diabetes mellitus with diabetic polyneuropathy, without long-term current use of insulin    DM type 2 without retinopathy    Left posterior capsular opacification    Pseudophakia    History of left common carotid artery stent placement    Refractive error    Rectal bleeding    External hemorrhoid, bleeding    Anemia    Anxiety    Tinea pedis of both feet    Lumbar spinal stenosis    Chronic kidney disease, stage III (moderate)       Presenting for follow-up for chronic pain    Chronic pain  She is having increased back pain worse with movement  Taking norco and very concerned about becoming addicted  She takes her medicine and it reduces her pain form a 10 to a 7    HTN  BP has improved with medication adjustment but pain tends to have an effect  She has an appt with a renal doctor to establish care    Discussed ED visit for black stool likely because she took pepto bismol, she is seeing GI as she has trouble swallowing and hx of goiter followed by endo        ROS  Review of Systems   Constitutional: Negative for chills, diaphoresis, fatigue, fever and unexpected weight change.   HENT: Positive for trouble swallowing. Negative for rhinorrhea, sinus pressure, sore throat and tinnitus.    Eyes: Negative for photophobia and visual disturbance.   Respiratory: Negative for cough, shortness of breath and wheezing.    Cardiovascular: Negative for chest pain and palpitations.   Gastrointestinal: Negative for abdominal pain, blood in stool, constipation, diarrhea, nausea and vomiting.  "  Genitourinary: Negative for dysuria, flank pain, frequency and vaginal discharge.   Musculoskeletal: Positive for arthralgias. Negative for joint swelling.   Skin: Negative for rash.   Neurological: Negative for speech difficulty, weakness, light-headedness and headaches.   Psychiatric/Behavioral: Negative for behavioral problems and dysphoric mood.       Physical Exam  Vitals:    06/22/22 0815 06/22/22 0843   BP: (!) 140/58 (!) 142/60   BP Location:  Right arm   Patient Position:  Sitting   BP Method:  Medium (Manual)   Pulse: 71    Resp: 16    Temp: 98.4 °F (36.9 °C)    TempSrc: Oral    SpO2: 96%    Weight: 50.2 kg (110 lb 10.7 oz)    Height: 4' 9" (1.448 m)     Body mass index is 23.95 kg/m².  Weight: 50.2 kg (110 lb 10.7 oz)   Height: 4' 9" (144.8 cm)     Physical Exam  Vitals and nursing note reviewed.   Constitutional:       Appearance: She is well-developed.   Skin:     General: Skin is warm and dry.      Findings: No erythema or rash.   Neurological:      Mental Status: She is alert and oriented to person, place, and time.   Psychiatric:         Behavior: Behavior normal.         ALLERGIES AND MEDICATIONS: updated and reviewed.  Review of patient's allergies indicates:   Allergen Reactions    Pneumovax-23 [pneumococcal 23-melva ps vaccine] Swelling     Medication List with Changes/Refills   New Medications    OXYCODONE-ACETAMINOPHEN (PERCOCET) 5-325 MG PER TABLET    Take 1 tablet by mouth 3 (three) times daily as needed for Pain.    OXYCODONE-ACETAMINOPHEN (PERCOCET) 5-325 MG PER TABLET    Take 1 tablet by mouth 3 (three) times daily as needed for Pain.    OXYCODONE-ACETAMINOPHEN (PERCOCET) 5-325 MG PER TABLET    Take 1 tablet by mouth 3 (three) times daily as needed for Pain.   Current Medications    ACETAMINOPHEN (TYLENOL) 500 MG TABLET    Take 500 mg by mouth every 6 (six) hours as needed for Pain.    ASPIRIN (ECOTRIN) 81 MG EC TABLET    Take 81 mg by mouth once daily.    BLOOD-GLUCOSE METER (FREESTYLE " SYSTEM KIT) KIT    Use as instructed    CALCIUM CARBONATE/VITAMIN D3 (CALCIUM 600 + D,3, ORAL)    Take by mouth.    FLUCONAZOLE (DIFLUCAN) 150 MG TAB        HYDROCHLOROTHIAZIDE (HYDRODIURIL) 12.5 MG TAB    TAKE 1 TABLET EVERY DAY    MIRTAZAPINE (REMERON) 7.5 MG TAB    TAKE 1 TABLET EVERY EVENING    MULTIVIT-MIN/IRON/FOLIC/LUTEIN (CENTRUM SILVER WOMEN ORAL)    Take by mouth.    NIFEDIPINE (PROCARDIA-XL) 60 MG (OSM) 24 HR TABLET    TAKE 1 TABLET EVERY DAY    OMEPRAZOLE (PRILOSEC) 20 MG CAPSULE    Take 1 capsule (20 mg total) by mouth once daily.    POTASSIUM CHLORIDE (KLOR-CON) 10 MEQ TBSR    TK 1 T PO BID FOR 15 DAYS    SERTRALINE (ZOLOFT) 100 MG TABLET    TAKE 1 TABLET EVERY DAY    TRUE METRIX GLUCOSE TEST STRIP STRP    Check blood glucose 2 times daily before meals    VALSARTAN (DIOVAN) 320 MG TABLET    Take 1 tablet (320 mg total) by mouth once daily.   Discontinued Medications    AGDBCB AMITRIPTYLINE 10%- GABAPENTIN 6%- DICLOFENAC 8%- BACLOFEN 2%- CYCLOBENAPRINE 2%- BUPIVACAINE 1% IN TRANSDERMAL CREAM    Apply 1 to 2 grams 3 to 4 times daily    ERGOCALCIFEROL, VITAMIN D2, (VITAMIN D ORAL)    Take by mouth.    WHEELCHAIR ASHER    Transport wheelchair       Assessment & Plan  1. Essential hypertension    2. Controlled type 2 diabetes mellitus with microalbuminuria, without long-term current use of insulin    3. Chronic midline back pain, unspecified back location    4. Spinal stenosis of lumbar region without neurogenic claudication        Problem List Items Addressed This Visit        Neuro    Lumbar spinal stenosis  D/c norco and begin percocet 5-325mg    Relevant Medications    oxyCODONE-acetaminophen (PERCOCET) 5-325 mg per tablet    oxyCODONE-acetaminophen (PERCOCET) 5-325 mg per tablet (Start on 7/23/2022)    oxyCODONE-acetaminophen (PERCOCET) 5-325 mg per tablet (Start on 8/22/2022)       Cardiac/Vascular    Essential hypertension - Primary  Likely affected by increased pain  Will try to aim for better pain  control       Endocrine    Controlled type 2 diabetes mellitus with microalbuminuria, without long-term current use of insulin\  stable       Orthopedic    Chronic back pain  Continue current regimen    Relevant Medications    oxyCODONE-acetaminophen (PERCOCET) 5-325 mg per tablet    oxyCODONE-acetaminophen (PERCOCET) 5-325 mg per tablet (Start on 7/23/2022)    oxyCODONE-acetaminophen (PERCOCET) 5-325 mg per tablet (Start on 8/22/2022)          Follow up in about 3 months (around 9/22/2022) for management of chronic conditions.    Other Orders Placed This Visit:  No orders of the defined types were placed in this encounter.

## 2022-07-13 NOTE — PROGRESS NOTES
"Subjective:       Patient ID: Kaycee Almanza is a 86 y.o. female.    Vitals:  height is 4' 9" (1.448 m) and weight is 45.4 kg (100 lb). Her tympanic temperature is 98.2 °F (36.8 °C). Her blood pressure is 184/84 (abnormal) and her pulse is 85. Her respiration is 18 and oxygen saturation is 97%.     Chief Complaint: Emesis    Mrs. Almanza is an 85yo female with a PMHx of CAD, HTN, DM II, GERD, and anxiety who presents to the urgent care for evaluation with c/o nausea, vomiting, and diarrhea that began last night. States she had 2 episodes of loose stool and two episodes of emesis last night. States she is feeling significantly better today and has not had any additional episodes of vomiting/diarrhea since last night. States she has been able to tolerate pedialyte today with no issues. States she did not eat anything today as she did not want to "upset the stomach again" and states she did not take any of her daily medications today as she was nervous about taking them on an empty stomach. She is very worried about COVID-19 and is requesting testing due to symptoms.     Emesis   This is a new problem. The current episode started yesterday. The problem occurs less than 2 times per day. The problem has been rapidly improving. The emesis has an appearance of stomach contents. There has been no fever. Associated symptoms include abdominal pain (cramping) and diarrhea. Pertinent negatives include no arthralgias, chest pain, chills, coughing, decreased urine volume, dizziness, fever, headaches, myalgias, sweats, URI or weight loss. She has tried increased fluids for the symptoms. The treatment provided moderate relief.       Constitution: Negative for chills, sweating, fatigue and fever.   HENT: Negative for ear pain, congestion, sinus pain, sinus pressure, sore throat and voice change.    Neck: Negative for neck pain, neck stiffness and painful lymph nodes.   Cardiovascular: Negative for chest pain and palpitations. " [facility] [address] [facility] [place], store [id number] need three new prescriptions for patient for Amlopidine, Lisinopril and Atorvastatin.  Patient just changed to this new pharmacy (from [facility]) [phone]   Eyes: Negative for eye redness.   Respiratory: Negative for chest tightness, cough, sputum production, bloody sputum, COPD, shortness of breath, stridor, wheezing and asthma.    Gastrointestinal: Positive for abdominal pain (cramping), nausea, vomiting and diarrhea. Negative for constipation and bright red blood in stool.   Genitourinary: Negative for dysuria, frequency, urgency, urine decreased, flank pain and hematuria.   Musculoskeletal: Positive for arthritis. Negative for joint pain and muscle ache.   Skin: Negative for rash.   Allergic/Immunologic: Negative for seasonal allergies, asthma, itching and sneezing.   Neurological: Negative for dizziness, light-headedness and headaches.   Hematologic/Lymphatic: Negative for swollen lymph nodes.       Objective:      Physical Exam   Constitutional: She is oriented to person, place, and time. She appears well-developed.  Non-toxic appearance. She does not appear ill. No distress.      Comments:Patient is sitting pleasantly on exam table in no acute distress. Nontoxic appearing.    HENT:   Head: Normocephalic and atraumatic.   Ears:   Right Ear: External ear normal.   Left Ear: External ear normal.   Nose: Nose normal. No rhinorrhea or congestion.   Mouth/Throat: Mucous membranes are normal. Posterior oropharyngeal erythema present. No oropharyngeal exudate.   Eyes: Pupils are equal, round, and reactive to light. Conjunctivae and lids are normal.   Neck: Trachea normal, normal range of motion and full passive range of motion without pain. Neck supple.   Cardiovascular: Normal rate, regular rhythm and normal heart sounds.   Pulmonary/Chest: Effort normal and breath sounds normal. No stridor. No respiratory distress. She has no wheezes. She has no rhonchi. She has no rales.   Abdominal: Soft. Normal appearance. She exhibits no distension, no abdominal bruit, no pulsatile midline mass and no mass. Bowel sounds are increased. There is no abdominal tenderness. There is no  rebound, no guarding, no tenderness at McBurney's point, no left CVA tenderness, negative psoas sign and no right CVA tenderness.   Musculoskeletal: Normal range of motion.   Lymphadenopathy:     She has no cervical adenopathy.   Neurological: She is alert and oriented to person, place, and time. She has normal strength. She displays no weakness.      Comments: Ambulatory with walker for support   Skin: Skin is warm, dry, intact, not diaphoretic and not pale. Psychiatric: Her speech is normal and behavior is normal. Affect, judgment and thought content normal. Her mood appears anxious.   Nursing note and vitals reviewed.        Results for orders placed or performed in visit on 12/10/20   POCT COVID-19 Rapid Screening   Result Value Ref Range    POC Rapid COVID Negative Negative     Acceptable Yes    POCT Urinalysis, Dipstick, Automated, W/O Scope   Result Value Ref Range    POC Blood, Urine Negative Negative    POC Bilirubin, Urine Negative Negative    POC Urobilinogen, Urine norm 0.1 - 1.1    POC Ketones, Urine Negative Negative    POC Protein, Urine Positive (A) Negative    POC Nitrates, Urine Negative Negative    POC Glucose, Urine Negative Negative    pH, UA 7.0 5 - 8    POC Specific Gravity, Urine 1.015 1.003 - 1.029    POC Leukocytes, Urine Negative Negative   POCT Influenza A/B   Result Value Ref Range    Rapid Influenza A Ag Negative Negative    Rapid Influenza B Ag Negative Negative     Acceptable Yes        Clinically well appearing. Educated on BP and stressed importance of not skipping doses. Advised to take BP medications when she gets back home. Advised to keep BP long and to follow up with PCP for repeat BP check.   UA, rapid COVID-19 and flu negative. Symptoms appear viral in nature.   Advised on return/follow-up precautions. Advised on ER precautions. Answered all patient questions. Patient verbalized understanding and voiced agreement with current treatment  plan.      Assessment:       1. Viral gastroenteritis    2. Nausea vomiting and diarrhea        Plan:         Viral gastroenteritis  -     ondansetron (ZOFRAN-ODT) 4 MG TbDL; Take 1 tablet (4 mg total) by mouth every 8 (eight) hours as needed.  Dispense: 15 tablet; Refill: 0  -     dicyclomine (BENTYL) 10 MG capsule; Take 1 capsule (10 mg total) by mouth 4 (four) times daily. for 7 days  Dispense: 28 capsule; Refill: 0    Nausea vomiting and diarrhea  -     POCT COVID-19 Rapid Screening  -     POCT Urinalysis, Dipstick, Automated, W/O Scope  -     POCT Influenza A/B      Patient Instructions     If your condition worsens or fails to improve we recommend that you receive another evaluation at the ER immediately or contact your PCP to discuss your concerns or return here. You must understand that you've received an urgent care treatment only and that you may be released before all your medical problems are known or treated. You the patient will arrange for followup care as instructed.     Use prescribed anti-nausea medicine as needed and prescribed   Take prescribed bentyl as directed as needed for the cramping associated with diarrhea.   Pepto bismol over the counter to help with diarrhea. Avoid Imodium.    Increase fluids and rest is important .  Start off with liquid diet and progress as tolerated (see below)  · Water and clear liquids are important so you do not get dehydrated. Drink a small amount at a time.  · Do not force yourself to eat, especially if you have cramps, vomiting, or diarrhea. When you finally decide to start eating, do not eat large amounts at a time, even if you are hungry.  · If you eat, avoid fatty, greasy, spicy, or fried foods.  · Do not eat dairy products if you have diarrhea; they can make the diarrhea worse    Watch for any increase pain, fever, localized pain to right lower abdomen or continued vomiting or diarrhea.          Food Poisoning or Viral Gastroenteritis (Adult)  You have a  stomach illness that is likely either food poisoning or viral gastroenteritis.  Food poisoning is illness that is passed along in food and affects the stomach and intestinal tract. It usually occurs from 1 to 24 hours after eating food that has spoiled. When it happens within a few hours of eating, it is often caused by toxins from bacteria in food that has not been cooked or refrigerated properly.  Viral gastroenteritis is also an illness from a virus that affects the stomach and intestinal tract. Many people call it the stomach flu, but it has nothing to do with influenza. In fact, it can happen from food poisoning, but it can also happen when germs are passed from person-to-person or contaminated surface (toothbrush, cutting board, toilet) to a person.  Either illness can cause these symptoms:  · Abdominal pain and cramping  · Nausea  · Vomiting  · Diarrhea  · Fever and chills  · Loss of bowel control  The symptoms of food poisoning usually last 1 to 2 days. The symptoms of viral gastroenteritis can sometimes last up to 7 days but usually end sooner.  Antibiotics are not effective for either illness.  Other causes of gastroenteritis include bacteria and parasites which are not discussed here.  Home care  Follow all instructions given by your healthcare provider. Rest at home for the next 24 hours, or until you feel better. Avoid caffeine, tobacco, and alcohol. These can make diarrhea, cramping, and pain worse.  If taking medicines:  · Dont take over-the-counter diarrhea or nausea medicines unless your healthcare provider tells you to.  · You may use acetaminophen or NSAID medicines like ibuprofen or naproxen to reduce pain and fever. Dont use these if you have chronic liver or kidney disease, or ever had a stomach ulcer or GI bleeding. Talk with your healthcare provider first. Don't use NSAID medicines if you are already taking one for another condition (like arthritis) or are on daily aspirin therapy (such  as for heart disease or after a stroke).  To prevent the spread of illness:  · Remember that washing with soap and water is the best way to prevent the spread of infection. Wash your hands before and after caring for a sick person.  · Clean the toilet after each use.  · Wash your hands or use alcohol-based hand  before eating.  · Wash your hands or use alcohol-based hand  before and after preparing food. Keep in mind that people with diarrhea or vomiting should not prepare food for others.  · Wash your hands or use alcohol-based hand  after using cutting boards, counter-tops, and knives (and other utensils) that have been in contact with raw foods.  · Wash and then peel fruits and vegetables.  · Keep uncooked meats away from cooked and ready-to-eat foods.  · Use a food thermometer when cooking. Cook poultry to at least 165°F (74°C). Cook ground meat (beef, veal, pork, lamb) to at least 160°F (71°C). Cook fresh beef, veal, lamb, and pork to at least 145°F (63°C).  · Dont eat raw or undercooked eggs (poached or mary lou side up), poultry, meat or unpasteurized milk and juices.  Food and drinks  The main goal while treating vomiting or diarrhea is to prevent dehydration. This is done by taking small amounts of liquids often.  · Keep in mind that liquids are more important than food right now.  · Drink only small amounts of liquids at a time.  · Dont force yourself to eat, especially if you are having cramping, vomiting, or diarrhea. Dont eat large amounts at a time, even if you are hungry.  · If you eat, avoid fatty, greasy, spicy, or fried foods.  · Dont eat dairy foods or drink milk if you have diarrhea. These can make diarrhea worse.  The first 24 hours you can try:  · Oral rehydration solutions, available at grocery stores and pharmacies. Sports drinks are not a good choice if you are very dehydrated. They have too much sugar and not enough electrolytes.  · Soft drinks without  caffeine  · Ginger ale  · Water (plain or flavored)  · Decaf tea or coffee  · Clear broth, consommé, or bouillon  · Gelatin, popsicles, or frozen fruit juice bars   The second 24 hours, if you are feeling better, you can add:  · Hot cereal, plain toast, bread, rolls, or crackers  · Plain noodles, rice, mashed potatoes, chicken noodle soup, or rice soup  · Unsweetened canned fruit (no pineapple)  · Bananas  As you recover:  · Limit fat intake to less than 15 grams per day. Dont eat margarine, butter, oils, mayonnaise, sauces, gravies, fried foods, peanut butter, meat, poultry, or fish.  · Limit fiber. Dont eat raw or cooked vegetables, fresh fruits except bananas, and bran cereals.  · Limit caffeine and chocolate.  · Dont use spices or seasonings except salt.  · Resume a normal diet over time, as you feel better and your symptoms improve.  · If the symptoms come back, go back to a simple diet or clear liquids.  Follow-up care  Follow up with your healthcare provider, or as advised. If a stool sample was taken or cultures were done, call the healthcare provider for the results as instructed.  Call 911  Call 911 if you have any of these symptoms:  · Trouble breathing  · Confusion  · Extreme drowsiness or trouble walking  · Loss of consciousness  · Rapid heart rate  · Chest pain  · Stiff neck  · Seizure  When to seek medical advice  Call your healthcare provider right away if any of these occur:  · Abdominal pain that gets worse  · Constant lower right abdominal pain  · Continued vomiting and inability to keep liquids down  · Diarrhea more than 5 times a day  · Blood in vomit or stool  · Dark urine or no urine for 8 hours, dry mouth and tongue, tiredness, weakness, or dizziness  · New rash  · You dont get better in 2 to 3 days  · Fever of 100.4°F (38°C) or higher that doesnt get lower with medicine  · You have new symptoms of arthritis  Date Last Reviewed: 1/3/2016  © 2374-6646 [employer] [address]  [place] [address]. All rights reserved. This information is not intended as a substitute for professional medical care. Always follow your healthcare professional's instructions.

## 2022-07-14 NOTE — ADDENDUM NOTE
Addended by: TABITHA SUMMERS on: 5/17/2019 05:29 PM     Modules accepted: Orders    
Quality 226: Preventive Care And Screening: Tobacco Use: Screening And Cessation Intervention: Patient screened for tobacco use and is an ex/non-smoker
Quality 130: Documentation Of Current Medications In The Medical Record: Current Medications Documented
Quality 431: Preventive Care And Screening: Unhealthy Alcohol Use - Screening: Patient not identified as an unhealthy alcohol user when screened for unhealthy alcohol use using a systematic screening method
Detail Level: Detailed

## 2022-07-18 NOTE — TELEPHONE ENCOUNTER
Please let her know her bone scan shows osteoporosis and she would benefit from treatment either with a once weekly pill or a shot every 6 mos. The shot is more expensive so we have to see if insurance covers it    Let me know what she decides    Iona Jose MD

## 2022-07-19 NOTE — TELEPHONE ENCOUNTER
----- Message from Tess Palmer MD sent at 7/19/2022 10:05 AM CDT -----  Please make this patient clinic follow up with me if she doesn't already have it in a few months

## 2022-07-19 NOTE — PROVATION PATIENT INSTRUCTIONS
Discharge Summary/Instructions after an Endoscopic Procedure  Patient Name: Kaycee Almanza  Patient MRN: [id number]  Patient YOB: 1934  Tuesday, July 19, 2022  Tess Palmer MD  Dear patient,  As a result of recent federal legislation (The Federal Cures Act), you may   receive lab or pathology results from your procedure in your [facility]   account before your physician is able to contact you. Your physician or   their representative will relay the results to you with their   recommendations at their soonest availability.  Thank you,  RESTRICTIONS:  During your procedure today, you received medications for sedation.  These   medications may affect your judgment, balance and coordination.  Therefore,   for 24 hours, you have the following restrictions:   - DO NOT drive a car, operate machinery, make legal/financial decisions,   sign important papers or drink alcohol.    ACTIVITY:  Today: no heavy lifting, straining or running due to procedural   sedation/anesthesia.  The following day: return to full activity including work.  DIET:  Eat and drink normally unless instructed otherwise.     TREATMENT FOR COMMON SIDE EFFECTS:  - Mild abdominal pain, nausea, belching, bloating or excessive gas:  rest,   eat lightly and use a heating pad.  - Sore Throat: treat with throat lozenges and/or gargle with warm salt   water.  - Because air was used during the procedure, expelling large amounts of air   from your rectum or belching is normal.  - If a bowel prep was taken, you may not have a bowel movement for 1-3 days.    This is normal.  SYMPTOMS TO WATCH FOR AND REPORT TO YOUR PHYSICIAN:  1. Abdominal pain or bloating, other than gas cramps.  2. Chest pain.  3. Back pain.  4. Signs of infection such as: chills or fever occurring within 24 hours   after the procedure.  5. Rectal bleeding, which would show as bright red, maroon, or black stools.   (A tablespoon of blood from the rectum is not serious, especially  if   hemorrhoids are present.)  6. Vomiting.  7. Weakness or dizziness.  GO DIRECTLY TO THE NEAREST EMERGENCY ROOM IF YOU HAVE ANY OF THE FOLLOWING:      Difficulty breathing              Chills and/or fever over 101 F   Persistent vomiting and/or vomiting blood   Severe abdominal pain   Severe chest pain   Black, tarry stools   Bleeding- more than one tablespoon   Any other symptom or condition that you feel may need urgent attention  Your doctor recommends these additional instructions:  If any biopsies were taken, your doctors clinic will contact you in 1 to 2   weeks with any results.  - Discharge patient to home (ambulatory).   - Advance diet as tolerated.   - Continue present medications.   - Await pathology results.   - Follow in GI clinic to assess response to treatment  For questions, problems or results please call your physician - Tess Palmer MD at Work:  [phone].  [facility] can be reached at [phone]     IF A COMPLICATION OR EMERGENCY SITUATION ARISES AND YOU ARE UNABLE TO REACH   YOUR PHYSICIAN - GO DIRECTLY TO THE EMERGENCY ROOM.  Tess Palmer MD  7/19/2022 9:37:08 AM  This report has been verified and signed electronically.  Dear patient,  As a result of recent federal legislation (The Federal Cures Act), you may   receive lab or pathology results from your procedure in your [facility]   account before your physician is able to contact you. Your physician or   their representative will relay the results to you with their   recommendations at their soonest availability.  Thank you,  PROVATION

## 2022-07-19 NOTE — TRANSFER OF CARE
Anesthesia Transfer of Care Note    Patient: Kaycee Almanza    Procedure(s) Performed: Procedure(s) (LRB):  EGD (ESOPHAGOGASTRODUODENOSCOPY) (N/A)    Patient location: GI    Anesthesia Type: MAC    Transport from OR: Transported from OR on 2-3 L/min O2 by NC with adequate spontaneous ventilation    Post pain: adequate analgesia    Post assessment: no apparent anesthetic complications    Post vital signs: stable    Level of consciousness: responds to stimulation    Nausea/Vomiting: no nausea/vomiting    Complications: none    Transfer of care protocol was followed      Last vitals:   Visit Vitals  /62 (BP Location: Left arm, Patient Position: Lying)   Pulse 75   Temp 36.8 °C (98.3 °F) (Tympanic)   Resp 20   SpO2 97%   Breastfeeding No

## 2022-07-19 NOTE — H&P
Short Stay Endoscopy History and Physical    PCP - Iona Jose MD  Referring Physician - Tess Palmer MD  [address] [place] [address]    Procedure - EGD  ASA - per anesthesia  Mallampati - per anesthesia  History of Anesthesia problems - no  Family history Anesthesia problems -  no   Plan of anesthesia - General    HPI  87 y.o. female  Reason for procedure:  Dysphagia, unspecified type [R13.10]      ROS:  Constitutional: No fevers, chills, No weight loss  CV: No chest pain  Pulm: No cough, No shortness of breath  GI: see HPI    Medical History:  has a past medical history of Anxiety, Arthritis, Coronary artery disease, Dementia, Depression, Diabetes type 2, controlled, GERD (gastroesophageal reflux disease), Hyperlipidemia, Hypertension, Left posterior capsular opacification (5/11/2017), Osteoporosis, Thyroid disease, Ulcer, and Ulcer.    Surgical History:  has a past surgical history that includes Cholecystectomy; cataract surgery (2006); Shoulder surgery; Knee surgery; Hand surgery; Hysterectomy; Eye surgery; Cataract extraction w/  intraocular lens implant (Bilateral, 2006); yag laser  (Bilateral); and Elbow surgery.    Family History: family history includes Arthritis in her father; Birth defects in her brother, brother, brother, brother, and brother; Diabetes in her mother; Early death in her sister; No Known Problems in her daughter, maternal aunt, maternal grandfather, maternal grandmother, maternal uncle, paternal aunt, paternal grandfather, paternal grandmother, paternal uncle, and sister; Stroke in her mother..    Social History:  reports that she has never smoked. She has never used smokeless tobacco. She reports that she does not drink alcohol and does not use drugs.    Review of patient's allergies indicates:   Allergen Reactions    Pneumovax-23 [pneumococcal 23-melva ps vaccine] Swelling       Medications:   Medications Prior to Admission   Medication Sig Dispense Refill Last Dose     acetaminophen (TYLENOL) 500 MG tablet Take 500 mg by mouth every 6 (six) hours as needed for Pain.       aspirin (ECOTRIN) 81 MG EC tablet Take 81 mg by mouth once daily.       blood-glucose meter (FREESTYLE SYSTEM KIT) kit Use as instructed 1 each 0     calcium carbonate/vitamin D3 (CALCIUM 600 + D,3, ORAL) Take by mouth.       fluconazole (DIFLUCAN) 150 MG Tab        hydroCHLOROthiazide (HYDRODIURIL) 12.5 MG Tab TAKE 1 TABLET EVERY DAY 90 tablet 1     mirtazapine (REMERON) 7.5 MG Tab TAKE 1 TABLET EVERY EVENING 90 tablet 1     multivit-min/iron/folic/lutein (CENTRUM SILVER WOMEN ORAL) Take by mouth.       NIFEdipine (PROCARDIA-XL) 60 MG (OSM) 24 hr tablet TAKE 1 TABLET EVERY DAY 90 tablet 1     omeprazole (PRILOSEC) 20 MG capsule Take 1 capsule (20 mg total) by mouth once daily. 90 capsule 0     oxyCODONE-acetaminophen (PERCOCET) 5-325 mg per tablet Take 1 tablet by mouth 3 (three) times daily as needed for Pain. 90 tablet 0     [START ON 7/23/2022] oxyCODONE-acetaminophen (PERCOCET) 5-325 mg per tablet Take 1 tablet by mouth 3 (three) times daily as needed for Pain. 90 tablet 0     [START ON 8/22/2022] oxyCODONE-acetaminophen (PERCOCET) 5-325 mg per tablet Take 1 tablet by mouth 3 (three) times daily as needed for Pain. 90 tablet 0     potassium chloride (KLOR-CON) 10 MEQ TbSR TK 1 T PO BID FOR 15 DAYS  0     sertraline (ZOLOFT) 100 MG tablet TAKE 1 TABLET EVERY DAY 90 tablet 1     TRUE METRIX GLUCOSE TEST STRIP Strp Check blood glucose 2 times daily before meals 200 each 11     valsartan (DIOVAN) 320 MG tablet Take 1 tablet (320 mg total) by mouth once daily. 90 tablet 3        Physical Exam:    Vital Signs: There were no vitals filed for this visit.    General Appearance: Well appearing in no acute distress  Abdomen: Soft, non tender, non distended with normal bowel sounds, no masses      Labs:  Lab Results   Component Value Date    WBC 6.89 04/21/2022    HGB 11.9 (L) 04/21/2022    HCT 36.4  (L) 04/21/2022     04/21/2022    CHOL 313 (H) 03/12/2021    TRIG 165 (H) 03/12/2021    HDL 50 03/12/2021    ALT 17 04/21/2022    AST 19 04/21/2022     04/21/2022    K 4.0 04/21/2022     04/21/2022    CREATININE 1.1 04/21/2022    BUN 31 (H) 04/21/2022    CO2 21 (L) 04/21/2022    TSH 1.066 03/12/2021    INR 1.0 04/21/2022    HGBA1C 5.6 03/12/2021       I have explained the risks and benefits of this endoscopic procedure to the patient including but not limited to bleeding, inflammation, infection, perforation, and death.    Assessment/Plan:     1. Dysphagia     - proceed with EGD       Tess Palmer MD  Gastroenterology   [facility]

## 2022-07-19 NOTE — ANESTHESIA POSTPROCEDURE EVALUATION
Anesthesia Post Evaluation    Patient: Kaycee Almanza    Procedure(s) Performed: Procedure(s) (LRB):  EGD (ESOPHAGOGASTRODUODENOSCOPY) (N/A)    Final Anesthesia Type: general      Patient location during evaluation: GI PACU  Patient participation: Yes- Able to Participate  Level of consciousness: awake and alert and oriented  Post-procedure vital signs: reviewed and stable  Pain management: adequate  Airway patency: patent    PONV status at discharge: No PONV  Anesthetic complications: no      Cardiovascular status: hemodynamically stable and blood pressure returned to baseline  Respiratory status: spontaneous ventilation, room air and unassisted  Hydration status: euvolemic  Follow-up not needed.          Vitals Value Taken Time   /69 07/19/22 1018   Temp 36.8 °C (98.3 °F) 07/19/22 0939   Pulse 69 07/19/22 1018   Resp 16 07/19/22 1018   SpO2 98 % 07/19/22 1018         No case tracking events are documented in the log.      Pain/Randee Score: Randee Score: 10 (7/19/2022 10:19 AM)

## 2022-07-19 NOTE — ANESTHESIA PREPROCEDURE EVALUATION
07/19/2022  Kaycee Almanza is a 87 y.o., female.  To undergo Procedure(s) (LRB):  EGD (ESOPHAGOGASTRODUODENOSCOPY) (N/A)     Denies CP/SOB/MI/CVA/URI symptoms.  Endorses GERD with dysphagia to solids.  METS > 4  NPO > 8    Past Medical History:  Past Medical History:   Diagnosis Date    Anxiety     Arthritis     Coronary artery disease     Dementia     Depression     Diabetes type 2, controlled     sees Dr. Elena    GERD (gastroesophageal reflux disease)     Hyperlipidemia     Hypertension     Left posterior capsular opacification 5/11/2017    Osteoporosis     Thyroid disease     Ulcer     Ulcer        Past Surgical History:  Past Surgical History:   Procedure Laterality Date    CATARACT EXTRACTION W/  INTRAOCULAR LENS IMPLANT Bilateral 2006    done at [facility]    cataract surgery  2006    CHOLECYSTECTOMY      ELBOW SURGERY      EYE SURGERY      HAND SURGERY      HYSTERECTOMY      KNEE SURGERY      SHOULDER SURGERY      yag laser  Bilateral        Social History:  Social History     Socioeconomic History    Marital status:     Number of children: 3   Tobacco Use    Smoking status: Never Smoker    Smokeless tobacco: Never Used   Substance and Sexual Activity    Alcohol use: No    Drug use: No    Sexual activity: Never       Medications:  No current facility-administered medications on file prior to encounter.     Current Outpatient Medications on File Prior to Encounter   Medication Sig Dispense Refill    acetaminophen (TYLENOL) 500 MG tablet Take 500 mg by mouth every 6 (six) hours as needed for Pain.      aspirin (ECOTRIN) 81 MG EC tablet Take 81 mg by mouth once daily.      blood-glucose meter (FREESTYLE SYSTEM KIT) kit Use as instructed 1 each 0    calcium carbonate/vitamin D3 (CALCIUM 600 + D,3, ORAL) Take by mouth.      fluconazole (DIFLUCAN) 150 MG Tab        multivit-min/iron/folic/lutein (CENTRUM SILVER WOMEN ORAL) Take by mouth.      NIFEdipine (PROCARDIA-XL) 60 MG (OSM) 24 hr tablet TAKE 1 TABLET EVERY DAY 90 tablet 1    potassium chloride (KLOR-CON) 10 MEQ TbSR TK 1 T PO BID FOR 15 DAYS  0    TRUE METRIX GLUCOSE TEST STRIP Strp Check blood glucose 2 times daily before meals 200 each 11    valsartan (DIOVAN) 320 MG tablet Take 1 tablet (320 mg total) by mouth once daily. 90 tablet 3       Allergies:  Review of patient's allergies indicates:   Allergen Reactions    Pneumovax-23 [pneumococcal 23-melva ps vaccine] Swelling       Active Problems:  Patient Active Problem List   Diagnosis    Osteoarthritis involving multiple joints on both sides of body    Essential hypertension    Controlled type 2 diabetes mellitus with microalbuminuria, without long-term current use of insulin    Chronic back pain    Type 2 diabetes mellitus with diabetic polyneuropathy, without long-term current use of insulin    DM type 2 without retinopathy    Left posterior capsular opacification    Pseudophakia    History of left common carotid artery stent placement    Refractive error    Rectal bleeding    External hemorrhoid, bleeding    Anemia    Anxiety    Tinea pedis of both feet    Lumbar spinal stenosis    Chronic kidney disease, stage III (moderate)       Diagnostic Studies:   Latest Reference Range & Units 04/21/22 11:19   WBC 3.90 - 12.70 K/uL 6.89   RBC 4.00 - 5.40 M/uL 3.85 (L)   Hemoglobin 12.0 - 16.0 g/dL 11.9 (L)   Hematocrit 37.0 - 48.5 % 36.4 (L)   MCV 82 - 98 fL 95   MCH 27.0 - 31.0 pg 30.9   MCHC 32.0 - 36.0 g/dL 32.7   RDW 11.5 - 14.5 % 12.9   Platelets 150 - 450 K/uL 176   MPV 9.2 - 12.9 fL 10.7   Gran % 38.0 - 73.0 % 56.7   Lymph % 18.0 - 48.0 % 25.3   Mono % 4.0 - 15.0 % 7.7   Eosinophil % 0.0 - 8.0 % 9.4 (H)   Basophil % 0.0 - 1.9 % 0.6   Immature Granulocytes 0.0 - 0.5 % 0.3   Gran # (ANC) 1.8 - 7.7 K/uL 3.9   Lymph # 1.0 - 4.8 K/uL 1.7   Mono # 0.3  - 1.0 K/uL 0.5   Eos # 0.0 - 0.5 K/uL 0.7 (H)   Baso # 0.00 - 0.20 K/uL 0.04   Immature Grans (Abs) 0.00 - 0.04 K/uL 0.02   nRBC 0 /100 WBC 0   Differential Method  Automated      Latest Reference Range & Units 04/21/22 11:19   Sodium 136 - 145 mmol/L 140   Potassium 3.5 - 5.1 mmol/L 4.0   Chloride 95 - 110 mmol/L 108   CO2 23 - 29 mmol/L 21 (L)   Anion Gap 8 - 16 mmol/L 11   BUN 8 - 23 mg/dL 31 (H)   Creatinine 0.5 - 1.4 mg/dL 1.1   eGFR if non African American >60 mL/min/1.73 m^2 45 !   eGFR if African American >60 mL/min/1.73 m^2 52 !   Glucose 70 - 110 mg/dL 120 (H)   Calcium 8.7 - 10.5 mg/dL 8.9     TTE (6/3/21):  · The left ventricle is normal in size with mild concentric hypertrophy and mildly decreased systolic function.  · The estimated ejection fraction is 45%.  · Grade I left ventricular diastolic dysfunction.  · Severe left atrial enlargement.  · Mild tricuspid regurgitation.  · Normal central venous pressure (3 mmHg).  · The estimated PA systolic pressure is 34 mmHg.  · Normal right ventricular size with normal right ventricular systolic function.  · Moderate right atrial enlargement.    24 Hour Vitals:      See Nursing Charting For Additional Vitals      Pre-op Assessment    I have reviewed the Patient Summary Reports.     I have reviewed the Nursing Notes.       Review of Systems  Anesthesia Hx:  No problems with previous Anesthesia   Denies Personal Hx of Anesthesia complications.   Social:  Non-Smoker, No Alcohol Use    Hematology/Oncology:         -- Anemia:   Cardiovascular:   Exercise tolerance: good Hypertension CAD   hyperlipidemia    Pulmonary:  Pulmonary Normal    Renal/:   Chronic Renal Disease, CRI    Hepatic/GI:   GERD, well controlled    Musculoskeletal:   Arthritis     Neurological:  Dementia    Endocrine:   Diabetes, type 2    Psych:   anxiety depression          Physical Exam  General: Well nourished    Airway:  Mallampati: II   Mouth Opening: Normal  TM Distance:  Normal      Dental:  Dentures    Chest/Lungs:  Clear to auscultation    Heart:  Rate: Normal  Rhythm: Regular Rhythm        Anesthesia Plan  Type of Anesthesia, risks & benefits discussed:    Anesthesia Type: Gen ETT, Gen Natural Airway, MAC  Intra-op Monitoring Plan: Standard ASA Monitors  Post Op Pain Control Plan: multimodal analgesia and IV/PO Opioids PRN  Induction:  IV  Informed Consent: Informed consent signed with the Patient representative and all parties understand the risks and agree with anesthesia plan.  All questions answered.   ASA Score: 3    Ready For Surgery From Anesthesia Perspective.     .

## 2022-07-20 NOTE — TELEPHONE ENCOUNTER
Called patient and daughter Jacklyn whom assists with her answered . She confirmed patients full name , do.b and address .  Reviewed results of dxa and asked her to speak with mother or contact office on which treatment she'd prefer . Informed Dr. Jose said weekly pill or a shot every 6 months . Daughter says mother had shots in the past and they left effects to mothers health . Mother said she never wanted to get whatever the name of said shot was again . Needs to know the names of both injections and pill that are being considered for prescription . Please advise

## 2022-07-25 NOTE — TELEPHONE ENCOUNTER
----- Message from Suzette Crow sent at 7/25/2022 12:52 PM CDT -----  Who Called:        Refill or New Rx: new         RX Name and Strength:  NIFEdipine (PROCARDIA-XL) 60 MG (OSM) 24 hr tablet        How is the patient currently taking it? (ex. 1XDay)        Is this a 30 day or 90 day RX     90 day supply         Preferred Pharmacy with phone number:  [facility] (NOW [facility]) - [place] [address] [address]          Local or Mail Order: mail order         Ordering Provider:        Would the patient rather a call back or a response via [facility]? Call back         Best Call Back Number:  [phone]        Additional Information:

## 2022-07-25 NOTE — TELEPHONE ENCOUNTER
No new care gaps identified.  [facility] Embedded Care Gaps. Reference number: [id number]. 7/25/2022   1:40:54 PM CDT

## 2022-08-02 NOTE — TELEPHONE ENCOUNTER
Reminded patient of Pain Management appointment scheduled for Monday at 8.15a with Dr. Castro- verbal confirmation received.  Location information also provided.    Statement Selected

## 2022-08-10 NOTE — PROGRESS NOTES
CHIEF COMPLAINT/HPI: Kaycee is a 87 y.o.woman  For follow up of her of CKD, she has HTN, DM ( diet controlled) , arthrits, hx of Lt common carotid artery stent placement, hx of rectal bleed, anemia , anxiety.   She was following with Arcelia Malik and she is new to my care,   Denied NSAID use     Past Medical History:   Diagnosis Date    Anxiety     Arthritis     Coronary artery disease     Dementia     Depression     Diabetes type 2, controlled     sees Dr. Elena    GERD (gastroesophageal reflux disease)     Hyperlipidemia     Hypertension     Left posterior capsular opacification 5/11/2017    Osteoporosis     Thyroid disease     Ulcer     Ulcer        Kaycee reports that she has never smoked. She has never used smokeless tobacco. She reports that she does not drink alcohol and does not use drugs.    Family History   Problem Relation Age of Onset    Stroke Mother     Diabetes Mother     Arthritis Father     Early death Sister     Birth defects Brother     No Known Problems Daughter     No Known Problems Sister     Birth defects Brother     Birth defects Brother     Birth defects Brother     Birth defects Brother     No Known Problems Maternal Aunt     No Known Problems Maternal Uncle     No Known Problems Paternal Aunt     No Known Problems Paternal Uncle     No Known Problems Maternal Grandmother     No Known Problems Maternal Grandfather     No Known Problems Paternal Grandmother     No Known Problems Paternal Grandfather     Amblyopia Neg Hx     Blindness Neg Hx     Cataracts Neg Hx     Glaucoma Neg Hx     Macular degeneration Neg Hx     Retinal detachment Neg Hx     Strabismus Neg Hx     Cancer Neg Hx     Hypertension Neg Hx     Thyroid disease Neg Hx     Esophageal cancer Neg Hx     Colon cancer Neg Hx        ROS:    Review of Systems   Constitutional: Negative for activity change, appetite change, chills, fever and unexpected weight change.   HENT: Negative for  "congestion, ear discharge, hearing loss, nosebleeds, sore throat and tinnitus.    Eyes: Negative for photophobia, pain, redness and visual disturbance.   Respiratory: Negative for cough, chest tightness, shortness of breath and wheezing.    Cardiovascular: Negative for chest pain, palpitations and leg swelling.   Gastrointestinal: Negative for abdominal distention, constipation, diarrhea, nausea and vomiting.   Endocrine: Negative for cold intolerance, heat intolerance, polydipsia and polyuria.   Genitourinary: Negative for decreased urine volume, difficulty urinating, dysuria, flank pain and hematuria.   Musculoskeletal: Negative for arthralgias, gait problem, joint swelling and neck stiffness.   Skin: Negative for rash.   Neurological: Negative for dizziness, tremors, weakness, numbness and headaches.   Psychiatric/Behavioral: Negative for confusion and sleep disturbance.         PE:    Vitals:    08/10/22 0909   BP: (!) 158/60   Weight: 50.5 kg (111 lb 5.3 oz)   Height: 4' 9" (1.448 m)       Physical Exam  Constitutional:       General: She is not in acute distress.     Appearance: Normal appearance. She is normal weight.   HENT:      Head: Normocephalic and atraumatic.      Nose: Nose normal.      Mouth/Throat:      Mouth: Mucous membranes are moist.      Pharynx: Oropharynx is clear. No oropharyngeal exudate or posterior oropharyngeal erythema.   Eyes:      Extraocular Movements: Extraocular movements intact.      Conjunctiva/sclera: Conjunctivae normal.      Pupils: Pupils are equal, round, and reactive to light.   Cardiovascular:      Rate and Rhythm: Normal rate and regular rhythm.      Pulses: Normal pulses.      Heart sounds: Normal heart sounds. No murmur heard.    No friction rub. No gallop.   Pulmonary:      Effort: Pulmonary effort is normal. No respiratory distress.      Breath sounds: Normal breath sounds. No stridor. No wheezing or rales.   Abdominal:      General: Abdomen is flat. Bowel sounds are " normal.      Palpations: Abdomen is soft.      Tenderness: There is no abdominal tenderness. There is no guarding or rebound.   Musculoskeletal:         General: No swelling. Normal range of motion.      Cervical back: Normal range of motion and neck supple.      Right lower leg: No edema.      Left lower leg: No edema.   Skin:     General: Skin is warm.      Coloration: Skin is not jaundiced or pale.      Findings: No lesion.   Neurological:      General: No focal deficit present.      Mental Status: She is alert and oriented to person, place, and time.      Motor: No weakness.   Psychiatric:         Mood and Affect: Mood normal.           Impression/Plan:    No diagnosis found.  # CKD: likely due to DM and HTN, however will obtain  LISBETH and dsDNA , and 24hr urine  -counseled on BP contol , and keeping a log so we can review and adjust medications .     Lab Results   Component Value Date    CREATININE 1.1 04/21/2022     Protein Creatinine Ratios:    Prot/Creat Ratio, Urine   Date Value Ref Range Status   12/21/2021 2.49 (H) 0.00 - 0.20 Final   03/09/2021 1.08 (H) 0.00 - 0.20 Final   08/21/2020 1.21 (H) 0.00 - 0.20 Final      ·   ·   Acid-Base:   Lab Results   Component Value Date     04/21/2022    K 4.0 04/21/2022    CO2 21 (L) 04/21/2022     #HTN: Blood pressures mildly elevated , but she stated /with daughter in Law( who ha POA per patient), is always in normal range 130s/60-70s   Will cont same meds( Nifedipine , HCTZ, Lasix )  and asked for log     # Renal osteodystrophy:   Lab Results   Component Value Date    CALCIUM 8.9 04/21/2022       # Anemia:   Lab Results   Component Value Date    HGB 11.9 (L) 04/21/2022        # DM:  Last HbA1C   Lab Results   Component Value Date    HGBA1C 5.6 03/12/2021       # Lipid management:   Lab Results   Component Value Date    LDLCALC 230.0 (H) 03/12/2021       # ESRD planing: none     Follow up in  3-4 months

## 2022-08-16 NOTE — TELEPHONE ENCOUNTER
----- Message from Brenda Amato sent at 8/16/2022  3:27 PM CDT -----  Type: Patient Call Back    Who called: [facility]     What is the request in detail: Patient dropped off a 24 hour urine, but there are no orders in the system.     Can the clinic reply by [facility]? No     Would the patient rather a call back or a response via [facility]? Call back

## 2022-09-23 NOTE — PROGRESS NOTES
Chief Complaint   Patient presents with    Back Pain         HPI  Kaycee Almanza is a 87 y.o. female with multiple medical diagnoses as listed in the medical history and problem list that presents for follow-up chronic low back pain and HTN      Chronic pain  Low back pain improved on percocet  Some concerns about balance, she is using a cane with feet to ambulate around her house  Does exercise with a pedal bike she can use while seated    HTN  Bp has not been elevated recently    She is undergoing workup for thyroid goiter      ALLERGIES AND MEDICATIONS: updated and reviewed.  Review of patient's allergies indicates:   Allergen Reactions    Pneumoc 13-melva conj-dip cr(pf) Swelling    Pneumovax-23 [pneumococcal 23-melva ps vaccine] Swelling     Medication List with Changes/Refills   New Medications    OXYCODONE-ACETAMINOPHEN (PERCOCET) 5-325 MG PER TABLET    Take 1 tablet by mouth 3 (three) times daily as needed for Pain.    OXYCODONE-ACETAMINOPHEN (PERCOCET) 5-325 MG PER TABLET    Take 1 tablet by mouth 3 (three) times daily as needed for Pain.   Current Medications    ACETAMINOPHEN (TYLENOL) 500 MG TABLET    Take 500 mg by mouth every 6 (six) hours as needed for Pain.    ASPIRIN (ECOTRIN) 81 MG EC TABLET    Take 81 mg by mouth once daily.    BLOOD-GLUCOSE METER (FREESTYLE SYSTEM KIT) KIT    Use as instructed    CALCIUM CARBONATE/VITAMIN D3 (CALCIUM 600 + D,3, ORAL)    Take by mouth.    FLUCONAZOLE (DIFLUCAN) 150 MG TAB        HYDROCHLOROTHIAZIDE (HYDRODIURIL) 12.5 MG TAB    TAKE 1 TABLET EVERY DAY    MIRTAZAPINE (REMERON) 7.5 MG TAB    TAKE 1 TABLET EVERY EVENING    MULTIVIT-MIN/IRON/FOLIC/LUTEIN (CENTRUM SILVER WOMEN ORAL)    Take by mouth.    NIFEDIPINE (PROCARDIA-XL) 60 MG (OSM) 24 HR TABLET    Take 1 tablet (60 mg total) by mouth once daily.    OMEPRAZOLE (PRILOSEC) 40 MG CAPSULE    Take 1 capsule (40 mg total) by mouth once daily.    POTASSIUM CHLORIDE (KLOR-CON) 10 MEQ TBSR    TK 1 T PO BID FOR 15 DAYS     "SERTRALINE (ZOLOFT) 100 MG TABLET    TAKE 1 TABLET EVERY DAY    TRUE METRIX GLUCOSE TEST STRIP STRP    Check blood glucose 2 times daily before meals   Changed and/or Refilled Medications    Modified Medication Previous Medication    OXYCODONE-ACETAMINOPHEN (PERCOCET) 5-325 MG PER TABLET oxyCODONE-acetaminophen (PERCOCET) 5-325 mg per tablet       Take 1 tablet by mouth 3 (three) times daily as needed for Pain.    Take 1 tablet by mouth 3 (three) times daily as needed for Pain.    VALSARTAN (DIOVAN) 320 MG TABLET valsartan (DIOVAN) 320 MG tablet       Take 1 tablet (320 mg total) by mouth once daily.    Take 1 tablet (320 mg total) by mouth once daily.       ROS  Review of Systems   Constitutional:  Negative for chills, diaphoresis, fatigue, fever and unexpected weight change.   HENT:  Negative for rhinorrhea, sinus pressure, sore throat and tinnitus.    Eyes:  Negative for photophobia and visual disturbance.   Respiratory:  Negative for cough, shortness of breath and wheezing.    Cardiovascular:  Negative for chest pain and palpitations.   Gastrointestinal:  Negative for abdominal pain, blood in stool, constipation, diarrhea, nausea and vomiting.   Genitourinary:  Negative for dysuria, flank pain, frequency and vaginal discharge.   Musculoskeletal:  Positive for arthralgias and back pain. Negative for joint swelling.   Skin:  Negative for rash.   Neurological:  Negative for speech difficulty, weakness, light-headedness and headaches.   Psychiatric/Behavioral:  Negative for behavioral problems and dysphoric mood.      Physical Exam  Vitals:    09/23/22 0843   BP: 122/60   Pulse: 78   Resp: 16   Temp: 98.6 °F (37 °C)   TempSrc: Oral   SpO2: 98%   Weight: 49.3 kg (108 lb 11 oz)   Height: 4' 9" (1.448 m)    Body mass index is 23.52 kg/m².  Weight: 49.3 kg (108 lb 11 oz)   Height: 4' 9" (144.8 cm)     Physical Exam  Vitals and nursing note reviewed.   Constitutional:       Appearance: She is well-developed.   Skin:     " General: Skin is warm and dry.      Findings: No erythema or rash.   Neurological:      Mental Status: She is alert. Mental status is at baseline.   Psychiatric:         Behavior: Behavior normal.       Health Maintenance         Date Due Completion Date    COVID-19 Vaccine (5 - Booster for Moderna series) 07/26/2022 5/31/2022    DEXA Scan 06/23/2024 6/23/2022    Override on 6/22/2016: Done (NP Akilah Barnett/Dr. Hollis Luther/Endocrinology- normal range in the lumbar spine & hips,left wrist Tscore -2.7 improved from -3.3. Patient has had to stop Prolia, but patient had 4 doses. Patient is also on calcium + vitamin d supplements)    Override on 7/22/2014: Done (Dr. Hollis Luther/Endocrinology- AP Spine-1.247 g/cm 2 w/ T score =0.3,dual femur=0.979 g/cm 2 w/ T score =0.2,left forearm= 0.586 g/cm 2 w/ T score=3.3,patient started on prolia)    TETANUS VACCINE 03/09/2027 3/9/2017 (Declined)    Override on 3/9/2017: Declined            Health maintenance reviewed and addressed as ordered      ASSESSMENT     1. Chronic midline back pain, unspecified back location    2. Essential hypertension    3. Controlled type 2 diabetes mellitus with microalbuminuria, without long-term current use of insulin    4. Nontoxic goiter    5. Spinal stenosis of lumbar region without neurogenic claudication        PLAN:     Problem List Items Addressed This Visit          Neuro    Lumbar spinal stenosis   [place]  database queried/reviewed  Patient improving with higher dose of medication  Encourage ambulation      Relevant Medications    oxyCODONE-acetaminophen (PERCOCET) 5-325 mg per tablet    oxyCODONE-acetaminophen (PERCOCET) 5-325 mg per tablet (Start on 10/23/2022)    oxyCODONE-acetaminophen (PERCOCET) 5-325 mg per tablet (Start on 11/22/2022)       Cardiac/Vascular    Essential hypertension  stable    Relevant Medications    valsartan (DIOVAN) 320 MG tablet       Endocrine    Controlled type 2 diabetes mellitus with microalbuminuria,  without long-term current use of insulin  stable       Orthopedic    Chronic back pain - Primary  We discussed that osteoporosis mgmt may improve pain but she had jaw pain on bisphosphonates previously    Relevant Medications    oxyCODONE-acetaminophen (PERCOCET) 5-325 mg per tablet    oxyCODONE-acetaminophen (PERCOCET) 5-325 mg per tablet (Start on 10/23/2022)    oxyCODONE-acetaminophen (PERCOCET) 5-325 mg per tablet (Start on 11/22/2022)     Other Visit Diagnoses       Nontoxic goiter      Per endocrine              Iona Jose MD  09/23/2022 9:06 AM      Follow up in about 3 months (around 12/23/2022) for management of chronic conditions.

## 2022-09-26 NOTE — TELEPHONE ENCOUNTER
----- Message from Juana Damon sent at 9/26/2022 10:00 AM CDT -----  Regarding: requesting sooner appt  Type:  Sooner Appointment Request    Patient is requesting a sooner appointment.  Patient declined first available appointment listed as well as another facility and provider .  Patient will not accept being placed on the waitlist and is requesting a message be sent to doctor.    Name of Caller: Jacklyn -Daughter in Law    When is the first available appointment? 1/5/23    Symptoms: f/u, test results    Would the patient rather a call back or a response via My [facility]? Call    Best Call Back Number: [phone]

## 2023-01-01 ENCOUNTER — OFFICE VISIT (OUTPATIENT)
Dept: FAMILY MEDICINE | Facility: CLINIC | Age: 88
End: 2023-01-01
Payer: MEDICARE

## 2023-01-01 ENCOUNTER — TELEPHONE (OUTPATIENT)
Dept: FAMILY MEDICINE | Facility: CLINIC | Age: 88
End: 2023-01-01
Payer: MEDICARE

## 2023-01-01 ENCOUNTER — OFFICE VISIT (OUTPATIENT)
Dept: GASTROENTEROLOGY | Facility: CLINIC | Age: 88
End: 2023-01-01
Payer: MEDICARE

## 2023-01-01 ENCOUNTER — PES CALL (OUTPATIENT)
Dept: ADMINISTRATIVE | Facility: CLINIC | Age: 88
End: 2023-01-01
Payer: MEDICARE

## 2023-01-01 ENCOUNTER — EXTERNAL HOSPITAL ADMISSION (OUTPATIENT)
Dept: ADMINISTRATIVE | Facility: CLINIC | Age: 88
End: 2023-01-01
Payer: MEDICARE

## 2023-01-01 ENCOUNTER — PATIENT OUTREACH (OUTPATIENT)
Dept: ADMINISTRATIVE | Facility: CLINIC | Age: 88
End: 2023-01-01
Payer: MEDICARE

## 2023-01-01 ENCOUNTER — TELEPHONE (OUTPATIENT)
Dept: FAMILY MEDICINE | Facility: CLINIC | Age: 88
End: 2023-01-01

## 2023-01-01 VITALS
SYSTOLIC BLOOD PRESSURE: 120 MMHG | WEIGHT: 108.44 LBS | RESPIRATION RATE: 18 BRPM | HEIGHT: 57 IN | OXYGEN SATURATION: 95 % | TEMPERATURE: 98 F | HEART RATE: 73 BPM | BODY MASS INDEX: 23.4 KG/M2 | DIASTOLIC BLOOD PRESSURE: 72 MMHG

## 2023-01-01 VITALS
WEIGHT: 104.25 LBS | RESPIRATION RATE: 16 BRPM | BODY MASS INDEX: 22.49 KG/M2 | DIASTOLIC BLOOD PRESSURE: 62 MMHG | SYSTOLIC BLOOD PRESSURE: 129 MMHG | HEIGHT: 57 IN | HEART RATE: 53 BPM | OXYGEN SATURATION: 98 % | TEMPERATURE: 99 F

## 2023-01-01 VITALS
TEMPERATURE: 98 F | DIASTOLIC BLOOD PRESSURE: 58 MMHG | BODY MASS INDEX: 21.83 KG/M2 | HEART RATE: 74 BPM | SYSTOLIC BLOOD PRESSURE: 128 MMHG | HEIGHT: 57 IN | OXYGEN SATURATION: 96 % | WEIGHT: 101.31 LBS | RESPIRATION RATE: 16 BRPM | SYSTOLIC BLOOD PRESSURE: 126 MMHG | DIASTOLIC BLOOD PRESSURE: 58 MMHG | WEIGHT: 101.19 LBS | TEMPERATURE: 98 F | HEIGHT: 57 IN | HEART RATE: 69 BPM | BODY MASS INDEX: 21.85 KG/M2 | OXYGEN SATURATION: 98 %

## 2023-01-01 VITALS
DIASTOLIC BLOOD PRESSURE: 63 MMHG | WEIGHT: 105.19 LBS | HEIGHT: 57 IN | HEART RATE: 81 BPM | SYSTOLIC BLOOD PRESSURE: 136 MMHG | BODY MASS INDEX: 22.69 KG/M2

## 2023-01-01 DIAGNOSIS — D64.9 ANEMIA, UNSPECIFIED TYPE: ICD-10-CM

## 2023-01-01 DIAGNOSIS — G89.29 CHRONIC MIDLINE BACK PAIN, UNSPECIFIED BACK LOCATION: ICD-10-CM

## 2023-01-01 DIAGNOSIS — R13.10 DYSPHAGIA, UNSPECIFIED TYPE: ICD-10-CM

## 2023-01-01 DIAGNOSIS — N18.31 STAGE 3A CHRONIC KIDNEY DISEASE: ICD-10-CM

## 2023-01-01 DIAGNOSIS — I10 ESSENTIAL HYPERTENSION: ICD-10-CM

## 2023-01-01 DIAGNOSIS — N18.32 STAGE 3B CHRONIC KIDNEY DISEASE: ICD-10-CM

## 2023-01-01 DIAGNOSIS — E21.3 HYPERPARATHYROIDISM, UNSPECIFIED: ICD-10-CM

## 2023-01-01 DIAGNOSIS — M54.9 CHRONIC MIDLINE BACK PAIN, UNSPECIFIED BACK LOCATION: ICD-10-CM

## 2023-01-01 DIAGNOSIS — M48.061 SPINAL STENOSIS OF LUMBAR REGION WITHOUT NEUROGENIC CLAUDICATION: ICD-10-CM

## 2023-01-01 DIAGNOSIS — K21.9 GASTROESOPHAGEAL REFLUX DISEASE, UNSPECIFIED WHETHER ESOPHAGITIS PRESENT: ICD-10-CM

## 2023-01-01 DIAGNOSIS — E11.29 CONTROLLED TYPE 2 DIABETES MELLITUS WITH MICROALBUMINURIA, WITHOUT LONG-TERM CURRENT USE OF INSULIN: ICD-10-CM

## 2023-01-01 DIAGNOSIS — R09.89 RIGHT CAROTID BRUIT: ICD-10-CM

## 2023-01-01 DIAGNOSIS — Z00.00 ENCOUNTER FOR PREVENTIVE HEALTH EXAMINATION: Primary | ICD-10-CM

## 2023-01-01 DIAGNOSIS — K64.4 EXTERNAL HEMORRHOID, BLEEDING: ICD-10-CM

## 2023-01-01 DIAGNOSIS — R80.9 CONTROLLED TYPE 2 DIABETES MELLITUS WITH MICROALBUMINURIA, WITHOUT LONG-TERM CURRENT USE OF INSULIN: ICD-10-CM

## 2023-01-01 DIAGNOSIS — R13.10 DYSPHAGIA, UNSPECIFIED TYPE: Primary | ICD-10-CM

## 2023-01-01 DIAGNOSIS — L30.9 DERMATITIS: ICD-10-CM

## 2023-01-01 DIAGNOSIS — I10 ESSENTIAL HYPERTENSION: Primary | ICD-10-CM

## 2023-01-01 DIAGNOSIS — M15.9 OSTEOARTHRITIS INVOLVING MULTIPLE JOINTS ON BOTH SIDES OF BODY: ICD-10-CM

## 2023-01-01 DIAGNOSIS — F41.9 ANXIETY: ICD-10-CM

## 2023-01-01 DIAGNOSIS — E11.9 DM TYPE 2 WITHOUT RETINOPATHY: ICD-10-CM

## 2023-01-01 DIAGNOSIS — I50.9 HEART FAILURE, UNSPECIFIED HF CHRONICITY, UNSPECIFIED HEART FAILURE TYPE: Primary | ICD-10-CM

## 2023-01-01 DIAGNOSIS — G89.29 CHRONIC MIDLINE BACK PAIN, UNSPECIFIED BACK LOCATION: Primary | ICD-10-CM

## 2023-01-01 DIAGNOSIS — M48.061 SPINAL STENOSIS OF LUMBAR REGION WITHOUT NEUROGENIC CLAUDICATION: Primary | ICD-10-CM

## 2023-01-01 DIAGNOSIS — E11.42 TYPE 2 DIABETES MELLITUS WITH DIABETIC POLYNEUROPATHY, WITHOUT LONG-TERM CURRENT USE OF INSULIN: ICD-10-CM

## 2023-01-01 DIAGNOSIS — M54.9 CHRONIC MIDLINE BACK PAIN, UNSPECIFIED BACK LOCATION: Primary | ICD-10-CM

## 2023-01-01 PROCEDURE — 99999 PR PBB SHADOW E&M-EST. PATIENT-LVL IV: CPT | Mod: PBBFAC,,, | Performed by: FAMILY MEDICINE

## 2023-01-01 PROCEDURE — 1159F PR MEDICATION LIST DOCUMENTED IN MEDICAL RECORD: ICD-10-PCS | Mod: CPTII,S$GLB,, | Performed by: NURSE PRACTITIONER

## 2023-01-01 PROCEDURE — 99999 PR PBB SHADOW E&M-EST. PATIENT-LVL IV: ICD-10-PCS | Mod: PBBFAC,,, | Performed by: FAMILY MEDICINE

## 2023-01-01 PROCEDURE — 1125F AMNT PAIN NOTED PAIN PRSNT: CPT | Mod: CPTII,S$GLB,, | Performed by: NURSE PRACTITIONER

## 2023-01-01 PROCEDURE — 1101F PT FALLS ASSESS-DOCD LE1/YR: CPT | Mod: CPTII,S$GLB,, | Performed by: FAMILY MEDICINE

## 2023-01-01 PROCEDURE — 99214 OFFICE O/P EST MOD 30 MIN: CPT | Mod: S$GLB,,, | Performed by: FAMILY MEDICINE

## 2023-01-01 PROCEDURE — 3288F FALL RISK ASSESSMENT DOCD: CPT | Mod: CPTII,S$GLB,, | Performed by: NURSE PRACTITIONER

## 2023-01-01 PROCEDURE — 99214 PR OFFICE/OUTPT VISIT, EST, LEVL IV, 30-39 MIN: ICD-10-PCS | Mod: S$GLB,,, | Performed by: FAMILY MEDICINE

## 2023-01-01 PROCEDURE — 1159F MED LIST DOCD IN RCRD: CPT | Mod: CPTII,S$GLB,, | Performed by: NURSE PRACTITIONER

## 2023-01-01 PROCEDURE — 1101F PR PT FALLS ASSESS DOC 0-1 FALLS W/OUT INJ PAST YR: ICD-10-PCS | Mod: CPTII,S$GLB,, | Performed by: FAMILY MEDICINE

## 2023-01-01 PROCEDURE — 99999 PR PBB SHADOW E&M-EST. PATIENT-LVL IV: ICD-10-PCS | Mod: PBBFAC,,, | Performed by: NURSE PRACTITIONER

## 2023-01-01 PROCEDURE — 99215 OFFICE O/P EST HI 40 MIN: CPT | Mod: PN | Performed by: NURSE PRACTITIONER

## 2023-01-01 PROCEDURE — G0439 PR MEDICARE ANNUAL WELLNESS SUBSEQUENT VISIT: ICD-10-PCS | Mod: S$GLB,,, | Performed by: NURSE PRACTITIONER

## 2023-01-01 PROCEDURE — 3288F FALL RISK ASSESSMENT DOCD: CPT | Mod: CPTII,S$GLB,, | Performed by: FAMILY MEDICINE

## 2023-01-01 PROCEDURE — 1170F FXNL STATUS ASSESSED: CPT | Mod: CPTII,S$GLB,, | Performed by: NURSE PRACTITIONER

## 2023-01-01 PROCEDURE — 1125F AMNT PAIN NOTED PAIN PRSNT: CPT | Mod: CPTII,S$GLB,, | Performed by: FAMILY MEDICINE

## 2023-01-01 PROCEDURE — 1170F PR FUNCTIONAL STATUS ASSESSED: ICD-10-PCS | Mod: CPTII,S$GLB,, | Performed by: NURSE PRACTITIONER

## 2023-01-01 PROCEDURE — 1159F PR MEDICATION LIST DOCUMENTED IN MEDICAL RECORD: ICD-10-PCS | Mod: CPTII,S$GLB,, | Performed by: STUDENT IN AN ORGANIZED HEALTH CARE EDUCATION/TRAINING PROGRAM

## 2023-01-01 PROCEDURE — 99499 UNLISTED E&M SERVICE: CPT | Mod: ,,, | Performed by: STUDENT IN AN ORGANIZED HEALTH CARE EDUCATION/TRAINING PROGRAM

## 2023-01-01 PROCEDURE — 3288F PR FALLS RISK ASSESSMENT DOCUMENTED: ICD-10-PCS | Mod: CPTII,S$GLB,, | Performed by: STUDENT IN AN ORGANIZED HEALTH CARE EDUCATION/TRAINING PROGRAM

## 2023-01-01 PROCEDURE — 1159F MED LIST DOCD IN RCRD: CPT | Mod: CPTII,S$GLB,, | Performed by: FAMILY MEDICINE

## 2023-01-01 PROCEDURE — 1160F PR REVIEW ALL MEDS BY PRESCRIBER/CLIN PHARMACIST DOCUMENTED: ICD-10-PCS | Mod: CPTII,S$GLB,, | Performed by: NURSE PRACTITIONER

## 2023-01-01 PROCEDURE — 1101F PT FALLS ASSESS-DOCD LE1/YR: CPT | Mod: CPTII,S$GLB,, | Performed by: STUDENT IN AN ORGANIZED HEALTH CARE EDUCATION/TRAINING PROGRAM

## 2023-01-01 PROCEDURE — 99999 PR PBB SHADOW E&M-EST. PATIENT-LVL IV: CPT | Mod: PBBFAC,,, | Performed by: NURSE PRACTITIONER

## 2023-01-01 PROCEDURE — 99499 RISK ADDL DX/OHS AUDIT: ICD-10-PCS | Mod: S$PBB,,, | Performed by: FAMILY MEDICINE

## 2023-01-01 PROCEDURE — 1100F PR PT FALLS ASSESS DOC 2+ FALLS/FALL W/INJURY/YR: ICD-10-PCS | Mod: CPTII,S$GLB,, | Performed by: NURSE PRACTITIONER

## 2023-01-01 PROCEDURE — 99999 PR PBB SHADOW E&M-EST. PATIENT-LVL IV: CPT | Mod: PBBFAC,,, | Performed by: STUDENT IN AN ORGANIZED HEALTH CARE EDUCATION/TRAINING PROGRAM

## 2023-01-01 PROCEDURE — 3288F PR FALLS RISK ASSESSMENT DOCUMENTED: ICD-10-PCS | Mod: CPTII,S$GLB,, | Performed by: NURSE PRACTITIONER

## 2023-01-01 PROCEDURE — 1160F RVW MEDS BY RX/DR IN RCRD: CPT | Mod: CPTII,S$GLB,, | Performed by: NURSE PRACTITIONER

## 2023-01-01 PROCEDURE — 1125F PR PAIN SEVERITY QUANTIFIED, PAIN PRESENT: ICD-10-PCS | Mod: CPTII,S$GLB,, | Performed by: NURSE PRACTITIONER

## 2023-01-01 PROCEDURE — 99214 OFFICE O/P EST MOD 30 MIN: CPT | Mod: S$GLB,,, | Performed by: NURSE PRACTITIONER

## 2023-01-01 PROCEDURE — 1159F MED LIST DOCD IN RCRD: CPT | Mod: CPTII,S$GLB,, | Performed by: STUDENT IN AN ORGANIZED HEALTH CARE EDUCATION/TRAINING PROGRAM

## 2023-01-01 PROCEDURE — 1126F AMNT PAIN NOTED NONE PRSNT: CPT | Mod: CPTII,S$GLB,, | Performed by: STUDENT IN AN ORGANIZED HEALTH CARE EDUCATION/TRAINING PROGRAM

## 2023-01-01 PROCEDURE — 99499 RISK ADDL DX/OHS AUDIT: ICD-10-PCS | Mod: ,,, | Performed by: STUDENT IN AN ORGANIZED HEALTH CARE EDUCATION/TRAINING PROGRAM

## 2023-01-01 PROCEDURE — 99999 PR PBB SHADOW E&M-EST. PATIENT-LVL V: ICD-10-PCS | Mod: PBBFAC,,, | Performed by: NURSE PRACTITIONER

## 2023-01-01 PROCEDURE — 99499 UNLISTED E&M SERVICE: CPT | Mod: S$PBB,,, | Performed by: FAMILY MEDICINE

## 2023-01-01 PROCEDURE — 99999 PR PBB SHADOW E&M-EST. PATIENT-LVL V: CPT | Mod: PBBFAC,,, | Performed by: NURSE PRACTITIONER

## 2023-01-01 PROCEDURE — 99212 PR OFFICE/OUTPT VISIT, EST, LEVL II, 10-19 MIN: ICD-10-PCS | Mod: S$GLB,,, | Performed by: STUDENT IN AN ORGANIZED HEALTH CARE EDUCATION/TRAINING PROGRAM

## 2023-01-01 PROCEDURE — 3288F PR FALLS RISK ASSESSMENT DOCUMENTED: ICD-10-PCS | Mod: CPTII,S$GLB,, | Performed by: FAMILY MEDICINE

## 2023-01-01 PROCEDURE — 99212 OFFICE O/P EST SF 10 MIN: CPT | Mod: S$GLB,,, | Performed by: STUDENT IN AN ORGANIZED HEALTH CARE EDUCATION/TRAINING PROGRAM

## 2023-01-01 PROCEDURE — 1159F PR MEDICATION LIST DOCUMENTED IN MEDICAL RECORD: ICD-10-PCS | Mod: CPTII,S$GLB,, | Performed by: FAMILY MEDICINE

## 2023-01-01 PROCEDURE — 99999 PR PBB SHADOW E&M-EST. PATIENT-LVL IV: ICD-10-PCS | Mod: PBBFAC,,, | Performed by: STUDENT IN AN ORGANIZED HEALTH CARE EDUCATION/TRAINING PROGRAM

## 2023-01-01 PROCEDURE — 1126F PR PAIN SEVERITY QUANTIFIED, NO PAIN PRESENT: ICD-10-PCS | Mod: CPTII,S$GLB,, | Performed by: STUDENT IN AN ORGANIZED HEALTH CARE EDUCATION/TRAINING PROGRAM

## 2023-01-01 PROCEDURE — 1125F PR PAIN SEVERITY QUANTIFIED, PAIN PRESENT: ICD-10-PCS | Mod: CPTII,S$GLB,, | Performed by: FAMILY MEDICINE

## 2023-01-01 PROCEDURE — 3288F FALL RISK ASSESSMENT DOCD: CPT | Mod: CPTII,S$GLB,, | Performed by: STUDENT IN AN ORGANIZED HEALTH CARE EDUCATION/TRAINING PROGRAM

## 2023-01-01 PROCEDURE — 1101F PR PT FALLS ASSESS DOC 0-1 FALLS W/OUT INJ PAST YR: ICD-10-PCS | Mod: CPTII,S$GLB,, | Performed by: STUDENT IN AN ORGANIZED HEALTH CARE EDUCATION/TRAINING PROGRAM

## 2023-01-01 PROCEDURE — 1101F PR PT FALLS ASSESS DOC 0-1 FALLS W/OUT INJ PAST YR: ICD-10-PCS | Mod: CPTII,S$GLB,, | Performed by: NURSE PRACTITIONER

## 2023-01-01 PROCEDURE — 1101F PT FALLS ASSESS-DOCD LE1/YR: CPT | Mod: CPTII,S$GLB,, | Performed by: NURSE PRACTITIONER

## 2023-01-01 PROCEDURE — 1100F PTFALLS ASSESS-DOCD GE2>/YR: CPT | Mod: CPTII,S$GLB,, | Performed by: NURSE PRACTITIONER

## 2023-01-01 PROCEDURE — 99214 PR OFFICE/OUTPT VISIT, EST, LEVL IV, 30-39 MIN: ICD-10-PCS | Mod: S$GLB,,, | Performed by: NURSE PRACTITIONER

## 2023-01-01 PROCEDURE — G0439 PPPS, SUBSEQ VISIT: HCPCS | Mod: S$GLB,,, | Performed by: NURSE PRACTITIONER

## 2023-01-01 RX ORDER — CLONIDINE HYDROCHLORIDE 0.1 MG/1
0.1 TABLET ORAL 2 TIMES DAILY
COMMUNITY

## 2023-01-01 RX ORDER — OXYCODONE AND ACETAMINOPHEN 5; 325 MG/1; MG/1
1 TABLET ORAL 3 TIMES DAILY PRN
Qty: 90 TABLET | Refills: 0 | Status: SHIPPED | OUTPATIENT
Start: 2023-01-01 | End: 2023-01-01

## 2023-01-01 RX ORDER — METOPROLOL SUCCINATE 50 MG/1
50 TABLET, EXTENDED RELEASE ORAL DAILY
COMMUNITY

## 2023-01-01 RX ORDER — NITROGLYCERIN 0.4 MG/1
0.4 TABLET SUBLINGUAL
COMMUNITY
Start: 2023-01-01

## 2023-01-01 RX ORDER — ACETAMINOPHEN 500 MG
1 TABLET ORAL NIGHTLY
COMMUNITY
Start: 2023-01-01

## 2023-01-01 RX ORDER — NITROGLYCERIN 0.4 MG/1
TABLET SUBLINGUAL
COMMUNITY
Start: 2023-01-01

## 2023-01-01 RX ORDER — HYDROCODONE BITARTRATE AND ACETAMINOPHEN 10; 325 MG/1; MG/1
1 TABLET ORAL EVERY 6 HOURS PRN
COMMUNITY
End: 2023-01-01

## 2023-01-01 RX ORDER — OXYCODONE AND ACETAMINOPHEN 5; 325 MG/1; MG/1
1 TABLET ORAL 3 TIMES DAILY PRN
Qty: 90 TABLET | Refills: 0 | Status: SHIPPED | OUTPATIENT
Start: 2023-01-01 | End: 2023-07-03

## 2023-01-01 RX ORDER — ERGOCALCIFEROL (VITAMIN D2) 200 MCG/ML
8000 DROPS ORAL
COMMUNITY
Start: 2023-01-01 | End: 2023-01-01 | Stop reason: SDUPTHER

## 2023-01-01 RX ORDER — OMEPRAZOLE 40 MG/1
CAPSULE, DELAYED RELEASE ORAL
Qty: 90 CAPSULE | Refills: 3 | Status: SHIPPED | OUTPATIENT
Start: 2023-01-01 | End: 2023-01-01 | Stop reason: SDUPTHER

## 2023-01-01 RX ORDER — ATORVASTATIN CALCIUM 80 MG/1
80 TABLET, FILM COATED ORAL DAILY
COMMUNITY

## 2023-01-01 RX ORDER — MULTIVITAMIN
1 TABLET ORAL DAILY
COMMUNITY
Start: 2023-01-01

## 2023-01-01 RX ORDER — CLOPIDOGREL BISULFATE 75 MG/1
75 TABLET ORAL DAILY
COMMUNITY

## 2023-01-01 RX ORDER — BNT162B2 ORIGINAL AND OMICRON BA.4/BA.5 .1125; .1125 MG/2.25ML; MG/2.25ML
INJECTION, SUSPENSION INTRAMUSCULAR
COMMUNITY
Start: 2022-01-01 | End: 2023-01-01

## 2023-01-01 RX ORDER — OXYCODONE AND ACETAMINOPHEN 5; 325 MG/1; MG/1
1 TABLET ORAL 3 TIMES DAILY PRN
Qty: 90 TABLET | Refills: 0 | Status: SHIPPED | OUTPATIENT
Start: 2023-01-01 | End: 2023-01-01 | Stop reason: SDUPTHER

## 2023-01-01 RX ORDER — SACUBITRIL AND VALSARTAN 97; 103 MG/1; MG/1
1 TABLET, FILM COATED ORAL 2 TIMES DAILY
COMMUNITY

## 2023-01-01 RX ORDER — OXYCODONE AND ACETAMINOPHEN 5; 325 MG/1; MG/1
TABLET ORAL
COMMUNITY
Start: 2023-01-01 | End: 2023-01-01 | Stop reason: SDUPTHER

## 2023-01-01 RX ORDER — SERTRALINE HYDROCHLORIDE 100 MG/1
TABLET, FILM COATED ORAL
Qty: 90 TABLET | Refills: 3 | Status: SHIPPED | OUTPATIENT
Start: 2023-01-01

## 2023-01-01 RX ORDER — TRIAMCINOLONE ACETONIDE 1 MG/G
CREAM TOPICAL 2 TIMES DAILY
Qty: 28.4 G | Refills: 0 | Status: SHIPPED | OUTPATIENT
Start: 2023-01-01

## 2023-01-01 RX ORDER — PRAVASTATIN SODIUM 20 MG/1
TABLET ORAL
COMMUNITY
Start: 2023-01-01

## 2023-01-01 RX ORDER — OXYCODONE AND ACETAMINOPHEN 5; 325 MG/1; MG/1
1 TABLET ORAL 3 TIMES DAILY PRN
Qty: 90 TABLET | Refills: 0 | Status: SHIPPED | OUTPATIENT
Start: 2023-01-01 | End: 2023-08-21

## 2023-01-01 RX ORDER — METOCLOPRAMIDE 5 MG/1
TABLET ORAL
COMMUNITY
Start: 2022-01-01

## 2023-01-01 RX ORDER — MIRTAZAPINE 7.5 MG/1
7.5 TABLET, FILM COATED ORAL NIGHTLY
Qty: 90 TABLET | Refills: 1 | Status: SHIPPED | OUTPATIENT
Start: 2023-01-01

## 2023-01-01 RX ORDER — OMEPRAZOLE 40 MG/1
40 CAPSULE, DELAYED RELEASE ORAL DAILY
Qty: 90 CAPSULE | Refills: 1 | Status: SHIPPED | OUTPATIENT
Start: 2023-01-01

## 2023-01-04 PROBLEM — E21.3 HYPERPARATHYROIDISM, UNSPECIFIED: Status: ACTIVE | Noted: 2023-01-01

## 2023-01-04 NOTE — PROGRESS NOTES
Chief Complaint   Patient presents with    Chronic Pain       HPI  Kaycee Almanza is a 88 y.o. female with multiple medical diagnoses as listed in the medical history and problem list that presents for follow-up for chronic pain    Chronic pain  She is taking percocet 5 for pain  Has had some flare up with the weather changing  Has not gotten her latest script from [facility]      Dermatitis  She has had some itching on her right cheek and some discoloration near her right temple      ALLERGIES AND MEDICATIONS: updated and reviewed.  Review of patient's allergies indicates:   Allergen Reactions    Pneumoc 13-melva conj-dip cr(pf) Swelling    Pneumovax-23 [pneumococcal 23-melva ps vaccine] Swelling     Medication List with Changes/Refills   New Medications    TRIAMCINOLONE ACETONIDE 0.1% (KENALOG) 0.1 % CREAM    Apply topically 2 (two) times daily.   Current Medications    ACETAMINOPHEN (TYLENOL) 500 MG TABLET    Take 500 mg by mouth every 6 (six) hours as needed for Pain.    ASPIRIN (ECOTRIN) 81 MG EC TABLET    Take 81 mg by mouth once daily.    BLOOD-GLUCOSE METER (FREESTYLE SYSTEM KIT) KIT    Use as instructed    CALCIUM CARBONATE/VITAMIN D3 (CALCIUM 600 + D,3, ORAL)    Take by mouth.    FLUCONAZOLE (DIFLUCAN) 150 MG TAB        HYDROCHLOROTHIAZIDE (HYDRODIURIL) 12.5 MG TAB    TAKE 1 TABLET EVERY DAY    MIRTAZAPINE (REMERON) 7.5 MG TAB    TAKE 1 TABLET EVERY EVENING    MULTIVIT-MIN/IRON/FOLIC/LUTEIN (CENTRUM SILVER WOMEN ORAL)    Take by mouth.    NIFEDIPINE (PROCARDIA-XL) 60 MG (OSM) 24 HR TABLET    Take 1 tablet (60 mg total) by mouth once daily.    OMEPRAZOLE (PRILOSEC) 40 MG CAPSULE    Take 1 capsule (40 mg total) by mouth once daily.    POTASSIUM CHLORIDE (KLOR-CON) 10 MEQ TBSR    TK 1 T PO BID FOR 15 DAYS    SERTRALINE (ZOLOFT) 100 MG TABLET    TAKE 1 TABLET EVERY DAY    TRUE METRIX GLUCOSE TEST STRIP STRP    Check blood glucose 2 times daily before meals    VALSARTAN (DIOVAN) 320 MG TABLET    Take 1 tablet  "(320 mg total) by mouth once daily.   Changed and/or Refilled Medications    Modified Medication Previous Medication    OXYCODONE-ACETAMINOPHEN (PERCOCET) 5-325 MG PER TABLET oxyCODONE-acetaminophen (PERCOCET) 5-325 mg per tablet       Take 1 tablet by mouth 3 (three) times daily as needed for Pain.    Take 1 tablet by mouth 3 (three) times daily as needed for Pain.   Discontinued Medications    HYDROCODONE-ACETAMINOPHEN (NORCO)  MG PER TABLET    Take 1 tablet by mouth every 6 (six) hours as needed for Pain.       ROS  Review of Systems   Constitutional:  Negative for chills, diaphoresis, fatigue, fever and unexpected weight change.   HENT:  Negative for rhinorrhea, sinus pressure, sore throat and tinnitus.    Eyes:  Negative for photophobia and visual disturbance.   Respiratory:  Negative for cough, shortness of breath and wheezing.    Cardiovascular:  Negative for chest pain and palpitations.   Gastrointestinal:  Negative for abdominal pain, blood in stool, constipation, diarrhea, nausea and vomiting.   Genitourinary:  Negative for dysuria, flank pain, frequency and vaginal discharge.   Musculoskeletal:  Positive for arthralgias. Negative for joint swelling.   Skin:  Negative for rash.   Neurological:  Negative for speech difficulty, weakness, light-headedness and headaches.   Psychiatric/Behavioral:  Negative for behavioral problems and dysphoric mood.      Physical Exam  Vitals:    01/04/23 0749   BP: 120/72   BP Location: Left arm   Patient Position: Sitting   BP Method: Small (Manual)   Pulse: 73   Resp: 18   Temp: 97.9 °F (36.6 °C)   TempSrc: Oral   SpO2: 95%   Weight: 49.2 kg (108 lb 7.5 oz)   Height: 4' 9" (1.448 m)    Body mass index is 23.47 kg/m².  Weight: 49.2 kg (108 lb 7.5 oz)   Height: 4' 9" (144.8 cm)     Physical Exam  Vitals and nursing note reviewed.   Constitutional:       Appearance: She is well-developed.   HENT:      Head:     Skin:     General: Skin is warm and dry.      Findings: No " erythema or rash.   Neurological:      Mental Status: She is alert. Mental status is at baseline.   Psychiatric:         Behavior: Behavior normal.       Health Maintenance         Date Due Completion Date    DEXA Scan 06/23/2024 6/23/2022    Override on 6/22/2016: Done (NP Akilah Barnett/Dr. Hollis Luther/Endocrinology- normal range in the lumbar spine & hips,left wrist Tscore -2.7 improved from -3.3. Patient has had to stop Prolia, but patient had 4 doses. Patient is also on calcium + vitamin d supplements)    Override on 7/22/2014: Done (Dr. Hollis Luther/Endocrinology- AP Spine-1.247 g/cm 2 w/ T score =0.3,dual femur=0.979 g/cm 2 w/ T score =0.2,left forearm= 0.586 g/cm 2 w/ T score=3.3,patient started on prolia)    TETANUS VACCINE 03/09/2027 3/9/2017 (Declined)    Override on 3/9/2017: Declined            Health maintenance reviewed and addressed as ordered      ASSESSMENT     1. Essential hypertension    2. Hyperparathyroidism, unspecified    3. Controlled type 2 diabetes mellitus with microalbuminuria, without long-term current use of insulin    4. Stage 3b chronic kidney disease    5. Chronic midline back pain, unspecified back location    6. Spinal stenosis of lumbar region without neurogenic claudication    7. Dermatitis        PLAN:     Problem List Items Addressed This Visit          Neuro    Lumbar spinal stenosis  Continue current regimen    Relevant Medications    oxyCODONE-acetaminophen (PERCOCET) 5-325 mg per tablet       Cardiac/Vascular    Essential hypertension - Primary  BP recheck       Renal/    Chronic kidney disease, stage III (moderate)  Aim for BP control       Endocrine    Controlled type 2 diabetes mellitus with microalbuminuria, without long-term current use of insulin  Has f/u with Endocrine    Hyperparathyroidism, unspecified  Has f/u with endocrine, undergoing workup       Orthopedic    Chronic back pain   [place]  database queried/reviewed  Continue current regimen    Relevant  Medications    oxyCODONE-acetaminophen (PERCOCET) 5-325 mg per tablet     Other Visit Diagnoses       Dermatitis     Apply BID  Consult derm if no improvement as this may be an AK     Relevant Medications    triamcinolone acetonide 0.1% (KENALOG) 0.1 % cream              Iona Jose MD  01/04/2023 8:07 AM      Follow up in about 3 months (around 4/4/2023) for management of chronic conditions.

## 2023-01-10 NOTE — PROGRESS NOTES
[facility] Follow-UP Note    Reason for Follow-Up:  dysphagia     PCP:   Iona Jose       HPI:  This is a 88 y.o. female last seen in GI clinic on 5/2022 for dysphagia. She has a PMHx of Arthtitis, CAD, DM, GERD, HTN. She has had worsening dysphagia over the past few months which occurs with solids and also liquids. She feels that she swallows food and liquid without issue but they feel they are stuck in her chest. She either has to throw up the food or stop eating until it passes. Denied any choking episodes or issues swallowing saliva. Does not take any reflux medications. She does not have any endoscopy on file. No recent abdominal imaging. She states she did have an EGD 15 years ago which showed she had stomach ulcers which were attributed to aleve which she was taking at the time for arthritis. Denied NSAID use currently. No hematemesis, melena, change in bowel habits. Weight has been stable. She has had cscopes in her life time and did not have polyps. Recent blood works showed Hg 11 but otherwise normal and CMP wnl. She has a small goiter but her endocrine doctor does not think this is large enough to be causing issue with swallowing.     Esophagram showed small zenker's diverticulum and also benign appearing stenosis at GEJ. EGD subsequently showed a mild stenosis at GEJ which was dilated to 15mm and biopsied (no evidence of Rios's). Also showed gastritis which was HP negative.      Interval History:  Since EGD, she has been doing much better. Reflux is controlled with PPI and she has not been having much issues with dysphagia any longer. She is eating a regular diet without too much issue. Her weight and appetite are stable. Daughter is with her and agrees she is doing well. Their only concern is that she is on pain medication now and wanting to avoid constipation. Denied any abdominal pain, blood in stool.       ROS:  Constitutional: No fevers, chills, No weight loss  ENT: No  allergies  CV: No chest pain  Pulm: No cough, No shortness of breath  Ophtho: No vision changes  GI: see HPI  Derm: No rash  Heme: No lymphadenopathy, No bruising  MSK: No arthritis  : No dysuria, No hematuria  Endo: No hot or cold intolerance  Neuro: No syncope, No seizure  Psych: No anxiety, No depression    PMH, PSH, SH, FH reviewed     All:   Review of patient's allergies indicates:   Allergen Reactions    Pneumoc 13-melva conj-dip cr(pf) Swelling    Pneumovax-23 [pneumococcal 23-melva ps vaccine] Swelling       Current Outpatient Rx   Medication Sig Dispense Refill    acetaminophen (TYLENOL) 500 MG tablet Take 500 mg by mouth every 6 (six) hours as needed for Pain.      aspirin (ECOTRIN) 81 MG EC tablet Take 81 mg by mouth once daily.      calcium carbonate/vitamin D3 (CALCIUM 600 + D,3, ORAL) Take by mouth.      fluconazole (DIFLUCAN) 150 MG Tab       hydroCHLOROthiazide (HYDRODIURIL) 12.5 MG Tab TAKE 1 TABLET EVERY DAY 90 tablet 1    mirtazapine (REMERON) 7.5 MG Tab TAKE 1 TABLET EVERY EVENING 90 tablet 1    multivit-min/iron/folic/lutein (CENTRUM SILVER WOMEN ORAL) Take by mouth.      NIFEdipine (PROCARDIA-XL) 60 MG (OSM) 24 hr tablet Take 1 tablet (60 mg total) by mouth once daily. 90 tablet 1    oxyCODONE-acetaminophen (PERCOCET) 5-325 mg per tablet Take 1 tablet by mouth 3 (three) times daily as needed for Pain. 90 tablet 0    potassium chloride (KLOR-CON) 10 MEQ TbSR TK 1 T PO BID FOR 15 DAYS  0    sertraline (ZOLOFT) 100 MG tablet TAKE 1 TABLET EVERY DAY 90 tablet 1    triamcinolone acetonide 0.1% (KENALOG) 0.1 % cream Apply topically 2 (two) times daily. 28.4 g 0    TRUE METRIX GLUCOSE TEST STRIP Strp Check blood glucose 2 times daily before meals 200 each 11    valsartan (DIOVAN) 320 MG tablet Take 1 tablet (320 mg total) by mouth once daily. 90 tablet 3    blood-glucose meter (FREESTYLE SYSTEM KIT) kit Use as instructed 1 each 0    omeprazole (PRILOSEC) 40 MG capsule Take 1 capsule (40 mg total) by  "mouth once daily. 90 capsule 3       Objective Findings:    Vital Signs:  Ht 4' 9" (1.448 m)   Wt 47.7 kg (105 lb 2.6 oz)   BMI 22.76 kg/m²   Body mass index is 22.76 kg/m².    Physical Exam:  General Appearance: Well appearing in no acute distress  Head:   Normocephalic, without obvious abnormality  Eyes:    No scleral icterus, EOMI  ENT: Neck supple, Lips, mucosa, and tongue normal   Lungs: CTA bilaterally in anterior and posterior fields, no wheezes, no crackles.  Heart:  Regular rate and rhythm, S1, S2 normal, no murmurs heard  Abdomen: Soft, non tender, non distended with positive bowel sounds in all four quadrants. No hepatosplenomegaly, ascites, or mass  Extremities: 2+ pulses, no clubbing, cyanosis or edema  Skin: No rash  Neurologic: no focal deficits       Labs:  Lab Results   Component Value Date    WBC 7.27 08/10/2022    HGB 10.9 (L) 08/10/2022    HCT 34.6 (L) 08/10/2022     08/10/2022    CHOL 313 (H) 03/12/2021    TRIG 165 (H) 03/12/2021    HDL 50 03/12/2021    ALT 17 08/10/2022    AST 20 08/10/2022     08/10/2022    K 4.9 08/10/2022     08/10/2022    CREATININE 1.2 08/19/2022    BUN 33 (H) 08/10/2022    CO2 27 08/10/2022    TSH 1.066 03/12/2021    INR 1.0 04/21/2022    HGBA1C 5.6 03/12/2021     Imaging:     Esophagram 5/2022   Impression:     1. Tiny metal stable left lateral neck probable cutaneous surface.  2. Prominent presbyesophagus.  3. Segmental benign stenosis distal esophagus at GE junction discussed above, benign scarring favored.  4. Evidence of tiny Zenker's diverticulum discussed above.  This report was flagged in Epic as abnormal.     Endoscopy:  None      Assessment:     1. Dysphagia   2. GERD      Patient with few months of worsening dysphagia. EGD showed mild stenosis which was dilated and biopsied. She has been on PPI and symptoms are much improved now. Continue PPI. Can consider repeat dilation if symptoms recur. Recommended stool softener and miralax while on " pain medication      Recommendations:     - Take PPI on empty stomach, 30 mins before meals   - H2 blocker for breakthrough symptoms   - GERD precautions discussed    - miralax and stool softener while on pain medication to avoid constipation     RTC 6 months     Order summary:         Thank you so much for allowing me to participate in the care of Kaycee Palmer MD  Gastroenterology   [facility]

## 2023-02-01 NOTE — TELEPHONE ENCOUNTER
----- Message from Karie Puentes sent at 2/1/2023 11:45 AM CST -----  Type: RX Refill Request    Who Called:  self    Have you contacted your pharmacy: no    Refill or New Rx: refill    RX Name and Strength: oxyCODONE-acetaminophen (PERCOCET) 5-325 mg per tablet      Preferred Pharmacy with phone number: [facility] [address]    Phone:  [phone]  Fax:  [phone]    Local or Mail Order:  mail    Would the patient rather a call back or a response via [facility]?  call    Best Call Back Number: [phone] (home)      Additional Information:

## 2023-02-01 NOTE — TELEPHONE ENCOUNTER
Care Due:                  Date            Visit Type   Department     Provider  --------------------------------------------------------------------------------                                [facility]                              PRIMARY      MEDICINE/  Last Visit: 01-      CARE ([facility])   INTERNAL ANTHONY Jose                              [facility]                              PRIMARY      MEDICINE/  Next Visit: 04-      CARE ([facility])   AYLEEN Santillan  Test          Frequency    Reason                     Performed    Due Date  --------------------------------------------------------------------------------    Lipid Panel.  12 months..  mirtazapine..............  03- 03-    [employer] Embedded Care Gaps. Reference number: [id number]. 2/01/2023   11:53:17 AM CST

## 2023-03-08 NOTE — TELEPHONE ENCOUNTER
Refill Decision Note   Kaycee Cami  is requesting a refill authorization.  Brief Assessment and Rationale for Refill:  Approve     Medication Therapy Plan:       Medication Reconciliation Completed: No   Comments:     No Care Gaps recommended.     Note composed:8:06 AM 03/08/2023

## 2023-03-08 NOTE — TELEPHONE ENCOUNTER
No new care gaps identified.  [facility] Embedded Care Gaps. Reference number: [id number]. 3/08/2023   7:55:52 AM CST

## 2023-03-09 NOTE — TELEPHONE ENCOUNTER
Spoke with the Patient and rescheduled an EAWV appointment for 04/06/23 @ 8:30am  Patient verbally understands.

## 2023-04-05 NOTE — PROGRESS NOTES
Chief Complaint   Patient presents with    Medication Refill    Pain And Symptom Management       HPI  Kaycee Almanza is a 88 y.o. female with multiple medical diagnoses as listed in the medical history and problem list that presents for follow-up for chronic pain management     Chronic pain- she is out of her medication; it was filled by the mail order pharmacy but not delivered   Carotid bruit- heard by her cardiologist; she is not having any TIA symptoms or vision changes      ALLERGIES AND MEDICATIONS: updated and reviewed.  Review of patient's allergies indicates:   Allergen Reactions    Pneumoc 13-melva conj-dip cr(pf) Swelling    Pneumovax-23 [pneumococcal 23-melva ps vaccine] Swelling     Medication List with Changes/Refills   New Medications    OXYCODONE-ACETAMINOPHEN (PERCOCET) 5-325 MG PER TABLET    Take 1 tablet by mouth 3 (three) times daily as needed for Pain.    OXYCODONE-ACETAMINOPHEN (PERCOCET) 5-325 MG PER TABLET    Take 1 tablet by mouth 3 (three) times daily as needed for Pain.   Current Medications    ACETAMINOPHEN (TYLENOL) 500 MG TABLET    Take 500 mg by mouth every 6 (six) hours as needed for Pain.    ASPIRIN (ECOTRIN) 81 MG EC TABLET    Take 81 mg by mouth once daily.    BLOOD-GLUCOSE METER (FREESTYLE SYSTEM KIT) KIT    Use as instructed    CALCIUM CARBONATE/VITAMIN D3 (CALCIUM 600 + D,3, ORAL)    Take by mouth.    FLUCONAZOLE (DIFLUCAN) 150 MG TAB        HYDROCHLOROTHIAZIDE (HYDRODIURIL) 12.5 MG TAB    TAKE 1 TABLET EVERY DAY    METOCLOPRAMIDE HCL (REGLAN) 5 MG TABLET        MULTIVIT-MIN/IRON/FOLIC/LUTEIN (CENTRUM SILVER WOMEN ORAL)    Take by mouth.    NIFEDIPINE (PROCARDIA-XL) 60 MG (OSM) 24 HR TABLET    Take 1 tablet (60 mg total) by mouth once daily.    OMEPRAZOLE (PRILOSEC) 40 MG CAPSULE    Take 1 capsule (40 mg total) by mouth once daily.    OXYCODONE-ACETAMINOPHEN (PERCOCET) 5-325 MG PER TABLET        POTASSIUM CHLORIDE (KLOR-CON) 10 MEQ TBSR    TK 1 T PO BID FOR 15 DAYS    SERTRALINE  (ZOLOFT) 100 MG TABLET    TAKE 1 TABLET EVERY DAY    TRIAMCINOLONE ACETONIDE 0.1% (KENALOG) 0.1 % CREAM    Apply topically 2 (two) times daily.    TRUE METRIX GLUCOSE TEST STRIP STRP    Check blood glucose 2 times daily before meals    VALSARTAN (DIOVAN) 320 MG TABLET    Take 1 tablet (320 mg total) by mouth once daily.   Changed and/or Refilled Medications    Modified Medication Previous Medication    MIRTAZAPINE (REMERON) 7.5 MG TAB mirtazapine (REMERON) 7.5 MG Tab       Take 1 tablet (7.5 mg total) by mouth every evening.    TAKE 1 TABLET EVERY EVENING    OXYCODONE-ACETAMINOPHEN (PERCOCET) 5-325 MG PER TABLET oxyCODONE-acetaminophen (PERCOCET) 5-325 mg per tablet       Take 1 tablet by mouth 3 (three) times daily as needed for Pain.    Take 1 tablet by mouth 3 (three) times daily as needed for Pain.   Discontinued Medications    PFIZER COVID BIVAL,12Y UP,,PF, 30 MCG/0.3 ML INJECTION           ROS  Review of Systems   Constitutional:  Negative for chills, diaphoresis, fatigue, fever and unexpected weight change.   HENT:  Negative for rhinorrhea, sinus pressure, sore throat and tinnitus.    Eyes:  Negative for photophobia and visual disturbance.   Respiratory:  Negative for cough, shortness of breath and wheezing.    Cardiovascular:  Negative for chest pain and palpitations.   Gastrointestinal:  Negative for abdominal pain, blood in stool, constipation, diarrhea, nausea and vomiting.   Genitourinary:  Negative for dysuria, flank pain, frequency and vaginal discharge.   Musculoskeletal:  Positive for arthralgias. Negative for joint swelling.   Skin:  Negative for rash.   Neurological:  Negative for speech difficulty, weakness, light-headedness and headaches.   Psychiatric/Behavioral:  Negative for behavioral problems and dysphoric mood.      Physical Exam  Vitals:    04/05/23 0821   BP: (!) 128/58   BP Location: Left arm   Patient Position: Sitting   BP Method: Small (Manual)   Pulse: 74   Resp: 16   Temp: 98 °F  "(36.7 °C)   TempSrc: Oral   SpO2: 98%   Weight: 45.9 kg (101 lb 3.1 oz)   Height: 4' 9" (1.448 m)    Body mass index is 21.9 kg/m².  Weight: 45.9 kg (101 lb 3.1 oz)   Height: 4' 9" (144.8 cm)     Physical Exam  Vitals and nursing note reviewed.   Constitutional:       Appearance: She is well-developed.   Skin:     General: Skin is warm and dry.      Findings: No erythema or rash.   Neurological:      Mental Status: She is alert. Mental status is at baseline.   Psychiatric:         Behavior: Behavior normal.       Health Maintenance         Date Due Completion Date    DEXA Scan 06/23/2024 6/23/2022    Override on 6/22/2016: Done (LAURY Barnett/Dr. Hollis Luther/Endocrinology- normal range in the lumbar spine & hips,left wrist Tscore -2.7 improved from -3.3. Patient has had to stop Prolia, but patient had 4 doses. Patient is also on calcium + vitamin d supplements)    Override on 7/22/2014: Done (Dr. Hollis Luther/Endocrinology- AP Spine-1.247 g/cm 2 w/ T score =0.3,dual femur=0.979 g/cm 2 w/ T score =0.2,left forearm= 0.586 g/cm 2 w/ T score=3.3,patient started on prolia)    TETANUS VACCINE 03/09/2027 3/9/2017 (Declined)    Override on 3/9/2017: Declined            Health maintenance reviewed and addressed as ordered      ASSESSMENT/PLAN       1. Chronic midline back pain, unspecified back location   [facility] database queried/reviewed  Will continue current regimen  - oxyCODONE-acetaminophen (PERCOCET) 5-325 mg per tablet; Take 1 tablet by mouth 3 (three) times daily as needed for Pain.  Dispense: 90 tablet; Refill: 0  - oxyCODONE-acetaminophen (PERCOCET) 5-325 mg per tablet; Take 1 tablet by mouth 3 (three) times daily as needed for Pain.  Dispense: 90 tablet; Refill: 0  - oxyCODONE-acetaminophen (PERCOCET) 5-325 mg per tablet; Take 1 tablet by mouth 3 (three) times daily as needed for Pain.  Dispense: 90 tablet; Refill: 0    2. Spinal stenosis of lumbar region without neurogenic claudication  Continue current " regimen  - oxyCODONE-acetaminophen (PERCOCET) 5-325 mg per tablet; Take 1 tablet by mouth 3 (three) times daily as needed for Pain.  Dispense: 90 tablet; Refill: 0  - oxyCODONE-acetaminophen (PERCOCET) 5-325 mg per tablet; Take 1 tablet by mouth 3 (three) times daily as needed for Pain.  Dispense: 90 tablet; Refill: 0  - oxyCODONE-acetaminophen (PERCOCET) 5-325 mg per tablet; Take 1 tablet by mouth 3 (three) times daily as needed for Pain.  Dispense: 90 tablet; Refill: 0    3. Osteoarthritis involving multiple joints on both sides of body  Refill medication    4. Controlled type 2 diabetes mellitus with microalbuminuria, without long-term current use of insulin  stable    5. Right carotid bruit  Will image if symptoms worsen    6. Anxiety  Encourage appetite, recommend boost shakes to supplement protein and prevent further weight loss  - mirtazapine (REMERON) 7.5 MG Tab; Take 1 tablet (7.5 mg total) by mouth every evening.  Dispense: 90 tablet; Refill: 1        Iona Jose MD  04/05/2023 8:27 AM        Follow up in about 3 months (around 7/5/2023) for management of chronic conditions.    No orders of the defined types were placed in this encounter.

## 2023-04-06 NOTE — PATIENT INSTRUCTIONS
Counseling and Referral of Other Preventative  (Italic type indicates deductible and co-insurance are waived)    Patient Name: Kaycee Almanza  Today's Date: 4/6/2023    Health Maintenance       Date Due Completion Date    DEXA Scan 06/23/2024 6/23/2022    Override on 6/22/2016: Done (LAURY Barnett/Dr. Hollis Luther/Endocrinology- normal range in the lumbar spine & hips,left wrist Tscore -2.7 improved from -3.3. Patient has had to stop Prolia, but patient had 4 doses. Patient is also on calcium + vitamin d supplements)    Override on 7/22/2014: Done (Dr. Hollis Luther/Endocrinology- AP Spine-1.247 g/cm 2 w/ T score =0.3,dual femur=0.979 g/cm 2 w/ T score =0.2,left forearm= 0.586 g/cm 2 w/ T score=3.3,patient started on prolia)    TETANUS VACCINE 03/09/2027 3/9/2017 (Declined)    Override on 3/9/2017: Declined        No orders of the defined types were placed in this encounter.    The following information is provided to all patients.  This information is to help you find resources for any of the problems found today that may be affecting your health:                Living healthy guide: www.[facility].[place].gov      Understanding Diabetes: www.diabetes.org      Eating healthy: www.cdc.gov/healthyweight      CDC home safety checklist: www.cdc.gov/steadi/patient.html      Agency on Aging: www.goea.[place].gov      Alcoholics anonymous (AA): www.aa.org      Physical Activity: www.dylon.nih.gov/nt8ceyk      Tobacco use: www.quitwithusla.org

## 2023-04-06 NOTE — PROGRESS NOTES
"  Kaycee Almanza presented for a  Medicare AWV and comprehensive Health Risk Assessment today. The following components were reviewed and updated:    Medical history  Family History  Social history  Allergies and Current Medications  Health Risk Assessment  Health Maintenance  Care Team         ** See Completed Assessments for Annual Wellness Visit within the encounter summary.**         The following assessments were completed:  Living Situation  CAGE  Depression Screening  Timed Get Up and Go  Whisper Test  Cognitive Function Screening  Nutrition Screening  ADL Screening  PAQ Screening        Vitals:    04/06/23 0826 04/06/23 0840   BP:  (!) 126/58   Pulse:  69   Temp:  98.1 °F (36.7 °C)   SpO2:  96%   Weight: 45.9 kg (101 lb 4.8 oz)    Height: 4' 9" (1.448 m)      Body mass index is 21.92 kg/m².  Physical Exam  Constitutional:       Appearance: Normal appearance.   Pulmonary:      Effort: Pulmonary effort is normal.   Skin:     General: Skin is warm and dry.   Neurological:      General: No focal deficit present.      Mental Status: She is alert and oriented to person, place, and time.   Psychiatric:         Mood and Affect: Mood normal.         Behavior: Behavior normal.               Diagnoses and health risks identified today and associated recommendations/orders:    1. Encounter for preventive health examination  Patient was seen today for AWV.  Healthcare maintenance and screening recommendations were discussed and updated as indicated.  Return in one year for AWV.    2. Controlled type 2 diabetes mellitus with microalbuminuria, without long-term current use of insulin  The current medical regimen is effective;  continue present plan and medications.    3. Type 2 diabetes mellitus with diabetic polyneuropathy, without long-term current use of insulin  The current medical regimen is effective;  continue present plan and medications.    4. DM type 2 without retinopathy  The current medical regimen is " effective;  continue present plan and medications.    5. Hyperparathyroidism, unspecified  The current medical regimen is effective;  continue present plan and medications.    6. Stage 3a chronic kidney disease  The current medical regimen is effective;  continue present plan and medications.    7. Spinal stenosis of lumbar region without neurogenic claudication  The current medical regimen is effective;  continue present plan and medications.    8. Anxiety  The current medical regimen is effective;  continue present plan and medications.    9. Essential hypertension  The current medical regimen is effective;  continue present plan and medications.    10. Anemia, unspecified type  The current medical regimen is effective;  continue present plan and medications.    11. Chronic midline back pain, unspecified back location  The current medical regimen is effective;  continue present plan and medications.    12. Osteoarthritis involving multiple joints on both sides of body  The current medical regimen is effective;  continue present plan and medications.    13. External hemorrhoid, bleeding  The current medical regimen is effective;  continue present plan and medications.      Provided Kaycee with a 5-10 year written screening schedule and personal prevention plan. Recommendations were developed using the USPSTF age appropriate recommendations. Education, counseling, and referrals were provided as needed. After Visit Summary printed and given to patient which includes a list of additional screenings\tests needed.      Alyssa Campbell NP  I offered to discuss advanced care planning, including how to pick a person who would make decisions for you if you were unable to make them for yourself, called a health care power of , and what kind of decisions you might make such as use of life sustaining treatments such as ventilators and tube feeding when faced with a life limiting illness recorded on a living will that  they will need to know. (How you want to be cared for as you near the end of your natural life)     X Patient is interested in learning more about how to make advanced directives.  I provided them paperwork and offered to discuss this with them.

## 2023-05-16 NOTE — TELEPHONE ENCOUNTER
Rt pt daughter Jacklyn alonso , offered next opening this Thursday with LAURY Mcmahan . She delcined due to being out of town . Accepted 5/22 at 9:30 am with LAURY Marques

## 2023-05-16 NOTE — TELEPHONE ENCOUNTER
----- Message from Alyssa Glass sent at 5/16/2023 11:17 AM CDT -----  Regarding: Sooner appointment request  .Type:  Sooner Appointment Request    Patient is requesting a sooner appointment.  Patient declined first available appointment listed as well as another facility and provider .  Patient will not accept being placed on the waitlist and is requesting a message be sent to doctor.    Name of Caller: Jacklyn-Daughter-N-Law     When is the first available appointment? 08/2023    Symptoms: heart attack/ER visit     Would the patient rather a call back or a response via [facility]? Call     Best Call Back Number: .[phone]      Additional Information:

## 2023-05-17 NOTE — PROGRESS NOTES
C3 nurse spoke with Kaycee Almanza's daughter, Jacklyn, for a TCC post hospital discharge follow up call. The patient has a scheduled HOSFU appointment with Iona Jose MD (PCP) NP visit 5/22/23 @ 9:30am, and Victoriano Gambino MD (cardio) 5/22/23.

## 2023-05-22 NOTE — PATIENT INSTRUCTIONS
OK to resume sertraline and mirtazapine    HOLD nifedipine, hydrochlorothiazide, and valsartan until guidance from cardiology    STOP pravastatin

## 2023-05-22 NOTE — TELEPHONE ENCOUNTER
Care Due:                  Date            Visit Type   Department     Provider  --------------------------------------------------------------------------------                                [facility]                              PRIMARY      MEDICINE/  Last Visit: 04-      CARE ([facility])   INTERNAL ANTHONY Jose                              [facility]                              PRIMARY      MEDICINE/  Next Visit: 07-      CARE ([facility])   AYLEEN Santillan  Test          Frequency    Reason                     Performed    Due Date  --------------------------------------------------------------------------------    CBC.........  12 months..  mirtazapine..............  08-   08-    CMP.........  12 months..  hydroCHLOROthiazide,       08-   08-                             mirtazapine, valsartan...    Lipid Panel.  12 months..  mirtazapine..............  Not Found    Overdue    [employer] Embedded Care Due Messages. Reference number: [id number].   5/22/2023 10:07:00 AM CDT

## 2023-05-22 NOTE — TELEPHONE ENCOUNTER
Can we check with the pharmacy to see if a script was sent for her pain medication, I'm not sure if she requested a refill since leaving the hospital

## 2023-05-22 NOTE — PROGRESS NOTES
Routine Office Visit    Patient Name: Kaycee Almanza    : 10/13/1934  MRN: [id number]    Chief Complaint:  Hospital follow-up    Subjective:  Kaycee is a 88 y.o. female who presents today for:    1. Hospital follow-up - patient who is new to me with a history of anxiety, insomnia, hypertension, GERD, diabetes well control without medications reports today with her daughter Jacklyn for hospital follow-up.  Recently she was hospitalized at [facility] for an NSTEMI treated medically.  Previously she was on hydrochlorothiazide, nifedipine, valsartan prescribed by her PCP for hypertension.  Since being discharged from the hospital she was started on metoprolol, clonidine, atorvastatin 80 mg (to replace previously ordered pravastatin 20 mg), Entresto, and Plavix.  She does take a baby aspirin.  She has not been taking the hydrochlorothiazide, nifedipine, valsartan, mirtazapine, or sertraline since leaving the hospital.  EF during hospitalization was 30-35%.  She does have a follow-up with her cardiologist Dr. Victoriano ramos later today.  Overall patient has been feeling well since being discharged.  She denies any chest pain, shortness a breath, wheezing, or palpitations.  No leg swelling.  She does have home health set up with nursing and physical therapy coming to her house.    Past Medical History  Past Medical History:   Diagnosis Date    Anxiety     Arthritis     Coronary artery disease     Dementia     Depression     Diabetes type 2, controlled     sees Dr. Elena    GERD (gastroesophageal reflux disease)     Hyperlipidemia     Hypertension     Left posterior capsular opacification 2017    Osteoporosis     Thyroid disease     Ulcer     Ulcer        Past Surgical History  Past Surgical History:   Procedure Laterality Date    ANGIOGRAM, CORONARY, WITH LEFT HEART CATHETERIZATION      CATARACT EXTRACTION W/  INTRAOCULAR LENS IMPLANT Bilateral     done at [facility]    cataract surgery       CHOLECYSTECTOMY      ELBOW SURGERY      ESOPHAGOGASTRODUODENOSCOPY N/A 07/19/2022    Procedure: EGD (ESOPHAGOGASTRODUODENOSCOPY);  Surgeon: Tess Palmer MD;  Location: [facility];  Service: Gastroenterology;  Laterality: N/A;  Fully vaccinated.EC    EYE SURGERY      HAND SURGERY      HYSTERECTOMY      KNEE SURGERY      SHOULDER SURGERY      yag laser  Bilateral        Family History  Family History   Problem Relation Age of Onset    Stroke Mother     Diabetes Mother     Arthritis Father     Early death Sister     Birth defects Brother     No Known Problems Daughter     No Known Problems Sister     Birth defects Brother     Birth defects Brother     Birth defects Brother     Birth defects Brother     No Known Problems Maternal Aunt     No Known Problems Maternal Uncle     No Known Problems Paternal Aunt     No Known Problems Paternal Uncle     No Known Problems Maternal Grandmother     No Known Problems Maternal Grandfather     No Known Problems Paternal Grandmother     No Known Problems Paternal Grandfather     Amblyopia Neg Hx     Blindness Neg Hx     Cataracts Neg Hx     Glaucoma Neg Hx     Macular degeneration Neg Hx     Retinal detachment Neg Hx     Strabismus Neg Hx     Cancer Neg Hx     Hypertension Neg Hx     Thyroid disease Neg Hx     Esophageal cancer Neg Hx     Colon cancer Neg Hx        Social History  Social History     Socioeconomic History    Marital status:     Number of children: 3   Tobacco Use    Smoking status: Never    Smokeless tobacco: Never   Substance and Sexual Activity    Alcohol use: No    Drug use: No    Sexual activity: Never     Social Determinants of Health     Financial Resource Strain: Low Risk     Difficulty of Paying Living Expenses: Not hard at all   Food Insecurity: No Food Insecurity    Worried About Running Out of Food in the Last Year: Never true    Ran Out of Food in the Last Year: Never true   Transportation Needs: No Transportation Needs    Lack of Transportation  (Medical): No    Lack of Transportation (Non-Medical): No   Physical Activity: Sufficiently Active    Days of Exercise per Week: 7 days    Minutes of Exercise per Session: 60 min   Stress: No Stress Concern Present    Feeling of Stress : Not at all   Social Connections: Moderately Isolated    Frequency of Communication with Friends and Family: More than three times a week    Frequency of Social Gatherings with Friends and Family: More than three times a week    Attends L[employer] day Services: 1 to 4 times per year    Active Member of Clubs or Organizations: No    Attends Club or Organization Meetings: Never    Marital Status:    Housing Stability: Low Risk     Unable to Pay for Housing in the Last Year: No    Number of Places Lived in the Last Year: 1    Unstable Housing in the Last Year: No       Current Medications  Current Outpatient Medications on File Prior to Visit   Medication Sig Dispense Refill    acetaminophen (TYLENOL) 500 MG tablet Take 500 mg by mouth every 6 (six) hours as needed for Pain.      aspirin (ECOTRIN) 81 MG EC tablet Take 81 mg by mouth once daily.      atorvastatin (LIPITOR) 80 MG tablet Take 80 mg by mouth once daily.      calcium carbonate/vitamin D3 (CALCIUM 600 + D,3, ORAL) Take by mouth.      cloNIDine (CATAPRES) 0.1 MG tablet Take 0.1 mg by mouth 2 (two) times daily.      fluconazole (DIFLUCAN) 150 MG Tab       metoclopramide HCl (REGLAN) 5 MG tablet       metoprolol succinate (TOPROL-XL) 50 MG 24 hr tablet Take 50 mg by mouth once daily.      multivit-min/iron/folic/lutein (CENTRUM SILVER WOMEN ORAL) Take by mouth.      oxyCODONE-acetaminophen (PERCOCET) 5-325 mg per tablet       oxyCODONE-acetaminophen (PERCOCET) 5-325 mg per tablet Take 1 tablet by mouth 3 (three) times daily as needed for Pain. 90 tablet 0    [START ON 6/4/2023] oxyCODONE-acetaminophen (PERCOCET) 5-325 mg per tablet Take 1 tablet by mouth 3 (three) times daily as needed for Pain. 90 tablet 0    potassium  chloride (KLOR-CON) 10 MEQ TbSR TK 1 T PO BID FOR 15 DAYS  0    sacubitriL-valsartan (ENTRESTO)  mg per tablet Take 1 tablet by mouth 2 (two) times daily.      triamcinolone acetonide 0.1% (KENALOG) 0.1 % cream Apply topically 2 (two) times daily. 28.4 g 0    TRUE METRIX GLUCOSE TEST STRIP Strp Check blood glucose 2 times daily before meals 200 each 11    [DISCONTINUED] omeprazole (PRILOSEC) 40 MG capsule TAKE 1 CAPSULE ONE TIME DAILY 90 capsule 3    blood-glucose meter (FREESTYLE SYSTEM KIT) kit Use as instructed 1 each 0    clopidogreL (PLAVIX) 75 mg tablet Take 75 mg by mouth once daily.      hydroCHLOROthiazide (HYDRODIURIL) 12.5 MG Tab TAKE 1 TABLET EVERY DAY (Patient not taking: Reported on 5/22/2023) 90 tablet 1    mirtazapine (REMERON) 7.5 MG Tab Take 1 tablet (7.5 mg total) by mouth every evening. (Patient not taking: Reported on 5/22/2023) 90 tablet 1    NIFEdipine (PROCARDIA-XL) 60 MG (OSM) 24 hr tablet Take 1 tablet (60 mg total) by mouth once daily. (Patient not taking: Reported on 5/22/2023) 90 tablet 1    sertraline (ZOLOFT) 100 MG tablet TAKE 1 TABLET EVERY DAY (Patient not taking: Reported on 5/22/2023) 90 tablet 3    valsartan (DIOVAN) 320 MG tablet Take 1 tablet (320 mg total) by mouth once daily. (Patient not taking: Reported on 5/22/2023) 90 tablet 3    [DISCONTINUED] omeprazole (PRILOSEC) 40 MG capsule Take 1 capsule (40 mg total) by mouth once daily. 90 capsule 3     No current facility-administered medications on file prior to visit.       Allergies   Review of patient's allergies indicates:   Allergen Reactions    Pneumoc 13-melva conj-dip cr(pf) Swelling    Pneumovax-23 [pneumococcal 23-melva ps vaccine] Swelling       Review of Systems (Pertinent positives)  Review of Systems   Constitutional: Negative.  Negative for chills and fever.   HENT: Negative.  Negative for congestion, sinus pain and sore throat.    Eyes: Negative.    Respiratory:  Negative for cough, shortness of breath and  "wheezing.    Cardiovascular:  Negative for chest pain, palpitations, orthopnea and claudication.   Gastrointestinal: Negative.  Negative for abdominal pain, diarrhea, nausea and vomiting.   Genitourinary: Negative.  Negative for dysuria, frequency and urgency.   Musculoskeletal: Negative.  Negative for back pain, joint pain and neck pain.   Skin: Negative.    Neurological: Negative.  Negative for dizziness, tingling, loss of consciousness and headaches.   Endo/Heme/Allergies: Negative.    Psychiatric/Behavioral: Negative.       /62 (BP Location: Right arm, Patient Position: Sitting, BP Method: Large (Manual))   Pulse (!) 53   Temp 98.6 °F (37 °C) (Oral)   Resp 16   Ht 4' 9" (1.448 m)   Wt 47.3 kg (104 lb 4.4 oz)   SpO2 98%   BMI 22.57 kg/m²     Physical Exam  Vitals reviewed.   Constitutional:       General: She is not in acute distress.     Appearance: Normal appearance. She is not ill-appearing, toxic-appearing or diaphoretic.   HENT:      Head: Normocephalic and atraumatic.   Cardiovascular:      Rate and Rhythm: Normal rate and regular rhythm.      Pulses: Normal pulses.      Heart sounds: Normal heart sounds.      Comments: Clinically euvolemic no JVD no lower extremity swelling  Pulmonary:      Effort: Pulmonary effort is normal. No respiratory distress.      Breath sounds: Normal breath sounds. No wheezing.   Abdominal:      General: Bowel sounds are normal. There is no distension.      Palpations: Abdomen is soft.      Tenderness: There is no abdominal tenderness.   Musculoskeletal:         General: No swelling, tenderness or deformity. Normal range of motion.   Skin:     General: Skin is warm and dry.      Capillary Refill: Capillary refill takes less than 2 seconds.   Neurological:      General: No focal deficit present.      Mental Status: She is alert and oriented to person, place, and time.   Psychiatric:         Mood and Affect: Mood normal.         Behavior: Behavior normal.    "     Assessment/Plan:  Kaycee Almanza is a 88 y.o. female who presents today for :    Kaycee was seen today for hosiptal follow up.    Diagnoses and all orders for this visit:    Heart failure, unspecified HF chronicity, unspecified heart failure type    Medications reviewed with patient.  She can restart mirtazapine and sertraline as prescribed.  Continue to hold nifedipine, hydrochlorothiazide, and valsartan until cardiology follow-up.  Medications updated in chart.  Clinically she is doing well from this standpoint.  Normal examination today.  She denies any residual cardiac symptoms.  To be followed by Cardiology.  Recommended patient to stop pravastatin and take atorvastatin 80 mg as prescribed.  On DAPT.    Gastroesophageal reflux disease, unspecified whether esophagitis present  -     omeprazole (PRILOSEC) 40 MG capsule; Take 1 capsule (40 mg total) by mouth once daily.    Patient does need refill of this.  Sent.    Essential hypertension    Controlled on current regimen of metoprolol, clonidine, and Entresto.  Continue to hold nifedipine, hydrochlorothiazide, and valsartan until cardiology follow-up.    Stage 3a chronic kidney disease    Stable.    PCP follow-up has previously already been scheduled in 6 weeks.  Recommended patient to keep this appointment.        This office note has been dictated.  This dictation has been generated using M-Modal Fluency Direct dictation; some phonetic errors may occur.   My collaborating physician is Dr. Santhosh Bunn.

## 2023-06-05 NOTE — TELEPHONE ENCOUNTER
----- Message from Abram Winchester sent at 6/5/2023 12:30 PM CDT -----  Regarding: [facility] [phone]  Type: Patient Call Back    Who called: [facility] PT     What is the request in detail: called because the pt has elevated blood pressure and would like to talk to the office. The BP is 202/102     Can the clinic reply by [facility]? No     Would the patient rather a call back or a response via [facility]? Call back     Best call back number: [phone]    Additional Information:    Thank you.

## 2023-06-05 NOTE — TELEPHONE ENCOUNTER
Called patient's only number on file, which her daughter had picked up. She stated that patient has been complaining mainly of memory issues and not recognizing familiar things.   I asked her about her blood pressure and she said she did hear about it earlier, but did not get a recent update.  Attempted to call [facility], left a voicemail to call back office.

## 2023-06-08 NOTE — TELEPHONE ENCOUNTER
"----- Message from Ines De Leon sent at 6/8/2023  9:51 AM CDT -----  Regarding: [facility]  .Type: Patient Call Back    Who called: [facility] office"    What is the request in detail: Calling to notify Dr Jose on the passing of the patient on today 06/08 @ 6:44 am. Pt pass a home     Can the clinic reply by [facility]? Call back     Would the patient rather a call back or a response via [facility]? Call [facility]     Best call back number: [phone]      "

## 2023-10-30 NOTE — TELEPHONE ENCOUNTER
----- Message from Maria Victoria Ronquillo sent at 11/16/2017 10:18 AM CST -----  Contact: Self  REFILL: hydrocodone-acetaminophen 5-325mg (NORCO) 5-325 mg per tablet   8/16/23

## 2024-11-22 NOTE — PROGRESS NOTES
"Subjective:       Patient ID: Kaycee Almanza is a 83 y.o. White female who presents for re-evaluation of progression of Proteinuria    HPI  Ms. Almanza is an 83 year old woman with medical history of diabetes, hypertension presenting for evaluation of proteinuria.  Patient previously followed by Nephrology for proteinuria, attributed to diabetes, reports improvement in the recent past with well-preserved kidney function.  However, recent urine studies in January 2018 showed microalbuminuria of ~2.4gm/day.  Patient reports recently losing a son and has not been "taking care" of herself as she used to.  Patient denies any NSAID use, she reports adequate fluid intake, she reports dietary indiscretion with salt.  She otherwise denies any fever, chest pain, shortness of breath, abdominal pain, diarrhea, dysuria/hematuria.      Review of Systems   Constitutional: Negative for appetite change, fatigue and fever.   Respiratory: Negative for chest tightness and shortness of breath.    Cardiovascular: Negative for chest pain and leg swelling.   Gastrointestinal: Negative for abdominal pain, constipation, diarrhea, nausea and vomiting.   Genitourinary: Negative for difficulty urinating, dysuria, flank pain, frequency, hematuria and urgency.   Musculoskeletal: Negative for arthralgias, joint swelling and myalgias.   Skin: Negative for rash and wound.   Neurological: Negative for dizziness, weakness and light-headedness.   All other systems reviewed and are negative.      Objective:      Physical Exam   Constitutional: She appears well-developed and well-nourished.   Cardiovascular: Normal rate, regular rhythm and normal heart sounds.  Exam reveals no gallop and no friction rub.    No murmur heard.  Pulmonary/Chest: Effort normal and breath sounds normal. No respiratory distress. She has no wheezes. She has no rales.   Abdominal: Soft. Bowel sounds are normal. There is no tenderness.   Musculoskeletal: She exhibits no " VM left for patient to return call.     edema.   Neurological: She is alert.   Skin: Skin is warm and dry. No rash noted. No erythema.   Psychiatric: She has a normal mood and affect.       Assessment:       1. Persistent proteinuria    2. Controlled type 2 diabetes mellitus with microalbuminuria, without long-term current use of insulin        Plan:     Ms. Almanza is an 83 year old woman with medical history of diabetes, hypertension presenting for evaluation of proteinuria.  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  Recent urine studies in January 2018 showed microalbuminuria of ~2.4gm/day, possibly due to worse glycemic control, will obtain urine protein/creatinine ratio to trend, stressed importance of blood pressure/glycemic control to prevent any further progression of kidney disease, patient voiced understanding.  Further recommendations pending results of above.     Return to clinic in 6 months with renal/heme panel, iron/TIBC/ferritin, urinalysis/culture, urine protein/creatinine ratio prior to next visit